# Patient Record
Sex: FEMALE | Race: BLACK OR AFRICAN AMERICAN | Employment: OTHER | URBAN - METROPOLITAN AREA
[De-identification: names, ages, dates, MRNs, and addresses within clinical notes are randomized per-mention and may not be internally consistent; named-entity substitution may affect disease eponyms.]

---

## 2018-10-11 RX ORDER — ENALAPRIL MALEATE 10 MG/1
10 TABLET ORAL 2 TIMES DAILY
Status: ON HOLD | COMMUNITY
End: 2020-08-27

## 2018-10-11 RX ORDER — BUMETANIDE 1 MG/1
1 TABLET ORAL 2 TIMES DAILY
Status: ON HOLD | COMMUNITY
End: 2020-08-27

## 2018-10-11 RX ORDER — CHOLECALCIFEROL (VITAMIN D3) 125 MCG
1000 CAPSULE ORAL DAILY
COMMUNITY
End: 2020-05-20

## 2018-10-11 RX ORDER — CARVEDILOL 25 MG/1
25 TABLET ORAL NIGHTLY
COMMUNITY
End: 2019-12-03 | Stop reason: DRUGHIGH

## 2018-10-11 RX ORDER — CARVEDILOL 25 MG/1
25 TABLET ORAL 2 TIMES DAILY WITH MEALS
Status: ON HOLD | COMMUNITY
End: 2020-01-01

## 2018-10-11 RX ORDER — RANITIDINE 150 MG/1
150 TABLET ORAL 2 TIMES DAILY
Status: ON HOLD | COMMUNITY
End: 2018-10-30

## 2018-10-17 ENCOUNTER — APPOINTMENT (OUTPATIENT)
Dept: LAB | Facility: HOSPITAL | Age: 73
End: 2018-10-17
Attending: FAMILY MEDICINE
Payer: MEDICARE

## 2018-10-17 ENCOUNTER — TELEPHONE (OUTPATIENT)
Dept: FAMILY MEDICINE CLINIC | Facility: CLINIC | Age: 73
End: 2018-10-17

## 2018-10-17 ENCOUNTER — HOSPITAL ENCOUNTER (OUTPATIENT)
Dept: GENERAL RADIOLOGY | Facility: HOSPITAL | Age: 73
Discharge: HOME | End: 2018-10-17
Attending: FAMILY MEDICINE
Payer: MEDICARE

## 2018-10-17 ENCOUNTER — OFFICE VISIT (OUTPATIENT)
Dept: FAMILY MEDICINE CLINIC | Facility: CLINIC | Age: 73
End: 2018-10-17
Payer: MEDICARE

## 2018-10-17 VITALS
BODY MASS INDEX: 40.67 KG/M2 | TEMPERATURE: 98 F | DIASTOLIC BLOOD PRESSURE: 72 MMHG | OXYGEN SATURATION: 96 % | RESPIRATION RATE: 16 BRPM | WEIGHT: 221 LBS | HEIGHT: 62 IN | HEART RATE: 61 BPM | SYSTOLIC BLOOD PRESSURE: 162 MMHG

## 2018-10-17 DIAGNOSIS — I42.9 CARDIOMYOPATHY, UNSPECIFIED TYPE (HCC): ICD-10-CM

## 2018-10-17 DIAGNOSIS — Z01.818 PREOP EXAMINATION: Primary | ICD-10-CM

## 2018-10-17 DIAGNOSIS — Z01.812 PRE-OPERATIVE LABORATORY EXAMINATION: ICD-10-CM

## 2018-10-17 DIAGNOSIS — Z01.818 PREOP EXAMINATION: ICD-10-CM

## 2018-10-17 DIAGNOSIS — E11.9 CONTROLLED TYPE 2 DIABETES MELLITUS WITHOUT COMPLICATION, WITH LONG-TERM CURRENT USE OF INSULIN (HCC): ICD-10-CM

## 2018-10-17 DIAGNOSIS — Z79.4 CONTROLLED TYPE 2 DIABETES MELLITUS WITHOUT COMPLICATION, WITH LONG-TERM CURRENT USE OF INSULIN (HCC): ICD-10-CM

## 2018-10-17 PROBLEM — E11.22 TYPE 2 DIABETES MELLITUS WITH CHRONIC KIDNEY DISEASE, WITH LONG-TERM CURRENT USE OF INSULIN (HCC): Status: ACTIVE | Noted: 2018-10-17

## 2018-10-17 PROBLEM — I10 ESSENTIAL HYPERTENSION: Status: ACTIVE | Noted: 2018-10-17

## 2018-10-17 PROBLEM — I42.8 NONISCHEMIC CARDIOMYOPATHY (HCC): Status: ACTIVE | Noted: 2018-10-17

## 2018-10-17 PROBLEM — M17.11 ARTHRITIS OF RIGHT KNEE: Status: ACTIVE | Noted: 2018-10-17

## 2018-10-17 PROBLEM — K21.00 GERD WITH ESOPHAGITIS: Status: ACTIVE | Noted: 2018-10-17

## 2018-10-17 PROBLEM — N28.9 RENAL INSUFFICIENCY: Status: ACTIVE | Noted: 2018-10-17

## 2018-10-17 PROCEDURE — 80053 COMPREHEN METABOLIC PANEL: CPT

## 2018-10-17 PROCEDURE — 86850 RBC ANTIBODY SCREEN: CPT

## 2018-10-17 PROCEDURE — 93010 ELECTROCARDIOGRAM REPORT: CPT | Performed by: FAMILY MEDICINE

## 2018-10-17 PROCEDURE — 86900 BLOOD TYPING SEROLOGIC ABO: CPT

## 2018-10-17 PROCEDURE — 81015 MICROSCOPIC EXAM OF URINE: CPT

## 2018-10-17 PROCEDURE — 36415 COLL VENOUS BLD VENIPUNCTURE: CPT

## 2018-10-17 PROCEDURE — 71046 X-RAY EXAM CHEST 2 VIEWS: CPT | Performed by: FAMILY MEDICINE

## 2018-10-17 PROCEDURE — 99205 OFFICE O/P NEW HI 60 MIN: CPT | Performed by: FAMILY MEDICINE

## 2018-10-17 PROCEDURE — 87186 SC STD MICRODIL/AGAR DIL: CPT

## 2018-10-17 PROCEDURE — 87081 CULTURE SCREEN ONLY: CPT

## 2018-10-17 PROCEDURE — 83036 HEMOGLOBIN GLYCOSYLATED A1C: CPT

## 2018-10-17 PROCEDURE — 93005 ELECTROCARDIOGRAM TRACING: CPT

## 2018-10-17 PROCEDURE — 86901 BLOOD TYPING SEROLOGIC RH(D): CPT

## 2018-10-17 PROCEDURE — 87086 URINE CULTURE/COLONY COUNT: CPT

## 2018-10-17 PROCEDURE — 85025 COMPLETE CBC W/AUTO DIFF WBC: CPT

## 2018-10-17 PROCEDURE — 87077 CULTURE AEROBIC IDENTIFY: CPT

## 2018-10-17 RX ORDER — LOPERAMIDE HYDROCHLORIDE 2 MG/1
2 CAPSULE ORAL AS NEEDED
Status: ON HOLD | COMMUNITY
End: 2020-01-01

## 2018-10-17 NOTE — TELEPHONE ENCOUNTER
Surgery on 10/29/18, RTKA with Dr. Ashley Marroquin @ [address] [place] [address]  H&P- complete  Labs- elevated HA1C (7.0), elevated BUN (28) and creatinine (1.52), low ALT (11) AST (12) and alk phos (144), low GFR (39), low HGB (10.2), low HCT (31.5), E. Coli grew out in the urine will n

## 2018-10-17 NOTE — PROGRESS NOTES
Pre-Op Evaluation     Date of surgery: 10/29/18               Procedure: R TKA  Physician: Dr. Kia Ramos    Pertinent PMH: LBP, Cataract 2017, CHF, Reflux, HBP, R thigh surgery d/t spider bite, Renal disorder, Type II DM  Living situation: Flora lives alone

## 2018-10-18 NOTE — H&P
[facility] PRE-OP CLINIC [place]    PRE-OP NOTE    HPI:   Lissa Bridges is a 75year old female with a hx of severe degenerative arthritis right knee who presents for a pre-operative physical exam. Patient is to have Right TKA by Dr. Nell Schwab on 10/29/18 at Cataract 2017    surgery   • Congestive heart disease ([facility] [address]    • Esophageal reflux    • High blood pressure    • History of blood transfusion 2003    r/t thigh surgery from spider bite-debridement   • Osteoarthritis    • Renal disorder     watching renal c lesion,   CARDIOVASCULAR:  See above Denies DVT.  Denies chest pain, palpitations, edema, dyspnea  RESPIRATORY:  Denies JOAN, Denies shortness of breath, wheezing, cough  GASTROINTESTINAL:  Denies abdominal pain, nausea, vomiting, constipation, diarrhea, or intact      Lab Results   Component Value Date    WBC 6.8 10/17/2018    HGB 10.2 (L) 10/17/2018    HCT 31.5 (L) 10/17/2018     10/17/2018    CREATSERUM 1.52 (H) 10/17/2018    BUN 28 (H) 10/17/2018     10/17/2018    K 4.4 10/17/2018     1 acceptable risk of surgery and may proceed with surgery as planned. Patient to discontinue medications and supplements with anticoagulation properties as per instruction.        Postoperative Recommendations:  Anticoagulation / DVT prophylaxis: SCDs, ASA 3

## 2018-10-18 NOTE — H&P (VIEW-ONLY)
[facility] PRE-OP CLINIC [facility]    PRE-OP NOTE    HPI:   Thea Mclean is a 75year old female with a hx of severe degenerative arthritis right knee who presents for a pre-operative physical exam. Patient is to have Right TKA by Dr. Juany Nevarez on 10/29/18 at Cataract 2017    surgery   • Congestive heart disease ([facility] [address]    • Esophageal reflux    • High blood pressure    • History of blood transfusion 2003    r/t thigh surgery from spider bite-debridement   • Osteoarthritis    • Renal disorder     watching renal c lesion,   CARDIOVASCULAR:  See above Denies DVT.  Denies chest pain, palpitations, edema, dyspnea  RESPIRATORY:  Denies JOAN, Denies shortness of breath, wheezing, cough  GASTROINTESTINAL:  Denies abdominal pain, nausea, vomiting, constipation, diarrhea, or intact      Lab Results   Component Value Date    WBC 6.8 10/17/2018    HGB 10.2 (L) 10/17/2018    HCT 31.5 (L) 10/17/2018     10/17/2018    CREATSERUM 1.52 (H) 10/17/2018    BUN 28 (H) 10/17/2018     10/17/2018    K 4.4 10/17/2018     1 acceptable risk of surgery and may proceed with surgery as planned. Patient to discontinue medications and supplements with anticoagulation properties as per instruction.        Postoperative Recommendations:  Anticoagulation / DVT prophylaxis: SCDs, ASA 3

## 2018-10-18 NOTE — ADDENDUM NOTE
Addended by: Kasandra John. on: 10/17/2018 09:41 PM     Modules accepted: Level of Service, SmartSet

## 2018-10-23 ENCOUNTER — HOSPITAL ENCOUNTER (OUTPATIENT)
Dept: CV DIAGNOSTICS | Facility: HOSPITAL | Age: 73
Discharge: HOME | End: 2018-10-23
Attending: FAMILY MEDICINE
Payer: MEDICARE

## 2018-10-23 ENCOUNTER — HOSPITAL ENCOUNTER (OUTPATIENT)
Dept: NUCLEAR MEDICINE | Facility: HOSPITAL | Age: 73
Discharge: HOME | End: 2018-10-23
Attending: FAMILY MEDICINE
Payer: MEDICARE

## 2018-10-23 DIAGNOSIS — I42.9 CARDIOMYOPATHY, UNSPECIFIED TYPE (HCC): ICD-10-CM

## 2018-10-23 PROCEDURE — 93016 CV STRESS TEST SUPVJ ONLY: CPT | Performed by: FAMILY MEDICINE

## 2018-10-23 PROCEDURE — 78452 HT MUSCLE IMAGE SPECT MULT: CPT | Performed by: FAMILY MEDICINE

## 2018-10-23 PROCEDURE — 93017 CV STRESS TEST TRACING ONLY: CPT | Performed by: FAMILY MEDICINE

## 2018-10-23 PROCEDURE — A4216 STERILE WATER/SALINE, 10 ML: HCPCS

## 2018-10-23 PROCEDURE — 93018 CV STRESS TEST I&R ONLY: CPT | Performed by: FAMILY MEDICINE

## 2018-10-23 RX ORDER — 0.9 % SODIUM CHLORIDE 0.9 %
VIAL (ML) INJECTION
Status: COMPLETED
Start: 2018-10-23 | End: 2018-10-23

## 2018-10-23 RX ADMIN — 0.9 % SODIUM CHLORIDE 10 ML: 0.9 % VIAL (ML) INJECTION at 09:29:00

## 2018-10-23 NOTE — TELEPHONE ENCOUNTER
Dr. Trae Briones please review    Labs- elevated HA1C (7.0), elevated BUN (28) and creatinine (1.52), low ALT (11) AST (12) and alk phos (144), low GFR (39), low HGB (10.2), low HCT (31.5), E. Coli grew out in the urine will need to treat.    EKG- abnormal, arabella

## 2018-10-24 NOTE — TELEPHONE ENCOUNTER
Caitie Mart MD  P [facility] Pre-Op Clinic Clinical Staff             Positive urine culture. Recommend keflex 500 mg tid for 10 days.

## 2018-10-26 RX ORDER — NITROFURANTOIN 25; 75 MG/1; MG/1
100 CAPSULE ORAL 2 TIMES DAILY
Qty: 14 CAPSULE | Refills: 0 | Status: ON HOLD | OUTPATIENT
Start: 2018-10-26 | End: 2018-11-21

## 2018-10-26 NOTE — DISCHARGE SUMMARY
Ortho Discharge Summary     Patient ID:  Andressa Fernandez  [id number]  75year old  9/8/1945      Admit Date: 10/29/2018    Discharge Date and Time: 10/30/2018    Attending Physician: Andrey Bell MD     Reason for admission: right knee DJD    Discharge D

## 2018-10-26 NOTE — TELEPHONE ENCOUNTER
Spoke to patient, Rx sent to [facility] [address] in [facility]. Her Daughter will  and she will start Rx today. Per Dr. Chen Balderrama ok if she only takes a few days prior to surgery, she will also get IV antibiotics during surgery.  Let her know she is clear for

## 2018-10-26 NOTE — TELEPHONE ENCOUNTER
Per Dr. Jeff Angel since patient has a penicillin allergy we can rodriguez antibiotic to Macrobid 100mg BID x7 days. She will take before and after surgery. She will call me back when she has the pharmacy she wants Rx to go to.    Stress test mailed to patient's a

## 2018-10-29 ENCOUNTER — HOSPITAL ENCOUNTER (INPATIENT)
Facility: HOSPITAL | Age: 73
LOS: 1 days | Discharge: HOME | DRG: 470 | End: 2018-10-30
Attending: ORTHOPAEDIC SURGERY | Admitting: ORTHOPAEDIC SURGERY
Payer: MEDICARE

## 2018-10-29 ENCOUNTER — ANESTHESIA (OUTPATIENT)
Dept: SURGERY | Facility: HOSPITAL | Age: 73
DRG: 470 | End: 2018-10-29
Payer: MEDICARE

## 2018-10-29 ENCOUNTER — ANESTHESIA EVENT (OUTPATIENT)
Dept: SURGERY | Facility: HOSPITAL | Age: 73
DRG: 470 | End: 2018-10-29
Payer: MEDICARE

## 2018-10-29 ENCOUNTER — APPOINTMENT (OUTPATIENT)
Dept: GENERAL RADIOLOGY | Facility: HOSPITAL | Age: 73
DRG: 470 | End: 2018-10-29
Attending: PHYSICIAN ASSISTANT
Payer: MEDICARE

## 2018-10-29 DIAGNOSIS — M17.11 PRIMARY OSTEOARTHRITIS OF RIGHT KNEE: Primary | ICD-10-CM

## 2018-10-29 DIAGNOSIS — D50.9 IRON DEFICIENCY ANEMIA, UNSPECIFIED IRON DEFICIENCY ANEMIA TYPE: ICD-10-CM

## 2018-10-29 PROBLEM — Z96.659 S/P KNEE REPLACEMENT: Status: ACTIVE | Noted: 2018-10-29

## 2018-10-29 PROCEDURE — 0SRC0J9 REPLACEMENT OF RIGHT KNEE JOINT WITH SYNTHETIC SUBSTITUTE, CEMENTED, OPEN APPROACH: ICD-10-PCS | Performed by: ORTHOPAEDIC SURGERY

## 2018-10-29 PROCEDURE — 73560 X-RAY EXAM OF KNEE 1 OR 2: CPT | Performed by: PHYSICIAN ASSISTANT

## 2018-10-29 PROCEDURE — 97116 GAIT TRAINING THERAPY: CPT

## 2018-10-29 PROCEDURE — 80053 COMPREHEN METABOLIC PANEL: CPT | Performed by: PHYSICIAN ASSISTANT

## 2018-10-29 PROCEDURE — 82962 GLUCOSE BLOOD TEST: CPT

## 2018-10-29 PROCEDURE — A4216 STERILE WATER/SALINE, 10 ML: HCPCS | Performed by: PHYSICIAN ASSISTANT

## 2018-10-29 PROCEDURE — 88305 TISSUE EXAM BY PATHOLOGIST: CPT | Performed by: ORTHOPAEDIC SURGERY

## 2018-10-29 PROCEDURE — 88311 DECALCIFY TISSUE: CPT | Performed by: ORTHOPAEDIC SURGERY

## 2018-10-29 PROCEDURE — 85014 HEMATOCRIT: CPT | Performed by: PHYSICIAN ASSISTANT

## 2018-10-29 PROCEDURE — 97162 PT EVAL MOD COMPLEX 30 MIN: CPT

## 2018-10-29 PROCEDURE — 85018 HEMOGLOBIN: CPT | Performed by: PHYSICIAN ASSISTANT

## 2018-10-29 RX ORDER — DIPHENHYDRAMINE HYDROCHLORIDE 50 MG/ML
25 INJECTION INTRAMUSCULAR; INTRAVENOUS ONCE AS NEEDED
Status: ACTIVE | OUTPATIENT
Start: 2018-10-29 | End: 2018-10-29

## 2018-10-29 RX ORDER — FAMOTIDINE 10 MG/ML
20 INJECTION, SOLUTION INTRAVENOUS 2 TIMES DAILY
Status: DISCONTINUED | OUTPATIENT
Start: 2018-10-29 | End: 2018-10-30

## 2018-10-29 RX ORDER — DEXAMETHASONE SODIUM PHOSPHATE 4 MG/ML
VIAL (ML) INJECTION AS NEEDED
Status: DISCONTINUED | OUTPATIENT
Start: 2018-10-29 | End: 2018-10-29 | Stop reason: SURG

## 2018-10-29 RX ORDER — DOCUSATE SODIUM 100 MG/1
100 CAPSULE, LIQUID FILLED ORAL 2 TIMES DAILY
Qty: 60 CAPSULE | Refills: 2 | Status: SHIPPED | OUTPATIENT
Start: 2018-10-29 | End: 2019-06-24

## 2018-10-29 RX ORDER — FAMOTIDINE 20 MG/1
20 TABLET ORAL ONCE
Status: DISCONTINUED | OUTPATIENT
Start: 2018-10-29 | End: 2018-10-29 | Stop reason: HOSPADM

## 2018-10-29 RX ORDER — CLINDAMYCIN PHOSPHATE 900 MG/50ML
900 INJECTION INTRAVENOUS ONCE
Status: DISCONTINUED | OUTPATIENT
Start: 2018-10-29 | End: 2018-10-29 | Stop reason: HOSPADM

## 2018-10-29 RX ORDER — DEXTROSE MONOHYDRATE 25 G/50ML
50 INJECTION, SOLUTION INTRAVENOUS AS NEEDED
Status: DISCONTINUED | OUTPATIENT
Start: 2018-10-29 | End: 2018-10-30

## 2018-10-29 RX ORDER — HYDROCODONE BITARTRATE AND ACETAMINOPHEN 5; 325 MG/1; MG/1
2 TABLET ORAL AS NEEDED
Status: DISCONTINUED | OUTPATIENT
Start: 2018-10-29 | End: 2018-10-29 | Stop reason: HOSPADM

## 2018-10-29 RX ORDER — CARVEDILOL 25 MG/1
25 TABLET ORAL NIGHTLY
Status: DISCONTINUED | OUTPATIENT
Start: 2018-10-29 | End: 2018-10-30

## 2018-10-29 RX ORDER — CLINDAMYCIN PHOSPHATE 150 MG/ML
INJECTION, SOLUTION INTRAVENOUS AS NEEDED
Status: DISCONTINUED | OUTPATIENT
Start: 2018-10-29 | End: 2018-10-29 | Stop reason: SURG

## 2018-10-29 RX ORDER — DIPHENHYDRAMINE HYDROCHLORIDE 50 MG/ML
12.5 INJECTION INTRAMUSCULAR; INTRAVENOUS EVERY 4 HOURS PRN
Status: DISCONTINUED | OUTPATIENT
Start: 2018-10-29 | End: 2018-10-30

## 2018-10-29 RX ORDER — BISACODYL 10 MG
10 SUPPOSITORY, RECTAL RECTAL
Status: DISCONTINUED | OUTPATIENT
Start: 2018-10-29 | End: 2018-10-30

## 2018-10-29 RX ORDER — NALOXONE HYDROCHLORIDE 0.4 MG/ML
80 INJECTION, SOLUTION INTRAMUSCULAR; INTRAVENOUS; SUBCUTANEOUS AS NEEDED
Status: DISCONTINUED | OUTPATIENT
Start: 2018-10-29 | End: 2018-10-29 | Stop reason: HOSPADM

## 2018-10-29 RX ORDER — ASPIRIN 325 MG
325 TABLET ORAL 2 TIMES DAILY
Status: DISCONTINUED | OUTPATIENT
Start: 2018-10-29 | End: 2018-10-30

## 2018-10-29 RX ORDER — MORPHINE SULFATE 4 MG/ML
2 INJECTION, SOLUTION INTRAMUSCULAR; INTRAVENOUS EVERY 10 MIN PRN
Status: DISCONTINUED | OUTPATIENT
Start: 2018-10-29 | End: 2018-10-29 | Stop reason: HOSPADM

## 2018-10-29 RX ORDER — METOPROLOL TARTRATE 5 MG/5ML
2.5 INJECTION INTRAVENOUS ONCE
Status: DISCONTINUED | OUTPATIENT
Start: 2018-10-29 | End: 2018-10-29 | Stop reason: HOSPADM

## 2018-10-29 RX ORDER — DIPHENHYDRAMINE HCL 25 MG
25 CAPSULE ORAL EVERY 4 HOURS PRN
Status: DISCONTINUED | OUTPATIENT
Start: 2018-10-29 | End: 2018-10-30

## 2018-10-29 RX ORDER — MORPHINE SULFATE 4 MG/ML
4 INJECTION, SOLUTION INTRAMUSCULAR; INTRAVENOUS EVERY 10 MIN PRN
Status: DISCONTINUED | OUTPATIENT
Start: 2018-10-29 | End: 2018-10-29 | Stop reason: HOSPADM

## 2018-10-29 RX ORDER — HYDROCODONE BITARTRATE AND ACETAMINOPHEN 5; 325 MG/1; MG/1
1 TABLET ORAL AS NEEDED
Status: DISCONTINUED | OUTPATIENT
Start: 2018-10-29 | End: 2018-10-29 | Stop reason: HOSPADM

## 2018-10-29 RX ORDER — METOCLOPRAMIDE 10 MG/1
10 TABLET ORAL ONCE
Status: COMPLETED | OUTPATIENT
Start: 2018-10-29 | End: 2018-10-29

## 2018-10-29 RX ORDER — DOCUSATE SODIUM 100 MG/1
100 CAPSULE, LIQUID FILLED ORAL 2 TIMES DAILY
Status: DISCONTINUED | OUTPATIENT
Start: 2018-10-29 | End: 2018-10-30

## 2018-10-29 RX ORDER — BUMETANIDE 1 MG/1
1 TABLET ORAL 2 TIMES DAILY
Status: DISCONTINUED | OUTPATIENT
Start: 2018-10-30 | End: 2018-10-30

## 2018-10-29 RX ORDER — CELECOXIB 200 MG/1
200 CAPSULE ORAL ONCE
Status: COMPLETED | OUTPATIENT
Start: 2018-10-29 | End: 2018-10-29

## 2018-10-29 RX ORDER — ONDANSETRON 4 MG/1
4 TABLET, FILM COATED ORAL EVERY 8 HOURS PRN
Qty: 30 TABLET | Refills: 0 | Status: SHIPPED | OUTPATIENT
Start: 2018-10-29 | End: 2019-06-24

## 2018-10-29 RX ORDER — ONDANSETRON 2 MG/ML
4 INJECTION INTRAMUSCULAR; INTRAVENOUS EVERY 4 HOURS PRN
Status: DISCONTINUED | OUTPATIENT
Start: 2018-10-29 | End: 2018-10-30

## 2018-10-29 RX ORDER — BUPIVACAINE HYDROCHLORIDE 7.5 MG/ML
INJECTION, SOLUTION EPIDURAL; RETROBULBAR AS NEEDED
Status: DISCONTINUED | OUTPATIENT
Start: 2018-10-29 | End: 2018-10-29 | Stop reason: SURG

## 2018-10-29 RX ORDER — ENALAPRIL MALEATE 10 MG/1
10 TABLET ORAL 2 TIMES DAILY
Status: DISCONTINUED | OUTPATIENT
Start: 2018-10-30 | End: 2018-10-30

## 2018-10-29 RX ORDER — ONDANSETRON 2 MG/ML
4 INJECTION INTRAMUSCULAR; INTRAVENOUS ONCE AS NEEDED
Status: COMPLETED | OUTPATIENT
Start: 2018-10-29 | End: 2018-10-29

## 2018-10-29 RX ORDER — HYDROMORPHONE HYDROCHLORIDE 1 MG/ML
0.8 INJECTION, SOLUTION INTRAMUSCULAR; INTRAVENOUS; SUBCUTANEOUS EVERY 2 HOUR PRN
Status: DISCONTINUED | OUTPATIENT
Start: 2018-10-29 | End: 2018-10-30

## 2018-10-29 RX ORDER — MIDAZOLAM HYDROCHLORIDE 1 MG/ML
INJECTION INTRAMUSCULAR; INTRAVENOUS AS NEEDED
Status: DISCONTINUED | OUTPATIENT
Start: 2018-10-29 | End: 2018-10-29 | Stop reason: SURG

## 2018-10-29 RX ORDER — SODIUM CHLORIDE, SODIUM LACTATE, POTASSIUM CHLORIDE, CALCIUM CHLORIDE 600; 310; 30; 20 MG/100ML; MG/100ML; MG/100ML; MG/100ML
INJECTION, SOLUTION INTRAVENOUS CONTINUOUS
Status: DISCONTINUED | OUTPATIENT
Start: 2018-10-29 | End: 2018-10-30

## 2018-10-29 RX ORDER — LABETALOL HYDROCHLORIDE 5 MG/ML
10 INJECTION, SOLUTION INTRAVENOUS ONCE
Status: COMPLETED | OUTPATIENT
Start: 2018-10-29 | End: 2018-10-29

## 2018-10-29 RX ORDER — CLINDAMYCIN PHOSPHATE 900 MG/50ML
900 INJECTION INTRAVENOUS EVERY 8 HOURS
Status: COMPLETED | OUTPATIENT
Start: 2018-10-29 | End: 2018-10-30

## 2018-10-29 RX ORDER — MORPHINE SULFATE 10 MG/ML
6 INJECTION, SOLUTION INTRAMUSCULAR; INTRAVENOUS EVERY 10 MIN PRN
Status: DISCONTINUED | OUTPATIENT
Start: 2018-10-29 | End: 2018-10-29 | Stop reason: HOSPADM

## 2018-10-29 RX ORDER — HALOPERIDOL 5 MG/ML
0.25 INJECTION INTRAMUSCULAR ONCE AS NEEDED
Status: DISCONTINUED | OUTPATIENT
Start: 2018-10-29 | End: 2018-10-29 | Stop reason: HOSPADM

## 2018-10-29 RX ORDER — SODIUM CHLORIDE, SODIUM LACTATE, POTASSIUM CHLORIDE, CALCIUM CHLORIDE 600; 310; 30; 20 MG/100ML; MG/100ML; MG/100ML; MG/100ML
INJECTION, SOLUTION INTRAVENOUS CONTINUOUS
Status: DISCONTINUED | OUTPATIENT
Start: 2018-10-29 | End: 2018-10-29 | Stop reason: HOSPADM

## 2018-10-29 RX ORDER — CARVEDILOL 25 MG/1
50 TABLET ORAL
Status: DISCONTINUED | OUTPATIENT
Start: 2018-10-30 | End: 2018-10-30

## 2018-10-29 RX ORDER — FAMOTIDINE 20 MG/1
20 TABLET ORAL 2 TIMES DAILY
Status: DISCONTINUED | OUTPATIENT
Start: 2018-10-29 | End: 2018-10-30

## 2018-10-29 RX ORDER — HYDRALAZINE HYDROCHLORIDE 20 MG/ML
10 INJECTION INTRAMUSCULAR; INTRAVENOUS ONCE
Status: COMPLETED | OUTPATIENT
Start: 2018-10-29 | End: 2018-10-29

## 2018-10-29 RX ORDER — CYCLOBENZAPRINE HCL 5 MG
5 TABLET ORAL EVERY 8 HOURS PRN
Status: DISCONTINUED | OUTPATIENT
Start: 2018-10-29 | End: 2018-10-30

## 2018-10-29 RX ORDER — SODIUM CHLORIDE 0.9 % (FLUSH) 0.9 %
10 SYRINGE (ML) INJECTION AS NEEDED
Status: DISCONTINUED | OUTPATIENT
Start: 2018-10-29 | End: 2018-10-30

## 2018-10-29 RX ORDER — ACETAMINOPHEN 500 MG
1000 TABLET ORAL ONCE
Status: DISCONTINUED | OUTPATIENT
Start: 2018-10-29 | End: 2018-10-29 | Stop reason: HOSPADM

## 2018-10-29 RX ORDER — METOCLOPRAMIDE HYDROCHLORIDE 5 MG/ML
10 INJECTION INTRAMUSCULAR; INTRAVENOUS EVERY 6 HOURS PRN
Status: DISCONTINUED | OUTPATIENT
Start: 2018-10-29 | End: 2018-10-30

## 2018-10-29 RX ORDER — SODIUM PHOSPHATE, DIBASIC AND SODIUM PHOSPHATE, MONOBASIC 7; 19 G/133ML; G/133ML
1 ENEMA RECTAL ONCE AS NEEDED
Status: DISCONTINUED | OUTPATIENT
Start: 2018-10-29 | End: 2018-10-30

## 2018-10-29 RX ORDER — DEXTROSE MONOHYDRATE 25 G/50ML
50 INJECTION, SOLUTION INTRAVENOUS
Status: DISCONTINUED | OUTPATIENT
Start: 2018-10-29 | End: 2018-10-29 | Stop reason: HOSPADM

## 2018-10-29 RX ORDER — POLYETHYLENE GLYCOL 3350 17 G/17G
17 POWDER, FOR SOLUTION ORAL DAILY PRN
Status: DISCONTINUED | OUTPATIENT
Start: 2018-10-29 | End: 2018-10-30

## 2018-10-29 RX ORDER — HYDROMORPHONE HYDROCHLORIDE 2 MG/1
TABLET ORAL
Qty: 60 TABLET | Refills: 0 | Status: ON HOLD | OUTPATIENT
Start: 2018-10-29 | End: 2018-11-19

## 2018-10-29 RX ORDER — HYDROMORPHONE HYDROCHLORIDE 2 MG/1
TABLET ORAL EVERY 4 HOURS PRN
Status: DISCONTINUED | OUTPATIENT
Start: 2018-10-29 | End: 2018-10-30

## 2018-10-29 RX ORDER — HYDROMORPHONE HYDROCHLORIDE 1 MG/ML
0.4 INJECTION, SOLUTION INTRAMUSCULAR; INTRAVENOUS; SUBCUTANEOUS EVERY 2 HOUR PRN
Status: DISCONTINUED | OUTPATIENT
Start: 2018-10-29 | End: 2018-10-30

## 2018-10-29 RX ORDER — LIDOCAINE HYDROCHLORIDE 10 MG/ML
INJECTION, SOLUTION EPIDURAL; INFILTRATION; INTRACAUDAL; PERINEURAL AS NEEDED
Status: DISCONTINUED | OUTPATIENT
Start: 2018-10-29 | End: 2018-10-29 | Stop reason: SURG

## 2018-10-29 RX ORDER — ASPIRIN 325 MG
325 TABLET, DELAYED RELEASE (ENTERIC COATED) ORAL 2 TIMES DAILY
Qty: 60 TABLET | Refills: 0 | Status: ON HOLD | OUTPATIENT
Start: 2018-10-29 | End: 2018-11-19

## 2018-10-29 RX ORDER — SCOLOPAMINE TRANSDERMAL SYSTEM 1 MG/1
1 PATCH, EXTENDED RELEASE TRANSDERMAL ONCE
Status: DISCONTINUED | OUTPATIENT
Start: 2018-10-29 | End: 2018-10-30

## 2018-10-29 RX ORDER — PANTOPRAZOLE SODIUM 40 MG/1
40 TABLET, DELAYED RELEASE ORAL
Qty: 30 TABLET | Refills: 0 | Status: SHIPPED | OUTPATIENT
Start: 2018-10-29 | End: 2018-11-16

## 2018-10-29 RX ORDER — HYDROMORPHONE HYDROCHLORIDE 1 MG/ML
1.2 INJECTION, SOLUTION INTRAMUSCULAR; INTRAVENOUS; SUBCUTANEOUS EVERY 2 HOUR PRN
Status: DISCONTINUED | OUTPATIENT
Start: 2018-10-29 | End: 2018-10-30

## 2018-10-29 RX ORDER — ONDANSETRON 2 MG/ML
INJECTION INTRAMUSCULAR; INTRAVENOUS AS NEEDED
Status: DISCONTINUED | OUTPATIENT
Start: 2018-10-29 | End: 2018-10-29 | Stop reason: SURG

## 2018-10-29 RX ORDER — ACETAMINOPHEN 325 MG/1
650 TABLET ORAL EVERY 6 HOURS SCHEDULED
Status: DISCONTINUED | OUTPATIENT
Start: 2018-10-29 | End: 2018-10-30

## 2018-10-29 RX ADMIN — LIDOCAINE HYDROCHLORIDE 15 MG: 10 INJECTION, SOLUTION EPIDURAL; INFILTRATION; INTRACAUDAL; PERINEURAL at 10:20:00

## 2018-10-29 RX ADMIN — ONDANSETRON 4 MG: 2 INJECTION INTRAMUSCULAR; INTRAVENOUS at 10:31:00

## 2018-10-29 RX ADMIN — CLINDAMYCIN PHOSPHATE 900 MG: 150 INJECTION, SOLUTION INTRAVENOUS at 10:31:00

## 2018-10-29 RX ADMIN — MIDAZOLAM HYDROCHLORIDE 2 MG: 1 INJECTION INTRAMUSCULAR; INTRAVENOUS at 10:17:00

## 2018-10-29 RX ADMIN — DEXAMETHASONE SODIUM PHOSPHATE 4 MG: 4 MG/ML VIAL (ML) INJECTION at 10:31:00

## 2018-10-29 RX ADMIN — BUPIVACAINE HYDROCHLORIDE 1.4 ML: 7.5 INJECTION, SOLUTION EPIDURAL; RETROBULBAR at 10:24:00

## 2018-10-29 RX ADMIN — LIDOCAINE HYDROCHLORIDE 25 MG: 10 INJECTION, SOLUTION EPIDURAL; INFILTRATION; INTRACAUDAL; PERINEURAL at 10:25:00

## 2018-10-29 RX ADMIN — SODIUM CHLORIDE, SODIUM LACTATE, POTASSIUM CHLORIDE, CALCIUM CHLORIDE: 600; 310; 30; 20 INJECTION, SOLUTION INTRAVENOUS at 12:15:00

## 2018-10-29 NOTE — ANESTHESIA PREPROCEDURE EVALUATION
Anesthesia PreOp Note    HPI:     Dheeraj Falcon is a 75year old female who presents for preoperative consultation requested by: Sesar Boss MD    Date of Surgery: 10/29/2018    Procedure(s):  KNEE TOTAL REPLACEMENT  Indication: Right knee degenerativ carvedilol 25 MG Oral Tab Take 50 mg by mouth daily. Disp:  Rfl:  10/29/2018 at 0700   carvedilol 25 MG Oral Tab Take 25 mg by mouth nightly. Disp:  Rfl:  10/28/2018 at 2100   RaNITidine HCl 150 MG Oral Tab Take 150 mg by mouth 2 (two) times daily.  Disp: Once Syeda Munoz MD      No current [facility]-ordered outpatient medications on file.       Flagyl [Metronidazo*    HIVES, NAUSEA AND VOMITING  Hydrocodone             HIVES, NAUSEA AND VOMITING  Penicillins             HIVES  Asa [Aspirin]           NAUSEA A Component Value Date     10/17/2018    K 4.4 10/17/2018     10/17/2018    CO2 27 10/17/2018    BUN 28 (H) 10/17/2018    CREATSERUM 1.52 (H) 10/17/2018     (H) 10/17/2018    CA 9.7 10/17/2018          Vital Signs:   Body mass index is 38 planned.   Simin Dawson  10/29/2018 9:36 AM

## 2018-10-29 NOTE — INTERVAL H&P NOTE
Pre-op Diagnosis: Right knee degenerative joint disease    The above referenced H&P was reviewed by YARI Freedman on 10/29/2018, the patient was examined and no significant changes have occurred in the patient's condition since the H&P was performed.

## 2018-10-29 NOTE — PHYSICAL THERAPY NOTE
PHYSICAL THERAPY KNEE EVALUATION - INPATIENT       Room Number: 415/415-A  Evaluation Date: 10/29/2018  Type of Evaluation: Initial  Physician Order: PT Eval and Treat    Presenting Problem: R TKA  Reason for Therapy: Mobility Dysfunction and Discharge Scott Primary osteoarthritis of right knee      Past Medical History  Past Medical History:   Diagnosis Date   • Back problem    • Cataract 2017    surgery   • Congestive heart disease ([facility] [address]    • Esophageal reflux    • High blood pressure    • History of blood t Standing: Fair  Dynamic [facility] '6-Clicks' INPATIENT SHORT FORM - BASIC MOBILITY  How much difficulty does the patient currently have. ..  -   Turning over in bed (inclu Goal #3   Current Status    Goal #4 Patient will negotiate 25 stairs/one curb w/ assistive device and SBA   Goal #4   Current Status    Goal #5  AROM 0 degrees extension to 95 degrees flexion     Goal #5   Current Status    Goal #6 Patient independently

## 2018-10-29 NOTE — CM/SW NOTE
SW met with the patient at bedside. The patient lives alone in the apartment, 1 step to enter, 18 steps with railing to get in. The patient responds being independent prior to admission with all ADL's, ambulation and driving. Patient is using a cane.  The p

## 2018-10-29 NOTE — ANESTHESIA PROCEDURE NOTES
Spinal Block  Performed by: Iraida Wilson CRNA  Authorized by: Milind Giordano MD     Start Time:  10/29/2018 10:19 AM  End Time:  10/29/2018 10:24 AM  Site identification: surface landmarks    Reason for Block: at surgeon's request, procedure

## 2018-10-30 VITALS
OXYGEN SATURATION: 96 % | BODY MASS INDEX: 39.01 KG/M2 | HEART RATE: 57 BPM | TEMPERATURE: 98 F | DIASTOLIC BLOOD PRESSURE: 46 MMHG | HEIGHT: 62 IN | RESPIRATION RATE: 18 BRPM | SYSTOLIC BLOOD PRESSURE: 129 MMHG | WEIGHT: 212 LBS

## 2018-10-30 PROCEDURE — 97116 GAIT TRAINING THERAPY: CPT

## 2018-10-30 PROCEDURE — 82962 GLUCOSE BLOOD TEST: CPT

## 2018-10-30 PROCEDURE — 97110 THERAPEUTIC EXERCISES: CPT

## 2018-10-30 PROCEDURE — 97530 THERAPEUTIC ACTIVITIES: CPT

## 2018-10-30 PROCEDURE — 85025 COMPLETE CBC W/AUTO DIFF WBC: CPT | Performed by: FAMILY MEDICINE

## 2018-10-30 PROCEDURE — 83036 HEMOGLOBIN GLYCOSYLATED A1C: CPT | Performed by: FAMILY MEDICINE

## 2018-10-30 PROCEDURE — 80053 COMPREHEN METABOLIC PANEL: CPT | Performed by: FAMILY MEDICINE

## 2018-10-30 NOTE — PROGRESS NOTES
[place] [facility]    Ortho Medical Progress Note     Eriberto Hopkins Patient Status:  Inpatient    1945 MRN [id number]   Location [facility] 4W/SW/SE Attending Neftaly Spears MD   Hosp Day # 1 PCP No primary care provider on file. CA 9.0 10/30/2018    ALB 3.0 (L) 10/30/2018    ALKPHO 112 (H) 10/30/2018    BILT 0.7 10/30/2018    TP 5.6 (L) 10/30/2018    AST 11 (L) 10/30/2018    ALT 10 (L) 10/30/2018       Xr Knee (1 Or 2 Views), Right (cpt=73560)    Result Date: 10/29/2018  CONCLUSIO

## 2018-10-30 NOTE — PROGRESS NOTES
[facility]    Progress Note    Mingo Valdivia Patient Status:  Inpatient    1945 MRN [id number]   Location [facility] 4W/SW/SE Attending Jaxon Amaro MD   Hosp Day # 1 PCP No primary care provider on file.        Subjective:

## 2018-10-30 NOTE — CM/SW NOTE
CTL rounds: met with patient and her daughter at bedside. Flora is BPCI eligible under . [employer] program reviewed with patient and daughter - 80 day phone call follow up explained. Patient verbalized understanding. [employer] provided.

## 2018-10-30 NOTE — PROGRESS NOTES
[facility]    Progress Note    Whit Morris Patient Status:  Inpatient    1945 MRN [id number]   Location [facility] 4W/SW/SE Attending Elmer Thomson MD   Hosp Day # 0 PCP No primary care provider on file.        Subjective: replacement      S/P knee replacement  Stable. Aspirin for anticoagulation      Essential hypertension        Renal insufficiency        Nonischemic cardiomyopathy (HCC)  Stable.  Negative stress test. EF normal      GERD with esophagitis        Type 2 diab

## 2018-11-01 NOTE — OPERATIVE REPORT
[address] [place] [address]\I OPERATIVE REPORT\b PATIENT NAME: Marylen Silos MR#: [id number]\G PMD: Eric Johansen DATE OF PROCEDURE: 10/29/2018\b PREOPERATIVE DIAGNOSIS: Degenerative Arthritis right kn surgery program on Monday, October 29, 2018. Following proper identification in the preoperative holding area with confirmation of the above-stated procedure, the patient was brought to the main operating room suite. Procedure was confirmed.  She received 0 was based between the medial and middle third of the tibial tubercle. This was provisionally pinned. A size 5 right EMPOWR 3D Non-Porous Femur femoral component was placed along with a 14 mm polyethylene spacer.  With this in place, the knee was able to joao

## 2018-11-08 ENCOUNTER — LAB ENCOUNTER (OUTPATIENT)
Dept: LAB | Facility: HOSPITAL | Age: 73
End: 2018-11-08
Attending: NURSE PRACTITIONER
Payer: MEDICARE

## 2018-11-08 DIAGNOSIS — D50.9 IRON DEFICIENCY ANEMIA, UNSPECIFIED IRON DEFICIENCY ANEMIA TYPE: ICD-10-CM

## 2018-11-08 PROCEDURE — 36415 COLL VENOUS BLD VENIPUNCTURE: CPT

## 2018-11-08 PROCEDURE — 85025 COMPLETE CBC W/AUTO DIFF WBC: CPT

## 2018-11-16 RX ORDER — RANITIDINE 15 MG/ML
150 SOLUTION ORAL 2 TIMES DAILY
COMMUNITY
End: 2019-12-13 | Stop reason: ALTCHOICE

## 2018-11-19 ENCOUNTER — ANESTHESIA (OUTPATIENT)
Dept: SURGERY | Facility: HOSPITAL | Age: 73
DRG: 488 | End: 2018-11-19
Payer: MEDICARE

## 2018-11-19 ENCOUNTER — HOSPITAL ENCOUNTER (INPATIENT)
Facility: HOSPITAL | Age: 73
LOS: 3 days | Discharge: NURSING, REHAB OR LONG-TERM CARE | DRG: 488 | End: 2018-11-22
Attending: ORTHOPAEDIC SURGERY | Admitting: ORTHOPAEDIC SURGERY
Payer: MEDICARE

## 2018-11-19 ENCOUNTER — APPOINTMENT (OUTPATIENT)
Dept: GENERAL RADIOLOGY | Facility: HOSPITAL | Age: 73
DRG: 488 | End: 2018-11-19
Attending: PHYSICIAN ASSISTANT
Payer: MEDICARE

## 2018-11-19 ENCOUNTER — ANESTHESIA EVENT (OUTPATIENT)
Dept: SURGERY | Facility: HOSPITAL | Age: 73
DRG: 488 | End: 2018-11-19
Payer: MEDICARE

## 2018-11-19 DIAGNOSIS — S86.811A PATELLAR TENDON RUPTURE, RIGHT, INITIAL ENCOUNTER: Primary | ICD-10-CM

## 2018-11-19 DIAGNOSIS — S83.8X1A MENISCAL INJURY, RIGHT, INITIAL ENCOUNTER: ICD-10-CM

## 2018-11-19 PROBLEM — Z96.659 S/P REVISION OF TOTAL KNEE: Status: ACTIVE | Noted: 2018-11-19

## 2018-11-19 PROBLEM — S86.819A PATELLAR TENDON RUPTURE: Status: ACTIVE | Noted: 2018-11-19

## 2018-11-19 PROCEDURE — 86850 RBC ANTIBODY SCREEN: CPT | Performed by: ORTHOPAEDIC SURGERY

## 2018-11-19 PROCEDURE — 87641 MR-STAPH DNA AMP PROBE: CPT | Performed by: ORTHOPAEDIC SURGERY

## 2018-11-19 PROCEDURE — 85025 COMPLETE CBC W/AUTO DIFF WBC: CPT | Performed by: ORTHOPAEDIC SURGERY

## 2018-11-19 PROCEDURE — 86901 BLOOD TYPING SEROLOGIC RH(D): CPT | Performed by: ORTHOPAEDIC SURGERY

## 2018-11-19 PROCEDURE — 89050 BODY FLUID CELL COUNT: CPT | Performed by: ORTHOPAEDIC SURGERY

## 2018-11-19 PROCEDURE — 73560 X-RAY EXAM OF KNEE 1 OR 2: CPT | Performed by: PHYSICIAN ASSISTANT

## 2018-11-19 PROCEDURE — 0SUV09Z SUPPLEMENT RIGHT KNEE JOINT, TIBIAL SURFACE WITH LINER, OPEN APPROACH: ICD-10-PCS | Performed by: ORTHOPAEDIC SURGERY

## 2018-11-19 PROCEDURE — 86900 BLOOD TYPING SEROLOGIC ABO: CPT | Performed by: ORTHOPAEDIC SURGERY

## 2018-11-19 PROCEDURE — 89051 BODY FLUID CELL COUNT: CPT | Performed by: ORTHOPAEDIC SURGERY

## 2018-11-19 PROCEDURE — 0LQQ0ZZ REPAIR RIGHT KNEE TENDON, OPEN APPROACH: ICD-10-PCS | Performed by: ORTHOPAEDIC SURGERY

## 2018-11-19 PROCEDURE — 86920 COMPATIBILITY TEST SPIN: CPT

## 2018-11-19 PROCEDURE — 0SPC09Z REMOVAL OF LINER FROM RIGHT KNEE JOINT, OPEN APPROACH: ICD-10-PCS | Performed by: ORTHOPAEDIC SURGERY

## 2018-11-19 PROCEDURE — 82962 GLUCOSE BLOOD TEST: CPT

## 2018-11-19 PROCEDURE — 87205 SMEAR GRAM STAIN: CPT | Performed by: ORTHOPAEDIC SURGERY

## 2018-11-19 PROCEDURE — 80053 COMPREHEN METABOLIC PANEL: CPT | Performed by: ORTHOPAEDIC SURGERY

## 2018-11-19 PROCEDURE — 0MQN0ZZ REPAIR RIGHT KNEE BURSA AND LIGAMENT, OPEN APPROACH: ICD-10-PCS | Performed by: ORTHOPAEDIC SURGERY

## 2018-11-19 PROCEDURE — 0LUQ0JZ SUPPLEMENT RIGHT KNEE TENDON WITH SYNTHETIC SUBSTITUTE, OPEN APPROACH: ICD-10-PCS | Performed by: ORTHOPAEDIC SURGERY

## 2018-11-19 PROCEDURE — 87070 CULTURE OTHR SPECIMN AEROBIC: CPT | Performed by: ORTHOPAEDIC SURGERY

## 2018-11-19 PROCEDURE — 88300 SURGICAL PATH GROSS: CPT | Performed by: ORTHOPAEDIC SURGERY

## 2018-11-19 PROCEDURE — 83036 HEMOGLOBIN GLYCOSYLATED A1C: CPT | Performed by: ORTHOPAEDIC SURGERY

## 2018-11-19 PROCEDURE — 87075 CULTR BACTERIA EXCEPT BLOOD: CPT | Performed by: ORTHOPAEDIC SURGERY

## 2018-11-19 PROCEDURE — 36415 COLL VENOUS BLD VENIPUNCTURE: CPT | Performed by: ORTHOPAEDIC SURGERY

## 2018-11-19 PROCEDURE — 30233N1 TRANSFUSION OF NONAUTOLOGOUS RED BLOOD CELLS INTO PERIPHERAL VEIN, PERCUTANEOUS APPROACH: ICD-10-PCS | Performed by: ORTHOPAEDIC SURGERY

## 2018-11-19 PROCEDURE — 36430 TRANSFUSION BLD/BLD COMPNT: CPT | Performed by: ANESTHESIOLOGY

## 2018-11-19 PROCEDURE — 88112 CYTOPATH CELL ENHANCE TECH: CPT | Performed by: ORTHOPAEDIC SURGERY

## 2018-11-19 DEVICE — IMPLANTABLE DEVICE: Type: IMPLANTABLE DEVICE | Site: KNEE | Status: FUNCTIONAL

## 2018-11-19 DEVICE — CEM BN NLTX STRL REUSE MED VSC: Type: IMPLANTABLE DEVICE | Site: KNEE | Status: FUNCTIONAL

## 2018-11-19 DEVICE — WASHER SYNT 7MM 219.98: Type: IMPLANTABLE DEVICE | Site: KNEE | Status: FUNCTIONAL

## 2018-11-19 RX ORDER — BISACODYL 10 MG
10 SUPPOSITORY, RECTAL RECTAL
Status: DISCONTINUED | OUTPATIENT
Start: 2018-11-19 | End: 2018-11-22

## 2018-11-19 RX ORDER — MIDAZOLAM HYDROCHLORIDE 1 MG/ML
INJECTION INTRAMUSCULAR; INTRAVENOUS AS NEEDED
Status: DISCONTINUED | OUTPATIENT
Start: 2018-11-19 | End: 2018-11-19 | Stop reason: SURG

## 2018-11-19 RX ORDER — POLYETHYLENE GLYCOL 3350 17 G/17G
17 POWDER, FOR SOLUTION ORAL DAILY PRN
Status: DISCONTINUED | OUTPATIENT
Start: 2018-11-19 | End: 2018-11-22

## 2018-11-19 RX ORDER — HYDRALAZINE HYDROCHLORIDE 20 MG/ML
INJECTION INTRAMUSCULAR; INTRAVENOUS AS NEEDED
Status: DISCONTINUED | OUTPATIENT
Start: 2018-11-19 | End: 2018-11-19 | Stop reason: SURG

## 2018-11-19 RX ORDER — METOCLOPRAMIDE HYDROCHLORIDE 5 MG/ML
10 INJECTION INTRAMUSCULAR; INTRAVENOUS EVERY 6 HOURS PRN
Status: ACTIVE | OUTPATIENT
Start: 2018-11-19 | End: 2018-11-21

## 2018-11-19 RX ORDER — DEXTROSE MONOHYDRATE 25 G/50ML
50 INJECTION, SOLUTION INTRAVENOUS
Status: DISCONTINUED | OUTPATIENT
Start: 2018-11-19 | End: 2018-11-19 | Stop reason: HOSPADM

## 2018-11-19 RX ORDER — MORPHINE SULFATE 4 MG/ML
4 INJECTION, SOLUTION INTRAMUSCULAR; INTRAVENOUS EVERY 10 MIN PRN
Status: DISCONTINUED | OUTPATIENT
Start: 2018-11-19 | End: 2018-11-19 | Stop reason: HOSPADM

## 2018-11-19 RX ORDER — EPHEDRINE SULFATE 50 MG/ML
INJECTION, SOLUTION INTRAVENOUS AS NEEDED
Status: DISCONTINUED | OUTPATIENT
Start: 2018-11-19 | End: 2018-11-19 | Stop reason: SURG

## 2018-11-19 RX ORDER — FAMOTIDINE 10 MG/ML
20 INJECTION, SOLUTION INTRAVENOUS 2 TIMES DAILY
Status: DISCONTINUED | OUTPATIENT
Start: 2018-11-19 | End: 2018-11-22

## 2018-11-19 RX ORDER — HYDROCODONE BITARTRATE AND ACETAMINOPHEN 5; 325 MG/1; MG/1
1 TABLET ORAL AS NEEDED
Status: DISCONTINUED | OUTPATIENT
Start: 2018-11-19 | End: 2018-11-19 | Stop reason: HOSPADM

## 2018-11-19 RX ORDER — SODIUM CHLORIDE, SODIUM LACTATE, POTASSIUM CHLORIDE, CALCIUM CHLORIDE 600; 310; 30; 20 MG/100ML; MG/100ML; MG/100ML; MG/100ML
INJECTION, SOLUTION INTRAVENOUS CONTINUOUS
Status: DISCONTINUED | OUTPATIENT
Start: 2018-11-19 | End: 2018-11-22

## 2018-11-19 RX ORDER — DOCUSATE SODIUM 100 MG/1
100 CAPSULE, LIQUID FILLED ORAL 2 TIMES DAILY
Status: DISCONTINUED | OUTPATIENT
Start: 2018-11-19 | End: 2018-11-22

## 2018-11-19 RX ORDER — SENNOSIDES 8.6 MG
17.2 TABLET ORAL NIGHTLY
Status: DISCONTINUED | OUTPATIENT
Start: 2018-11-19 | End: 2018-11-22

## 2018-11-19 RX ORDER — DIPHENHYDRAMINE HYDROCHLORIDE 50 MG/ML
12.5 INJECTION INTRAMUSCULAR; INTRAVENOUS EVERY 4 HOURS PRN
Status: DISCONTINUED | OUTPATIENT
Start: 2018-11-19 | End: 2018-11-22

## 2018-11-19 RX ORDER — HYDROMORPHONE HYDROCHLORIDE 2 MG/1
TABLET ORAL EVERY 4 HOURS PRN
Status: DISCONTINUED | OUTPATIENT
Start: 2018-11-19 | End: 2018-11-22

## 2018-11-19 RX ORDER — DEXAMETHASONE SODIUM PHOSPHATE 4 MG/ML
VIAL (ML) INJECTION AS NEEDED
Status: DISCONTINUED | OUTPATIENT
Start: 2018-11-19 | End: 2018-11-19 | Stop reason: SURG

## 2018-11-19 RX ORDER — ACETAMINOPHEN 500 MG
1000 TABLET ORAL ONCE
Status: COMPLETED | OUTPATIENT
Start: 2018-11-19 | End: 2018-11-19

## 2018-11-19 RX ORDER — CLINDAMYCIN PHOSPHATE 900 MG/50ML
900 INJECTION INTRAVENOUS EVERY 8 HOURS
Status: COMPLETED | OUTPATIENT
Start: 2018-11-19 | End: 2018-11-20

## 2018-11-19 RX ORDER — SODIUM PHOSPHATE, DIBASIC AND SODIUM PHOSPHATE, MONOBASIC 7; 19 G/133ML; G/133ML
1 ENEMA RECTAL ONCE AS NEEDED
Status: DISCONTINUED | OUTPATIENT
Start: 2018-11-19 | End: 2018-11-22

## 2018-11-19 RX ORDER — HYDROMORPHONE HYDROCHLORIDE 1 MG/ML
0.8 INJECTION, SOLUTION INTRAMUSCULAR; INTRAVENOUS; SUBCUTANEOUS EVERY 2 HOUR PRN
Status: DISCONTINUED | OUTPATIENT
Start: 2018-11-19 | End: 2018-11-22

## 2018-11-19 RX ORDER — SODIUM CHLORIDE, SODIUM LACTATE, POTASSIUM CHLORIDE, CALCIUM CHLORIDE 600; 310; 30; 20 MG/100ML; MG/100ML; MG/100ML; MG/100ML
INJECTION, SOLUTION INTRAVENOUS CONTINUOUS
Status: DISCONTINUED | OUTPATIENT
Start: 2018-11-19 | End: 2018-11-19 | Stop reason: HOSPADM

## 2018-11-19 RX ORDER — FAMOTIDINE 20 MG/1
20 TABLET ORAL 2 TIMES DAILY
Status: DISCONTINUED | OUTPATIENT
Start: 2018-11-19 | End: 2018-11-22

## 2018-11-19 RX ORDER — BUPIVACAINE HYDROCHLORIDE 7.5 MG/ML
INJECTION, SOLUTION EPIDURAL; RETROBULBAR AS NEEDED
Status: DISCONTINUED | OUTPATIENT
Start: 2018-11-19 | End: 2018-11-19 | Stop reason: SURG

## 2018-11-19 RX ORDER — ONDANSETRON 2 MG/ML
INJECTION INTRAMUSCULAR; INTRAVENOUS AS NEEDED
Status: DISCONTINUED | OUTPATIENT
Start: 2018-11-19 | End: 2018-11-19 | Stop reason: SURG

## 2018-11-19 RX ORDER — METOCLOPRAMIDE 10 MG/1
10 TABLET ORAL ONCE
Status: COMPLETED | OUTPATIENT
Start: 2018-11-19 | End: 2018-11-19

## 2018-11-19 RX ORDER — HYDROMORPHONE HYDROCHLORIDE 2 MG/1
TABLET ORAL
Qty: 60 TABLET | Refills: 0 | Status: ON HOLD | OUTPATIENT
Start: 2018-11-19 | End: 2019-06-25 | Stop reason: ALTCHOICE

## 2018-11-19 RX ORDER — HYDROCODONE BITARTRATE AND ACETAMINOPHEN 5; 325 MG/1; MG/1
2 TABLET ORAL AS NEEDED
Status: DISCONTINUED | OUTPATIENT
Start: 2018-11-19 | End: 2018-11-19 | Stop reason: HOSPADM

## 2018-11-19 RX ORDER — MORPHINE SULFATE 10 MG/ML
6 INJECTION, SOLUTION INTRAMUSCULAR; INTRAVENOUS EVERY 10 MIN PRN
Status: DISCONTINUED | OUTPATIENT
Start: 2018-11-19 | End: 2018-11-19 | Stop reason: HOSPADM

## 2018-11-19 RX ORDER — CELECOXIB 200 MG/1
200 CAPSULE ORAL ONCE
Status: COMPLETED | OUTPATIENT
Start: 2018-11-19 | End: 2018-11-19

## 2018-11-19 RX ORDER — ASPIRIN 81 MG/1
81 TABLET ORAL DAILY
Status: DISCONTINUED | OUTPATIENT
Start: 2018-11-19 | End: 2018-11-20

## 2018-11-19 RX ORDER — MORPHINE SULFATE 4 MG/ML
2 INJECTION, SOLUTION INTRAMUSCULAR; INTRAVENOUS EVERY 10 MIN PRN
Status: DISCONTINUED | OUTPATIENT
Start: 2018-11-19 | End: 2018-11-19 | Stop reason: HOSPADM

## 2018-11-19 RX ORDER — SCOLOPAMINE TRANSDERMAL SYSTEM 1 MG/1
1 PATCH, EXTENDED RELEASE TRANSDERMAL ONCE
Status: DISCONTINUED | OUTPATIENT
Start: 2018-11-19 | End: 2018-11-22

## 2018-11-19 RX ORDER — CYCLOBENZAPRINE HCL 5 MG
5 TABLET ORAL EVERY 8 HOURS PRN
Status: DISCONTINUED | OUTPATIENT
Start: 2018-11-19 | End: 2018-11-22

## 2018-11-19 RX ORDER — HYDROMORPHONE HYDROCHLORIDE 1 MG/ML
0.4 INJECTION, SOLUTION INTRAMUSCULAR; INTRAVENOUS; SUBCUTANEOUS EVERY 2 HOUR PRN
Status: DISCONTINUED | OUTPATIENT
Start: 2018-11-19 | End: 2018-11-22

## 2018-11-19 RX ORDER — LIDOCAINE HYDROCHLORIDE 10 MG/ML
INJECTION, SOLUTION EPIDURAL; INFILTRATION; INTRACAUDAL; PERINEURAL AS NEEDED
Status: DISCONTINUED | OUTPATIENT
Start: 2018-11-19 | End: 2018-11-19 | Stop reason: SURG

## 2018-11-19 RX ORDER — DIPHENHYDRAMINE HYDROCHLORIDE 50 MG/ML
25 INJECTION INTRAMUSCULAR; INTRAVENOUS ONCE AS NEEDED
Status: ACTIVE | OUTPATIENT
Start: 2018-11-19 | End: 2018-11-19

## 2018-11-19 RX ORDER — ASPIRIN 325 MG
325 TABLET ORAL 2 TIMES DAILY
Status: DISCONTINUED | OUTPATIENT
Start: 2018-11-19 | End: 2018-11-19

## 2018-11-19 RX ORDER — DIPHENHYDRAMINE HCL 25 MG
25 CAPSULE ORAL EVERY 4 HOURS PRN
Status: DISCONTINUED | OUTPATIENT
Start: 2018-11-19 | End: 2018-11-22

## 2018-11-19 RX ORDER — ACETAMINOPHEN 325 MG/1
650 TABLET ORAL EVERY 6 HOURS SCHEDULED
Status: DISCONTINUED | OUTPATIENT
Start: 2018-11-20 | End: 2018-11-22

## 2018-11-19 RX ORDER — ASPIRIN 81 MG/1
81 TABLET ORAL 2 TIMES DAILY
Qty: 60 TABLET | Refills: 0 | Status: ON HOLD | OUTPATIENT
Start: 2018-11-19 | End: 2019-06-25

## 2018-11-19 RX ORDER — FAMOTIDINE 20 MG/1
20 TABLET ORAL ONCE
Status: COMPLETED | OUTPATIENT
Start: 2018-11-19 | End: 2018-11-19

## 2018-11-19 RX ORDER — HALOPERIDOL 5 MG/ML
0.25 INJECTION INTRAMUSCULAR ONCE AS NEEDED
Status: DISCONTINUED | OUTPATIENT
Start: 2018-11-19 | End: 2018-11-19 | Stop reason: HOSPADM

## 2018-11-19 RX ORDER — SODIUM CHLORIDE 0.9 % (FLUSH) 0.9 %
10 SYRINGE (ML) INJECTION AS NEEDED
Status: DISCONTINUED | OUTPATIENT
Start: 2018-11-19 | End: 2018-11-22

## 2018-11-19 RX ORDER — HYDROMORPHONE HYDROCHLORIDE 1 MG/ML
1.2 INJECTION, SOLUTION INTRAMUSCULAR; INTRAVENOUS; SUBCUTANEOUS EVERY 2 HOUR PRN
Status: DISCONTINUED | OUTPATIENT
Start: 2018-11-19 | End: 2018-11-22

## 2018-11-19 RX ORDER — ONDANSETRON 2 MG/ML
4 INJECTION INTRAMUSCULAR; INTRAVENOUS EVERY 4 HOURS PRN
Status: ACTIVE | OUTPATIENT
Start: 2018-11-19 | End: 2018-11-21

## 2018-11-19 RX ORDER — NALOXONE HYDROCHLORIDE 0.4 MG/ML
80 INJECTION, SOLUTION INTRAMUSCULAR; INTRAVENOUS; SUBCUTANEOUS AS NEEDED
Status: DISCONTINUED | OUTPATIENT
Start: 2018-11-19 | End: 2018-11-19 | Stop reason: HOSPADM

## 2018-11-19 RX ORDER — ONDANSETRON 2 MG/ML
4 INJECTION INTRAMUSCULAR; INTRAVENOUS ONCE AS NEEDED
Status: DISCONTINUED | OUTPATIENT
Start: 2018-11-19 | End: 2018-11-19 | Stop reason: HOSPADM

## 2018-11-19 RX ADMIN — ONDANSETRON 4 MG: 2 INJECTION INTRAMUSCULAR; INTRAVENOUS at 16:12:00

## 2018-11-19 RX ADMIN — BUPIVACAINE HYDROCHLORIDE 1.3 ML: 7.5 INJECTION, SOLUTION EPIDURAL; RETROBULBAR at 15:49:00

## 2018-11-19 RX ADMIN — LIDOCAINE HYDROCHLORIDE 25 MG: 10 INJECTION, SOLUTION EPIDURAL; INFILTRATION; INTRACAUDAL; PERINEURAL at 15:50:00

## 2018-11-19 RX ADMIN — HYDRALAZINE HYDROCHLORIDE 5 MG: 20 INJECTION INTRAMUSCULAR; INTRAVENOUS at 17:36:00

## 2018-11-19 RX ADMIN — SODIUM CHLORIDE, SODIUM LACTATE, POTASSIUM CHLORIDE, CALCIUM CHLORIDE: 600; 310; 30; 20 INJECTION, SOLUTION INTRAVENOUS at 18:50:00

## 2018-11-19 RX ADMIN — EPHEDRINE SULFATE 10 MG: 50 INJECTION, SOLUTION INTRAVENOUS at 16:50:00

## 2018-11-19 RX ADMIN — LIDOCAINE HYDROCHLORIDE 10 MG: 10 INJECTION, SOLUTION EPIDURAL; INFILTRATION; INTRACAUDAL; PERINEURAL at 15:40:00

## 2018-11-19 RX ADMIN — DEXAMETHASONE SODIUM PHOSPHATE 4 MG: 4 MG/ML VIAL (ML) INJECTION at 16:12:00

## 2018-11-19 RX ADMIN — HYDRALAZINE HYDROCHLORIDE 5 MG: 20 INJECTION INTRAMUSCULAR; INTRAVENOUS at 17:29:00

## 2018-11-19 RX ADMIN — MIDAZOLAM HYDROCHLORIDE 2 MG: 1 INJECTION INTRAMUSCULAR; INTRAVENOUS at 15:39:00

## 2018-11-19 RX ADMIN — HYDRALAZINE HYDROCHLORIDE 5 MG: 20 INJECTION INTRAMUSCULAR; INTRAVENOUS at 17:34:00

## 2018-11-19 RX ADMIN — SODIUM CHLORIDE, SODIUM LACTATE, POTASSIUM CHLORIDE, CALCIUM CHLORIDE: 600; 310; 30; 20 INJECTION, SOLUTION INTRAVENOUS at 15:37:00

## 2018-11-19 NOTE — ANESTHESIA PROCEDURE NOTES
Spinal Block  Performed by: Vashti Younger CRNA  Authorized by: Seble Buenrostro MD     Patient Location:  OR  Start Time:  11/19/2018 3:45 PM  End Time:  11/19/2018 3:49 PM  Site identification: surface landmarks    Anesthesiologist:  Mango Cary

## 2018-11-19 NOTE — PAT NURSING NOTE
Per Redd Sim at Dr. Lyons Kelli office called and states per Dr. Jaxon Martin wants CBC and CMP done stat preop and the PA will see pt. And review labs . Orders entered.

## 2018-11-19 NOTE — H&P
[address]ga Patient Status:  Surgery Admit    1945 MRN [id number]   Location [facility] Attending Tryon Record, MD   Hosp Day # 0 PCP No primary care provider on file. • History of blood transfusion 11/2018    no reaction   • Osteoarthritis    • Renal disorder     watching renal counts   • Visual impairment     readers     Past Surgical History:   Procedure Laterality Date   • CATARACT  2017   • Arron Scott mouth 2 (two) times daily. carvedilol 25 MG Oral Tab Take 50 mg by mouth daily. carvedilol 25 MG Oral Tab Take 25 mg by mouth nightly. bumetanide 1 MG Oral Tab Take 1 mg by mouth 2 (two) times daily.    Insulin NPH, Human,, Isophane, 100 UNIT/ML Subcu 11/19/2018     11/19/2018    CREATSERUM 1.29 11/19/2018    BUN 12 11/19/2018     11/19/2018    K 3.5 11/19/2018     11/19/2018    CO2 29 11/19/2018    GLU 90 11/19/2018    CA 8.8 11/19/2018    ALB 2.7 (L) 11/19/2018    ALKPHO 111 (H) 11/

## 2018-11-19 NOTE — DISCHARGE SUMMARY
Ortho Discharge Summary     Patient ID:  Makayla Dash  [id number]  75year old  9/8/1945      Admit Date: 11/19/2018    Discharge Date and Time: 11/22/2018    Attending Physician: Trudi Wilcox MD     Reason for admission: right patellar tendon repair

## 2018-11-19 NOTE — ANESTHESIA PREPROCEDURE EVALUATION
Anesthesia PreOp Note    HPI:     Mal Holloway is a 75year old female who presents for preoperative consultation requested by: Silva Magaña MD    Date of Surgery: 11/19/2018    Procedure(s):  PATELLA TENDON REPAIR  Indication: Meniscal injury, right, total) by mouth 2 (two) times daily. Disp: 60 capsule Rfl: 2    Ondansetron HCl (ZOFRAN) 4 mg tablet Take 1 tablet (4 mg total) by mouth every 8 (eight) hours as needed for Nausea.  Disp: 30 tablet Rfl: 0    HYDROmorphone HCl 2 MG Oral Tab Take 1-2 tablets acetaminophen (TYLENOL EXTRA STRENGTH) tab 1,000 mg 1,000 mg Oral Once Arslan Maurer MD   metoprolol Tartrate (LOPRESSOR) tab 25 mg 25 mg Oral Once PRN Arslan Maurer MD   famoTIDine (PEPCID) tab 20 mg 20 mg Oral Once Arslan Maurer MD   Metoclopramide Component Value Date    WBC 9.6 11/08/2018    RBC 3.01 (L) 11/08/2018    HGB 7.8 (L) 11/08/2018    HCT 24.1 (L) 11/08/2018    MCV 80.1 11/08/2018    MCH 25.9 (L) 11/08/2018    MCHC 32.3 11/08/2018    RDW 16.8 (H) 11/08/2018     11/08/2018    MPV 9 patient's questions were answered to the best of my ability. The patient desires the anesthetic management as planned.   I have informed this Deandra Coup  of the risks of neuraxial anesthesia including, but not limited to: failure,headache, backache, sp

## 2018-11-20 PROBLEM — S86.811A PATELLAR TENDON RUPTURE, RIGHT, INITIAL ENCOUNTER: Status: ACTIVE | Noted: 2018-11-19

## 2018-11-20 PROCEDURE — 97110 THERAPEUTIC EXERCISES: CPT

## 2018-11-20 PROCEDURE — 97162 PT EVAL MOD COMPLEX 30 MIN: CPT

## 2018-11-20 PROCEDURE — A4216 STERILE WATER/SALINE, 10 ML: HCPCS | Performed by: PHYSICIAN ASSISTANT

## 2018-11-20 PROCEDURE — 97116 GAIT TRAINING THERAPY: CPT

## 2018-11-20 PROCEDURE — 82962 GLUCOSE BLOOD TEST: CPT

## 2018-11-20 PROCEDURE — 85014 HEMATOCRIT: CPT | Performed by: PHYSICIAN ASSISTANT

## 2018-11-20 PROCEDURE — 85018 HEMOGLOBIN: CPT | Performed by: PHYSICIAN ASSISTANT

## 2018-11-20 PROCEDURE — 80053 COMPREHEN METABOLIC PANEL: CPT | Performed by: PHYSICIAN ASSISTANT

## 2018-11-20 RX ORDER — ASPIRIN 81 MG/1
81 TABLET ORAL ONCE
Status: DISCONTINUED | OUTPATIENT
Start: 2018-11-19 | End: 2018-11-20

## 2018-11-20 RX ORDER — CARVEDILOL 25 MG/1
25 TABLET ORAL NIGHTLY
Status: DISCONTINUED | OUTPATIENT
Start: 2018-11-20 | End: 2018-11-22

## 2018-11-20 RX ORDER — BUMETANIDE 1 MG/1
1 TABLET ORAL 2 TIMES DAILY
Status: DISCONTINUED | OUTPATIENT
Start: 2018-11-20 | End: 2018-11-21

## 2018-11-20 RX ORDER — DEXTROSE MONOHYDRATE 25 G/50ML
50 INJECTION, SOLUTION INTRAVENOUS AS NEEDED
Status: DISCONTINUED | OUTPATIENT
Start: 2018-11-20 | End: 2018-11-22

## 2018-11-20 RX ORDER — ENALAPRIL MALEATE 10 MG/1
10 TABLET ORAL 2 TIMES DAILY
Status: DISCONTINUED | OUTPATIENT
Start: 2018-11-20 | End: 2018-11-21

## 2018-11-20 RX ORDER — ASPIRIN 81 MG/1
81 TABLET, CHEWABLE ORAL 2 TIMES DAILY
Status: DISCONTINUED | OUTPATIENT
Start: 2018-11-20 | End: 2018-11-22

## 2018-11-20 RX ORDER — SODIUM CHLORIDE 9 MG/ML
INJECTION, SOLUTION INTRAVENOUS CONTINUOUS
Status: DISCONTINUED | OUTPATIENT
Start: 2018-11-20 | End: 2018-11-22

## 2018-11-20 RX ORDER — CARVEDILOL 25 MG/1
50 TABLET ORAL DAILY
Status: DISCONTINUED | OUTPATIENT
Start: 2018-11-20 | End: 2018-11-22

## 2018-11-20 NOTE — PHYSICAL THERAPY NOTE
PHYSICAL THERAPY KNEE TREATMENT NOTE - INPATIENT     Room Number: 311/850-F             Presenting Problem: R patellar tendon rupture s/p reconstruction     Problem List  Principal Problem:    Patellar tendon rupture, right, initial encounter  Active Probl Sub-acute rehabilitation    PLAN  PT Treatment Plan: Endurance; Patient education; Family education;Gait training;Strengthening;Stair training;Transfer training;Balance training    SUBJECTIVE  I willing what is bets for me    OBJECTIVE  Precautions: (No R kn chair;Needs met;Call light within reach;RN aware of session/findings; All patient questions and concerns addressed    CURRENT GOALS  Goals to be met by: 11/25/18  Patient Goal Patient's self-stated goal is: to heal better   Goal #1 Patient is able to demons

## 2018-11-20 NOTE — PROGRESS NOTES
[facility]    Progress Note    Thea Mclean Patient Status:  Inpatient    1945 MRN [id number]   Location [facility] 4W/SW/SE Attending Zulema Arechiga MD   Hosp Day # 1 PCP No primary care provider on file.        Subjective: femur, tibia and patella. * There is now deformity of the inferior pole of the patella with question of a displaced fragment. * Skin staples are noted. Dictated by (CST): Kristina Radford MD on 11/19/2018 at 19:51     Approved by (CST):  Kizzy Redmond

## 2018-11-20 NOTE — PROGRESS NOTES
[facility]    Ortho Medical Progress Note     Lori Valente Patient Status:  Inpatient    1945 MRN [id number]   Location [facility] 4W/SW/SE Attending Nayana Soto MD   Hosp Day # 1 PCP No primary care provider on file. ALT 12 (L) 11/20/2018       Xr Knee (1 Or 2 Views), Right (cpt=73560)    Result Date: 11/19/2018  CONCLUSION:  * Total knee arthroplasty is identified. * The prostheses are well seated in the femur, tibia and patella.  * There is now deformity of the inf

## 2018-11-20 NOTE — ANESTHESIA PROCEDURE NOTES
Airway  Date/Time: 11/19/2018 6:25 PM  Urgency: emergent    Airway not difficult    General Information and Staff    Patient location during procedure: OR  Anesthesiologist: Tino Harding MD  Performed: anesthesiologist     Consent for Airway (if performed

## 2018-11-20 NOTE — PHYSICAL THERAPY NOTE
PHYSICAL THERAPY KNEE EVALUATION - INPATIENT       Room Number: 937/883-A  Evaluation Date: 11/20/2018  Type of Evaluation: Initial  Physician Order: PT Eval and Treat    Presenting Problem: R patellar tendon rupture s/p reconstruction   Reason for Therapy leg in full extension in splint.  NO FLEXION OF RIGHT KNEE    Procedure Performed:  11/19/18   Procedure(s):  RIGHT KNEE EXTENSOR PATELLAR TENDON RECONSTRUCTION  Primary repair of MCL    She was evaluated by Dr. Andrea Murrieta last Friday with a patella tendon rupt admission    SUBJECTIVE  I am OK fo what is best for me    PHYSICAL THERAPY EXAMINATION     OBJECTIVE  Precautions: (No R knee flexion)  Fall Risk: High fall risk    WEIGHT BEARING RESTRICTION  Weight Bearing Restriction: R lower extremity        R Lower E steps from the outside.  no steps inside    Bed Mobility: min A    Transfers: mod A    Exercise/Education Provided:  Bed mobility  Body mechanics  Functional activity tolerated  Gait training  Strengthening  Transfer training    Patient End of Session: Up i

## 2018-11-20 NOTE — ANESTHESIA POSTPROCEDURE EVALUATION
Patient: Vishal Kidd    Procedure Summary     Date:  11/19/18 Room / Location:  [address] [place] [address] MAIN OR 04 / [facility] MAIN OR    Anesthesia Start:  2260 Anesthesia Stop:      Procedure:  PATELLA TENDON REPAIR (Right Knee) Diagnosis:       Meniscal injury, right, initia

## 2018-11-20 NOTE — BRIEF OP NOTE
Pre-Operative Diagnosis: Extensor mechanism disruption of the inferior pole of the patella     Post-Operative Diagnosis:   Extensor mechanism disruption  Complete MCL rupture     Procedure Performed:   Procedure(s):  RIGHT KNEE EXTENSOR PATELLAR TENDON REC

## 2018-11-20 NOTE — PLAN OF CARE
Diabetes/Glucose Control    • Glucose maintained within prescribed range Progressing        DISCHARGE PLANNING    • Discharge to home or other facility with appropriate resources Progressing        GENITOURINARY - ADULT    • Absence of urinary retention Pr

## 2018-11-20 NOTE — CM/SW NOTE
CTL note: Lisabeth Schirmer is a BPCI readmission enrolled in [facility] program on 10/30/18 under . There are 68 days remaining in current episode.

## 2018-11-20 NOTE — PROGRESS NOTES
Amendment to Dr. Jose Harrell note:    Pt's weight bearing status is TDWB, no flexion of right knee.     Confirmed with Dr. Sabine Mccabe at [phone], 11/20/18    Ilia Martinez PA-C  Physician Assistant for Dr. Gerardo Soliman: [phone]  F: [phone]  C: [phone])

## 2018-11-21 PROCEDURE — 80048 BASIC METABOLIC PNL TOTAL CA: CPT | Performed by: NURSE PRACTITIONER

## 2018-11-21 PROCEDURE — 97530 THERAPEUTIC ACTIVITIES: CPT

## 2018-11-21 PROCEDURE — 97116 GAIT TRAINING THERAPY: CPT

## 2018-11-21 PROCEDURE — 97110 THERAPEUTIC EXERCISES: CPT

## 2018-11-21 PROCEDURE — 82962 GLUCOSE BLOOD TEST: CPT

## 2018-11-21 NOTE — PROGRESS NOTES
[facility]    Progress Note    Scarlet Heading Patient Status:  Inpatient    1945 MRN [id number]   Location [facility] 4W/SW/SE Attending Jeimy Rendon MD   Hosp Day # 2 PCP No primary care provider on file.        Subjective: Approved by (CST):  Alyssa Urbano MD on 11/19/2018 at 19:58                Assessment and Plan:     Patellar tendon rupture, right, initial encounter        S/P knee replacement        Essential hypertension        Renal insufficiency  Creatinine elevat

## 2018-11-21 NOTE — CM/SW NOTE
Patient has been accepted to [facility] in [place]. PT/OT notes sent.       JOSS Payan., [phone]

## 2018-11-21 NOTE — CM/SW NOTE
[facility] can [facility] the patient for rehab, per Yohana Daniel [phone].     Wilder Beard, MSW., [id number]

## 2018-11-21 NOTE — PHYSICAL THERAPY NOTE
PHYSICAL THERAPY TREATMENT NOTE - INPATIENT     Room Number: 011/556-C       Presenting Problem: R patellar tendon rupture s/p reconstruction     Problem List  Principal Problem:    Patellar tendon rupture, right, initial encounter  Active Problems:    S/P Static Sitting: Good  Dynamic Sitting: Fair +           Static Standing: Poor  Dynamic Standing: Dependent    ACTIVITY TOLERANCE                         O2 WALK                  AM-PAC '6-Clicks' INPATIENT Current Status  Max A x 2   Goal #4 amb 5 steps with RW TDWB on the R LE   Goal #4   Current Status  NT   Goal #5      Goal #5   Current Status     Goal #6 Patient independently performs home exercise program for ROM/strengthening per the instructions pr

## 2018-11-21 NOTE — PLAN OF CARE
GENITOURINARY - ADULT    • Absence of urinary retention Not Progressing          Diabetes/Glucose Control    • Glucose maintained within prescribed range Progressing        HEMATOLOGIC - ADULT    • Maintains hematologic stability Progressing    • Free from

## 2018-11-22 VITALS
BODY MASS INDEX: 39.75 KG/M2 | SYSTOLIC BLOOD PRESSURE: 150 MMHG | DIASTOLIC BLOOD PRESSURE: 51 MMHG | HEIGHT: 62 IN | OXYGEN SATURATION: 95 % | HEART RATE: 63 BPM | RESPIRATION RATE: 18 BRPM | TEMPERATURE: 98 F | WEIGHT: 216 LBS

## 2018-11-22 PROCEDURE — 85025 COMPLETE CBC W/AUTO DIFF WBC: CPT | Performed by: FAMILY MEDICINE

## 2018-11-22 PROCEDURE — A4216 STERILE WATER/SALINE, 10 ML: HCPCS | Performed by: PHYSICIAN ASSISTANT

## 2018-11-22 PROCEDURE — 80048 BASIC METABOLIC PNL TOTAL CA: CPT | Performed by: FAMILY MEDICINE

## 2018-11-22 PROCEDURE — 82962 GLUCOSE BLOOD TEST: CPT

## 2018-11-22 PROCEDURE — 97530 THERAPEUTIC ACTIVITIES: CPT

## 2018-11-22 PROCEDURE — 85018 HEMOGLOBIN: CPT | Performed by: FAMILY MEDICINE

## 2018-11-22 RX ORDER — SODIUM CHLORIDE 9 MG/ML
INJECTION, SOLUTION INTRAVENOUS ONCE
Status: COMPLETED | OUTPATIENT
Start: 2018-11-22 | End: 2018-11-22

## 2018-11-22 RX ORDER — SODIUM CHLORIDE 0.9 % (FLUSH) 0.9 %
10 SYRINGE (ML) INJECTION AS NEEDED
Status: DISCONTINUED | OUTPATIENT
Start: 2018-11-22 | End: 2018-11-22

## 2018-11-22 RX ORDER — SODIUM CHLORIDE 9 MG/ML
INJECTION, SOLUTION INTRAVENOUS
Status: COMPLETED
Start: 2018-11-22 | End: 2018-11-22

## 2018-11-22 NOTE — PLAN OF CARE
Diabetes/Glucose Control    • Glucose maintained within prescribed range Adequate for Discharge        DISCHARGE PLANNING    • Discharge to home or other facility with appropriate resources Adequate for Discharge        GENITOURINARY - ADULT    • Absence o

## 2018-11-22 NOTE — PROGRESS NOTES
[facility]    Progress Note    Zula Abo Patient Status:  Inpatient    1945 MRN [id number]   Location [facility] 4W/SW/SE Attending Stephane Restrepo MD   Hosp Day # 3 PCP No primary care provider on file.        Subjective: ALB 2.4 (L) 11/20/2018    ALKPHO 97 11/20/2018    BILT 0.7 11/20/2018    TP 4.9 (L) 11/20/2018    AST 16 11/20/2018    ALT 12 (L) 11/20/2018                         Caryn Peacock, DO  11/22/2018

## 2018-11-22 NOTE — DISCHARGE PLANNING
This RN spoke with Mandi Lopez at [facility] regarding transfer of patient. Reviewed medical history, diet, urinary issues and to continue the voiding trial and bladder scan at rehab. Medications, ambulatory and dressing status reviewed.  Patient

## 2018-11-22 NOTE — CM/SW NOTE
11/22CM- [address] of the above, they are able to accept the Patient today (11/22) at 5:30 p.m. This Writer informed the Patient's RN of the above, she will informed the Patient's family.   The Patient is a max assist x3  and will require

## 2018-11-22 NOTE — PHYSICAL THERAPY NOTE
PHYSICAL THERAPY TREATMENT NOTE - INPATIENT     Room Number: 647/841-K       Presenting Problem: R patellar tendon rupture s/p reconstruction     Problem List  Principal Problem:    Patellar tendon rupture, right, initial encounter  Active Problems:    S/P +  Dynamic Standing: Poor +    ACTIVITY TOLERANCE                         O2 WALK                  AM-PAC '6-Clicks' INPATIENT SHORT FORM - BASIC MOBILITY  How much difficulty does the patient currently have. ..  -   Turning over in bed (including adjusting the instructions provided in preparation for discharge.    Goal #6  Current Status IN PROGRESS

## 2018-11-23 NOTE — OPERATIVE REPORT
[facility]    PATIENT'S NAME: Urban Early   ATTENDING PHYSICIAN: Iraida Meng MD   OPERATING PHYSICIAN: Iraida Meng MD   PATIENT ACCOUNT#:   [id number]    LOCATION:  [address] [place] [address] #:   [id number]       DATE OF BIRTH: augmented with a Marlex mesh repair. This was performed, again, to supplement the primary patellar tendon repair. OPERATIVE TECHNIQUE:  The patient was admitted to the Same-Day Surgery program on 11/19/2018.   Following proper identification in the pr excellent tension on the patellar tendon with restoration of the continuity of the patellar ligament. It was felt she would be best served, as well, with a supplemental fixation using the Marlex mesh technique.   A trough in the tibia was subsequently deve splint. Dictated By Herb Meng MD  d: 11/23/2018 10:11:42  t: 11/23/2018 12:37:13  Job [id number]  [facility]/

## 2018-11-26 ENCOUNTER — SNF VISIT (OUTPATIENT)
Dept: INTERNAL MEDICINE CLINIC | Facility: SKILLED NURSING FACILITY | Age: 73
End: 2018-11-26

## 2018-11-26 DIAGNOSIS — Z79.4 TYPE 2 DIABETES MELLITUS WITH CHRONIC KIDNEY DISEASE, WITH LONG-TERM CURRENT USE OF INSULIN, UNSPECIFIED CKD STAGE (HCC): ICD-10-CM

## 2018-11-26 DIAGNOSIS — R53.1 WEAKNESS: ICD-10-CM

## 2018-11-26 DIAGNOSIS — E11.22 TYPE 2 DIABETES MELLITUS WITH CHRONIC KIDNEY DISEASE, WITH LONG-TERM CURRENT USE OF INSULIN, UNSPECIFIED CKD STAGE (HCC): ICD-10-CM

## 2018-11-26 DIAGNOSIS — Z96.651 STATUS POST RIGHT KNEE REPLACEMENT: ICD-10-CM

## 2018-11-26 DIAGNOSIS — S86.811D PATELLAR TENDON RUPTURE, RIGHT, SUBSEQUENT ENCOUNTER: ICD-10-CM

## 2018-11-26 PROCEDURE — 99310 SBSQ NF CARE HIGH MDM 45: CPT | Performed by: NURSE PRACTITIONER

## 2018-11-26 NOTE — PROGRESS NOTES
HPI: Vishal Kidd  Is a 76 yo female who underwent a right knee arthroplasty by Dr. Nam Burch 10/29/18 at [address] [place] [address] with anemia following the surgery. She was discharged and admitted to a different hospital for treatment of kidney stones, s/p stent placement.  Homero Cota VOMITING  Hydrocodone             HIVES, NAUSEA AND VOMITING  Penicillins             HIVES  Asa [Aspirin]           NAUSEA AND VOMITING    Comment:Can tolerate low dose but not regular strength d/t             GI problems  Codeine                 NAUSEA A Nonischemic cardiomyopathy  Cont present management      GERD with esophagitis  Cont bid zantac       Type 2 diabetes mellitus with chronic kidney disease, with long-term current use of insulin/controlled  Cont novolog sliding scale, levemir 15u qd      Re

## 2018-11-28 ENCOUNTER — SNF VISIT (OUTPATIENT)
Dept: INTERNAL MEDICINE CLINIC | Facility: SKILLED NURSING FACILITY | Age: 73
End: 2018-11-28

## 2018-11-28 DIAGNOSIS — S86.811D RUPTURE OF RIGHT PATELLAR TENDON, SUBSEQUENT ENCOUNTER: ICD-10-CM

## 2018-11-28 DIAGNOSIS — K59.00 CONSTIPATION, UNSPECIFIED CONSTIPATION TYPE: ICD-10-CM

## 2018-11-28 DIAGNOSIS — R53.1 WEAKNESS: ICD-10-CM

## 2018-11-28 PROCEDURE — 99308 SBSQ NF CARE LOW MDM 20: CPT | Performed by: NURSE PRACTITIONER

## 2018-11-28 NOTE — PROGRESS NOTES
HPI: Sushant Mckeon  Is a 74 yo female who underwent a right knee arthroplasty by Dr. Tito Collins 10/29/18 at [address] [place] [address] with anemia following the surgery. She was discharged and admitted to a different hospital for treatment of kidney stones, s/p stent placement.  Axel Rivera LABS/Imaging: Reviewed                 REVIEW OF SYSTEMS: Pain controlled. No sob/cough/CP/palpitations. No BM yet.   Denies hematuria/dysuria/frequency.      PHYSICAL EXAM:  GENERAL HEALTH: NAD  HEENT: atraumatic/normocephalic, mucous membranes pink and stent  Cont present management      Hx CHF  Cont bumex 1mg bid, daily weights  10/23/18 nuclear stress test EF 54%  Cardiology consult in rehab

## 2018-11-30 ENCOUNTER — EXTERNAL FACILITY (OUTPATIENT)
Dept: FAMILY MEDICINE CLINIC | Facility: CLINIC | Age: 73
End: 2018-11-30

## 2018-11-30 DIAGNOSIS — I50.9 CHRONIC CONGESTIVE HEART FAILURE, UNSPECIFIED HEART FAILURE TYPE (HCC): ICD-10-CM

## 2018-11-30 DIAGNOSIS — N30.00 ACUTE CYSTITIS WITHOUT HEMATURIA: ICD-10-CM

## 2018-11-30 DIAGNOSIS — D64.9 ANEMIA, UNSPECIFIED TYPE: Primary | ICD-10-CM

## 2018-11-30 DIAGNOSIS — M17.11 ARTHRITIS OF RIGHT KNEE: ICD-10-CM

## 2018-11-30 PROCEDURE — 99305 1ST NF CARE MODERATE MDM 35: CPT | Performed by: FAMILY MEDICINE

## 2018-12-03 ENCOUNTER — SNF VISIT (OUTPATIENT)
Dept: INTERNAL MEDICINE CLINIC | Facility: SKILLED NURSING FACILITY | Age: 73
End: 2018-12-03

## 2018-12-03 DIAGNOSIS — Z96.651 STATUS POST RIGHT KNEE REPLACEMENT: ICD-10-CM

## 2018-12-03 DIAGNOSIS — R53.1 WEAKNESS: ICD-10-CM

## 2018-12-03 DIAGNOSIS — S86.811D PATELLAR TENDON RUPTURE, RIGHT, SUBSEQUENT ENCOUNTER: ICD-10-CM

## 2018-12-03 PROCEDURE — 99308 SBSQ NF CARE LOW MDM 20: CPT | Performed by: NURSE PRACTITIONER

## 2018-12-03 NOTE — PROGRESS NOTES
HPI: Flora Jones  Is a 74 yo female who underwent a right knee arthroplasty by Dr. Albertina Miller 10/29/18 at [address] [place] [address] with anemia following the surgery. She was discharged and admitted to a different hospital for treatment of kidney stones, s/p stent placement.  Lara Araiza LABS/Imaging: Reviewed     REVIEW OF SYSTEMS: Looks well. Pain controlled. Declined her bumex yesterday, but will take it today, advised not to skip doses. No sob/cough/CP/palpitations. Reports she had a BM yesterday. Denies hematuria/dysuria/frequency. bid, daily weights  10/23/18 nuclear stress test EF 54%  Cardiology consult in rehab following

## 2018-12-05 ENCOUNTER — SNF VISIT (OUTPATIENT)
Dept: INTERNAL MEDICINE CLINIC | Facility: SKILLED NURSING FACILITY | Age: 73
End: 2018-12-05

## 2018-12-05 DIAGNOSIS — D64.9 ANEMIA, UNSPECIFIED TYPE: ICD-10-CM

## 2018-12-05 DIAGNOSIS — R53.1 WEAKNESS: ICD-10-CM

## 2018-12-05 DIAGNOSIS — E11.22 TYPE 2 DIABETES MELLITUS WITH CHRONIC KIDNEY DISEASE, WITH LONG-TERM CURRENT USE OF INSULIN, UNSPECIFIED CKD STAGE (HCC): ICD-10-CM

## 2018-12-05 DIAGNOSIS — Z79.4 TYPE 2 DIABETES MELLITUS WITH CHRONIC KIDNEY DISEASE, WITH LONG-TERM CURRENT USE OF INSULIN, UNSPECIFIED CKD STAGE (HCC): ICD-10-CM

## 2018-12-05 PROCEDURE — 99308 SBSQ NF CARE LOW MDM 20: CPT | Performed by: NURSE PRACTITIONER

## 2018-12-05 NOTE — PROGRESS NOTES
HPI: Flora Jones  Is a 74 yo female who underwent a right knee arthroplasty by Dr. Bianca Temple 10/29/18 at [address] [place] [address] with anemia following the surgery. She was discharged and admitted to a different hospital for treatment of kidney stones, s/p stent placement.  Leah Sharpe LABS/Imaging: Reviewed     REVIEW OF SYSTEMS:Seeing her ortho today. Pain controlled. Afebrile.  No sob/cough/CP/palpitations.  Denies hematuria/dysuria/frequency.     PHYSICAL EXAM:  GENERAL HEALTH: NAD  HEENT: atraumatic/normocephalic, mucous membranes p daily weights  10/23/18 nuclear stress test EF 54%  Cardiology consult in rehab following

## 2018-12-08 ENCOUNTER — TELEPHONE (OUTPATIENT)
Dept: FAMILY MEDICINE CLINIC | Facility: CLINIC | Age: 73
End: 2018-12-08

## 2018-12-10 ENCOUNTER — SNF VISIT (OUTPATIENT)
Dept: INTERNAL MEDICINE CLINIC | Facility: SKILLED NURSING FACILITY | Age: 73
End: 2018-12-10

## 2018-12-10 DIAGNOSIS — S86.811D PATELLAR TENDON RUPTURE, RIGHT, SUBSEQUENT ENCOUNTER: ICD-10-CM

## 2018-12-10 DIAGNOSIS — R53.1 WEAKNESS: ICD-10-CM

## 2018-12-10 PROCEDURE — 99308 SBSQ NF CARE LOW MDM 20: CPT | Performed by: NURSE PRACTITIONER

## 2018-12-10 NOTE — PROGRESS NOTES
HPI: Flora Jones  Is a 74 yo female who underwent a right knee arthroplasty by Dr. Emre Pulliam 10/29/18 at [address] [place] [address] with anemia following the surgery. She was discharged and admitted to a different hospital for treatment of kidney stones, s/p stent placement.  Yaw Alva LABS/Imaging: Reviewed     REVIEW OF SYSTEMS: Saw her surgeon last week, cast changed, next f/u 1/2/19. She will be discharged home soon. Pain controlled. Afebrile. No sob/cough/CP/palpitations.  Denies hematuria/dysuria/frequency.  Reports no BM since Th bid zantac        Type 2 diabetes mellitus with chronic kidney disease, with long-term current use of insulin/controlled  Cont novolog sliding scale, levemir 15u qd      Renal stones s/p stent  Cont present management      Hx CHF  Cont bumex 1mg bid, daily

## 2018-12-11 PROCEDURE — 99315 NF DSCHRG MGMT 30 MIN/LESS: CPT | Performed by: FAMILY MEDICINE

## 2018-12-12 ENCOUNTER — SNF VISIT (OUTPATIENT)
Dept: INTERNAL MEDICINE CLINIC | Facility: SKILLED NURSING FACILITY | Age: 73
End: 2018-12-12

## 2018-12-12 DIAGNOSIS — S86.811D PATELLAR TENDON RUPTURE, RIGHT, SUBSEQUENT ENCOUNTER: ICD-10-CM

## 2018-12-12 DIAGNOSIS — E11.22 TYPE 2 DIABETES MELLITUS WITH CHRONIC KIDNEY DISEASE, WITH LONG-TERM CURRENT USE OF INSULIN, UNSPECIFIED CKD STAGE (HCC): ICD-10-CM

## 2018-12-12 DIAGNOSIS — Z79.4 TYPE 2 DIABETES MELLITUS WITH CHRONIC KIDNEY DISEASE, WITH LONG-TERM CURRENT USE OF INSULIN, UNSPECIFIED CKD STAGE (HCC): ICD-10-CM

## 2018-12-12 PROCEDURE — 99308 SBSQ NF CARE LOW MDM 20: CPT | Performed by: NURSE PRACTITIONER

## 2018-12-12 NOTE — PROGRESS NOTES
HPI: Flora Jones  Is a 76 yo female who underwent a right knee arthroplasty by Dr. Nando Cuevas 10/29/18 at [address] [place] [address] with anemia following the surgery. She was discharged and admitted to a different hospital for treatment of kidney stones, s/p stent placement.  Janki Douglas Reviewed     REVIEW OF SYSTEMS: Going home today. Pain controlled. Afebrile. No sob/cough/CP/palpitations.  Denies hematuria/dysuria/frequency.  Last BM was yesterday.      PHYSICAL EXAM:  GENERAL HEALTH: NAD  HEENT: atraumatic/normocephalic, mucous membran bid, hydralazine 25mg 2 tabs bid      Renal insufficiency  Monitor bmp in rehab   Bun 14, creatinine 1. 16        Nonischemic cardiomyopathy  Cont present management       GERD with esophagitis  Cont bid zantac        Type 2 diabetes mellitus with chronic k

## 2019-01-28 ENCOUNTER — MED REC SCAN ONLY (OUTPATIENT)
Dept: FAMILY MEDICINE CLINIC | Facility: CLINIC | Age: 74
End: 2019-01-28

## 2019-01-28 ENCOUNTER — DIAGNOSTIC TRANS (OUTPATIENT)
Dept: OTHER | Age: 74
End: 2019-01-28

## 2019-01-28 LAB
ALBUMIN SERPL-MCNC: 3 GM/DL (ref 3.6–5.1)
ALBUMIN/GLOB SERPL: 0.6 {RATIO} (ref 1–2.4)
ALP SERPL-CCNC: 122 UNIT/L (ref 45–117)
ALT SERPL-CCNC: 9 UNIT/L
AMORPH SED URNS QL MICRO: PRESENT
ANALYZER ANC (IANC): ABNORMAL
ANION GAP SERPL CALC-SCNC: 17 MMOL/L (ref 10–20)
APPEARANCE UR: ABNORMAL
AST SERPL-CCNC: 17 UNIT/L
BACTERIA #/AREA URNS HPF: ABNORMAL /HPF
BASOPHILS # BLD: 0 THOUSAND/MCL (ref 0–0.3)
BASOPHILS NFR BLD: 0 %
BILIRUB SERPL-MCNC: 0.8 MG/DL (ref 0.2–1)
BILIRUB UR QL: NEGATIVE
BUN SERPL-MCNC: 16 MG/DL (ref 6–20)
BUN/CREAT SERPL: 14 (ref 7–25)
CALCIUM SERPL-MCNC: 9.8 MG/DL (ref 8.4–10.2)
CAOX CRY URNS QL MICRO: ABNORMAL
CHLORIDE: 102 MMOL/L (ref 98–107)
CO2 SERPL-SCNC: 27 MMOL/L (ref 21–32)
COLOR UR: ABNORMAL
CREAT SERPL-MCNC: 1.14 MG/DL (ref 0.51–0.95)
DIFFERENTIAL METHOD BLD: ABNORMAL
EOSINOPHIL # BLD: 0 THOUSAND/MCL (ref 0.1–0.5)
EOSINOPHIL NFR BLD: 0 %
EPITH CASTS #/AREA URNS LPF: ABNORMAL /[LPF]
ERYTHROCYTE [DISTWIDTH] IN BLOOD: 16.1 % (ref 11–15)
FATTY CASTS #/AREA URNS LPF: ABNORMAL /[LPF]
GLOBULIN SER-MCNC: 4.9 GM/DL (ref 2–4)
GLUCOSE BLDC GLUCOMTR-MCNC: 168 MG/DL (ref 65–99)
GLUCOSE SERPL-MCNC: 185 MG/DL (ref 65–99)
GLUCOSE UR-MCNC: NEGATIVE MG/DL
GRAN CASTS #/AREA URNS LPF: ABNORMAL /[LPF]
HEMATOCRIT: 34.3 % (ref 36–46.5)
HGB BLD-MCNC: 10.6 GM/DL (ref 12–15.5)
HGB UR QL: ABNORMAL
HYALINE CASTS #/AREA URNS LPF: ABNORMAL /LPF (ref 0–5)
IMM GRANULOCYTES # BLD AUTO: 0.1 THOUSAND/MCL (ref 0–0.2)
IMM GRANULOCYTES NFR BLD: 1 %
KETONES UR-MCNC: ABNORMAL MG/DL
LEUKOCYTE ESTERASE UR QL STRIP: ABNORMAL
LIPASE SERPL-CCNC: <50 UNIT/L (ref 73–393)
LYMPHOCYTES # BLD: 1.2 THOUSAND/MCL (ref 1–4)
LYMPHOCYTES NFR BLD: 12 %
MCH RBC QN AUTO: 25.5 PG (ref 26–34)
MCHC RBC AUTO-ENTMCNC: 30.9 GM/DL (ref 32–36.5)
MCV RBC AUTO: 82.5 FL (ref 78–100)
MIXED CELL CASTS #/AREA URNS LPF: ABNORMAL /[LPF]
MONOCYTES # BLD: 0.7 THOUSAND/MCL (ref 0.3–0.9)
MONOCYTES NFR BLD: 7 %
MUCOUS THREADS URNS QL MICRO: ABNORMAL
NEUTROPHILS # BLD: 7.8 THOUSAND/MCL (ref 1.8–7.7)
NEUTROPHILS NFR BLD: 80 %
NEUTS SEG NFR BLD: ABNORMAL %
NITRITE UR QL: NEGATIVE
NRBC (NRBCRE): 0 /100 WBC
PH UR: 6 UNIT (ref 5–7)
PLATELET # BLD: 252 THOUSAND/MCL (ref 140–450)
POTASSIUM SERPL-SCNC: 4.3 MMOL/L (ref 3.4–5.1)
PROT SERPL-MCNC: 7.9 GM/DL (ref 6.4–8.2)
PROT UR QL: >500 MG/DL
RBC # BLD: 4.16 MILLION/MCL (ref 4–5.2)
RBC #/AREA URNS HPF: >100 /HPF (ref 0–3)
RBC CASTS #/AREA URNS LPF: ABNORMAL /[LPF]
RENAL EPI CELLS #/AREA URNS HPF: ABNORMAL /[HPF]
SODIUM SERPL-SCNC: 142 MMOL/L (ref 135–145)
SP GR UR: 1.02 (ref 1–1.03)
SPECIMEN SOURCE: ABNORMAL
SPERM URNS QL MICRO: ABNORMAL
SQUAMOUS #/AREA URNS HPF: ABNORMAL /HPF (ref 0–5)
T VAGINALIS URNS QL MICRO: ABNORMAL
TRI-PHOS CRY URNS QL MICRO: ABNORMAL
TROPONIN I SERPL HS-MCNC: 0.16 NG/ML
URATE CRY URNS QL MICRO: ABNORMAL
URINE REFLEX: ABNORMAL
URNS CMNT MICRO: ABNORMAL
UROBILINOGEN UR QL: 0.2 MG/DL (ref 0–1)
WAXY CASTS #/AREA URNS LPF: ABNORMAL /[LPF]
WBC # BLD: 9.8 THOUSAND/MCL (ref 4.2–11)
WBC #/AREA URNS HPF: >100 /HPF (ref 0–5)
WBC CASTS #/AREA URNS LPF: ABNORMAL /[LPF]
YEAST HYPHAE URNS QL MICRO: ABNORMAL
YEAST URNS QL MICRO: ABNORMAL

## 2019-01-29 ENCOUNTER — HOSPITAL (OUTPATIENT)
Dept: OTHER | Age: 74
End: 2019-01-29
Attending: FAMILY MEDICINE

## 2019-01-29 ENCOUNTER — HOSPITAL (OUTPATIENT)
Dept: OTHER | Age: 74
End: 2019-01-29

## 2019-01-29 LAB
ALBUMIN SERPL-MCNC: 2.6 GM/DL (ref 3.6–5.1)
ALBUMIN/GLOB SERPL: 0.6 {RATIO} (ref 1–2.4)
ALP SERPL-CCNC: 108 UNIT/L (ref 45–117)
ALT SERPL-CCNC: 6 UNIT/L
ANALYZER ANC (IANC): ABNORMAL
ANION GAP SERPL CALC-SCNC: 15 MMOL/L (ref 10–20)
AST SERPL-CCNC: 8 UNIT/L
BASOPHILS # BLD: 0 THOUSAND/MCL (ref 0–0.3)
BASOPHILS NFR BLD: 0 %
BILIRUB SERPL-MCNC: 0.5 MG/DL (ref 0.2–1)
BUN SERPL-MCNC: 19 MG/DL (ref 6–20)
BUN/CREAT SERPL: 14 (ref 7–25)
CALCIUM SERPL-MCNC: 9.4 MG/DL (ref 8.4–10.2)
CHLORIDE: 106 MMOL/L (ref 98–107)
CO2 SERPL-SCNC: 29 MMOL/L (ref 21–32)
CREAT SERPL-MCNC: 1.34 MG/DL (ref 0.51–0.95)
DIFFERENTIAL METHOD BLD: ABNORMAL
EOSINOPHIL # BLD: 0 THOUSAND/MCL (ref 0.1–0.5)
EOSINOPHIL NFR BLD: 0 %
ERYTHROCYTE [DISTWIDTH] IN BLOOD: 16.2 % (ref 11–15)
GLOBULIN SER-MCNC: 4.3 GM/DL (ref 2–4)
GLUCOSE BLDC GLUCOMTR-MCNC: 162 MG/DL (ref 65–99)
GLUCOSE BLDC GLUCOMTR-MCNC: 164 MG/DL (ref 65–99)
GLUCOSE BLDC GLUCOMTR-MCNC: 179 MG/DL (ref 65–99)
GLUCOSE BLDC GLUCOMTR-MCNC: 196 MG/DL (ref 65–99)
GLUCOSE SERPL-MCNC: 190 MG/DL (ref 65–99)
HEMATOCRIT: 31.9 % (ref 36–46.5)
HGB BLD-MCNC: 9.8 GM/DL (ref 12–15.5)
IMM GRANULOCYTES # BLD AUTO: 0.1 THOUSAND/MCL (ref 0–0.2)
IMM GRANULOCYTES NFR BLD: 1 %
LYMPHOCYTES # BLD: 1.1 THOUSAND/MCL (ref 1–4)
LYMPHOCYTES NFR BLD: 11 %
MCH RBC QN AUTO: 25.2 PG (ref 26–34)
MCHC RBC AUTO-ENTMCNC: 30.7 GM/DL (ref 32–36.5)
MCV RBC AUTO: 82 FL (ref 78–100)
MONOCYTES # BLD: 0.9 THOUSAND/MCL (ref 0.3–0.9)
MONOCYTES NFR BLD: 8 %
NEUTROPHILS # BLD: 8.3 THOUSAND/MCL (ref 1.8–7.7)
NEUTROPHILS NFR BLD: 80 %
NEUTS SEG NFR BLD: ABNORMAL %
NRBC (NRBCRE): 0 /100 WBC
PLATELET # BLD: 245 THOUSAND/MCL (ref 140–450)
POTASSIUM SERPL-SCNC: 3.6 MMOL/L (ref 3.4–5.1)
PROT SERPL-MCNC: 6.9 GM/DL (ref 6.4–8.2)
RBC # BLD: 3.89 MILLION/MCL (ref 4–5.2)
SODIUM SERPL-SCNC: 146 MMOL/L (ref 135–145)
WBC # BLD: 10.3 THOUSAND/MCL (ref 4.2–11)

## 2019-01-30 ENCOUNTER — HOSPITAL (OUTPATIENT)
Dept: OTHER | Age: 74
End: 2019-01-30

## 2019-01-30 LAB
GLUCOSE BLDC GLUCOMTR-MCNC: 184 MG/DL (ref 65–99)
GLUCOSE BLDC GLUCOMTR-MCNC: 186 MG/DL (ref 65–99)
GLUCOSE BLDC GLUCOMTR-MCNC: 217 MG/DL (ref 65–99)

## 2019-01-31 LAB
ANALYZER ANC (IANC): ABNORMAL
ANION GAP SERPL CALC-SCNC: 11 MMOL/L (ref 10–20)
BASOPHILS # BLD: 0 THOUSAND/MCL (ref 0–0.3)
BASOPHILS NFR BLD: 0 %
BUN SERPL-MCNC: 28 MG/DL (ref 6–20)
BUN/CREAT SERPL: 14 (ref 7–25)
CALCIUM SERPL-MCNC: 9 MG/DL (ref 8.4–10.2)
CHLORIDE: 106 MMOL/L (ref 98–107)
CO2 SERPL-SCNC: 28 MMOL/L (ref 21–32)
CREAT SERPL-MCNC: 2.01 MG/DL (ref 0.51–0.95)
DIFFERENTIAL METHOD BLD: ABNORMAL
EOSINOPHIL # BLD: 0 THOUSAND/MCL (ref 0.1–0.5)
EOSINOPHIL NFR BLD: 0 %
ERYTHROCYTE [DISTWIDTH] IN BLOOD: 16.2 % (ref 11–15)
GLUCOSE BLDC GLUCOMTR-MCNC: 125 MG/DL (ref 65–99)
GLUCOSE BLDC GLUCOMTR-MCNC: 128 MG/DL (ref 65–99)
GLUCOSE BLDC GLUCOMTR-MCNC: 205 MG/DL (ref 65–99)
GLUCOSE SERPL-MCNC: 104 MG/DL (ref 65–99)
HEMATOCRIT: 27.7 % (ref 36–46.5)
HGB BLD-MCNC: 8.4 GM/DL (ref 12–15.5)
IMM GRANULOCYTES # BLD AUTO: 0.1 THOUSAND/MCL (ref 0–0.2)
IMM GRANULOCYTES NFR BLD: 1 %
LYMPHOCYTES # BLD: 1.5 THOUSAND/MCL (ref 1–4)
LYMPHOCYTES NFR BLD: 12 %
MAGNESIUM SERPL-MCNC: 1.5 MG/DL (ref 1.7–2.4)
MCH RBC QN AUTO: 25.1 PG (ref 26–34)
MCHC RBC AUTO-ENTMCNC: 30.3 GM/DL (ref 32–36.5)
MCV RBC AUTO: 82.9 FL (ref 78–100)
MONOCYTES # BLD: 1.2 THOUSAND/MCL (ref 0.3–0.9)
MONOCYTES NFR BLD: 10 %
NEUTROPHILS # BLD: 9.4 THOUSAND/MCL (ref 1.8–7.7)
NEUTROPHILS NFR BLD: 77 %
NEUTS SEG NFR BLD: ABNORMAL %
NRBC (NRBCRE): 0 /100 WBC
PLATELET # BLD: 210 THOUSAND/MCL (ref 140–450)
POTASSIUM SERPL-SCNC: 2.9 MMOL/L (ref 3.4–5.1)
POTASSIUM SERPL-SCNC: 3.6 MMOL/L (ref 3.4–5.1)
RBC # BLD: 3.34 MILLION/MCL (ref 4–5.2)
SODIUM SERPL-SCNC: 142 MMOL/L (ref 135–145)
WBC # BLD: 12.2 THOUSAND/MCL (ref 4.2–11)

## 2019-02-01 ENCOUNTER — HOSPITAL (OUTPATIENT)
Dept: OTHER | Age: 74
End: 2019-02-01

## 2019-02-01 LAB
CREAT SERPL-MCNC: 1.69 MG/DL (ref 0.51–0.95)
GLUCOSE BLDC GLUCOMTR-MCNC: 111 MG/DL (ref 65–99)
GLUCOSE BLDC GLUCOMTR-MCNC: 114 MG/DL (ref 65–99)
GLUCOSE BLDC GLUCOMTR-MCNC: 117 MG/DL (ref 65–99)
GLUCOSE BLDC GLUCOMTR-MCNC: 180 MG/DL (ref 65–99)
GLUCOSE BLDC GLUCOMTR-MCNC: 43 MG/DL (ref 65–99)
GLUCOSE BLDC GLUCOMTR-MCNC: 80 MG/DL (ref 65–99)
GLUCOSE BLDC GLUCOMTR-MCNC: 94 MG/DL (ref 65–99)
GLUCOSE BLDC GLUCOMTR-MCNC: 99 MG/DL (ref 65–99)
MAGNESIUM SERPL-MCNC: 1.4 MG/DL (ref 1.7–2.4)
POTASSIUM SERPL-SCNC: 3.1 MMOL/L (ref 3.4–5.1)

## 2019-02-02 LAB
ANION GAP SERPL CALC-SCNC: 10 MMOL/L (ref 10–20)
BUN SERPL-MCNC: 21 MG/DL (ref 6–20)
BUN/CREAT SERPL: 13 (ref 7–25)
CALCIUM SERPL-MCNC: 8.3 MG/DL (ref 8.4–10.2)
CHLORIDE: 107 MMOL/L (ref 98–107)
CO2 SERPL-SCNC: 27 MMOL/L (ref 21–32)
CREAT SERPL-MCNC: 1.61 MG/DL (ref 0.51–0.95)
GLUCOSE BLDC GLUCOMTR-MCNC: 187 MG/DL (ref 65–99)
GLUCOSE BLDC GLUCOMTR-MCNC: 211 MG/DL (ref 65–99)
GLUCOSE BLDC GLUCOMTR-MCNC: 59 MG/DL (ref 65–99)
GLUCOSE BLDC GLUCOMTR-MCNC: 90 MG/DL (ref 65–99)
GLUCOSE SERPL-MCNC: 71 MG/DL (ref 65–99)
MAGNESIUM SERPL-MCNC: 1.6 MG/DL (ref 1.7–2.4)
POTASSIUM SERPL-SCNC: 3.6 MMOL/L (ref 3.4–5.1)
SODIUM SERPL-SCNC: 140 MMOL/L (ref 135–145)

## 2019-02-04 LAB — PATHOLOGIST NAME: NORMAL

## 2019-02-05 PROCEDURE — 99305 1ST NF CARE MODERATE MDM 35: CPT | Performed by: FAMILY MEDICINE

## 2019-02-06 ENCOUNTER — TELEPHONE (OUTPATIENT)
Dept: FAMILY MEDICINE CLINIC | Facility: CLINIC | Age: 74
End: 2019-02-06

## 2019-02-06 NOTE — TELEPHONE ENCOUNTER
Message #          2019 05:36p   [JAY]  To:  From:  NARESH Khan MD:  Phone#:  ----------------------------------------------------------------------  [facility] [phone]. EXT [id number] PT ASHLEIGH MCLEAN VERIFY   ORDERS .  44

## 2019-02-11 ENCOUNTER — MED REC SCAN ONLY (OUTPATIENT)
Dept: FAMILY MEDICINE CLINIC | Facility: CLINIC | Age: 74
End: 2019-02-11

## 2019-02-11 NOTE — PROGRESS NOTES
[facility] laboratories sent lab results done on 2/6/19, forms put in [facility] folder for dr to review, will be sent to scan

## 2019-02-12 PROCEDURE — 99315 NF DSCHRG MGMT 30 MIN/LESS: CPT | Performed by: FAMILY MEDICINE

## 2019-02-23 ENCOUNTER — HOSPITAL (OUTPATIENT)
Dept: OTHER | Age: 74
End: 2019-02-23
Attending: INTERNAL MEDICINE

## 2019-02-23 ENCOUNTER — HOSPITAL (OUTPATIENT)
Dept: OTHER | Age: 74
End: 2019-02-23

## 2019-02-23 LAB
AMORPH SED URNS QL MICRO: ABNORMAL
ANALYZER ANC (IANC): ABNORMAL
ANION GAP SERPL CALC-SCNC: 12 MMOL/L (ref 10–20)
APPEARANCE UR: ABNORMAL
BACTERIA #/AREA URNS HPF: ABNORMAL /HPF
BASOPHILS # BLD: 0 THOUSAND/MCL (ref 0–0.3)
BASOPHILS NFR BLD: 0 %
BILIRUB UR QL: NEGATIVE
BUN SERPL-MCNC: 14 MG/DL (ref 6–20)
BUN/CREAT SERPL: 11 (ref 7–25)
CALCIUM SERPL-MCNC: 9.2 MG/DL (ref 8.4–10.2)
CAOX CRY URNS QL MICRO: ABNORMAL
CHLORIDE: 105 MMOL/L (ref 98–107)
CO2 SERPL-SCNC: 29 MMOL/L (ref 21–32)
COLOR UR: YELLOW
CREAT SERPL-MCNC: 1.23 MG/DL (ref 0.51–0.95)
DIFFERENTIAL METHOD BLD: ABNORMAL
EOSINOPHIL # BLD: 0.1 THOUSAND/MCL (ref 0.1–0.5)
EOSINOPHIL NFR BLD: 2 %
EPITH CASTS #/AREA URNS LPF: ABNORMAL /[LPF]
ERYTHROCYTE [DISTWIDTH] IN BLOOD: 16.6 % (ref 11–15)
FATTY CASTS #/AREA URNS LPF: ABNORMAL /[LPF]
GLUCOSE SERPL-MCNC: 153 MG/DL (ref 65–99)
GLUCOSE UR-MCNC: NEGATIVE MG/DL
GRAN CASTS #/AREA URNS LPF: ABNORMAL /[LPF]
HEMATOCRIT: 30.1 % (ref 36–46.5)
HGB BLD-MCNC: 9.2 GM/DL (ref 12–15.5)
HGB UR QL: ABNORMAL
HYALINE CASTS #/AREA URNS LPF: ABNORMAL /LPF (ref 0–5)
IMM GRANULOCYTES # BLD AUTO: 0 THOUSAND/MCL (ref 0–0.2)
IMM GRANULOCYTES NFR BLD: 1 %
KETONES UR-MCNC: NEGATIVE MG/DL
LACTATE BLDV-MCNC: 1.1 MMOL/L
LEUKOCYTE ESTERASE UR QL STRIP: ABNORMAL
LYMPHOCYTES # BLD: 0.5 THOUSAND/MCL (ref 1–4)
LYMPHOCYTES NFR BLD: 11 %
MAGNESIUM SERPL-MCNC: 1.3 MG/DL (ref 1.7–2.4)
MCH RBC QN AUTO: 25.2 PG (ref 26–34)
MCHC RBC AUTO-ENTMCNC: 30.6 GM/DL (ref 32–36.5)
MCV RBC AUTO: 82.5 FL (ref 78–100)
MIXED CELL CASTS #/AREA URNS LPF: ABNORMAL /[LPF]
MONOCYTES # BLD: 0.8 THOUSAND/MCL (ref 0.3–0.9)
MONOCYTES NFR BLD: 18 %
MUCOUS THREADS URNS QL MICRO: ABNORMAL
NEUTROPHILS # BLD: 3 THOUSAND/MCL (ref 1.8–7.7)
NEUTROPHILS NFR BLD: 68 %
NEUTS SEG NFR BLD: ABNORMAL %
NITRITE UR QL: NEGATIVE
NRBC (NRBCRE): 0 /100 WBC
PH UR: 8 UNIT (ref 5–7)
PLATELET # BLD: 228 THOUSAND/MCL (ref 140–450)
POTASSIUM SERPL-SCNC: 3.7 MMOL/L (ref 3.4–5.1)
PROT UR QL: >500 MG/DL
RBC # BLD: 3.65 MILLION/MCL (ref 4–5.2)
RBC #/AREA URNS HPF: >100 /HPF (ref 0–3)
RBC CASTS #/AREA URNS LPF: ABNORMAL /[LPF]
RENAL EPI CELLS #/AREA URNS HPF: ABNORMAL /[HPF]
SODIUM SERPL-SCNC: 142 MMOL/L (ref 135–145)
SP GR UR: 1.01 (ref 1–1.03)
SPECIMEN SOURCE: ABNORMAL
SPERM URNS QL MICRO: ABNORMAL
SQUAMOUS #/AREA URNS HPF: ABNORMAL /HPF (ref 0–5)
T VAGINALIS URNS QL MICRO: ABNORMAL
TRI-PHOS CRY URNS QL MICRO: ABNORMAL
URATE CRY URNS QL MICRO: ABNORMAL
URINE REFLEX: ABNORMAL
URNS CMNT MICRO: ABNORMAL
UROBILINOGEN UR QL: 0.2 MG/DL (ref 0–1)
WAXY CASTS #/AREA URNS LPF: ABNORMAL /[LPF]
WBC # BLD: 4.3 THOUSAND/MCL (ref 4.2–11)
WBC #/AREA URNS HPF: ABNORMAL /HPF (ref 0–5)
WBC CASTS #/AREA URNS LPF: ABNORMAL /[LPF]
YEAST HYPHAE URNS QL MICRO: ABNORMAL
YEAST URNS QL MICRO: ABNORMAL

## 2019-02-24 ENCOUNTER — HOSPITAL (OUTPATIENT)
Dept: OTHER | Age: 74
End: 2019-02-24

## 2019-02-24 ENCOUNTER — DIAGNOSTIC TRANS (OUTPATIENT)
Dept: OTHER | Age: 74
End: 2019-02-24

## 2019-02-24 LAB
ALBUMIN SERPL-MCNC: 2.3 GM/DL (ref 3.6–5.1)
ALBUMIN/GLOB SERPL: 0.6 {RATIO} (ref 1–2.4)
ALP SERPL-CCNC: 104 UNIT/L (ref 45–117)
ALT SERPL-CCNC: 8 UNIT/L
ANALYZER ANC (IANC): ABNORMAL
ANION GAP SERPL CALC-SCNC: 13 MMOL/L (ref 10–20)
AST SERPL-CCNC: 10 UNIT/L
BASOPHILS # BLD: 0 THOUSAND/MCL (ref 0–0.3)
BASOPHILS NFR BLD: 0 %
BILIRUB SERPL-MCNC: 0.3 MG/DL (ref 0.2–1)
BUN SERPL-MCNC: 13 MG/DL (ref 6–20)
BUN/CREAT SERPL: 10 (ref 7–25)
CALCIUM SERPL-MCNC: 8.7 MG/DL (ref 8.4–10.2)
CHLORIDE: 107 MMOL/L (ref 98–107)
CO2 SERPL-SCNC: 28 MMOL/L (ref 21–32)
CREAT SERPL-MCNC: 1.35 MG/DL (ref 0.51–0.95)
DIFFERENTIAL METHOD BLD: ABNORMAL
EOSINOPHIL # BLD: 0.1 THOUSAND/MCL (ref 0.1–0.5)
EOSINOPHIL NFR BLD: 2 %
ERYTHROCYTE [DISTWIDTH] IN BLOOD: 16.8 % (ref 11–15)
GLOBULIN SER-MCNC: 3.9 GM/DL (ref 2–4)
GLUCOSE BLDC GLUCOMTR-MCNC: 108 MG/DL (ref 65–99)
GLUCOSE BLDC GLUCOMTR-MCNC: 111 MG/DL (ref 65–99)
GLUCOSE BLDC GLUCOMTR-MCNC: 142 MG/DL (ref 65–99)
GLUCOSE BLDC GLUCOMTR-MCNC: 174 MG/DL (ref 65–99)
GLUCOSE BLDC GLUCOMTR-MCNC: 51 MG/DL (ref 65–99)
GLUCOSE BLDC GLUCOMTR-MCNC: 63 MG/DL (ref 65–99)
GLUCOSE BLDC GLUCOMTR-MCNC: 64 MG/DL (ref 65–99)
GLUCOSE BLDC GLUCOMTR-MCNC: 97 MG/DL (ref 65–99)
GLUCOSE SERPL-MCNC: 121 MG/DL (ref 65–99)
HEMATOCRIT: 29.3 % (ref 36–46.5)
HGB BLD-MCNC: 8.9 GM/DL (ref 12–15.5)
IMM GRANULOCYTES # BLD AUTO: 0 THOUSAND/MCL (ref 0–0.2)
IMM GRANULOCYTES NFR BLD: 0 %
LYMPHOCYTES # BLD: 0.7 THOUSAND/MCL (ref 1–4)
LYMPHOCYTES NFR BLD: 11 %
MAGNESIUM SERPL-MCNC: 1.8 MG/DL (ref 1.7–2.4)
MCH RBC QN AUTO: 25.4 PG (ref 26–34)
MCHC RBC AUTO-ENTMCNC: 30.4 GM/DL (ref 32–36.5)
MCV RBC AUTO: 83.7 FL (ref 78–100)
MONOCYTES # BLD: 0.8 THOUSAND/MCL (ref 0.3–0.9)
MONOCYTES NFR BLD: 13 %
NEUTROPHILS # BLD: 4.5 THOUSAND/MCL (ref 1.8–7.7)
NEUTROPHILS NFR BLD: 74 %
NEUTS SEG NFR BLD: ABNORMAL %
NRBC (NRBCRE): 0 /100 WBC
PLATELET # BLD: 202 THOUSAND/MCL (ref 140–450)
POTASSIUM SERPL-SCNC: 4 MMOL/L (ref 3.4–5.1)
PROT SERPL-MCNC: 6.2 GM/DL (ref 6.4–8.2)
RBC # BLD: 3.5 MILLION/MCL (ref 4–5.2)
SODIUM SERPL-SCNC: 144 MMOL/L (ref 135–145)
WBC # BLD: 6.2 THOUSAND/MCL (ref 4.2–11)

## 2019-02-25 ENCOUNTER — HOSPITAL (OUTPATIENT)
Dept: OTHER | Age: 74
End: 2019-02-25

## 2019-02-25 LAB
GLUCOSE BLDC GLUCOMTR-MCNC: 101 MG/DL (ref 65–99)
GLUCOSE BLDC GLUCOMTR-MCNC: 102 MG/DL (ref 65–99)
GLUCOSE BLDC GLUCOMTR-MCNC: 108 MG/DL (ref 65–99)
GLUCOSE BLDC GLUCOMTR-MCNC: 163 MG/DL (ref 65–99)
GLUCOSE BLDC GLUCOMTR-MCNC: 88 MG/DL (ref 65–99)
GLUCOSE BLDC GLUCOMTR-MCNC: 99 MG/DL (ref 65–99)
MAGNESIUM SERPL-MCNC: 2 MG/DL (ref 1.7–2.4)

## 2019-02-26 ENCOUNTER — HOSPITAL (OUTPATIENT)
Dept: OTHER | Age: 74
End: 2019-02-26

## 2019-02-26 LAB
25(OH)D3+25(OH)D2 SERPL-MCNC: 25.7 NG/ML (ref 30–100)
ANALYZER ANC (IANC): ABNORMAL
ANION GAP SERPL CALC-SCNC: 13 MMOL/L (ref 10–20)
BASOPHILS # BLD: 0 THOUSAND/MCL (ref 0–0.3)
BASOPHILS NFR BLD: 0 %
BUN SERPL-MCNC: 20 MG/DL (ref 6–20)
BUN/CREAT SERPL: 13 (ref 7–25)
CALCIUM SERPL-MCNC: 8.2 MG/DL (ref 8.4–10.2)
CHLORIDE: 102 MMOL/L (ref 98–107)
CHOLEST SERPL-MCNC: 189 MG/DL
CHOLEST/HDLC SERPL: 4.1 {RATIO}
CO2 SERPL-SCNC: 27 MMOL/L (ref 21–32)
CREAT SERPL-MCNC: 1.57 MG/DL (ref 0.51–0.95)
CRP SERPL HS-MCNC: 24 MG/L
DIFFERENTIAL METHOD BLD: ABNORMAL
EOSINOPHIL # BLD: 0 THOUSAND/MCL (ref 0.1–0.5)
EOSINOPHIL NFR BLD: 0 %
ERYTHROCYTE [DISTWIDTH] IN BLOOD: 16.4 % (ref 11–15)
ERYTHROCYTE [SEDIMENTATION RATE] IN BLOOD BY WESTERGREN METHOD: 51 MM/HR (ref 0–20)
GLUCOSE BLDC GLUCOMTR-MCNC: 100 MG/DL (ref 65–99)
GLUCOSE BLDC GLUCOMTR-MCNC: 102 MG/DL (ref 65–99)
GLUCOSE BLDC GLUCOMTR-MCNC: 106 MG/DL (ref 65–99)
GLUCOSE BLDC GLUCOMTR-MCNC: 112 MG/DL (ref 65–99)
GLUCOSE BLDC GLUCOMTR-MCNC: 128 MG/DL (ref 65–99)
GLUCOSE BLDC GLUCOMTR-MCNC: 60 MG/DL (ref 65–99)
GLUCOSE BLDC GLUCOMTR-MCNC: 66 MG/DL (ref 65–99)
GLUCOSE SERPL-MCNC: 71 MG/DL (ref 65–99)
HDLC SERPL-MCNC: 46 MG/DL
HEMATOCRIT: 25.2 % (ref 36–46.5)
HGB BLD-MCNC: 7.9 GM/DL (ref 12–15.5)
IMM GRANULOCYTES # BLD AUTO: 0 THOUSAND/MCL (ref 0–0.2)
IMM GRANULOCYTES NFR BLD: 1 %
LDLC SERPL CALC-MCNC: 127 MG/DL
LIPOPROTEIN (A): <2
LYMPHOCYTES # BLD: 1.3 THOUSAND/MCL (ref 1–4)
LYMPHOCYTES NFR BLD: 32 %
MAGNESIUM SERPL-MCNC: 1.8 MG/DL (ref 1.7–2.4)
MCH RBC QN AUTO: 25.6 PG (ref 26–34)
MCHC RBC AUTO-ENTMCNC: 31.3 GM/DL (ref 32–36.5)
MCV RBC AUTO: 81.6 FL (ref 78–100)
MONOCYTES # BLD: 0.9 THOUSAND/MCL (ref 0.3–0.9)
MONOCYTES NFR BLD: 22 %
NEUTROPHILS # BLD: 1.9 THOUSAND/MCL (ref 1.8–7.7)
NEUTROPHILS NFR BLD: 45 %
NEUTS SEG NFR BLD: ABNORMAL %
NONHDLC SERPL-MCNC: 143 MG/DL
NRBC (NRBCRE): 0 /100 WBC
NT-PROBNP SERPL-MCNC: ABNORMAL PG/ML
PLATELET # BLD: 199 THOUSAND/MCL (ref 140–450)
POTASSIUM SERPL-SCNC: 3.3 MMOL/L (ref 3.4–5.1)
RBC # BLD: 3.09 MILLION/MCL (ref 4–5.2)
SODIUM SERPL-SCNC: 139 MMOL/L (ref 135–145)
TRIGLYCERIDE (TRIGP): 82 MG/DL
TSH SERPL-ACNC: 1.18 MCUNIT/ML (ref 0.35–5)
WBC # BLD: 4.2 THOUSAND/MCL (ref 4.2–11)

## 2019-02-27 LAB
ALBUMIN SERPL-MCNC: 1.9 GM/DL (ref 3.6–5.1)
ALBUMIN/GLOB SERPL: 0.5 {RATIO} (ref 1–2.4)
ALP SERPL-CCNC: 75 UNIT/L (ref 45–117)
ALT SERPL-CCNC: 8 UNIT/L
ANALYZER ANC (IANC): ABNORMAL
ANION GAP SERPL CALC-SCNC: 10 MMOL/L (ref 10–20)
AST SERPL-CCNC: 10 UNIT/L
BASOPHILS # BLD: 0 THOUSAND/MCL (ref 0–0.3)
BASOPHILS NFR BLD: 0 %
BILIRUB SERPL-MCNC: 0.3 MG/DL (ref 0.2–1)
BUN SERPL-MCNC: 20 MG/DL (ref 6–20)
BUN/CREAT SERPL: 15 (ref 7–25)
CALCIUM SERPL-MCNC: 8.3 MG/DL (ref 8.4–10.2)
CHLORIDE: 104 MMOL/L (ref 98–107)
CO2 SERPL-SCNC: 29 MMOL/L (ref 21–32)
CREAT SERPL-MCNC: 1.37 MG/DL (ref 0.51–0.95)
DIFFERENTIAL METHOD BLD: ABNORMAL
DIGOXIN SERPL-MCNC: 0.8 NG/ML (ref 0.8–2.1)
EOSINOPHIL # BLD: 0.1 THOUSAND/MCL (ref 0.1–0.5)
EOSINOPHIL NFR BLD: 3 %
ERYTHROCYTE [DISTWIDTH] IN BLOOD: 16.2 % (ref 11–15)
GLOBULIN SER-MCNC: 3.5 GM/DL (ref 2–4)
GLUCOSE BLDC GLUCOMTR-MCNC: 111 MG/DL (ref 65–99)
GLUCOSE BLDC GLUCOMTR-MCNC: 119 MG/DL (ref 65–99)
GLUCOSE BLDC GLUCOMTR-MCNC: 133 MG/DL (ref 65–99)
GLUCOSE BLDC GLUCOMTR-MCNC: 172 MG/DL (ref 65–99)
GLUCOSE BLDC GLUCOMTR-MCNC: 93 MG/DL (ref 65–99)
GLUCOSE SERPL-MCNC: 115 MG/DL (ref 65–99)
HEMATOCRIT: 26.2 % (ref 36–46.5)
HGB BLD-MCNC: 8.1 GM/DL (ref 12–15.5)
IMM GRANULOCYTES # BLD AUTO: 0 THOUSAND/MCL (ref 0–0.2)
IMM GRANULOCYTES NFR BLD: 0 %
LYMPHOCYTES # BLD: 1.4 THOUSAND/MCL (ref 1–4)
LYMPHOCYTES NFR BLD: 45 %
MCH RBC QN AUTO: 25.2 PG (ref 26–34)
MCHC RBC AUTO-ENTMCNC: 30.9 GM/DL (ref 32–36.5)
MCV RBC AUTO: 81.4 FL (ref 78–100)
MONOCYTES # BLD: 0.6 THOUSAND/MCL (ref 0.3–0.9)
MONOCYTES NFR BLD: 20 %
NEUTROPHILS # BLD: 1 THOUSAND/MCL (ref 1.8–7.7)
NEUTROPHILS NFR BLD: 32 %
NEUTS SEG NFR BLD: ABNORMAL %
NRBC (NRBCRE): 0 /100 WBC
PLATELET # BLD: 183 THOUSAND/MCL (ref 140–450)
POTASSIUM SERPL-SCNC: 4 MMOL/L (ref 3.4–5.1)
PROT SERPL-MCNC: 5.4 GM/DL (ref 6.4–8.2)
RBC # BLD: 3.22 MILLION/MCL (ref 4–5.2)
SODIUM SERPL-SCNC: 139 MMOL/L (ref 135–145)
WBC # BLD: 3.2 THOUSAND/MCL (ref 4.2–11)

## 2019-02-28 LAB
ALBUMIN SERPL-MCNC: 2.3 GM/DL (ref 3.6–5.1)
ALBUMIN/GLOB SERPL: 0.6 {RATIO} (ref 1–2.4)
ALP SERPL-CCNC: 84 UNIT/L (ref 45–117)
ALT SERPL-CCNC: 7 UNIT/L
ANALYZER ANC (IANC): ABNORMAL
ANION GAP SERPL CALC-SCNC: 13 MMOL/L (ref 10–20)
AST SERPL-CCNC: 16 UNIT/L
BASOPHILS # BLD: 0 THOUSAND/MCL (ref 0–0.3)
BASOPHILS NFR BLD: 0 %
BILIRUB SERPL-MCNC: 0.2 MG/DL (ref 0.2–1)
BUN SERPL-MCNC: 23 MG/DL (ref 6–20)
BUN/CREAT SERPL: 16 (ref 7–25)
CALCIUM SERPL-MCNC: 9 MG/DL (ref 8.4–10.2)
CHLORIDE: 102 MMOL/L (ref 98–107)
CO2 SERPL-SCNC: 28 MMOL/L (ref 21–32)
CREAT SERPL-MCNC: 1.46 MG/DL (ref 0.51–0.95)
DIFFERENTIAL METHOD BLD: ABNORMAL
EOSINOPHIL # BLD: 0.2 THOUSAND/MCL (ref 0.1–0.5)
EOSINOPHIL NFR BLD: 4 %
ERYTHROCYTE [DISTWIDTH] IN BLOOD: 16.1 % (ref 11–15)
GLOBULIN SER-MCNC: 3.8 GM/DL (ref 2–4)
GLUCOSE BLDC GLUCOMTR-MCNC: 104 MG/DL (ref 65–99)
GLUCOSE BLDC GLUCOMTR-MCNC: 105 MG/DL (ref 65–99)
GLUCOSE BLDC GLUCOMTR-MCNC: 112 MG/DL (ref 65–99)
GLUCOSE BLDC GLUCOMTR-MCNC: 130 MG/DL (ref 65–99)
GLUCOSE BLDC GLUCOMTR-MCNC: 135 MG/DL (ref 65–99)
GLUCOSE BLDC GLUCOMTR-MCNC: 139 MG/DL (ref 65–99)
GLUCOSE BLDC GLUCOMTR-MCNC: 144 MG/DL (ref 65–99)
GLUCOSE BLDC GLUCOMTR-MCNC: 158 MG/DL (ref 65–99)
GLUCOSE BLDC GLUCOMTR-MCNC: 54 MG/DL (ref 65–99)
GLUCOSE BLDC GLUCOMTR-MCNC: 78 MG/DL (ref 65–99)
GLUCOSE BLDC GLUCOMTR-MCNC: 87 MG/DL (ref 65–99)
GLUCOSE SERPL-MCNC: 104 MG/DL (ref 65–99)
HEMATOCRIT: 29.4 % (ref 36–46.5)
HGB BLD-MCNC: 8.9 GM/DL (ref 12–15.5)
IMM GRANULOCYTES # BLD AUTO: 0 THOUSAND/MCL (ref 0–0.2)
IMM GRANULOCYTES NFR BLD: 1 %
LYMPHOCYTES # BLD: 1.6 THOUSAND/MCL (ref 1–4)
LYMPHOCYTES NFR BLD: 36 %
MCH RBC QN AUTO: 25.1 PG (ref 26–34)
MCHC RBC AUTO-ENTMCNC: 30.3 GM/DL (ref 32–36.5)
MCV RBC AUTO: 83.1 FL (ref 78–100)
MONOCYTES # BLD: 0.5 THOUSAND/MCL (ref 0.3–0.9)
MONOCYTES NFR BLD: 12 %
NEUTROPHILS # BLD: 2 THOUSAND/MCL (ref 1.8–7.7)
NEUTROPHILS NFR BLD: 47 %
NEUTS SEG NFR BLD: ABNORMAL %
NRBC (NRBCRE): 0 /100 WBC
PLATELET # BLD: 247 THOUSAND/MCL (ref 140–450)
POTASSIUM SERPL-SCNC: 4.2 MMOL/L (ref 3.4–5.1)
PROT SERPL-MCNC: 6.1 GM/DL (ref 6.4–8.2)
RBC # BLD: 3.54 MILLION/MCL (ref 4–5.2)
SODIUM SERPL-SCNC: 139 MMOL/L (ref 135–145)
WBC # BLD: 4.3 THOUSAND/MCL (ref 4.2–11)

## 2019-03-01 LAB
CREAT SERPL-MCNC: 1.32 MG/DL (ref 0.51–0.95)
GLUCOSE BLDC GLUCOMTR-MCNC: 105 MG/DL (ref 65–99)
GLUCOSE BLDC GLUCOMTR-MCNC: 106 MG/DL (ref 65–99)
GLUCOSE BLDC GLUCOMTR-MCNC: 155 MG/DL (ref 65–99)
GLUCOSE BLDC GLUCOMTR-MCNC: 175 MG/DL (ref 65–99)
INR PPP: 1
POTASSIUM SERPL-SCNC: 4.1 MMOL/L (ref 3.4–5.1)
PROTHROMBIN TIME: 10.5 SECONDS (ref 9.7–11.8)
PROTHROMBIN TIME: NORMAL

## 2019-03-02 LAB
GLUCOSE BLDC GLUCOMTR-MCNC: 108 MG/DL (ref 65–99)
GLUCOSE BLDC GLUCOMTR-MCNC: 142 MG/DL (ref 65–99)
GLUCOSE BLDC GLUCOMTR-MCNC: 145 MG/DL (ref 65–99)
GLUCOSE BLDC GLUCOMTR-MCNC: 169 MG/DL (ref 65–99)
INR PPP: 1.1
NT-PROBNP SERPL-MCNC: 3376 PG/ML
PROTHROMBIN TIME: 10.9 SECONDS (ref 9.7–11.8)
PROTHROMBIN TIME: NORMAL

## 2019-03-18 ENCOUNTER — TELEPHONE (OUTPATIENT)
Dept: SCHEDULING | Age: 74
End: 2019-03-18

## 2019-03-18 ENCOUNTER — TELEPHONE (OUTPATIENT)
Dept: CARDIOLOGY | Age: 74
End: 2019-03-18

## 2019-03-19 ENCOUNTER — TELEPHONE (OUTPATIENT)
Dept: CARDIOLOGY | Age: 74
End: 2019-03-19

## 2019-03-25 ENCOUNTER — HOSPITAL (OUTPATIENT)
Dept: OTHER | Age: 74
End: 2019-03-25

## 2019-03-25 ENCOUNTER — HOSPITAL (OUTPATIENT)
Dept: OTHER | Age: 74
End: 2019-03-25
Attending: UROLOGY

## 2019-03-25 LAB
COLOR STONE: NORMAL
COMPN STONE: NORMAL
GLUCOSE BLDC GLUCOMTR-MCNC: 141 MG/DL (ref 65–99)
GLUCOSE BLDC GLUCOMTR-MCNC: 141 MG/DL (ref 65–99)
GLUCOSE BLDC GLUCOMTR-MCNC: 170 MG/DL (ref 65–99)
GLUCOSE BLDC GLUCOMTR-MCNC: 170 MG/DL (ref 65–99)
INR PPP: 2.8
INR PPP: 2.8
INR PPP: ABNORMAL
PROTHROMBIN TIME (PRT2): ABNORMAL
PROTHROMBIN TIME: 26.9 SEC (ref 9.7–11.8)
PROTHROMBIN TIME: 26.9 SECONDS (ref 9.7–11.8)
PROTHROMBIN TIME: ABNORMAL
SIZE STONE: NORMAL MM3
STONE ANALYSIS-IMP: NORMAL
STONE ANALYSIS-IMP: NORMAL

## 2019-03-26 LAB — PATHOLOGIST NAME: NORMAL

## 2019-03-27 LAB
COLOR STONE: NORMAL
COMPN STONE: NORMAL
SIZE STONE: NORMAL MM3
STONE ANALYSIS-IMP: NORMAL

## 2019-04-15 ENCOUNTER — HOSPITAL ENCOUNTER (OUTPATIENT)
Dept: ULTRASOUND IMAGING | Facility: HOSPITAL | Age: 74
Discharge: HOME | End: 2019-04-15
Attending: UROLOGY
Payer: MEDICARE

## 2019-04-15 DIAGNOSIS — N20.1 CALCULUS OF URETER: ICD-10-CM

## 2019-04-15 PROCEDURE — 76775 US EXAM ABDO BACK WALL LIM: CPT | Performed by: UROLOGY

## 2019-04-26 ENCOUNTER — NURSE ONLY (OUTPATIENT)
Dept: WOUND CARE | Facility: HOSPITAL | Age: 74
End: 2019-04-26
Attending: CLINICAL NURSE SPECIALIST
Payer: MEDICARE

## 2019-04-26 DIAGNOSIS — L89.610 PRESSURE INJURY OF RIGHT HEEL, UNSTAGEABLE (HCC): Primary | ICD-10-CM

## 2019-04-26 PROCEDURE — 99214 OFFICE O/P EST MOD 30 MIN: CPT

## 2019-04-26 NOTE — PROGRESS NOTES
Subjective    Chief Complaint  This information was obtained from the patient  The patient is new to the [address] [facility],[address] here for an initial visit for the evaluation and management of non-healing wound(s).  Patient was discharged from [facility] fo denies, Illicit Drug Use - No, Lives in - apartment, Lives with - friend, Needs assistance with weight-shifting - wheelchair bound due to knee surgery uses awalker occasionally, No Signs/Symptoms of Abuse, (Ask Privately) Are you being hurt or frightened b oral 2 mg tablet twice daily  Imodium A-D - oral 2 mg tablet other  ondansetron HCl - oral 4 mg tablet every 8 hours as needed  enalapril maleate - oral 10 mg tablet twice daily  ranitidine HCl - oral 15 mg/mL 150 mg syrup twice daily  insulin aspart U-100 present  Compression Device In Use: No  Vascular Assessment:  Right Extremity Pulses:  Popliteal: Palpable  Posterior Tibial: Palpable  Dorsalis Pedis: Palpable  Right Extremity colors, hair growth, and conditions:  Extremity Color: WNL  Hair Growth on Ext absolute 1.4 high. BMP abnormal lab results: Sodium 130, BUN 22 high, creatinine 1.90 high, total calcium 7.9 low, calculated osmolality 273 low, GFR 30 low. 11/22/2018 hemoglobin 8.5 low POCT glucose 161 high.      Anemia and elevated glucose levels can im

## 2019-05-09 ENCOUNTER — APPOINTMENT (OUTPATIENT)
Dept: WOUND CARE | Facility: HOSPITAL | Age: 74
End: 2019-05-09
Attending: CLINICAL NURSE SPECIALIST
Payer: MEDICARE

## 2019-05-16 ENCOUNTER — OFFICE VISIT (OUTPATIENT)
Dept: WOUND CARE | Facility: HOSPITAL | Age: 74
End: 2019-05-16
Attending: CLINICAL NURSE SPECIALIST
Payer: MEDICARE

## 2019-05-16 PROBLEM — L89.610 UNSTAGEABLE PRESSURE ULCER OF RIGHT HEEL (HCC): Status: ACTIVE | Noted: 2019-05-16

## 2019-05-16 PROCEDURE — 97597 DBRDMT OPN WND 1ST 20 CM/<: CPT

## 2019-05-16 NOTE — PROGRESS NOTES
Subjective    Chief Complaint  This information was obtained from the patient  The patient is new to the [address] [facility],[address] here for an initial visit for the evaluation and management of non-healing wound(s).    5/16/19 no additional concerns for todays visit /foot)  Endocrine:  Other (HX; Type 2 DM)    Patient denies complaints or symptoms related to:   Constitutional Symptoms (General Health): Fever  Respiratory: Cough, Shortness of Breath, Wheezing  Cardiovascular (Central/Peripheral): Chest Pain, Edema  Neur presents to the outpatient wound clinic with daughter. Right heel eschar stable. No drainage noted. Selective debridement performed to trim eschar edges that are loose. Continue Betadine to decrease bioburden and maintain dryness to stable eschar.   Inst Appointments:  A follow-up appointment should be scheduled. Increase wound clinic visit to weekly.        Entered By: Sanjay Chappell on 05/16/2019 2:29:51 PM   Signature(s): Date(s):         Header Image    Debridement Details  Patient Name: Cherry Yanez,

## 2019-06-06 ENCOUNTER — OFFICE VISIT (OUTPATIENT)
Dept: WOUND CARE | Facility: HOSPITAL | Age: 74
End: 2019-06-06
Attending: CLINICAL NURSE SPECIALIST
Payer: MEDICARE

## 2019-06-06 DIAGNOSIS — L89.610 UNSTAGEABLE PRESSURE ULCER OF RIGHT HEEL (HCC): Primary | ICD-10-CM

## 2019-06-06 PROCEDURE — 97597 DBRDMT OPN WND 1ST 20 CM/<: CPT

## 2019-06-06 NOTE — PROGRESS NOTES
Subjective    Chief Complaint  This information was obtained from the patient  The patient is new to the [address] [facility],[address] here for an initial visit for the evaluation and management of non-healing wound(s).    6/6/19 no additional concerns for todays visit right leg /foot)  Neurological: Abnormal Gait (uses walker to ambulate)  Endocrine:  Other (HX; Type 2 DM)    Patient denies complaints or symptoms related to:   Constitutional Symptoms (General Health): Fever  Respiratory: Cough, Shortness of Breath, Rojean Kohut neurological complication  (Encounter Diagnosis) I10 - Essential (primary) hypertension    Diagnoses    ICD-10  L89.610: Pressure ulcer of right heel, unstageable        Patient has right heel eschar that is soft in center with slough tissue noted under es of 2.4 cubic cm;        Plan    Wound Orders:  Wound #1 Right, Posterior Heel     Follow-Up Appointments  Return Appointment in 2 weeks.   - APN visit 30 minutes weekly x 4 weeks    Wound Cleansing & Dressings  Clean wound with Normal Saline or Wound Cleans on Area*D : 1.2    Tunneling: No    Undermining: No    Sinus Tract: No    Wound Encounter: Subsequent    Can Scale: Grade 1    Exudate Amount: None    Odor: None    Pain: 0    Wound Margin:  Thickened    Eschar: %    Debridement: Selective    Devit tolerated well      Entered By: Suri Young on 06/06/2019 1:56:30 PM   Signature(s): Date(s):

## 2019-06-12 ENCOUNTER — HOSPITAL ENCOUNTER (OUTPATIENT)
Dept: GENERAL RADIOLOGY | Facility: HOSPITAL | Age: 74
Discharge: HOME | End: 2019-06-12
Attending: CLINICAL NURSE SPECIALIST
Payer: MEDICARE

## 2019-06-12 ENCOUNTER — OFFICE VISIT (OUTPATIENT)
Dept: WOUND CARE | Facility: HOSPITAL | Age: 74
End: 2019-06-12
Attending: CLINICAL NURSE SPECIALIST
Payer: MEDICARE

## 2019-06-12 ENCOUNTER — LAB ENCOUNTER (OUTPATIENT)
Dept: LAB | Facility: HOSPITAL | Age: 74
End: 2019-06-12
Attending: CLINICAL NURSE SPECIALIST
Payer: MEDICARE

## 2019-06-12 DIAGNOSIS — L89.610 UNSTAGEABLE PRESSURE ULCER OF RIGHT HEEL (HCC): ICD-10-CM

## 2019-06-12 DIAGNOSIS — L97.412 DIABETIC ULCER OF RIGHT HEEL ASSOCIATED WITH TYPE 2 DIABETES MELLITUS, WITH FAT LAYER EXPOSED (HCC): Primary | ICD-10-CM

## 2019-06-12 DIAGNOSIS — L97.412 DIABETIC ULCER OF RIGHT HEEL ASSOCIATED WITH TYPE 2 DIABETES MELLITUS, WITH FAT LAYER EXPOSED (HCC): ICD-10-CM

## 2019-06-12 DIAGNOSIS — E11.621 DIABETIC ULCER OF RIGHT HEEL ASSOCIATED WITH TYPE 2 DIABETES MELLITUS, WITH FAT LAYER EXPOSED (HCC): ICD-10-CM

## 2019-06-12 DIAGNOSIS — E11.621 DIABETIC ULCER OF RIGHT HEEL ASSOCIATED WITH TYPE 2 DIABETES MELLITUS, WITH FAT LAYER EXPOSED (HCC): Primary | ICD-10-CM

## 2019-06-12 PROCEDURE — 80048 BASIC METABOLIC PNL TOTAL CA: CPT

## 2019-06-12 PROCEDURE — 36415 COLL VENOUS BLD VENIPUNCTURE: CPT

## 2019-06-12 PROCEDURE — 73630 X-RAY EXAM OF FOOT: CPT | Performed by: CLINICAL NURSE SPECIALIST

## 2019-06-12 PROCEDURE — 11042 DBRDMT SUBQ TIS 1ST 20SQCM/<: CPT

## 2019-06-12 PROCEDURE — 85025 COMPLETE CBC W/AUTO DIFF WBC: CPT

## 2019-06-12 NOTE — PROGRESS NOTES
Subjective    Chief Complaint  This information was obtained from the patient  The patient is new to the [address] [facility],[address] here for an initial visit for the evaluation and management of non-healing wound(s).    6/12/19 no additional concerns for todays visit Activity  Integumentary (Hair/Skin/Nails): Other (pressure ulcer to right heel unstageable), Dryness (dry skin to right leg /foot)  Neurological: Abnormal Gait (uses walker to ambulate)  Endocrine:  Other (HX; Type 2 DM)    Patient denies complaints or symp with other diabetic neurological complication  (Encounter Diagnosis) I10 - Essential (primary) hypertension    Diagnoses    ICD-10  L89.610: Pressure ulcer of right heel, unstageable        Patient's right heel wound with soft necrosis/slough tissue noted of 0 throughout and a pain level of 0 following the procedure.  Post Debridement Measurements: 3cm length x 3.5cm width x 0.3cm depth; with an area of 10.5 sq cm and a volume of 3.15 cubic cm;  General Notes:  Excisional Debridement        [address] other material debrided:  Subcutaneous  Devitalized Tissue Debrided:  Necrotic/Eschar  Instrument: Blade, Forceps  Bleeding: None  Procedural Pain: 0  Post Procedural Pain: 0  Response to Treatment: Procedure was tolerated well    Notes  Excisional Debride

## 2019-06-14 ENCOUNTER — APPOINTMENT (OUTPATIENT)
Dept: LAB | Facility: HOSPITAL | Age: 74
End: 2019-06-14
Attending: CLINICAL NURSE SPECIALIST
Payer: MEDICARE

## 2019-06-14 DIAGNOSIS — L97.412 DIABETIC ULCER OF RIGHT HEEL ASSOCIATED WITH TYPE 2 DIABETES MELLITUS, WITH FAT LAYER EXPOSED (HCC): ICD-10-CM

## 2019-06-14 DIAGNOSIS — L89.610 UNSTAGEABLE PRESSURE ULCER OF RIGHT HEEL (HCC): ICD-10-CM

## 2019-06-14 DIAGNOSIS — E11.621 DIABETIC ULCER OF RIGHT HEEL ASSOCIATED WITH TYPE 2 DIABETES MELLITUS, WITH FAT LAYER EXPOSED (HCC): ICD-10-CM

## 2019-06-14 PROCEDURE — 85652 RBC SED RATE AUTOMATED: CPT

## 2019-06-14 PROCEDURE — 36415 COLL VENOUS BLD VENIPUNCTURE: CPT

## 2019-06-19 ENCOUNTER — APPOINTMENT (OUTPATIENT)
Dept: WOUND CARE | Facility: HOSPITAL | Age: 74
End: 2019-06-19
Attending: CLINICAL NURSE SPECIALIST
Payer: MEDICARE

## 2019-06-20 ENCOUNTER — LAB ENCOUNTER (OUTPATIENT)
Dept: LAB | Age: 74
End: 2019-06-20
Attending: INTERNAL MEDICINE
Payer: MEDICARE

## 2019-06-20 DIAGNOSIS — L97.429 ULCER OF LEFT HEEL AND MIDFOOT, UNSPECIFIED ULCER STAGE (HCC): Primary | ICD-10-CM

## 2019-06-20 DIAGNOSIS — Z96.651 HISTORY OF ARTHROPLASTY OF RIGHT KNEE: ICD-10-CM

## 2019-06-20 DIAGNOSIS — Z00.00 ROUTINE GENERAL MEDICAL EXAMINATION AT A HEALTH CARE FACILITY: ICD-10-CM

## 2019-06-20 PROCEDURE — 85610 PROTHROMBIN TIME: CPT

## 2019-06-20 PROCEDURE — 80048 BASIC METABOLIC PNL TOTAL CA: CPT

## 2019-06-20 PROCEDURE — 36415 COLL VENOUS BLD VENIPUNCTURE: CPT

## 2019-06-20 PROCEDURE — 85025 COMPLETE CBC W/AUTO DIFF WBC: CPT

## 2019-06-24 RX ORDER — WARFARIN SODIUM 5 MG/1
5 TABLET ORAL EVERY EVENING
COMMUNITY
End: 2019-12-03

## 2019-06-24 RX ORDER — METOCLOPRAMIDE 10 MG/1
10 TABLET ORAL ONCE
Status: CANCELLED | OUTPATIENT
Start: 2019-06-24 | End: 2019-06-24

## 2019-06-24 RX ORDER — SODIUM CHLORIDE 9 MG/ML
INJECTION, SOLUTION INTRAVENOUS CONTINUOUS
Status: DISCONTINUED | OUTPATIENT
Start: 2019-06-24 | End: 2019-06-24

## 2019-06-24 RX ORDER — ACETAMINOPHEN 500 MG
1000 TABLET ORAL ONCE
Status: CANCELLED | OUTPATIENT
Start: 2019-06-24 | End: 2019-06-24

## 2019-06-24 RX ORDER — METOCLOPRAMIDE 10 MG/1
10 TABLET ORAL ONCE
Status: DISCONTINUED | OUTPATIENT
Start: 2019-06-24 | End: 2019-06-24

## 2019-06-24 RX ORDER — FAMOTIDINE 20 MG/1
20 TABLET ORAL ONCE
Status: CANCELLED | OUTPATIENT
Start: 2019-06-24 | End: 2019-06-24

## 2019-06-24 RX ORDER — ACETAMINOPHEN 500 MG
1000 TABLET ORAL ONCE
Status: DISCONTINUED | OUTPATIENT
Start: 2019-06-24 | End: 2019-06-24

## 2019-06-24 RX ORDER — FAMOTIDINE 20 MG/1
20 TABLET ORAL ONCE
Status: DISCONTINUED | OUTPATIENT
Start: 2019-06-24 | End: 2019-06-24

## 2019-06-24 RX ORDER — SODIUM CHLORIDE 9 MG/ML
INJECTION, SOLUTION INTRAVENOUS CONTINUOUS
Status: CANCELLED | OUTPATIENT
Start: 2019-06-24

## 2019-06-24 NOTE — PAT NURSING NOTE
Called  spoke to him notified about pt's still taking Coumadin,said still will do the surgery,said to call the pt. and inform her and was done. Also spoke to Dr. Syeda Chavira anesthesiologist about pt's still taking Coumadin, no orders.

## 2019-06-25 ENCOUNTER — ANESTHESIA EVENT (OUTPATIENT)
Dept: SURGERY | Facility: HOSPITAL | Age: 74
End: 2019-06-25
Payer: MEDICARE

## 2019-06-25 ENCOUNTER — ANESTHESIA (OUTPATIENT)
Dept: SURGERY | Facility: HOSPITAL | Age: 74
End: 2019-06-25
Payer: MEDICARE

## 2019-06-25 ENCOUNTER — HOSPITAL ENCOUNTER (OUTPATIENT)
Facility: HOSPITAL | Age: 74
Setting detail: HOSPITAL OUTPATIENT SURGERY
Discharge: HOME | End: 2019-06-25
Attending: INTERNAL MEDICINE | Admitting: PODIATRIST
Payer: MEDICARE

## 2019-06-25 VITALS
HEIGHT: 62 IN | HEART RATE: 58 BPM | TEMPERATURE: 97 F | OXYGEN SATURATION: 100 % | SYSTOLIC BLOOD PRESSURE: 147 MMHG | WEIGHT: 202.38 LBS | BODY MASS INDEX: 37.24 KG/M2 | RESPIRATION RATE: 16 BRPM | DIASTOLIC BLOOD PRESSURE: 63 MMHG

## 2019-06-25 DIAGNOSIS — L97.416 DIABETIC ULCER OF RIGHT HEEL ASSOCIATED WITH TYPE 2 DIABETES MELLITUS, WITH BONE INVOLVEMENT WITHOUT EVIDENCE OF NECROSIS (HCC): Primary | ICD-10-CM

## 2019-06-25 DIAGNOSIS — E11.621 DIABETIC ULCER OF RIGHT HEEL ASSOCIATED WITH TYPE 2 DIABETES MELLITUS, WITH BONE INVOLVEMENT WITHOUT EVIDENCE OF NECROSIS (HCC): Primary | ICD-10-CM

## 2019-06-25 PROCEDURE — 82962 GLUCOSE BLOOD TEST: CPT

## 2019-06-25 PROCEDURE — 85610 PROTHROMBIN TIME: CPT | Performed by: INTERNAL MEDICINE

## 2019-06-25 PROCEDURE — 0HRMXK3 REPLACEMENT OF RIGHT FOOT SKIN WITH NONAUTOLOGOUS TISSUE SUBSTITUTE, FULL THICKNESS, EXTERNAL APPROACH: ICD-10-PCS | Performed by: PODIATRIST

## 2019-06-25 RX ORDER — HYDROMORPHONE HYDROCHLORIDE 1 MG/ML
0.6 INJECTION, SOLUTION INTRAMUSCULAR; INTRAVENOUS; SUBCUTANEOUS EVERY 5 MIN PRN
Status: DISCONTINUED | OUTPATIENT
Start: 2019-06-25 | End: 2019-06-25

## 2019-06-25 RX ORDER — ACETAMINOPHEN 500 MG
1000 TABLET ORAL ONCE
Status: COMPLETED | OUTPATIENT
Start: 2019-06-25 | End: 2019-06-25

## 2019-06-25 RX ORDER — DEXTROSE MONOHYDRATE 25 G/50ML
50 INJECTION, SOLUTION INTRAVENOUS
Status: DISCONTINUED | OUTPATIENT
Start: 2019-06-25 | End: 2019-06-25

## 2019-06-25 RX ORDER — HYDROMORPHONE HYDROCHLORIDE 1 MG/ML
0.2 INJECTION, SOLUTION INTRAMUSCULAR; INTRAVENOUS; SUBCUTANEOUS EVERY 5 MIN PRN
Status: DISCONTINUED | OUTPATIENT
Start: 2019-06-25 | End: 2019-06-25

## 2019-06-25 RX ORDER — ONDANSETRON 2 MG/ML
4 INJECTION INTRAMUSCULAR; INTRAVENOUS ONCE AS NEEDED
Status: DISCONTINUED | OUTPATIENT
Start: 2019-06-25 | End: 2019-06-25

## 2019-06-25 RX ORDER — METOPROLOL TARTRATE 5 MG/5ML
2.5 INJECTION INTRAVENOUS ONCE
Status: DISCONTINUED | OUTPATIENT
Start: 2019-06-25 | End: 2019-06-25

## 2019-06-25 RX ORDER — SODIUM CHLORIDE, SODIUM LACTATE, POTASSIUM CHLORIDE, CALCIUM CHLORIDE 600; 310; 30; 20 MG/100ML; MG/100ML; MG/100ML; MG/100ML
INJECTION, SOLUTION INTRAVENOUS CONTINUOUS
Status: DISCONTINUED | OUTPATIENT
Start: 2019-06-25 | End: 2019-06-25

## 2019-06-25 RX ORDER — NALOXONE HYDROCHLORIDE 0.4 MG/ML
80 INJECTION, SOLUTION INTRAMUSCULAR; INTRAVENOUS; SUBCUTANEOUS AS NEEDED
Status: DISCONTINUED | OUTPATIENT
Start: 2019-06-25 | End: 2019-06-25

## 2019-06-25 RX ORDER — HYDROCODONE BITARTRATE AND ACETAMINOPHEN 5; 325 MG/1; MG/1
2 TABLET ORAL AS NEEDED
Status: DISCONTINUED | OUTPATIENT
Start: 2019-06-25 | End: 2019-06-25

## 2019-06-25 RX ORDER — MORPHINE SULFATE 10 MG/ML
6 INJECTION, SOLUTION INTRAMUSCULAR; INTRAVENOUS EVERY 10 MIN PRN
Status: DISCONTINUED | OUTPATIENT
Start: 2019-06-25 | End: 2019-06-25

## 2019-06-25 RX ORDER — HYDROMORPHONE HYDROCHLORIDE 1 MG/ML
0.4 INJECTION, SOLUTION INTRAMUSCULAR; INTRAVENOUS; SUBCUTANEOUS EVERY 5 MIN PRN
Status: DISCONTINUED | OUTPATIENT
Start: 2019-06-25 | End: 2019-06-25

## 2019-06-25 RX ORDER — LIDOCAINE HYDROCHLORIDE 10 MG/ML
INJECTION, SOLUTION EPIDURAL; INFILTRATION; INTRACAUDAL; PERINEURAL AS NEEDED
Status: DISCONTINUED | OUTPATIENT
Start: 2019-06-25 | End: 2019-06-25 | Stop reason: SURG

## 2019-06-25 RX ORDER — PROCHLORPERAZINE EDISYLATE 5 MG/ML
5 INJECTION INTRAMUSCULAR; INTRAVENOUS ONCE AS NEEDED
Status: DISCONTINUED | OUTPATIENT
Start: 2019-06-25 | End: 2019-06-25

## 2019-06-25 RX ORDER — METOCLOPRAMIDE 10 MG/1
10 TABLET ORAL ONCE
Status: COMPLETED | OUTPATIENT
Start: 2019-06-25 | End: 2019-06-25

## 2019-06-25 RX ORDER — HALOPERIDOL 5 MG/ML
0.25 INJECTION INTRAMUSCULAR ONCE AS NEEDED
Status: DISCONTINUED | OUTPATIENT
Start: 2019-06-25 | End: 2019-06-25

## 2019-06-25 RX ORDER — SODIUM CHLORIDE 9 MG/ML
INJECTION, SOLUTION INTRAVENOUS CONTINUOUS
Status: DISCONTINUED | OUTPATIENT
Start: 2019-06-25 | End: 2019-06-25

## 2019-06-25 RX ORDER — MORPHINE SULFATE 2 MG/ML
2 INJECTION, SOLUTION INTRAMUSCULAR; INTRAVENOUS EVERY 10 MIN PRN
Status: DISCONTINUED | OUTPATIENT
Start: 2019-06-25 | End: 2019-06-25

## 2019-06-25 RX ORDER — FAMOTIDINE 20 MG/1
20 TABLET ORAL ONCE
Status: DISCONTINUED | OUTPATIENT
Start: 2019-06-25 | End: 2019-06-25 | Stop reason: HOSPADM

## 2019-06-25 RX ORDER — BUPIVACAINE HYDROCHLORIDE 5 MG/ML
INJECTION, SOLUTION EPIDURAL; INTRACAUDAL AS NEEDED
Status: DISCONTINUED | OUTPATIENT
Start: 2019-06-25 | End: 2019-06-25 | Stop reason: HOSPADM

## 2019-06-25 RX ORDER — INSULIN ASPART 100 [IU]/ML
INJECTION, SOLUTION INTRAVENOUS; SUBCUTANEOUS ONCE
Status: DISCONTINUED | OUTPATIENT
Start: 2019-06-25 | End: 2019-06-25

## 2019-06-25 RX ORDER — CEFAZOLIN SODIUM/WATER 2 G/20 ML
SYRINGE (ML) INTRAVENOUS AS NEEDED
Status: DISCONTINUED | OUTPATIENT
Start: 2019-06-25 | End: 2019-06-25 | Stop reason: SURG

## 2019-06-25 RX ORDER — MORPHINE SULFATE 4 MG/ML
4 INJECTION, SOLUTION INTRAMUSCULAR; INTRAVENOUS EVERY 10 MIN PRN
Status: DISCONTINUED | OUTPATIENT
Start: 2019-06-25 | End: 2019-06-25

## 2019-06-25 RX ORDER — HYDROCODONE BITARTRATE AND ACETAMINOPHEN 5; 325 MG/1; MG/1
1 TABLET ORAL AS NEEDED
Status: DISCONTINUED | OUTPATIENT
Start: 2019-06-25 | End: 2019-06-25

## 2019-06-25 RX ADMIN — LIDOCAINE HYDROCHLORIDE 50 MG: 10 INJECTION, SOLUTION EPIDURAL; INFILTRATION; INTRACAUDAL; PERINEURAL at 15:16:00

## 2019-06-25 RX ADMIN — SODIUM CHLORIDE: 9 INJECTION, SOLUTION INTRAVENOUS at 15:58:00

## 2019-06-25 RX ADMIN — CEFAZOLIN SODIUM/WATER 2 G: 2 G/20 ML SYRINGE (ML) INTRAVENOUS at 15:20:00

## 2019-06-25 NOTE — ANESTHESIA PREPROCEDURE EVALUATION
Anesthesia PreOp Note    HPI:     Mingo Valdivia is a 75year old female who presents for preoperative consultation requested by:  Vincent Kelley DPM    Date of Surgery: 6/25/2019    Procedure(s):  EXTREMITY LOWER IRRIGATION & DEBRIDEMENT  Indication reclusive, right thigh   • PATELLA TENDON REPAIR Right 11/19/2018    Performed by Stephane Restrepo MD at [facility] OR   • TOTAL KNEE REPLACEMENT Right 10/29/2018         Medications Prior to Admission:  Warfarin Sodium 5 MG Oral Tab Take 5 mg by mouth every e Oral Once PRN [employer], DPM    famoTIDine (PEPCID) tab 20 mg 20 mg Oral Once [employer], DPM      No current Epic-ordered outpatient medications on file.       Flagyl [Metronidazo*    HIVES, NAUSEA AND VOMITING  Hydrocodone             H clubs or organizations: Not on file        Relationship status: Not on file      Intimate partner violence:        Fear of current or ex partner: Not on file        Emotionally abused: Not on file        Physically abused: Not on file        Forced sexual controlled using insulin,   Abdominal  - normal exam     Other findings: Missing teeth          Anesthesia Plan:   ASA:  3  Plan:   MAC  Post-op Pain Management: Local and IV analgesics  Informed Consent Plan and Risks Discussed With:  Patient  Discussed p

## 2019-06-25 NOTE — OPERATIVE REPORT
[facility]    PATIENT'S NAME: Marc Burrell   ATTENDING PHYSICIAN: Naveed Shepherd. Kavon Juarez MD   OPERATING PHYSICIAN: Winter Subramanian   PATIENT ACCOUNT#:   [de-identified]    LOCATION:  [facility] PACU 2 [facility] 10  MEDICAL RECORD #:   [id number]

## 2019-06-25 NOTE — ANESTHESIA POSTPROCEDURE EVALUATION
Patient: Eriberto Hopkins    Procedure Summary     Date:  06/25/19 Room / Location:  [address] [place] [address] MAIN OR 08 / [address] [place] [address] MAIN OR    Anesthesia Start:  3176 Anesthesia Stop:  0952    Procedure:  EXTREMITY LOWER IRRIGATION & DEBRIDEMENT (Right ) Diagnosis:  (diabetic ulcera

## 2019-06-25 NOTE — BRIEF OP NOTE
Pre-Operative Diagnosis: diabetic ulceration right heel     Post-Operative Diagnosis: diabetic ulceration right heel      Procedure Performed:   Procedure(s):  debridement of right foot wound with application of apligraf    Surgeon(s) and Role:     Alpesh Worley

## 2019-06-26 ENCOUNTER — APPOINTMENT (OUTPATIENT)
Dept: WOUND CARE | Facility: HOSPITAL | Age: 74
End: 2019-06-26
Attending: CLINICAL NURSE SPECIALIST
Payer: MEDICARE

## 2019-06-26 DIAGNOSIS — L97.412 DIABETIC ULCER OF RIGHT HEEL ASSOCIATED WITH TYPE 2 DIABETES MELLITUS, WITH FAT LAYER EXPOSED (HCC): Primary | ICD-10-CM

## 2019-06-26 DIAGNOSIS — E11.621 DIABETIC ULCER OF RIGHT HEEL ASSOCIATED WITH TYPE 2 DIABETES MELLITUS, WITH FAT LAYER EXPOSED (HCC): Primary | ICD-10-CM

## 2019-06-26 PROCEDURE — 97605 NEG PRS WND THER DME<=50SQCM: CPT

## 2019-06-26 NOTE — PROGRESS NOTES
Subjective    Chief Complaint  This information was obtained from the patient  The patient is new to the [address] [facility],[address] here for an initial visit for the evaluation and management of non-healing wound(s).   [birth year] patient has application of apligraft on the Musculoskeletal: Other (wears right immobilizer knee/leg brace), Assistive Devices (walker), Joint Pain (left knee arthritis), Decreased Activity  Integumentary (Hair/Skin/Nails):  Other (pressure ulcer to right heel more granular), Dryness (dry skin to rig patient has mepitel one in place the dressing has slid some --- see picture    Psychiatric:  Appropriate judgement and insight. Oriented to time, place and person. Appropriate mood and affect.         Assessment    Active Problems    ICD-10  (Encounter Diag Patient presents to the outpatient wound clinic with friend. Patient had debridement and Apligraf application performed by Dr. Teresa Marshall yesterday. Patient's in the outpatient wound clinic today to have wound VAC applied to right heel wound.   Mepitel dres Increase dietary protein intake. - Drink Glucerna or Premier protein drink between meals  Other: - waffle boot to elevate heel off of bed at night-patient has one  Patient to purchase a Darco heel offloading shoe to right heel (Prescription for ICD-10 L89.

## 2019-07-03 ENCOUNTER — APPOINTMENT (OUTPATIENT)
Dept: WOUND CARE | Facility: HOSPITAL | Age: 74
End: 2019-07-03
Attending: CLINICAL NURSE SPECIALIST
Payer: MEDICARE

## 2019-07-10 ENCOUNTER — OFFICE VISIT (OUTPATIENT)
Dept: WOUND CARE | Facility: HOSPITAL | Age: 74
End: 2019-07-10
Attending: CLINICAL NURSE SPECIALIST
Payer: MEDICARE

## 2019-07-10 DIAGNOSIS — L97.412 DIABETIC ULCER OF RIGHT HEEL ASSOCIATED WITH TYPE 2 DIABETES MELLITUS, WITH FAT LAYER EXPOSED (HCC): ICD-10-CM

## 2019-07-10 DIAGNOSIS — L89.613 PRESSURE ULCER OF RIGHT HEEL, STAGE 3 (HCC): Primary | ICD-10-CM

## 2019-07-10 DIAGNOSIS — E11.621 DIABETIC ULCER OF RIGHT HEEL ASSOCIATED WITH TYPE 2 DIABETES MELLITUS, WITH FAT LAYER EXPOSED (HCC): ICD-10-CM

## 2019-07-10 PROCEDURE — 97605 NEG PRS WND THER DME<=50SQCM: CPT

## 2019-07-10 NOTE — PROGRESS NOTES
Subjective    Chief Complaint  This information was obtained from the patient  The patient is new to the [address] [facility],[address] here for an initial visit for the evaluation and management of non-healing wound(s).    7/10/19 no additional concerns    Allergies  Flagyl patient    Complaints and Symptoms  Patient complains of:   Constitutional Symptoms (General Health): Weakness (walker)  Eyes: Other (cataract right eye)  Ear/Nose/Mouth/Throat: Other ([place] to right ear)  Gastrointestinal (GI): Diarrhea  Genitourinary (): periwound skin did not exhibit: Brawny Induration, Edema, Excoriation, Induration, Callus, Crepitus, Fluctuance, Friable, Rash, Moist, Maceration. The temperature of the periwound skin is Warm.  Periwound skin does not exhibit signs or symptoms of infection Black granular foam applied to wound and continued wound VAC negative pressure therapy to assist in healing of wound. Instructed patient to continue protein in diet to promote wound healing. Maintain glucose within normal range.   Continue current pressur Name: Mary Ann Harrison   Patient Number: [id number]  Patient YOB: 1945  Date: 7/10/2019  RN: Eli Pabon  Physician / Extender: Kayla Horton [address]: Outpatient  NPWT Maintenance Performed for: Wound #1 Right, Posterior Heel  Performed By:

## 2019-07-17 ENCOUNTER — APPOINTMENT (OUTPATIENT)
Dept: WOUND CARE | Facility: HOSPITAL | Age: 74
End: 2019-07-17
Attending: CLINICAL NURSE SPECIALIST
Payer: MEDICARE

## 2019-07-19 ENCOUNTER — APPOINTMENT (OUTPATIENT)
Dept: WOUND CARE | Facility: HOSPITAL | Age: 74
End: 2019-07-19
Attending: CLINICAL NURSE SPECIALIST
Payer: MEDICARE

## 2019-07-22 ENCOUNTER — APPOINTMENT (OUTPATIENT)
Dept: WOUND CARE | Facility: HOSPITAL | Age: 74
End: 2019-07-22
Attending: CLINICAL NURSE SPECIALIST
Payer: MEDICARE

## 2019-07-24 ENCOUNTER — OFFICE VISIT (OUTPATIENT)
Dept: WOUND CARE | Facility: HOSPITAL | Age: 74
End: 2019-07-24
Attending: CLINICAL NURSE SPECIALIST
Payer: MEDICARE

## 2019-07-24 DIAGNOSIS — S91.301D OPEN WOUND OF HEEL, RIGHT, SUBSEQUENT ENCOUNTER: Primary | ICD-10-CM

## 2019-07-24 DIAGNOSIS — L89.613 PRESSURE INJURY OF RIGHT HEEL, STAGE 3 (HCC): ICD-10-CM

## 2019-07-24 DIAGNOSIS — Z86.31 PERSONAL HISTORY OF DIABETIC FOOT ULCER: ICD-10-CM

## 2019-07-24 PROCEDURE — 99214 OFFICE O/P EST MOD 30 MIN: CPT | Performed by: CLINICAL NURSE SPECIALIST

## 2019-07-24 PROCEDURE — 99213 OFFICE O/P EST LOW 20 MIN: CPT | Performed by: CLINICAL NURSE SPECIALIST

## 2019-07-24 PROCEDURE — 97605 NEG PRS WND THER DME<=50SQCM: CPT

## 2019-07-24 NOTE — PROGRESS NOTES
Patient presents to the outpatient wound clinic with sister. Right heel wound granular. Wound measures 2.0X2.8X0.6 cm Odor present to wound. Dakin's solution soak x10 minutes to decrease bioburden to wound. Epithelialization noted to wound edges.   Prism

## 2019-07-25 NOTE — PROGRESS NOTES
Objective:    T: 97.9  HR: 64  RR: 17  BP: 126/54  O2: 99  B/S 102      Right dorsal 0.3x0.3x0.1  Right heel 2.0x2.8x0.6

## 2019-07-31 ENCOUNTER — APPOINTMENT (OUTPATIENT)
Dept: WOUND CARE | Facility: HOSPITAL | Age: 74
End: 2019-07-31
Attending: CLINICAL NURSE SPECIALIST
Payer: MEDICARE

## 2019-08-07 ENCOUNTER — OFFICE VISIT (OUTPATIENT)
Dept: WOUND CARE | Facility: HOSPITAL | Age: 74
End: 2019-08-07
Attending: CLINICAL NURSE SPECIALIST
Payer: MEDICARE

## 2019-08-07 DIAGNOSIS — L89.623 PRESSURE ULCER OF LEFT HEEL, STAGE 3 (HCC): Primary | ICD-10-CM

## 2019-08-07 DIAGNOSIS — Z86.31 PERSONAL HISTORY OF DIABETIC FOOT ULCER: ICD-10-CM

## 2019-08-07 PROCEDURE — 11042 DBRDMT SUBQ TIS 1ST 20SQCM/<: CPT

## 2019-08-07 NOTE — PROGRESS NOTES
Subjective    Chief Complaint  This information was obtained from the patient  The patient was seen today for follow up and management of difficult to heal wound on the R heel.  8/7/19: no additional new concern    Allergies  Flagyl (Reaction: Hives, Nausea complains of:   Constitutional Symptoms (General Health): Weakness (walker)  Eyes: Other (cataract right eye)  Ear/Nose/Mouth/Throat: Other ([place] to right ear)  Gastrointestinal (GI): Diarrhea  Genitourinary (): Other (HX; Kidney stones)  Musculoskeletal: The periwound skin did not exhibit: Brawny Induration, Edema, Excoriation, Induration, Callus, Crepitus, Fluctuance, Friable, Rash, Moist. The temperature of the periwound skin is Warm. Periwound skin does not exhibit signs or symptoms of infection.     Wou used to offload right  heel. Endoform to enhance collagen production to granular tissue. Continue wound VAC negative pressure therapy to assist in healing of wound. Maintain glucose levels within normal range.   Instructed to continue protein in diet to - HHC to change wound vac on Mondays and fridays. Outpatient wound clinic to change wound vac on wednesdays    Misc / Additional orders  Increase dietary protein intake. - Drink Glucerna or Premier protein drink between meals  Decrease salt intake.    Other

## 2019-08-14 ENCOUNTER — OFFICE VISIT (OUTPATIENT)
Dept: WOUND CARE | Facility: HOSPITAL | Age: 74
End: 2019-08-14
Attending: CLINICAL NURSE SPECIALIST
Payer: MEDICARE

## 2019-08-14 DIAGNOSIS — L89.613 PRESSURE INJURY OF RIGHT HEEL, STAGE 3 (HCC): Primary | ICD-10-CM

## 2019-08-14 DIAGNOSIS — L89.610 UNSTAGEABLE PRESSURE ULCER OF RIGHT HEEL (HCC): ICD-10-CM

## 2019-08-14 PROCEDURE — 97605 NEG PRS WND THER DME<=50SQCM: CPT

## 2019-08-14 NOTE — PROGRESS NOTES
Subjective    Chief Complaint  This information was obtained from the patient  The patient was seen today for follow up and management of difficult to heal wound on the R heel.  8/14/19: no additional new concern    Allergies  Flagyl (Reaction: Hives, Nause health care agency due to [employer].     Review of Systems (ROS)  This information was obtained from the patient    Complaints and Symptoms  Patient complains of:   Constitutional Symptoms (General Health): Weakness (walker)  Eyes: Other (catarac The periwound skin color is normal. The periwound skin exhibited: Dry/Scaly, Maceration.  The periwound skin did not exhibit: Brawny Induration, Edema, Excoriation, Induration, Callus, Crepitus, Fluctuance, Friable, Rash, Moist. The temperature of the per off of bony prominence area. Patient wearing Darco offloading heel shoe during the day and patient to wear heel boot to elevate heel off of bed  at bedtime. Patient to continue protein in diet to promote wound healing.   Instructed patient to maintain gly changes, monitoring for signs and symptoms of infection, monitoring for wound healing.       Entered By: Oksana Patrick on 08/14/2019 2:50:12 PM   Signature(s): Date(s):         Header Image    Negative Pressure Wound Therapy Maintenance (NPWT) Details  Shira

## 2019-08-21 ENCOUNTER — OFFICE VISIT (OUTPATIENT)
Dept: WOUND CARE | Facility: HOSPITAL | Age: 74
End: 2019-08-21
Attending: CLINICAL NURSE SPECIALIST
Payer: MEDICARE

## 2019-08-21 DIAGNOSIS — L89.613 PRESSURE INJURY OF RIGHT HEEL, STAGE 3 (HCC): Primary | ICD-10-CM

## 2019-08-21 DIAGNOSIS — Z86.31 PERSONAL HISTORY OF DIABETIC FOOT ULCER: ICD-10-CM

## 2019-08-21 PROCEDURE — 97605 NEG PRS WND THER DME<=50SQCM: CPT

## 2019-08-21 NOTE — PROGRESS NOTES
Subjective    Chief Complaint  This information was obtained from the patient  The patient was seen today for follow up and management of difficult to heal wound on the R heel.  8/21/19: no additional new concern    Allergies  Flagyl (Reaction: Hives, Nause health care agency due to [employer].     Review of Systems (ROS)  This information was obtained from the patient    Complaints and Symptoms  Patient complains of:   Constitutional Symptoms (General Health): Weakness (walker)  Eyes: Other (catarac periwound skin exhibited: Dry/Scaly, Maceration. The periwound skin did not exhibit: Brawny Induration, Edema, Excoriation, Induration, Callus, Crepitus, Fluctuance, Friable, Rash, Moist. The temperature of the periwound skin is Warm.  Periwound skin does n understanding of instructions. Patient verbalized she will not be able to make her appointment next week as she has an appointment with her orthopedic surgeon for her right knee on the same day of her visit on Wednesday in wound clinic.   Patient to res appointment should be scheduled. Entered By: Gerhardt Ghent on 08/21/2019 2:46:30 PM   Signature(s): Date(s):         Header Image    Negative Pressure Wound Therapy Maintenance (NPWT) Details  Patient Name: Pan Eddy   Patient Number: [id number]

## 2019-08-28 ENCOUNTER — APPOINTMENT (OUTPATIENT)
Dept: WOUND CARE | Facility: HOSPITAL | Age: 74
End: 2019-08-28
Attending: CLINICAL NURSE SPECIALIST
Payer: MEDICARE

## 2019-09-04 ENCOUNTER — APPOINTMENT (OUTPATIENT)
Dept: WOUND CARE | Facility: HOSPITAL | Age: 74
End: 2019-09-04
Attending: CLINICAL NURSE SPECIALIST
Payer: MEDICARE

## 2019-09-11 ENCOUNTER — OFFICE VISIT (OUTPATIENT)
Dept: WOUND CARE | Facility: HOSPITAL | Age: 74
End: 2019-09-11
Attending: CLINICAL NURSE SPECIALIST
Payer: MEDICARE

## 2019-09-11 DIAGNOSIS — L89.623 PRESSURE ULCER OF LEFT HEEL, STAGE 3 (HCC): Primary | ICD-10-CM

## 2019-09-11 DIAGNOSIS — E08.49 DIABETES DUE TO UNDRL CONDITION W OTH DIABETIC NEURO COMP (HCC): ICD-10-CM

## 2019-09-11 PROCEDURE — 97597 DBRDMT OPN WND 1ST 20 CM/<: CPT

## 2019-09-11 NOTE — PROGRESS NOTES
Subjective    Chief Complaint  This information was obtained from the patient  The patient was seen today for follow up and management of difficult to heal wound(s).  9/11/19: Patient verbalized she was hospitalized    Allergies  Flagyl (Reaction: Hives, Na health care agency due to [employer].  9/11/19: Patient verbalized she was hospitalized at [facility] and remained in intensive care unit x3 days. Hospitalization was due to patient vomiting blood and Coumadin issues.     Review of Sy volume of 0.28 cubic cm. No tunneling has been noted. No sinus tract has been noted. No undermining has been noted. There is a small amount of sanguineous drainage noted which has no odor. The patient reports a wound pain of level 0/10.  The wound margin is infection noted. 91% wound healing present. Wound care changed as follows: Fibracol to enhance collagen production and Hydrofera Ready to decrease bioburden to wound and manage drainage. Secured with foam boarder dressing.  Offloading using thick adhesive Orders  Discontinue NPWT - Patient to send wound vac back to [facility] / Additional orders  Increase dietary protein intake. - Drink Glucerna or Premier protein drink between meals  Decrease salt intake.   Other: - waffle boot to elevate heel off of bed a Type: Sanguineous    Odor: None    Pain: 0    Wound Margin:  Thickened    Granulation: %, Red    Debridement: Selective    Devitalized Tissue Debrided: Necrotic/Eschar    Instrument: Curette    Bleeding: Minimal    Hemostasis Achieved: Pressure    Pro Debridement Measurements  Length: (cm) 1  Width: (cm) 1.4  Depth: (cm) 0.3      Area: (cm²) 1.4  Percent Debrided: 20  Total Area Debrided: (sq cm) 0.28  Volume: (cm³) 0.42  Devitalized Tissue Debrided:  Necrotic/Eschar  Instrument: Curette  Bleeding: Mini

## 2019-09-18 ENCOUNTER — OFFICE VISIT (OUTPATIENT)
Dept: WOUND CARE | Facility: HOSPITAL | Age: 74
End: 2019-09-18
Attending: CLINICAL NURSE SPECIALIST
Payer: MEDICARE

## 2019-09-18 DIAGNOSIS — Z86.31 PERSONAL HISTORY OF DIABETIC FOOT ULCER: ICD-10-CM

## 2019-09-18 DIAGNOSIS — L89.613 PRESSURE ULCER OF RIGHT HEEL, STAGE 3 (HCC): Primary | ICD-10-CM

## 2019-09-18 PROCEDURE — 97597 DBRDMT OPN WND 1ST 20 CM/<: CPT

## 2019-09-18 NOTE — PROGRESS NOTES
Subjective    Chief Complaint  This information was obtained from the patient  The patient was seen today for follow up and management of difficult to heal wound(s).  9/18/19:no new concerns    Allergies  Flagyl (Reaction: Hives, Nausea, Vomiting), hydrocod to [employer].  9/11/19: Patient verbalized she was hospitalized at [facility] and remained in intensive care unit x3 days. Hospitalization was due to patient vomiting blood and Coumadin issues.   9/18/19:  Patient to follow up with o are 1.4cm length x 2cm width x 0.2cm depth, with an area of 2.8 sq cm and a volume of 0.56 cubic cm. No tunneling has been noted. No sinus tract has been noted. No undermining has been noted.  There is a small amount of sanguineous drainage noted which has not wear Darco offloading heel shoe today. Instructed patient the importance to continue to wear offloading shoe. Instructed patient to continue to include protein in diet to enhance wound healing. Patient verbalized understanding of instructions.       Pa heel off of bed at night-patient has this  Patient purchased Darco heel offloading shoe to be worn right heel during the day. Sloan Rendon Nurse to call APN in outpatient wound clinic if has any questions regarding Plan of Treatment.     Additional Orders:    Offlo

## 2019-09-25 ENCOUNTER — APPOINTMENT (OUTPATIENT)
Dept: WOUND CARE | Facility: HOSPITAL | Age: 74
End: 2019-09-25
Attending: CLINICAL NURSE SPECIALIST
Payer: MEDICARE

## 2019-10-02 ENCOUNTER — OFFICE VISIT (OUTPATIENT)
Dept: WOUND CARE | Facility: HOSPITAL | Age: 74
End: 2019-10-02
Attending: CLINICAL NURSE SPECIALIST
Payer: MEDICARE

## 2019-10-02 DIAGNOSIS — Z86.31 PERSONAL HISTORY OF DIABETIC FOOT ULCER: Primary | ICD-10-CM

## 2019-10-02 DIAGNOSIS — L89.623 PRESSURE ULCER OF LEFT HEEL, STAGE 3 (HCC): ICD-10-CM

## 2019-10-02 PROCEDURE — 17250 CHEM CAUT OF GRANLTJ TISSUE: CPT

## 2019-10-02 NOTE — PROGRESS NOTES
Subjective    Chief Complaint  This information was obtained from the patient  The patient was seen today for follow up and management of difficult to heal wound(s).   10/2/19 - No additional concerns for todays visit    Allergies  Flagyl (Reaction: Hives, home health care agency due to Consolidated Billing.  9/11/19: Patient verbalized she was hospitalized at [facility] and remained in intensive care unit x3 days. Hospitalization was due to patient vomiting blood and Coumadin issues.   9/18/19: induration. Wound #1 Right, Posterior Heel is a chronic Can Grade 1 Diabetic Ulcer and has received a status of Not Healed.  Subsequent wound encounter measurements are 1cm length x 1.3cm width x 0.2cm depth, with an area of 1.3 sq cm and a volume of offloading using thick adhesive felt to relieve pressure off of bony prominence area. Patient to follow up in outpatient wound clinic every other week for wound care/dressing changes, monitoring for wound healing and monitoring for wound infection.  Continu Multi Wound Chart Details  Patient Name: Domonique Cantor   Patient Number: [id number]  Patient YOB: 1945  Date: 10/2/2019  RN: Robina Kayser  Physician / Extender: Kayla Horton [address]: Outpatient  Vital Signs  Height/Length(in): 62  Heig Signature(s)  Signed By: Date:  Maite Kent 10/02/2019 3:27:19 PM  Maite Kent 10/02/2019 3:28:53 PM       Entered By: Maite Kent on 10/02/2019 3:26:07 PM         Header Image    Chemical Cautery Details  Patient Name: Annika Josue

## 2019-10-09 ENCOUNTER — APPOINTMENT (OUTPATIENT)
Dept: WOUND CARE | Facility: HOSPITAL | Age: 74
End: 2019-10-09
Attending: CLINICAL NURSE SPECIALIST
Payer: MEDICARE

## 2019-10-16 ENCOUNTER — OFFICE VISIT (OUTPATIENT)
Dept: WOUND CARE | Facility: HOSPITAL | Age: 74
End: 2019-10-16
Attending: CLINICAL NURSE SPECIALIST
Payer: MEDICARE

## 2019-10-16 DIAGNOSIS — L89.613 PRESSURE ULCER OF RIGHT HEEL, STAGE 3 (HCC): Primary | ICD-10-CM

## 2019-10-16 DIAGNOSIS — Z86.31 PERSONAL HISTORY OF DIABETIC FOOT ULCER: ICD-10-CM

## 2019-10-16 PROCEDURE — 99213 OFFICE O/P EST LOW 20 MIN: CPT

## 2019-10-16 NOTE — PROGRESS NOTES
Subjective    Chief Complaint  This information was obtained from the patient  The patient was seen today for follow up and management of difficult to heal wound(s).   10/16/19 - No additional concerns for todays visit    Allergies  Flagyl (Reaction: Hives, home health care agency due to Consolidated Billing.  9/11/19: Patient verbalized she was hospitalized at [facility] and remained in intensive care unit x3 days. Hospitalization was due to patient vomiting blood and Coumadin issues.   9/18/19: which has no odor. The patient reports a wound pain of level 0/10. The wound margin is thickened. Wound bed has 1-25% slough, 51-75% bright red granulation. The periwound skin color is normal. The periwound skin exhibited: Moist, Maceration.  The periwound heel shoe during the day. Continue wearing heel elevator boots at nighttime to right foot. Patient continues to wear right leg brace. Continue protein in diet to promote wound healing. Maintain glycemic control to assist in wound healing.   Patient to f Birth: 9/8/1945  Date: 10/16/2019  RN: Alexa Cade CNA/JUDY/CMA:  Court Leiva  Physician / Extender: Kayla Horton [address]: Outpatient  Vital Signs  Height/Length(in): 62  Height/Length(cm): 157.48  Weight(lbs): 178  Weight(kgs): 80.91  Temperature on 10/16/2019 1:44:56 PM

## 2019-10-23 ENCOUNTER — APPOINTMENT (OUTPATIENT)
Dept: WOUND CARE | Facility: HOSPITAL | Age: 74
End: 2019-10-23
Attending: CLINICAL NURSE SPECIALIST
Payer: MEDICARE

## 2019-10-23 ENCOUNTER — HOSPITAL ENCOUNTER (OUTPATIENT)
Dept: ULTRASOUND IMAGING | Facility: HOSPITAL | Age: 74
Discharge: HOME | End: 2019-10-23
Attending: UROLOGY
Payer: MEDICARE

## 2019-10-23 DIAGNOSIS — N20.0 KIDNEY STONE: ICD-10-CM

## 2019-10-23 PROCEDURE — 76775 US EXAM ABDO BACK WALL LIM: CPT | Performed by: UROLOGY

## 2019-10-30 ENCOUNTER — OFFICE VISIT (OUTPATIENT)
Dept: WOUND CARE | Facility: HOSPITAL | Age: 74
End: 2019-10-30
Attending: CLINICAL NURSE SPECIALIST
Payer: MEDICARE

## 2019-10-30 DIAGNOSIS — L89.613 PRESSURE ULCER OF RIGHT HEEL, STAGE 3 (HCC): Primary | ICD-10-CM

## 2019-10-30 PROCEDURE — 97597 DBRDMT OPN WND 1ST 20 CM/<: CPT

## 2019-10-30 NOTE — PROGRESS NOTES
Subjective    Chief Complaint  This information was obtained from the patient  The patient was seen today for follow up and management of difficult to heal wound(s).   10/30/19 - No additional concerns for todays visit    Allergies  Flagyl (Reaction: Hives, home health care agency due to Consolidated Billing.  9/11/19: Patient verbalized she was hospitalized at [facility] and remained in intensive care unit x3 days. Hospitalization was due to patient vomiting blood and Coumadin issues.   9/18/19: drainage noted which has no odor. The patient reports a wound pain of level 0/10. The wound margin is thickened. Wound bed has 1-25% slough, 51-75% bright red granulation.   The periwound skin color is normal. The periwound skin exhibited: Moist, Maceration decrease bioburden to wound and secured with a foam border dressing. Patient did have home health care continue previous wound care orders. Instructed patient to include protein in diet to promote wound healing.   Maintain glycemic control within normal r x 2 secured with medipore tape. Patient wears Darco offloading heel shoe    Additional Orders:    Misc / Additional orders  Home health care nurse for wound care.  - HHC to change dressing 3 x per week and outpatient wound clinic to change dressing every ot Red    Debridement: Selective    Devitalized Tissue Debrided: Fibrin    Instrument: Curette    Bleeding: Minimal    Hemostasis Achieved: Pressure    Procedural Pain: 0    Post Procedural Pain: 0    Debridement Treatment Response: Procedure was tolerated we (cm³) 0.24  Devitalized Tissue Debrided:  Fibrin  Instrument: Curette  Bleeding: Minimal  Hemostasis Achieved: Pressure  Procedural Pain: 0  Post Procedural Pain: 0  Response to Treatment: Procedure was tolerated well    Notes  Dermoplast applied prior to

## 2019-11-06 ENCOUNTER — APPOINTMENT (OUTPATIENT)
Dept: WOUND CARE | Facility: HOSPITAL | Age: 74
End: 2019-11-06
Attending: CLINICAL NURSE SPECIALIST
Payer: MEDICARE

## 2019-11-13 ENCOUNTER — OFFICE VISIT (OUTPATIENT)
Dept: WOUND CARE | Facility: HOSPITAL | Age: 74
End: 2019-11-13
Attending: CLINICAL NURSE SPECIALIST
Payer: MEDICARE

## 2019-11-13 DIAGNOSIS — L89.610 UNSTAGEABLE PRESSURE ULCER OF RIGHT HEEL (HCC): Primary | ICD-10-CM

## 2019-11-13 PROCEDURE — 99213 OFFICE O/P EST LOW 20 MIN: CPT

## 2019-11-13 NOTE — PROGRESS NOTES
Chief Complaint  This information was obtained from the patient  The patient was seen today for follow up and management of difficult to heal wound(s).   11/13/19 - No additional concerns for todays visit    Allergies  Flagyl (Reaction: Hives, Nausea, Vomit care agency due to Consolidated Billing.  9/11/19: Patient verbalized she was hospitalized at [facility] and remained in intensive care unit x3 days. Hospitalization was due to patient vomiting blood and Coumadin issues.   9/18/19:  Patient to patient  326/19: 50-year-old female with a history of hypertension, type 2 diabetes, GERD, arthritis of the right knee, renal insufficiency. Patient verbalized she had a right knee surgery (arthroplasty) performed by Dr. Maru Brennan on October 29, 2018 . Shira RLE.    Review of Systems (ROS)  This information was obtained from the patient    Complaints and Symptoms  Patient complains of:  Constitutional Symptoms (General Health): Weakness (walker)  Eyes: Other (cataract right eye)  Ear/Nose/Mouth/Throat: Other ( Oriented to time, place and person. Appropriate mood and affect.         Assessment    Active Problems    ICD-10  (Encounter Diagnosis) E08.49 - Diabetes mellitus due to underlying condition with other diabetic neurological complication  (Encounter Diagnosi on body weight. Status: Continued Date: 9/11/2019  - Distribute protein equally into 3 meals.   Status: Continued Date: 9/18/2019  Pressure Ulcer Prevention:  - Conduct skin risk assessment utilizing a structured approach that will identify patients at ris Resolved. The patient reports a wound pain of level 0/10. The wound margin is thickened. The periwound skin color is normal. The periwound skin exhibited: Moist, Maceration.  The periwound skin did not exhibit: Brawny Induration, Edema, Excoriation, Geovanna Brunette injury. Plan    Additional Orders: Follow-Up Appointments  Discharge from the wound care clinic. Offloading  Pressure relief shoe / inserts / foams  Heel offloading boot    Misc / Additional orders  Exercise as tolerated. Decrease salt intake.   Jg if at risk for malnutrition. Status: Continued Date: 9/18/2019  - Reposition the patient to relieve or redistribute the pressure while avoiding pressure and shear forces.  Frequency of repositioning will be influenced by variables such as tissue tolerance,

## 2019-11-20 ENCOUNTER — APPOINTMENT (OUTPATIENT)
Dept: WOUND CARE | Facility: HOSPITAL | Age: 74
End: 2019-11-20
Attending: CLINICAL NURSE SPECIALIST
Payer: MEDICARE

## 2019-11-27 ENCOUNTER — APPOINTMENT (OUTPATIENT)
Dept: WOUND CARE | Facility: HOSPITAL | Age: 74
End: 2019-11-27
Attending: CLINICAL NURSE SPECIALIST
Payer: MEDICARE

## 2019-12-02 ENCOUNTER — TELEPHONE (OUTPATIENT)
Dept: FAMILY MEDICINE CLINIC | Facility: CLINIC | Age: 74
End: 2019-12-02

## 2019-12-03 NOTE — TELEPHONE ENCOUNTER
Patient having surgery on DOS 12/23 Right Knee Revision with [email or web address]      H&P-  LABS-RBC(3.02),HGB(7.3),HCT(24.6),MCH(24.2),MCHC(29.7)RDW-SD(52.7)RDW(18.3),Lymphocyte Absol(0.87)Sed(104)  WBC Urine(32)Urine,CRP(0.87) C/S-pending,HA1C(5.8)Creatinine(1

## 2019-12-04 ENCOUNTER — LAB ENCOUNTER (OUTPATIENT)
Dept: LAB | Facility: HOSPITAL | Age: 74
End: 2019-12-04
Attending: FAMILY MEDICINE
Payer: MEDICARE

## 2019-12-04 ENCOUNTER — HOSPITAL ENCOUNTER (OUTPATIENT)
Dept: GENERAL RADIOLOGY | Facility: HOSPITAL | Age: 74
Discharge: HOME | End: 2019-12-04
Attending: FAMILY MEDICINE
Payer: MEDICARE

## 2019-12-04 ENCOUNTER — APPOINTMENT (OUTPATIENT)
Dept: WOUND CARE | Facility: HOSPITAL | Age: 74
End: 2019-12-04
Attending: CLINICAL NURSE SPECIALIST
Payer: MEDICARE

## 2019-12-04 ENCOUNTER — OFFICE VISIT (OUTPATIENT)
Dept: FAMILY MEDICINE CLINIC | Facility: CLINIC | Age: 74
End: 2019-12-04
Payer: MEDICARE

## 2019-12-04 VITALS
RESPIRATION RATE: 16 BRPM | BODY MASS INDEX: 31.28 KG/M2 | DIASTOLIC BLOOD PRESSURE: 60 MMHG | WEIGHT: 170 LBS | SYSTOLIC BLOOD PRESSURE: 100 MMHG | HEIGHT: 62 IN | TEMPERATURE: 98 F | HEART RATE: 56 BPM | OXYGEN SATURATION: 96 %

## 2019-12-04 DIAGNOSIS — R73.01 ELEVATED FASTING BLOOD SUGAR: ICD-10-CM

## 2019-12-04 DIAGNOSIS — Z01.812 PRE-OPERATIVE LABORATORY EXAMINATION: ICD-10-CM

## 2019-12-04 DIAGNOSIS — Z79.4 TYPE 2 DIABETES MELLITUS WITH CHRONIC KIDNEY DISEASE, WITH LONG-TERM CURRENT USE OF INSULIN, UNSPECIFIED CKD STAGE (HCC): ICD-10-CM

## 2019-12-04 DIAGNOSIS — Z01.818 OTHER SPECIFIED PRE-OPERATIVE EXAMINATION: ICD-10-CM

## 2019-12-04 DIAGNOSIS — I10 ESSENTIAL HYPERTENSION: ICD-10-CM

## 2019-12-04 DIAGNOSIS — Z01.818 OTHER SPECIFIED PRE-OPERATIVE EXAMINATION: Primary | ICD-10-CM

## 2019-12-04 DIAGNOSIS — I42.8 NONISCHEMIC CARDIOMYOPATHY (HCC): ICD-10-CM

## 2019-12-04 DIAGNOSIS — K21.00 GERD WITH ESOPHAGITIS: ICD-10-CM

## 2019-12-04 DIAGNOSIS — E11.22 TYPE 2 DIABETES MELLITUS WITH CHRONIC KIDNEY DISEASE, WITH LONG-TERM CURRENT USE OF INSULIN, UNSPECIFIED CKD STAGE (HCC): ICD-10-CM

## 2019-12-04 DIAGNOSIS — N28.9 RENAL INSUFFICIENCY: ICD-10-CM

## 2019-12-04 DIAGNOSIS — S86.811A PATELLAR TENDON RUPTURE, RIGHT, INITIAL ENCOUNTER: ICD-10-CM

## 2019-12-04 DIAGNOSIS — T84.012S FAILED TOTAL RIGHT KNEE REPLACEMENT, SEQUELA: ICD-10-CM

## 2019-12-04 PROBLEM — T84.012A FAILED TOTAL KNEE, RIGHT (HCC): Status: ACTIVE | Noted: 2019-12-04

## 2019-12-04 PROBLEM — I48.91 ATRIAL FIBRILLATION (HCC): Status: ACTIVE | Noted: 2019-12-04

## 2019-12-04 PROCEDURE — 83036 HEMOGLOBIN GLYCOSYLATED A1C: CPT

## 2019-12-04 PROCEDURE — 80053 COMPREHEN METABOLIC PANEL: CPT

## 2019-12-04 PROCEDURE — 87077 CULTURE AEROBIC IDENTIFY: CPT

## 2019-12-04 PROCEDURE — 36415 COLL VENOUS BLD VENIPUNCTURE: CPT

## 2019-12-04 PROCEDURE — 86140 C-REACTIVE PROTEIN: CPT

## 2019-12-04 PROCEDURE — 86850 RBC ANTIBODY SCREEN: CPT

## 2019-12-04 PROCEDURE — 81015 MICROSCOPIC EXAM OF URINE: CPT

## 2019-12-04 PROCEDURE — 93005 ELECTROCARDIOGRAM TRACING: CPT

## 2019-12-04 PROCEDURE — 85652 RBC SED RATE AUTOMATED: CPT

## 2019-12-04 PROCEDURE — 86900 BLOOD TYPING SEROLOGIC ABO: CPT

## 2019-12-04 PROCEDURE — 85025 COMPLETE CBC W/AUTO DIFF WBC: CPT

## 2019-12-04 PROCEDURE — 87086 URINE CULTURE/COLONY COUNT: CPT

## 2019-12-04 PROCEDURE — 99214 OFFICE O/P EST MOD 30 MIN: CPT | Performed by: FAMILY MEDICINE

## 2019-12-04 PROCEDURE — 87186 SC STD MICRODIL/AGAR DIL: CPT

## 2019-12-04 PROCEDURE — 71046 X-RAY EXAM CHEST 2 VIEWS: CPT | Performed by: FAMILY MEDICINE

## 2019-12-04 PROCEDURE — 93010 ELECTROCARDIOGRAM REPORT: CPT | Performed by: FAMILY MEDICINE

## 2019-12-04 PROCEDURE — 87081 CULTURE SCREEN ONLY: CPT

## 2019-12-04 PROCEDURE — 86901 BLOOD TYPING SEROLOGIC RH(D): CPT

## 2019-12-04 NOTE — TELEPHONE ENCOUNTER
Spoke with Dr Clyde Cummings in regards to patient's HGB and Abnormal EKG . Will notify patient she needs to go to the ER for blood transfusion and further workup.       Spoke with Girma GREENBERG with Dr Vincenzo Mares office, informed of blood values and patient will need t

## 2019-12-05 ENCOUNTER — HOSPITAL ENCOUNTER (EMERGENCY)
Facility: HOSPITAL | Age: 74
Discharge: HOME | End: 2019-12-05
Attending: EMERGENCY MEDICINE
Payer: MEDICARE

## 2019-12-05 VITALS
HEIGHT: 62 IN | SYSTOLIC BLOOD PRESSURE: 143 MMHG | WEIGHT: 170 LBS | HEART RATE: 73 BPM | BODY MASS INDEX: 31.28 KG/M2 | DIASTOLIC BLOOD PRESSURE: 70 MMHG | TEMPERATURE: 98 F | OXYGEN SATURATION: 97 % | RESPIRATION RATE: 23 BRPM

## 2019-12-05 DIAGNOSIS — D64.9 ANEMIA, UNSPECIFIED TYPE: Primary | ICD-10-CM

## 2019-12-05 PROCEDURE — 85025 COMPLETE CBC W/AUTO DIFF WBC: CPT

## 2019-12-05 PROCEDURE — 80048 BASIC METABOLIC PNL TOTAL CA: CPT

## 2019-12-05 PROCEDURE — 99283 EMERGENCY DEPT VISIT LOW MDM: CPT

## 2019-12-05 PROCEDURE — 80048 BASIC METABOLIC PNL TOTAL CA: CPT | Performed by: EMERGENCY MEDICINE

## 2019-12-05 PROCEDURE — 36415 COLL VENOUS BLD VENIPUNCTURE: CPT

## 2019-12-05 PROCEDURE — 85025 COMPLETE CBC W/AUTO DIFF WBC: CPT | Performed by: EMERGENCY MEDICINE

## 2019-12-05 NOTE — TELEPHONE ENCOUNTER
Spoke with Calixto Loomis at [facility] . They informed patient to go to the ER due to recent HGB.     Phone number [phone]

## 2019-12-05 NOTE — ED PROVIDER NOTES
Patient Seen in: [facility] Emergency Department      History   Patient presents with:  Abnormal Labs    Stated Complaint:     HPI    41-year-old female with past medical history significant for diabetes, high blood pressure, high cholesterol, HENRY Cigarettes        Quit date: 10/11/1980        Years since quittin.1      Smokeless tobacco: Never Used    Alcohol use: No      Frequency: Never    Drug use:  No             Review of Systems    Positive for stated complaint:   Other systems are as not 24.8 (*)     MCHC 30.6 (*)     RDW-SD 51.8 (*)     RDW 18.1 (*)     Lymphocyte Absolute 0.75 (*)     All other components within normal limits   CBC WITH DIFFERENTIAL WITH PLATELET    Narrative:      The following orders were created for panel order CBC WIT

## 2019-12-05 NOTE — TELEPHONE ENCOUNTER
Spoke with patient and son informed of recent blood work and CXR showing Congestive Heart Failure. Patient needs to go to ER. Spoke with Dr Jacques Villafuerte informed of recent lab work and CXR ,he will review with patient at office visit .

## 2019-12-05 NOTE — H&P
[address] [facility] PRE-OP CLINIC English    PRE-OP NOTE    HPI:   I have been consulted by Dr. Albin Rasmussen to see Meli Tubbs 75year old female for a preoperative evaluation and medical clearance.  Flora is scheduled to have a revision of her right knee scheduled 12 14 yrs   • Esophageal reflux    • Essential hypertension    • High blood pressure    • High cholesterol    • History of blood transfusion 11/2018    no reaction   • Hyperlipidemia    • Osteoarthritis    • Renal disorder     watching renal counts   • Visual Gets together: Not on file        Attends Quaker service: Not on file        Active member of club or organization: Not on file        Attends meetings of clubs or organizations: Not on file        Relationship status: Not on file      Intimate partner developed, well nourished, no apparent distress. HEENT:  Head:  Normocephalic, atraumatic Eyes: EOMI, PERRLA, no scleral icterus, conjunctivae clear bilaterally, no eye discharge Nose: patent, no nasal discharge Throat:  No tonsillar erythema or exudate. fibrillation -Old anterior infarct.  - Nonspecific T-abnormality.  ABNORMAL When compared with ECG of 10/17/2018 11:57:11 No significant changes have occurred Electronically signed on 12/04/2019 at 17:16 by Cinthia Saucedo MD      ASSESSMENT AND PLAN:   E

## 2019-12-05 NOTE — TELEPHONE ENCOUNTER
Spoke with patient informed surgery will be postponed until she can have further workup due to her low hemoglobin. Per Dr Solange Cox she needs to go to the ER for further workup . Patient does not have primary Doctor at this time.  She has Nephrologist at [facility]

## 2019-12-06 ENCOUNTER — TELEPHONE (OUTPATIENT)
Dept: HEMATOLOGY/ONCOLOGY | Facility: HOSPITAL | Age: 74
End: 2019-12-06

## 2019-12-06 NOTE — TELEPHONE ENCOUNTER
LM explaining I was the clinic nurse for Dr Rachele Chao and that the ED physician from [place] had referred her to Dr Valerio, a hematologist (blood doctor) regarding her anemia.   If she prefers to discuss with her PCP Dr Sara Redding first that would be understandab

## 2019-12-06 NOTE — TELEPHONE ENCOUNTER
As noted, pt needs to follow up with her pcp and have numerous medical concerns addressed before she can be cleared for surgery.

## 2019-12-06 NOTE — TELEPHONE ENCOUNTER
Reached out to Flora to see if she would like to come in. She is unable to come in today. She asked that she get a call back from a nurse as she did not know who Dr. Edmond Melendez was and is looking to get information as to why she is coming in.  Please give the p

## 2019-12-06 NOTE — TELEPHONE ENCOUNTER
Spoke with Mora Dewey patient has appt with Alesia Castellon on  12/18 for follow up with Anemia . She will be her PCP.

## 2019-12-06 NOTE — TELEPHONE ENCOUNTER
Faxed note in regards to patient's recent history to Dr Ольга Russell office  Fax number [phone]

## 2019-12-06 NOTE — TELEPHONE ENCOUNTER
Dr Pat Orat please review    Patient had urine C/S positive for >100,000 CFU/ML Klebsiella pneumoniae . Patient not having burning or frequency with urination. Per Dr Mihaela Gambino will not put patient on antibiotic at this time.  She will need to f/u  h

## 2019-12-11 ENCOUNTER — OFFICE VISIT (OUTPATIENT)
Dept: INTERNAL MEDICINE CLINIC | Facility: CLINIC | Age: 74
End: 2019-12-11
Payer: MEDICARE

## 2019-12-11 ENCOUNTER — APPOINTMENT (OUTPATIENT)
Dept: WOUND CARE | Facility: HOSPITAL | Age: 74
End: 2019-12-11
Attending: CLINICAL NURSE SPECIALIST
Payer: MEDICARE

## 2019-12-11 VITALS
HEIGHT: 62 IN | OXYGEN SATURATION: 98 % | BODY MASS INDEX: 31 KG/M2 | RESPIRATION RATE: 22 BRPM | DIASTOLIC BLOOD PRESSURE: 75 MMHG | HEART RATE: 68 BPM | SYSTOLIC BLOOD PRESSURE: 120 MMHG

## 2019-12-11 DIAGNOSIS — D50.9 MICROCYTIC ANEMIA: Primary | ICD-10-CM

## 2019-12-11 DIAGNOSIS — I48.21 PERMANENT ATRIAL FIBRILLATION (HCC): ICD-10-CM

## 2019-12-11 DIAGNOSIS — B96.89 UTI DUE TO KLEBSIELLA SPECIES: ICD-10-CM

## 2019-12-11 DIAGNOSIS — S86.811S RUPTURE OF RIGHT PATELLAR TENDON, SEQUELA: ICD-10-CM

## 2019-12-11 DIAGNOSIS — K20.90 ESOPHAGITIS: ICD-10-CM

## 2019-12-11 DIAGNOSIS — N39.0 UTI DUE TO KLEBSIELLA SPECIES: ICD-10-CM

## 2019-12-11 DIAGNOSIS — I10 ESSENTIAL HYPERTENSION: ICD-10-CM

## 2019-12-11 DIAGNOSIS — L97.419 HEEL ULCERATION, RIGHT, WITH UNSPECIFIED SEVERITY (HCC): ICD-10-CM

## 2019-12-11 DIAGNOSIS — E11.22 TYPE 2 DIABETES MELLITUS WITH CHRONIC KIDNEY DISEASE, WITH LONG-TERM CURRENT USE OF INSULIN, UNSPECIFIED CKD STAGE (HCC): ICD-10-CM

## 2019-12-11 DIAGNOSIS — I50.23 ACUTE ON CHRONIC SYSTOLIC (CONGESTIVE) HEART FAILURE (HCC): ICD-10-CM

## 2019-12-11 DIAGNOSIS — Z79.4 TYPE 2 DIABETES MELLITUS WITH CHRONIC KIDNEY DISEASE, WITH LONG-TERM CURRENT USE OF INSULIN, UNSPECIFIED CKD STAGE (HCC): ICD-10-CM

## 2019-12-11 PROBLEM — R13.10 DYSPHAGIA: Status: ACTIVE | Noted: 2019-12-11

## 2019-12-11 PROBLEM — N28.9 KIDNEY DISORDER: Status: ACTIVE | Noted: 2018-10-17

## 2019-12-11 PROBLEM — L60.2 HYPERTROPHY OF NAIL: Status: ACTIVE | Noted: 2019-12-11

## 2019-12-11 PROBLEM — L97.409 ULCER OF HEEL (HCC): Status: ACTIVE | Noted: 2019-05-16

## 2019-12-11 PROBLEM — M25.50 PAIN IN JOINT: Status: ACTIVE | Noted: 2019-10-20

## 2019-12-11 PROBLEM — K30 INDIGESTION: Status: ACTIVE | Noted: 2019-12-11

## 2019-12-11 PROBLEM — Z96.659 ARTIFICIAL KNEE JOINT PRESENT: Status: ACTIVE | Noted: 2019-03-07

## 2019-12-11 PROBLEM — D64.9 ANEMIA: Status: ACTIVE | Noted: 2018-02-22

## 2019-12-11 PROBLEM — M62.81 MUSCLE WEAKNESS: Status: ACTIVE | Noted: 2019-03-07

## 2019-12-11 PROBLEM — Z87.442 HISTORY OF URINARY STONE: Status: ACTIVE | Noted: 2019-03-07

## 2019-12-11 PROBLEM — I48.91 ATRIAL FIBRILLATION (HCC): Status: ACTIVE | Noted: 2019-03-07

## 2019-12-11 PROBLEM — I50.9 CONGESTIVE HEART FAILURE (HCC): Status: ACTIVE | Noted: 2019-03-07

## 2019-12-11 PROBLEM — K21.9 GASTROESOPHAGEAL REFLUX DISEASE: Status: ACTIVE | Noted: 2019-12-11

## 2019-12-11 PROBLEM — N64.4 BREAST PAIN: Status: ACTIVE | Noted: 2017-11-09

## 2019-12-11 PROBLEM — H26.9 CATARACT: Status: ACTIVE | Noted: 2019-12-11

## 2019-12-11 PROCEDURE — 99205 OFFICE O/P NEW HI 60 MIN: CPT | Performed by: INTERNAL MEDICINE

## 2019-12-11 RX ORDER — HYDROMORPHONE HYDROCHLORIDE 2 MG/1
TABLET ORAL
Refills: 0 | COMMUNITY
Start: 2019-10-24 | End: 2019-12-13 | Stop reason: ALTCHOICE

## 2019-12-11 RX ORDER — TORSEMIDE 20 MG/1
TABLET ORAL
COMMUNITY
End: 2019-12-13

## 2019-12-11 RX ORDER — ENOXAPARIN SODIUM 100 MG/ML
INJECTION SUBCUTANEOUS
Refills: 0 | COMMUNITY
Start: 2019-06-24 | End: 2019-12-13 | Stop reason: ALTCHOICE

## 2019-12-11 RX ORDER — PEN NEEDLE, DIABETIC 29 G X1/2"
NEEDLE, DISPOSABLE MISCELLANEOUS
Refills: 5 | COMMUNITY
Start: 2019-11-04 | End: 2021-01-01 | Stop reason: CLARIF

## 2019-12-11 RX ORDER — WARFARIN SODIUM 2.5 MG/1
TABLET ORAL
COMMUNITY
End: 2019-12-11 | Stop reason: ALTCHOICE

## 2019-12-11 RX ORDER — WARFARIN SODIUM 3 MG/1
TABLET ORAL
Refills: 3 | COMMUNITY
Start: 2019-07-15 | End: 2019-12-11 | Stop reason: ALTCHOICE

## 2019-12-11 RX ORDER — BLOOD SUGAR DIAGNOSTIC
STRIP MISCELLANEOUS
Refills: 5 | COMMUNITY
Start: 2019-09-23 | End: 2020-07-28

## 2019-12-11 RX ORDER — PANTOPRAZOLE SODIUM 40 MG/1
TABLET, DELAYED RELEASE ORAL
COMMUNITY
End: 2019-12-13 | Stop reason: ALTCHOICE

## 2019-12-11 RX ORDER — OMEPRAZOLE 40 MG/1
CAPSULE, DELAYED RELEASE ORAL
Refills: 3 | COMMUNITY
Start: 2019-10-21 | End: 2020-01-01 | Stop reason: ALTCHOICE

## 2019-12-11 RX ORDER — SPIRONOLACTONE 25 MG/1
TABLET ORAL
COMMUNITY
End: 2019-12-13 | Stop reason: ALTCHOICE

## 2019-12-11 RX ORDER — SYRING-NEEDL,DISP,INSUL,0.3 ML 31 GX5/16"
SYRINGE, EMPTY DISPOSABLE MISCELLANEOUS
Refills: 1 | COMMUNITY
Start: 2019-10-04 | End: 2020-07-28

## 2019-12-11 RX ORDER — PEN NEEDLE, DIABETIC 32GX 5/32"
NEEDLE, DISPOSABLE MISCELLANEOUS
Refills: 5 | COMMUNITY
Start: 2019-10-02 | End: 2021-01-01 | Stop reason: CLARIF

## 2019-12-11 RX ORDER — PNV NO.95/FERROUS FUM/FOLIC AC 28MG-0.8MG
TABLET ORAL
Refills: 2 | COMMUNITY
Start: 2019-06-24 | End: 2019-12-31 | Stop reason: ALTCHOICE

## 2019-12-11 RX ORDER — FLUTICASONE PROPIONATE 50 MCG
SPRAY, SUSPENSION (ML) NASAL
Refills: 4 | COMMUNITY
Start: 2019-11-01

## 2019-12-11 NOTE — PROGRESS NOTES
HPI:    Patient ID: Julissa Lomeli is a 75year old female. HPI     Patient is here to establish care.      H/o type 2 dm, on long term use of insulin, Non-ischemic cardiomyopathy, HTN , HLD, chronic microcytic anemia, requiring multiple blood transfusi She had right knee arthroplasty performed by Dr. Gloria Reynolds on 10/29/18. She later heard a pop in her right knee and was diagnosed to have patella tendon rupture. This was repaired by Dr. Gloria Reynolds at [facility].. Later transferred to  [place] [address] for rehab.  She Smoking status: Former Smoker        Packs/day: 0.50        Years: 8.00        Pack years: 4        Types: Cigarettes        Quit date: 10/11/1980        Years since quittin.1      Smokeless tobacco: Never Used    Alcohol use: No      Frequency: N Psychiatric/Behavioral: Negative for behavioral problems.             Current Outpatient Medications   Medication Sig Dispense Refill   • Insulin NPH, Human,, Isophane, 100 UNIT/ML Subcutaneous Suspension Inject 12 Units into the skin daily with breakfast. Hydrocodone-Acetami*    HIVES  Metronidazole           HIVES, NAUSEA AND VOMITING  Penicillins             HIVES    Comment:Other reaction(s): Hives/Urticaria  Tramadol                NAUSEA AND VOMITING, HIVES    Comment:Other reaction(s): emesis  Aspirin Approved by (CST): Olivia Wilson MD on 10/23/2018 at 11:04          Component      Latest Ref Rng & Units 12/5/2019 12/4/2019   WBC      4.0 - 11.0 x10(3) uL 5.5 5.9   RBC      3.80 - 5.30 x10(6)uL 3.14 (L) 3.02 (L)   Hemoglobin      12.0 - 16.0 g/dL 7.8 0.1 - 2.0 mg/dL  0.4   TOTAL PROTEIN      6.4 - 8.2 g/dL  7.4   Albumin      3.4 - 5.0 g/dL  2.6 (L)   Globulin      2.8 - 4.4 g/dL  4.8 (H)   A/G Ratio      1.0 - 2.0  0.5 (L)   Patient Fasting?         Yes   SQUAM EPI CELLS UR      /HPF  Few   HYALINE 4. Type 2 diabetes mellitus with chronic kidney disease, with long-term current use of insulin, unspecified CKD stage (HCC)  A1c 5.8. ON NPH  12 u daily  Hypoglycemic precaution     5.  Acute on chronic systolic (congestive) heart failure (HCC)  ON carvedil

## 2019-12-12 ENCOUNTER — TELEPHONE (OUTPATIENT)
Dept: INTERNAL MEDICINE CLINIC | Facility: CLINIC | Age: 74
End: 2019-12-12

## 2019-12-13 PROBLEM — T78.3XXA ANGIOEDEMA: Status: ACTIVE | Noted: 2019-12-13

## 2019-12-13 RX ORDER — CIPROFLOXACIN 500 MG/1
500 TABLET, FILM COATED ORAL 2 TIMES DAILY
Qty: 14 TABLET | Refills: 0 | Status: SHIPPED | OUTPATIENT
Start: 2019-12-13 | End: 2019-12-16

## 2019-12-16 ENCOUNTER — TELEPHONE (OUTPATIENT)
Dept: INTERNAL MEDICINE CLINIC | Facility: CLINIC | Age: 74
End: 2019-12-16

## 2019-12-16 RX ORDER — SULFAMETHOXAZOLE AND TRIMETHOPRIM 800; 160 MG/1; MG/1
1 TABLET ORAL 2 TIMES DAILY
Qty: 14 TABLET | Refills: 0 | Status: SHIPPED | OUTPATIENT
Start: 2019-12-16 | End: 2020-01-03 | Stop reason: ALTCHOICE

## 2019-12-16 NOTE — TELEPHONE ENCOUNTER
Ciprofloxacin HCl (CIPRO) 500 MG Oral Tab / pt called to let the Dr know that she can't take this RX because of the side effects.  / Please give her a call.

## 2019-12-18 ENCOUNTER — APPOINTMENT (OUTPATIENT)
Dept: WOUND CARE | Facility: HOSPITAL | Age: 74
End: 2019-12-18
Attending: CLINICAL NURSE SPECIALIST
Payer: MEDICARE

## 2019-12-20 ENCOUNTER — APPOINTMENT (OUTPATIENT)
Dept: LAB | Facility: HOSPITAL | Age: 74
End: 2019-12-20
Attending: INTERNAL MEDICINE
Payer: MEDICARE

## 2019-12-20 ENCOUNTER — OFFICE VISIT (OUTPATIENT)
Dept: HEMATOLOGY/ONCOLOGY | Facility: HOSPITAL | Age: 74
End: 2019-12-20
Attending: INTERNAL MEDICINE
Payer: MEDICARE

## 2019-12-20 VITALS
SYSTOLIC BLOOD PRESSURE: 123 MMHG | TEMPERATURE: 99 F | HEART RATE: 98 BPM | DIASTOLIC BLOOD PRESSURE: 51 MMHG | RESPIRATION RATE: 18 BRPM | OXYGEN SATURATION: 99 %

## 2019-12-20 DIAGNOSIS — D50.9 MICROCYTIC ANEMIA: ICD-10-CM

## 2019-12-20 DIAGNOSIS — D64.9 NORMOCYTIC ANEMIA: Primary | ICD-10-CM

## 2019-12-20 DIAGNOSIS — R11.2 NON-INTRACTABLE VOMITING WITH NAUSEA, UNSPECIFIED VOMITING TYPE: ICD-10-CM

## 2019-12-20 DIAGNOSIS — R19.7 DIARRHEA, UNSPECIFIED TYPE: ICD-10-CM

## 2019-12-20 DIAGNOSIS — R10.9 ABDOMINAL PAIN WITH RADIATION TO BACK: ICD-10-CM

## 2019-12-20 DIAGNOSIS — Z87.19 HISTORY OF GI BLEED: ICD-10-CM

## 2019-12-20 LAB
DEPRECATED HBV CORE AB SER IA-ACNC: 108.3 NG/ML (ref 18–340)
HGB RETIC QN AUTO: 27.5 PG (ref 28.2–36.6)
IMM RETICS NFR: 0.12 RATIO (ref 0.1–0.3)
IRON SATURATION: 12 % (ref 15–50)
IRON SERPL-MCNC: 24 UG/DL (ref 50–170)
RETICS # AUTO: 98.5 X10(3) UL (ref 22.5–147.5)
RETICS/RBC NFR AUTO: 3.1 % (ref 0.5–2.5)
TOTAL IRON BINDING CAPACITY: 194 UG/DL (ref 240–450)
TRANSFERRIN SERPL-MCNC: 130 MG/DL (ref 200–360)
VIT B12 SERPL-MCNC: >2000 PG/ML (ref 193–986)

## 2019-12-20 PROCEDURE — 85045 AUTOMATED RETICULOCYTE COUNT: CPT

## 2019-12-20 PROCEDURE — 82607 VITAMIN B-12: CPT

## 2019-12-20 PROCEDURE — 99204 OFFICE O/P NEW MOD 45 MIN: CPT | Performed by: INTERNAL MEDICINE

## 2019-12-20 PROCEDURE — 83540 ASSAY OF IRON: CPT

## 2019-12-20 PROCEDURE — 84466 ASSAY OF TRANSFERRIN: CPT

## 2019-12-20 PROCEDURE — 82728 ASSAY OF FERRITIN: CPT
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
## 2019-12-23 ENCOUNTER — TELEPHONE (OUTPATIENT)
Dept: INTERNAL MEDICINE CLINIC | Facility: CLINIC | Age: 74
End: 2019-12-23

## 2019-12-26 ENCOUNTER — APPOINTMENT (OUTPATIENT)
Dept: WOUND CARE | Facility: HOSPITAL | Age: 74
End: 2019-12-26
Attending: CLINICAL NURSE SPECIALIST
Payer: MEDICARE

## 2019-12-28 ENCOUNTER — HOSPITAL ENCOUNTER (OUTPATIENT)
Dept: CT IMAGING | Facility: HOSPITAL | Age: 74
Discharge: HOME | End: 2019-12-28
Attending: INTERNAL MEDICINE
Payer: MEDICARE

## 2019-12-28 DIAGNOSIS — D64.9 NORMOCYTIC ANEMIA: ICD-10-CM

## 2019-12-28 DIAGNOSIS — Z87.19 HISTORY OF GI BLEED: ICD-10-CM

## 2019-12-28 DIAGNOSIS — R10.9 ABDOMINAL PAIN WITH RADIATION TO BACK: ICD-10-CM

## 2019-12-28 PROCEDURE — 74176 CT ABD & PELVIS W/O CONTRAST: CPT | Performed by: INTERNAL MEDICINE

## 2019-12-31 ENCOUNTER — OFFICE VISIT (OUTPATIENT)
Dept: HEMATOLOGY/ONCOLOGY | Facility: HOSPITAL | Age: 74
End: 2019-12-31
Attending: INTERNAL MEDICINE
Payer: MEDICARE

## 2019-12-31 ENCOUNTER — LAB ENCOUNTER (OUTPATIENT)
Dept: LAB | Facility: HOSPITAL | Age: 74
End: 2019-12-31
Attending: INTERNAL MEDICINE
Payer: MEDICARE

## 2019-12-31 VITALS
SYSTOLIC BLOOD PRESSURE: 100 MMHG | OXYGEN SATURATION: 99 % | TEMPERATURE: 98 F | RESPIRATION RATE: 18 BRPM | HEART RATE: 85 BPM | DIASTOLIC BLOOD PRESSURE: 59 MMHG

## 2019-12-31 DIAGNOSIS — D64.9 NORMOCYTIC ANEMIA: Primary | ICD-10-CM

## 2019-12-31 DIAGNOSIS — E53.8 FOLIC ACID DEFICIENCY: ICD-10-CM

## 2019-12-31 DIAGNOSIS — Z87.19 HISTORY OF GI BLEED: ICD-10-CM

## 2019-12-31 DIAGNOSIS — D50.0 IRON DEFICIENCY ANEMIA DUE TO CHRONIC BLOOD LOSS: ICD-10-CM

## 2019-12-31 DIAGNOSIS — Z92.89 HISTORY OF BLOOD TRANSFUSION: ICD-10-CM

## 2019-12-31 DIAGNOSIS — M35.00 SJOGREN'S SYNDROME, WITH UNSPECIFIED ORGAN INVOLVEMENT (HCC): Primary | ICD-10-CM

## 2019-12-31 LAB
ANTIBODY SCREEN: NEGATIVE
DIRECT COOMBS POLY: NEGATIVE
RH BLOOD TYPE: POSITIVE

## 2019-12-31 PROCEDURE — 86901 BLOOD TYPING SEROLOGIC RH(D): CPT

## 2019-12-31 PROCEDURE — 99214 OFFICE O/P EST MOD 30 MIN: CPT | Performed by: INTERNAL MEDICINE

## 2019-12-31 PROCEDURE — 36415 COLL VENOUS BLD VENIPUNCTURE: CPT

## 2019-12-31 PROCEDURE — 86880 COOMBS TEST DIRECT: CPT

## 2019-12-31 PROCEDURE — 86900 BLOOD TYPING SEROLOGIC ABO: CPT

## 2019-12-31 PROCEDURE — 86850 RBC ANTIBODY SCREEN: CPT

## 2019-12-31 RX ORDER — FERROUS SULFATE 325(65) MG
325 TABLET ORAL
Qty: 30 TABLET | Refills: 3 | Status: SHIPPED | OUTPATIENT
Start: 2019-12-31 | End: 2020-05-20

## 2019-12-31 RX ORDER — FOLIC ACID 1 MG/1
1 TABLET ORAL DAILY
Qty: 30 TABLET | Refills: 3 | Status: SHIPPED | OUTPATIENT
Start: 2019-12-31 | End: 2020-05-20

## 2019-12-31 NOTE — PROGRESS NOTES
Hematology Progress Note    Patient Name: Makayla Dash   YOB: 1945   Medical Record Number: [id number]   CSN: [id number]   Consulting Physician: Meenakshi Marquez MD  Referring Physician(s):  Beatris Villatoro  Date of Visit: 12/31/2019     Chief co Renal disorder     watching renal counts   • Visual impairment     readers       Past Surgical History:  Past Surgical History:   Procedure Laterality Date   • CATARACT  2017   • CYSTOSCOPY,INSERT URETERAL STENT     • EXTREMITY LOWER IRRIGATION & Dominic Logan Activity      Alcohol use: No        Frequency: Never      Drug use: No      Sexual activity: Not on file    Lifestyle      Physical activity:        Days per week: Not on file        Minutes per session: Not on file      Stress: Not on file    Relationshi 0  Insulin NPH, Human,, Isophane, 100 UNIT/ML Subcutaneous Suspension, Inject 12 Units into the skin daily with breakfast., Disp: , Rfl:   Fluticasone Propionate 50 MCG/ACT Nasal Suspension, USE 1 SPRAY(S) IN EACH NOSTRIL ONCE DAILY, Disp: , Rfl: 4  Glucos easy bruising, bleeding, and lymphadenopathy. Musculoskeletal: Negative for myalgias, arthralgias, muscle weakness. Neurological: Negative for headaches, dizziness, speech problems, gait problems and weakness.     A comprehensive 14 point review of system 12/20/2019 11:57 AM    CO2 26.0 12/20/2019 11:57 AM    ALKPHO 122 12/20/2019 11:57 AM    AST 12 (L) 12/20/2019 11:57 AM    ALT 6 (L) 12/20/2019 11:57 AM       CT abdomen+pelvis 12/28/19    CONCLUSION:   1. No acute intra-abdominal process.     2. Small bila value is stabe ~8 g/dL. Hgb value that would be optimal for surgical procedures is to the discretion of her surgeon.  --She was ordered for type and screen and RICARDO testing today to ensure she has compatible blood available if she needs a blood transfusion w

## 2020-01-01 ENCOUNTER — ANESTHESIA (OUTPATIENT)
Dept: ENDOSCOPY | Facility: HOSPITAL | Age: 75
DRG: 378 | End: 2020-01-01
Payer: MEDICARE

## 2020-01-01 ENCOUNTER — EXTERNAL FACILITY (OUTPATIENT)
Dept: INTERNAL MEDICINE CLINIC | Facility: CLINIC | Age: 75
End: 2020-01-01

## 2020-01-01 ENCOUNTER — TELEPHONE (OUTPATIENT)
Dept: GASTROENTEROLOGY | Facility: CLINIC | Age: 75
End: 2020-01-01

## 2020-01-01 ENCOUNTER — ANESTHESIA EVENT (OUTPATIENT)
Dept: ENDOSCOPY | Facility: HOSPITAL | Age: 75
DRG: 378 | End: 2020-01-01
Payer: MEDICARE

## 2020-01-01 ENCOUNTER — MED REC SCAN ONLY (OUTPATIENT)
Dept: INTERNAL MEDICINE CLINIC | Facility: CLINIC | Age: 75
End: 2020-01-01

## 2020-01-01 ENCOUNTER — APPOINTMENT (OUTPATIENT)
Dept: GENERAL RADIOLOGY | Facility: HOSPITAL | Age: 75
DRG: 378 | End: 2020-01-01
Attending: EMERGENCY MEDICINE
Payer: MEDICARE

## 2020-01-01 ENCOUNTER — OFFICE VISIT (OUTPATIENT)
Dept: HEMATOLOGY/ONCOLOGY | Facility: HOSPITAL | Age: 75
End: 2020-01-01
Attending: INTERNAL MEDICINE
Payer: MEDICARE

## 2020-01-01 ENCOUNTER — HOSPITAL ENCOUNTER (INPATIENT)
Facility: HOSPITAL | Age: 75
LOS: 4 days | Discharge: NURSING, REHAB OR LONG-TERM CARE | DRG: 378 | End: 2020-01-01
Attending: EMERGENCY MEDICINE | Admitting: HOSPITALIST
Payer: MEDICARE

## 2020-01-01 ENCOUNTER — EXTERNAL RECORD (OUTPATIENT)
Dept: OTHER | Age: 75
End: 2020-01-01

## 2020-01-01 ENCOUNTER — HOSPITAL ENCOUNTER (INPATIENT)
Facility: HOSPITAL | Age: 75
LOS: 11 days | Discharge: NURSING, REHAB OR LONG-TERM CARE | DRG: 286 | End: 2020-01-01
Attending: HOSPITALIST | Admitting: HOSPITALIST
Payer: MEDICARE

## 2020-01-01 ENCOUNTER — TELEPHONE (OUTPATIENT)
Dept: FAMILY MEDICINE CLINIC | Facility: CLINIC | Age: 75
End: 2020-01-01

## 2020-01-01 ENCOUNTER — APPOINTMENT (OUTPATIENT)
Dept: ULTRASOUND IMAGING | Facility: HOSPITAL | Age: 75
DRG: 286 | End: 2020-01-01
Attending: INTERNAL MEDICINE
Payer: MEDICARE

## 2020-01-01 ENCOUNTER — HOSPITAL ENCOUNTER (OUTPATIENT)
Dept: INTERVENTIONAL RADIOLOGY/VASCULAR | Facility: HOSPITAL | Age: 75
Discharge: HOME | DRG: 286 | End: 2020-01-01
Attending: INTERNAL MEDICINE
Payer: MEDICARE

## 2020-01-01 ENCOUNTER — HOSPITAL ENCOUNTER (OUTPATIENT)
Dept: INTERVENTIONAL RADIOLOGY/VASCULAR | Facility: HOSPITAL | Age: 75
Discharge: NURSING, REHAB OR LONG-TERM CARE | End: 2020-01-01
Attending: INTERNAL MEDICINE | Admitting: RADIOLOGY
Payer: MEDICARE

## 2020-01-01 ENCOUNTER — TELEPHONE (OUTPATIENT)
Dept: HEMATOLOGY/ONCOLOGY | Facility: HOSPITAL | Age: 75
End: 2020-01-01

## 2020-01-01 ENCOUNTER — APPOINTMENT (OUTPATIENT)
Dept: GENERAL RADIOLOGY | Facility: HOSPITAL | Age: 75
DRG: 378 | End: 2020-01-01
Attending: HOSPITALIST
Payer: MEDICARE

## 2020-01-01 ENCOUNTER — OFFICE VISIT (OUTPATIENT)
Dept: FAMILY MEDICINE CLINIC | Facility: CLINIC | Age: 75
End: 2020-01-01
Payer: MEDICARE

## 2020-01-01 ENCOUNTER — PATIENT OUTREACH (OUTPATIENT)
Dept: CASE MANAGEMENT | Age: 75
End: 2020-01-01

## 2020-01-01 ENCOUNTER — TELEPHONE (OUTPATIENT)
Dept: INTERNAL MEDICINE CLINIC | Facility: CLINIC | Age: 75
End: 2020-01-01

## 2020-01-01 ENCOUNTER — APPOINTMENT (OUTPATIENT)
Dept: CT IMAGING | Facility: HOSPITAL | Age: 75
End: 2020-01-01
Attending: EMERGENCY MEDICINE
Payer: MEDICARE

## 2020-01-01 ENCOUNTER — HOSPITAL ENCOUNTER (EMERGENCY)
Facility: HOSPITAL | Age: 75
Discharge: HOME | End: 2020-01-01
Attending: EMERGENCY MEDICINE
Payer: MEDICARE

## 2020-01-01 ENCOUNTER — HOSPITAL ENCOUNTER (OUTPATIENT)
Dept: CV DIAGNOSTICS | Facility: HOSPITAL | Age: 75
Discharge: HOME | End: 2020-01-01
Attending: INTERNAL MEDICINE
Payer: MEDICARE

## 2020-01-01 ENCOUNTER — EXTERNAL RECORD (OUTPATIENT)
Dept: HEALTH INFORMATION MANAGEMENT | Facility: OTHER | Age: 75
End: 2020-01-01

## 2020-01-01 ENCOUNTER — LAB ENCOUNTER (OUTPATIENT)
Dept: LAB | Facility: HOSPITAL | Age: 75
End: 2020-01-01
Attending: FAMILY MEDICINE
Payer: MEDICARE

## 2020-01-01 ENCOUNTER — TELEPHONE (OUTPATIENT)
Dept: RHEUMATOLOGY | Facility: CLINIC | Age: 75
End: 2020-01-01

## 2020-01-01 VITALS
SYSTOLIC BLOOD PRESSURE: 113 MMHG | TEMPERATURE: 98 F | HEART RATE: 61 BPM | RESPIRATION RATE: 20 BRPM | BODY MASS INDEX: 34 KG/M2 | OXYGEN SATURATION: 96 % | DIASTOLIC BLOOD PRESSURE: 60 MMHG | WEIGHT: 186.31 LBS

## 2020-01-01 VITALS
RESPIRATION RATE: 16 BRPM | WEIGHT: 216 LBS | HEIGHT: 62 IN | SYSTOLIC BLOOD PRESSURE: 122 MMHG | DIASTOLIC BLOOD PRESSURE: 78 MMHG | TEMPERATURE: 98 F | HEART RATE: 99 BPM | OXYGEN SATURATION: 99 % | BODY MASS INDEX: 39.75 KG/M2

## 2020-01-01 VITALS
DIASTOLIC BLOOD PRESSURE: 63 MMHG | WEIGHT: 214 LBS | RESPIRATION RATE: 16 BRPM | OXYGEN SATURATION: 100 % | BODY MASS INDEX: 39 KG/M2 | SYSTOLIC BLOOD PRESSURE: 127 MMHG | TEMPERATURE: 97 F | HEART RATE: 63 BPM

## 2020-01-01 VITALS
OXYGEN SATURATION: 100 % | WEIGHT: 166.25 LBS | DIASTOLIC BLOOD PRESSURE: 62 MMHG | HEIGHT: 62 IN | RESPIRATION RATE: 16 BRPM | TEMPERATURE: 98 F | SYSTOLIC BLOOD PRESSURE: 98 MMHG | BODY MASS INDEX: 30.59 KG/M2 | HEART RATE: 77 BPM

## 2020-01-01 VITALS
HEART RATE: 67 BPM | SYSTOLIC BLOOD PRESSURE: 143 MMHG | TEMPERATURE: 90 F | OXYGEN SATURATION: 100 % | RESPIRATION RATE: 9 BRPM | DIASTOLIC BLOOD PRESSURE: 73 MMHG

## 2020-01-01 VITALS
DIASTOLIC BLOOD PRESSURE: 75 MMHG | RESPIRATION RATE: 17 BRPM | SYSTOLIC BLOOD PRESSURE: 126 MMHG | OXYGEN SATURATION: 98 % | HEART RATE: 60 BPM

## 2020-01-01 VITALS
OXYGEN SATURATION: 95 % | TEMPERATURE: 97 F | BODY MASS INDEX: 31.28 KG/M2 | HEIGHT: 62 IN | SYSTOLIC BLOOD PRESSURE: 150 MMHG | WEIGHT: 170 LBS | DIASTOLIC BLOOD PRESSURE: 96 MMHG | RESPIRATION RATE: 18 BRPM | HEART RATE: 87 BPM

## 2020-01-01 DIAGNOSIS — I42.0 DILATED CARDIOMYOPATHY (HCC): ICD-10-CM

## 2020-01-01 DIAGNOSIS — N18.4 CKD (CHRONIC KIDNEY DISEASE), STAGE IV (HCC): ICD-10-CM

## 2020-01-01 DIAGNOSIS — K92.2 GASTROINTESTINAL HEMORRHAGE, UNSPECIFIED GASTROINTESTINAL HEMORRHAGE TYPE: ICD-10-CM

## 2020-01-01 DIAGNOSIS — T84.50XS INFECTION OF PROSTHETIC JOINT, SEQUELA: ICD-10-CM

## 2020-01-01 DIAGNOSIS — Z96.659 INFECTION OF PROSTHETIC KNEE JOINT, SUBSEQUENT ENCOUNTER: ICD-10-CM

## 2020-01-01 DIAGNOSIS — N17.9 AKI (ACUTE KIDNEY INJURY) (HCC): ICD-10-CM

## 2020-01-01 DIAGNOSIS — R22.42 LOCALIZED SWELLING OF LEFT LOWER EXTREMITY: ICD-10-CM

## 2020-01-01 DIAGNOSIS — Z01.818 PREOP EXAMINATION: Primary | ICD-10-CM

## 2020-01-01 DIAGNOSIS — T84.59XD INFECTION OF PROSTHETIC KNEE JOINT, SUBSEQUENT ENCOUNTER: ICD-10-CM

## 2020-01-01 DIAGNOSIS — E11.65 TYPE 2 DIABETES MELLITUS WITH HYPERGLYCEMIA, WITHOUT LONG-TERM CURRENT USE OF INSULIN (HCC): ICD-10-CM

## 2020-01-01 DIAGNOSIS — N28.9 KIDNEY DISORDER: ICD-10-CM

## 2020-01-01 DIAGNOSIS — N18.9 CHRONIC KIDNEY DISEASE, UNSPECIFIED CKD STAGE: Primary | ICD-10-CM

## 2020-01-01 DIAGNOSIS — D63.1 ANEMIA DUE TO CHRONIC KIDNEY DISEASE, UNSPECIFIED CKD STAGE: ICD-10-CM

## 2020-01-01 DIAGNOSIS — N18.9 ANEMIA DUE TO CHRONIC KIDNEY DISEASE, UNSPECIFIED CKD STAGE: ICD-10-CM

## 2020-01-01 DIAGNOSIS — Z01.812 PRE-OPERATIVE LABORATORY EXAMINATION: ICD-10-CM

## 2020-01-01 DIAGNOSIS — D50.9 IRON DEFICIENCY ANEMIA, UNSPECIFIED IRON DEFICIENCY ANEMIA TYPE: ICD-10-CM

## 2020-01-01 DIAGNOSIS — I87.2 VENOUS INSUFFICIENCY (CHRONIC) (PERIPHERAL): ICD-10-CM

## 2020-01-01 DIAGNOSIS — M79.89 SWELLING OF LOWER EXTREMITY: ICD-10-CM

## 2020-01-01 DIAGNOSIS — R11.2 NAUSEA VOMITING AND DIARRHEA: Primary | ICD-10-CM

## 2020-01-01 DIAGNOSIS — E53.8 FOLIC ACID DEFICIENCY: ICD-10-CM

## 2020-01-01 DIAGNOSIS — I42.8 OTHER CARDIOMYOPATHY (HCC): ICD-10-CM

## 2020-01-01 DIAGNOSIS — N17.9 ACUTE KIDNEY FAILURE (HCC): ICD-10-CM

## 2020-01-01 DIAGNOSIS — I87.2 VENOUS INSUFFICIENCY: ICD-10-CM

## 2020-01-01 DIAGNOSIS — K92.2 GASTROINTESTINAL HEMORRHAGE, UNSPECIFIED GASTROINTESTINAL HEMORRHAGE TYPE: Primary | ICD-10-CM

## 2020-01-01 DIAGNOSIS — I50.22 CHRONIC SYSTOLIC CONGESTIVE HEART FAILURE (HCC): ICD-10-CM

## 2020-01-01 DIAGNOSIS — Z01.818 PREOP EXAMINATION: ICD-10-CM

## 2020-01-01 DIAGNOSIS — K59.00 CONSTIPATION, UNSPECIFIED CONSTIPATION TYPE: ICD-10-CM

## 2020-01-01 DIAGNOSIS — R19.7 NAUSEA VOMITING AND DIARRHEA: Primary | ICD-10-CM

## 2020-01-01 DIAGNOSIS — I25.5 ISCHEMIC CARDIOMYOPATHY: ICD-10-CM

## 2020-01-01 DIAGNOSIS — R11.2 NON-INTRACTABLE VOMITING WITH NAUSEA, UNSPECIFIED VOMITING TYPE: ICD-10-CM

## 2020-01-01 DIAGNOSIS — I25.10 CAD (CORONARY ARTERY DISEASE): ICD-10-CM

## 2020-01-01 DIAGNOSIS — R11.2 NAUSEA AND VOMITING, INTRACTABILITY OF VOMITING NOT SPECIFIED, UNSPECIFIED VOMITING TYPE: ICD-10-CM

## 2020-01-01 DIAGNOSIS — Z01.818 PREOPERATIVE CLEARANCE: ICD-10-CM

## 2020-01-01 DIAGNOSIS — I10 ESSENTIAL HYPERTENSION: ICD-10-CM

## 2020-01-01 DIAGNOSIS — I25.84 CORONARY ARTERY DISEASE DUE TO CALCIFIED CORONARY LESION: ICD-10-CM

## 2020-01-01 DIAGNOSIS — I25.10 CORONARY ARTERY DISEASE DUE TO CALCIFIED CORONARY LESION: ICD-10-CM

## 2020-01-01 DIAGNOSIS — R60.1 ANASARCA: ICD-10-CM

## 2020-01-01 DIAGNOSIS — D64.9 SEVERE ANEMIA: ICD-10-CM

## 2020-01-01 DIAGNOSIS — R76.8 POSITIVE ANA (ANTINUCLEAR ANTIBODY): ICD-10-CM

## 2020-01-01 DIAGNOSIS — D64.9 NORMOCYTIC ANEMIA: Primary | ICD-10-CM

## 2020-01-01 LAB
25(OH)D3 SERPL-MCNC: 29.8 NG/ML (ref 30–100)
ALBUMIN SERPL-MCNC: 2.2 G/DL (ref 3.4–5)
ALBUMIN SERPL-MCNC: 2.4 G/DL (ref 3.4–5)
ALBUMIN SERPL-MCNC: 2.4 G/DL (ref 3.4–5)
ALBUMIN SERPL-MCNC: 2.6 G/DL (ref 3.4–5)
ALBUMIN SERPL-MCNC: 2.9 G/DL (ref 3.4–5)
ALBUMIN SERPL-MCNC: 3.1 G/DL (ref 3.4–5)
ALP LIVER SERPL-CCNC: 149 U/L (ref 55–142)
ALT SERPL-CCNC: 10 U/L (ref 13–56)
ANION GAP SERPL CALC-SCNC: 10 MMOL/L (ref 0–18)
ANION GAP SERPL CALC-SCNC: 5 MMOL/L (ref 0–18)
ANION GAP SERPL CALC-SCNC: 5 MMOL/L (ref 0–18)
ANION GAP SERPL CALC-SCNC: 6 MMOL/L (ref 0–18)
ANION GAP SERPL CALC-SCNC: 7 MMOL/L (ref 0–18)
AST SERPL-CCNC: 11 U/L (ref 15–37)
BASOPHILS # BLD AUTO: 0 X10(3) UL (ref 0–0.2)
BASOPHILS # BLD AUTO: 0.01 X10(3) UL (ref 0–0.2)
BASOPHILS NFR BLD AUTO: 0 %
BASOPHILS NFR BLD AUTO: 0.2 %
BASOPHILS NFR BLD AUTO: 0.3 %
BILIRUB DIRECT SERPL-MCNC: 0.2 MG/DL (ref 0–0.2)
BILIRUB SERPL-MCNC: 0.5 MG/DL (ref 0.1–2)
BILIRUB UR QL: NEGATIVE
BUN BLD-MCNC: 26 MG/DL (ref 7–18)
BUN BLD-MCNC: 63 MG/DL (ref 7–18)
BUN BLD-MCNC: 65 MG/DL (ref 7–18)
BUN BLD-MCNC: 66 MG/DL (ref 7–18)
BUN BLD-MCNC: 67 MG/DL (ref 7–18)
BUN BLD-MCNC: 67 MG/DL (ref 7–18)
BUN BLD-MCNC: 69 MG/DL (ref 7–18)
BUN BLD-MCNC: 72 MG/DL (ref 7–18)
BUN BLD-MCNC: 73 MG/DL (ref 7–18)
BUN BLD-MCNC: 73 MG/DL (ref 7–18)
BUN BLD-MCNC: 77 MG/DL (ref 7–18)
BUN BLD-MCNC: 78 MG/DL (ref 7–18)
BUN/CREAT SERPL: 18.3 (ref 10–20)
BUN/CREAT SERPL: 18.4 (ref 10–20)
BUN/CREAT SERPL: 18.8 (ref 10–20)
BUN/CREAT SERPL: 19 (ref 10–20)
BUN/CREAT SERPL: 19.1 (ref 10–20)
BUN/CREAT SERPL: 19.9 (ref 10–20)
BUN/CREAT SERPL: 21.3 (ref 10–20)
BUN/CREAT SERPL: 23.5 (ref 10–20)
BUN/CREAT SERPL: 24.9 (ref 10–20)
BUN/CREAT SERPL: 25.5 (ref 10–20)
BUN/CREAT SERPL: 25.8 (ref 10–20)
BUN/CREAT SERPL: 9.5 (ref 10–20)
CALCIUM BLD-MCNC: 10.1 MG/DL (ref 8.5–10.1)
CALCIUM BLD-MCNC: 10.2 MG/DL (ref 8.5–10.1)
CALCIUM BLD-MCNC: 9 MG/DL (ref 8.5–10.1)
CALCIUM BLD-MCNC: 9.3 MG/DL (ref 8.5–10.1)
CALCIUM BLD-MCNC: 9.4 MG/DL (ref 8.5–10.1)
CALCIUM BLD-MCNC: 9.4 MG/DL (ref 8.5–10.1)
CALCIUM BLD-MCNC: 9.6 MG/DL (ref 8.5–10.1)
CALCIUM BLD-MCNC: 9.7 MG/DL (ref 8.5–10.1)
CALCIUM BLD-MCNC: 9.8 MG/DL (ref 8.5–10.1)
CALCIUM BLD-MCNC: 9.8 MG/DL (ref 8.5–10.1)
CHLORIDE SERPL-SCNC: 100 MMOL/L (ref 98–112)
CHLORIDE SERPL-SCNC: 107 MMOL/L (ref 98–112)
CHLORIDE SERPL-SCNC: 93 MMOL/L (ref 98–112)
CHLORIDE SERPL-SCNC: 95 MMOL/L (ref 98–112)
CHLORIDE SERPL-SCNC: 96 MMOL/L (ref 98–112)
CHLORIDE SERPL-SCNC: 97 MMOL/L (ref 98–112)
CHLORIDE SERPL-SCNC: 98 MMOL/L (ref 98–112)
CO2 SERPL-SCNC: 26 MMOL/L (ref 21–32)
CO2 SERPL-SCNC: 29 MMOL/L (ref 21–32)
CO2 SERPL-SCNC: 31 MMOL/L (ref 21–32)
CO2 SERPL-SCNC: 32 MMOL/L (ref 21–32)
CO2 SERPL-SCNC: 33 MMOL/L (ref 21–32)
CO2 SERPL-SCNC: 33 MMOL/L (ref 21–32)
CO2 SERPL-SCNC: 34 MMOL/L (ref 21–32)
CO2 SERPL-SCNC: 34 MMOL/L (ref 21–32)
CO2 SERPL-SCNC: 35 MMOL/L (ref 21–32)
CO2 SERPL-SCNC: 37 MMOL/L (ref 21–32)
CREAT BLD-MCNC: 2.73 MG/DL (ref 0.55–1.02)
CREAT BLD-MCNC: 2.93 MG/DL
CREAT BLD-MCNC: 2.99 MG/DL
CREAT BLD-MCNC: 3.06 MG/DL
CREAT BLD-MCNC: 3.1 MG/DL
CREAT BLD-MCNC: 3.37 MG/DL
CREAT BLD-MCNC: 3.38 MG/DL
CREAT BLD-MCNC: 3.4 MG/DL
CREAT BLD-MCNC: 3.42 MG/DL
CREAT BLD-MCNC: 3.47 MG/DL
CREAT BLD-MCNC: 3.56 MG/DL
CREAT BLD-MCNC: 3.77 MG/DL
DEPRECATED RDW RBC AUTO: 50.2 FL (ref 35.1–46.3)
DEPRECATED RDW RBC AUTO: 50.3 FL (ref 35.1–46.3)
DEPRECATED RDW RBC AUTO: 50.4 FL (ref 35.1–46.3)
DEPRECATED RDW RBC AUTO: 50.6 FL (ref 35.1–46.3)
DEPRECATED RDW RBC AUTO: 51 FL (ref 35.1–46.3)
DEPRECATED RDW RBC AUTO: 51 FL (ref 35.1–46.3)
DEPRECATED RDW RBC AUTO: 51.3 FL (ref 35.1–46.3)
DEPRECATED RDW RBC AUTO: 52.1 FL (ref 35.1–46.3)
DEPRECATED RDW RBC AUTO: 52.8 FL (ref 35.1–46.3)
DEPRECATED RDW RBC AUTO: 54.3 FL (ref 35.1–46.3)
EOSINOPHIL # BLD AUTO: 0.02 X10(3) UL (ref 0–0.7)
EOSINOPHIL # BLD AUTO: 0.1 X10(3) UL (ref 0–0.7)
EOSINOPHIL # BLD AUTO: 0.13 X10(3) UL (ref 0–0.7)
EOSINOPHIL # BLD AUTO: 0.19 X10(3) UL (ref 0–0.7)
EOSINOPHIL # BLD AUTO: 0.2 X10(3) UL (ref 0–0.7)
EOSINOPHIL # BLD AUTO: 0.24 X10(3) UL (ref 0–0.7)
EOSINOPHIL # BLD AUTO: 0.26 X10(3) UL (ref 0–0.7)
EOSINOPHIL # BLD AUTO: 0.29 X10(3) UL (ref 0–0.7)
EOSINOPHIL # BLD AUTO: 0.29 X10(3) UL (ref 0–0.7)
EOSINOPHIL # BLD AUTO: 0.31 X10(3) UL (ref 0–0.7)
EOSINOPHIL NFR BLD AUTO: 0.3 %
EOSINOPHIL NFR BLD AUTO: 2.2 %
EOSINOPHIL NFR BLD AUTO: 3.1 %
EOSINOPHIL NFR BLD AUTO: 4.6 %
EOSINOPHIL NFR BLD AUTO: 5.2 %
EOSINOPHIL NFR BLD AUTO: 5.3 %
EOSINOPHIL NFR BLD AUTO: 5.6 %
EOSINOPHIL NFR BLD AUTO: 5.7 %
EOSINOPHIL NFR BLD AUTO: 6 %
EOSINOPHIL NFR BLD AUTO: 6 %
ERYTHROCYTE [DISTWIDTH] IN BLOOD BY AUTOMATED COUNT: 15.9 % (ref 11–15)
ERYTHROCYTE [DISTWIDTH] IN BLOOD BY AUTOMATED COUNT: 17 % (ref 11–15)
ERYTHROCYTE [DISTWIDTH] IN BLOOD BY AUTOMATED COUNT: 17.1 % (ref 11–15)
ERYTHROCYTE [DISTWIDTH] IN BLOOD BY AUTOMATED COUNT: 17.1 % (ref 11–15)
ERYTHROCYTE [DISTWIDTH] IN BLOOD BY AUTOMATED COUNT: 17.2 % (ref 11–15)
ERYTHROCYTE [DISTWIDTH] IN BLOOD BY AUTOMATED COUNT: 17.2 % (ref 11–15)
ERYTHROCYTE [DISTWIDTH] IN BLOOD BY AUTOMATED COUNT: 17.3 % (ref 11–15)
ERYTHROCYTE [DISTWIDTH] IN BLOOD BY AUTOMATED COUNT: 17.5 % (ref 11–15)
ERYTHROCYTE [DISTWIDTH] IN BLOOD BY AUTOMATED COUNT: 17.5 % (ref 11–15)
ERYTHROCYTE [DISTWIDTH] IN BLOOD BY AUTOMATED COUNT: 18 % (ref 11–15)
GLUCOSE BLD-MCNC: 102 MG/DL (ref 70–99)
GLUCOSE BLD-MCNC: 107 MG/DL (ref 70–99)
GLUCOSE BLD-MCNC: 108 MG/DL (ref 70–99)
GLUCOSE BLD-MCNC: 113 MG/DL (ref 70–99)
GLUCOSE BLD-MCNC: 121 MG/DL (ref 70–99)
GLUCOSE BLD-MCNC: 126 MG/DL (ref 70–99)
GLUCOSE BLD-MCNC: 126 MG/DL (ref 70–99)
GLUCOSE BLD-MCNC: 132 MG/DL (ref 70–99)
GLUCOSE BLD-MCNC: 133 MG/DL (ref 70–99)
GLUCOSE BLD-MCNC: 147 MG/DL (ref 70–99)
GLUCOSE BLD-MCNC: 152 MG/DL (ref 70–99)
GLUCOSE BLD-MCNC: 169 MG/DL (ref 70–99)
GLUCOSE BLDC GLUCOMTR-MCNC: 106 MG/DL (ref 70–99)
GLUCOSE BLDC GLUCOMTR-MCNC: 115 MG/DL (ref 70–99)
GLUCOSE BLDC GLUCOMTR-MCNC: 117 MG/DL (ref 70–99)
GLUCOSE BLDC GLUCOMTR-MCNC: 121 MG/DL (ref 70–99)
GLUCOSE BLDC GLUCOMTR-MCNC: 124 MG/DL (ref 70–99)
GLUCOSE BLDC GLUCOMTR-MCNC: 128 MG/DL (ref 70–99)
GLUCOSE BLDC GLUCOMTR-MCNC: 129 MG/DL (ref 70–99)
GLUCOSE BLDC GLUCOMTR-MCNC: 129 MG/DL (ref 70–99)
GLUCOSE BLDC GLUCOMTR-MCNC: 132 MG/DL (ref 70–99)
GLUCOSE BLDC GLUCOMTR-MCNC: 134 MG/DL (ref 70–99)
GLUCOSE BLDC GLUCOMTR-MCNC: 136 MG/DL (ref 70–99)
GLUCOSE BLDC GLUCOMTR-MCNC: 138 MG/DL (ref 70–99)
GLUCOSE BLDC GLUCOMTR-MCNC: 141 MG/DL (ref 70–99)
GLUCOSE BLDC GLUCOMTR-MCNC: 142 MG/DL (ref 70–99)
GLUCOSE BLDC GLUCOMTR-MCNC: 145 MG/DL (ref 70–99)
GLUCOSE BLDC GLUCOMTR-MCNC: 146 MG/DL (ref 70–99)
GLUCOSE BLDC GLUCOMTR-MCNC: 146 MG/DL (ref 70–99)
GLUCOSE BLDC GLUCOMTR-MCNC: 147 MG/DL (ref 70–99)
GLUCOSE BLDC GLUCOMTR-MCNC: 148 MG/DL (ref 70–99)
GLUCOSE BLDC GLUCOMTR-MCNC: 156 MG/DL (ref 70–99)
GLUCOSE BLDC GLUCOMTR-MCNC: 158 MG/DL (ref 70–99)
GLUCOSE BLDC GLUCOMTR-MCNC: 159 MG/DL (ref 70–99)
GLUCOSE BLDC GLUCOMTR-MCNC: 160 MG/DL (ref 70–99)
GLUCOSE BLDC GLUCOMTR-MCNC: 161 MG/DL (ref 70–99)
GLUCOSE BLDC GLUCOMTR-MCNC: 162 MG/DL (ref 70–99)
GLUCOSE BLDC GLUCOMTR-MCNC: 164 MG/DL (ref 70–99)
GLUCOSE BLDC GLUCOMTR-MCNC: 166 MG/DL (ref 70–99)
GLUCOSE BLDC GLUCOMTR-MCNC: 168 MG/DL (ref 70–99)
GLUCOSE BLDC GLUCOMTR-MCNC: 169 MG/DL (ref 70–99)
GLUCOSE BLDC GLUCOMTR-MCNC: 171 MG/DL (ref 70–99)
GLUCOSE BLDC GLUCOMTR-MCNC: 172 MG/DL (ref 70–99)
GLUCOSE BLDC GLUCOMTR-MCNC: 174 MG/DL (ref 70–99)
GLUCOSE BLDC GLUCOMTR-MCNC: 180 MG/DL (ref 70–99)
GLUCOSE BLDC GLUCOMTR-MCNC: 185 MG/DL (ref 70–99)
GLUCOSE BLDC GLUCOMTR-MCNC: 187 MG/DL (ref 70–99)
GLUCOSE BLDC GLUCOMTR-MCNC: 192 MG/DL (ref 70–99)
GLUCOSE BLDC GLUCOMTR-MCNC: 194 MG/DL (ref 70–99)
GLUCOSE BLDC GLUCOMTR-MCNC: 201 MG/DL (ref 70–99)
GLUCOSE BLDC GLUCOMTR-MCNC: 203 MG/DL (ref 70–99)
GLUCOSE BLDC GLUCOMTR-MCNC: 218 MG/DL (ref 70–99)
GLUCOSE BLDC GLUCOMTR-MCNC: 244 MG/DL (ref 70–99)
GLUCOSE UR-MCNC: NEGATIVE MG/DL
HAV IGM SER QL: 1.7 MG/DL (ref 1.6–2.6)
HAV IGM SER QL: 1.8 MG/DL (ref 1.6–2.6)
HAV IGM SER QL: 1.9 MG/DL (ref 1.6–2.6)
HAV IGM SER QL: 1.9 MG/DL (ref 1.6–2.6)
HCT VFR BLD AUTO: 24.9 %
HCT VFR BLD AUTO: 25.6 %
HCT VFR BLD AUTO: 25.7 %
HCT VFR BLD AUTO: 26.4 %
HCT VFR BLD AUTO: 27.6 %
HCT VFR BLD AUTO: 27.7 %
HCT VFR BLD AUTO: 27.8 %
HCT VFR BLD AUTO: 30 %
HCT VFR BLD AUTO: 30.1 %
HCT VFR BLD AUTO: 37.1 % (ref 35–48)
HGB BLD-MCNC: 11.6 G/DL (ref 12–16)
HGB BLD-MCNC: 7.9 G/DL
HGB BLD-MCNC: 8 G/DL
HGB BLD-MCNC: 8.3 G/DL
HGB BLD-MCNC: 8.4 G/DL
HGB BLD-MCNC: 8.8 G/DL
HGB BLD-MCNC: 9.4 G/DL
HGB BLD-MCNC: 9.6 G/DL
HYALINE CASTS #/AREA URNS AUTO: 2 /LPF
IMM GRANULOCYTES # BLD AUTO: 0.01 X10(3) UL (ref 0–1)
IMM GRANULOCYTES # BLD AUTO: 0.02 X10(3) UL (ref 0–1)
IMM GRANULOCYTES # BLD AUTO: 0.03 X10(3) UL (ref 0–1)
IMM GRANULOCYTES NFR BLD: 0.2 %
IMM GRANULOCYTES NFR BLD: 0.3 %
IMM GRANULOCYTES NFR BLD: 0.4 %
IMM GRANULOCYTES NFR BLD: 0.4 %
IMM GRANULOCYTES NFR BLD: 0.5 %
IRON SATURATION: 19 %
IRON SERPL-MCNC: 44 UG/DL
KETONES UR-MCNC: NEGATIVE MG/DL
LIPASE SERPL-CCNC: 12 U/L (ref 73–393)
LYMPHOCYTES # BLD AUTO: 0.54 X10(3) UL (ref 1–4)
LYMPHOCYTES # BLD AUTO: 0.83 X10(3) UL (ref 1–4)
LYMPHOCYTES # BLD AUTO: 0.83 X10(3) UL (ref 1–4)
LYMPHOCYTES # BLD AUTO: 0.91 X10(3) UL (ref 1–4)
LYMPHOCYTES # BLD AUTO: 0.99 X10(3) UL (ref 1–4)
LYMPHOCYTES # BLD AUTO: 1.05 X10(3) UL (ref 1–4)
LYMPHOCYTES NFR BLD AUTO: 18 %
LYMPHOCYTES NFR BLD AUTO: 18.5 %
LYMPHOCYTES NFR BLD AUTO: 18.5 %
LYMPHOCYTES NFR BLD AUTO: 18.7 %
LYMPHOCYTES NFR BLD AUTO: 20.3 %
LYMPHOCYTES NFR BLD AUTO: 20.7 %
LYMPHOCYTES NFR BLD AUTO: 25.2 %
LYMPHOCYTES NFR BLD AUTO: 27.3 %
LYMPHOCYTES NFR BLD AUTO: 29.4 %
LYMPHOCYTES NFR BLD AUTO: 9.1 %
M PROTEIN MFR SERPL ELPH: 6.6 G/DL (ref 6.4–8.2)
MCH RBC QN AUTO: 25.8 PG (ref 26–34)
MCH RBC QN AUTO: 25.9 PG (ref 26–34)
MCH RBC QN AUTO: 26.1 PG (ref 26–34)
MCH RBC QN AUTO: 26.2 PG (ref 26–34)
MCH RBC QN AUTO: 26.3 PG (ref 26–34)
MCH RBC QN AUTO: 26.4 PG (ref 26–34)
MCH RBC QN AUTO: 26.4 PG (ref 26–34)
MCH RBC QN AUTO: 26.5 PG (ref 26–34)
MCH RBC QN AUTO: 27 PG (ref 26–34)
MCH RBC QN AUTO: 27.6 PG (ref 26–34)
MCHC RBC AUTO-ENTMCNC: 31.1 G/DL (ref 31–37)
MCHC RBC AUTO-ENTMCNC: 31.3 G/DL (ref 31–37)
MCHC RBC AUTO-ENTMCNC: 31.3 G/DL (ref 31–37)
MCHC RBC AUTO-ENTMCNC: 31.7 G/DL (ref 31–37)
MCHC RBC AUTO-ENTMCNC: 31.7 G/DL (ref 31–37)
MCHC RBC AUTO-ENTMCNC: 31.8 G/DL (ref 31–37)
MCHC RBC AUTO-ENTMCNC: 31.8 G/DL (ref 31–37)
MCHC RBC AUTO-ENTMCNC: 31.9 G/DL (ref 31–37)
MCHC RBC AUTO-ENTMCNC: 31.9 G/DL (ref 31–37)
MCHC RBC AUTO-ENTMCNC: 32.4 G/DL (ref 31–37)
MCV RBC AUTO: 81.6 FL
MCV RBC AUTO: 81.8 FL
MCV RBC AUTO: 82.2 FL
MCV RBC AUTO: 82.4 FL
MCV RBC AUTO: 82.9 FL
MCV RBC AUTO: 82.9 FL
MCV RBC AUTO: 83.3 FL
MCV RBC AUTO: 83.5 FL
MCV RBC AUTO: 85 FL
MCV RBC AUTO: 88.1 FL (ref 80–100)
MONOCYTES # BLD AUTO: 0.16 X10(3) UL (ref 0.1–1)
MONOCYTES # BLD AUTO: 0.45 X10(3) UL (ref 0.1–1)
MONOCYTES # BLD AUTO: 0.45 X10(3) UL (ref 0.1–1)
MONOCYTES # BLD AUTO: 0.53 X10(3) UL (ref 0.1–1)
MONOCYTES # BLD AUTO: 0.6 X10(3) UL (ref 0.1–1)
MONOCYTES # BLD AUTO: 0.61 X10(3) UL (ref 0.1–1)
MONOCYTES # BLD AUTO: 0.66 X10(3) UL (ref 0.1–1)
MONOCYTES # BLD AUTO: 0.81 X10(3) UL (ref 0.1–1)
MONOCYTES # BLD AUTO: 0.85 X10(3) UL (ref 0.1–1)
MONOCYTES # BLD AUTO: 0.88 X10(3) UL (ref 0.1–1)
MONOCYTES NFR BLD AUTO: 11.8 %
MONOCYTES NFR BLD AUTO: 12.6 %
MONOCYTES NFR BLD AUTO: 13.5 %
MONOCYTES NFR BLD AUTO: 13.6 %
MONOCYTES NFR BLD AUTO: 13.9 %
MONOCYTES NFR BLD AUTO: 14.7 %
MONOCYTES NFR BLD AUTO: 15 %
MONOCYTES NFR BLD AUTO: 15.1 %
MONOCYTES NFR BLD AUTO: 15.5 %
MONOCYTES NFR BLD AUTO: 2.7 %
NEUTROPHILS # BLD AUTO: 1.77 X10 (3) UL (ref 1.5–7.7)
NEUTROPHILS # BLD AUTO: 1.77 X10(3) UL (ref 1.5–7.7)
NEUTROPHILS # BLD AUTO: 1.85 X10 (3) UL (ref 1.5–7.7)
NEUTROPHILS # BLD AUTO: 1.85 X10(3) UL (ref 1.5–7.7)
NEUTROPHILS # BLD AUTO: 2.33 X10 (3) UL (ref 1.5–7.7)
NEUTROPHILS # BLD AUTO: 2.33 X10(3) UL (ref 1.5–7.7)
NEUTROPHILS # BLD AUTO: 2.36 X10 (3) UL (ref 1.5–7.7)
NEUTROPHILS # BLD AUTO: 2.36 X10(3) UL (ref 1.5–7.7)
NEUTROPHILS # BLD AUTO: 2.91 X10 (3) UL (ref 1.5–7.7)
NEUTROPHILS # BLD AUTO: 2.91 X10(3) UL (ref 1.5–7.7)
NEUTROPHILS # BLD AUTO: 3.02 X10 (3) UL (ref 1.5–7.7)
NEUTROPHILS # BLD AUTO: 3.02 X10(3) UL (ref 1.5–7.7)
NEUTROPHILS # BLD AUTO: 3.39 X10 (3) UL (ref 1.5–7.7)
NEUTROPHILS # BLD AUTO: 3.39 X10(3) UL (ref 1.5–7.7)
NEUTROPHILS # BLD AUTO: 3.45 X10 (3) UL (ref 1.5–7.7)
NEUTROPHILS # BLD AUTO: 3.45 X10(3) UL (ref 1.5–7.7)
NEUTROPHILS # BLD AUTO: 3.64 X10 (3) UL (ref 1.5–7.7)
NEUTROPHILS # BLD AUTO: 3.64 X10(3) UL (ref 1.5–7.7)
NEUTROPHILS # BLD AUTO: 5.22 X10 (3) UL (ref 1.5–7.7)
NEUTROPHILS # BLD AUTO: 5.22 X10(3) UL (ref 1.5–7.7)
NEUTROPHILS NFR BLD AUTO: 51.8 %
NEUTROPHILS NFR BLD AUTO: 53.2 %
NEUTROPHILS NFR BLD AUTO: 56.8 %
NEUTROPHILS NFR BLD AUTO: 58.1 %
NEUTROPHILS NFR BLD AUTO: 59.7 %
NEUTROPHILS NFR BLD AUTO: 60.3 %
NEUTROPHILS NFR BLD AUTO: 60.8 %
NEUTROPHILS NFR BLD AUTO: 62.3 %
NEUTROPHILS NFR BLD AUTO: 67.3 %
NEUTROPHILS NFR BLD AUTO: 87.7 %
NITRITE UR QL STRIP.AUTO: NEGATIVE
NT-PROBNP SERPL-MCNC: ABNORMAL PG/ML (ref ?–450)
OSMOLALITY SERPL CALC.SUM OF ELEC: 297 MOSM/KG (ref 275–295)
OSMOLALITY SERPL CALC.SUM OF ELEC: 298 MOSM/KG (ref 275–295)
OSMOLALITY SERPL CALC.SUM OF ELEC: 300 MOSM/KG (ref 275–295)
OSMOLALITY SERPL CALC.SUM OF ELEC: 300 MOSM/KG (ref 275–295)
OSMOLALITY SERPL CALC.SUM OF ELEC: 304 MOSM/KG (ref 275–295)
OSMOLALITY SERPL CALC.SUM OF ELEC: 305 MOSM/KG (ref 275–295)
OSMOLALITY SERPL CALC.SUM OF ELEC: 306 MOSM/KG (ref 275–295)
OSMOLALITY SERPL CALC.SUM OF ELEC: 307 MOSM/KG (ref 275–295)
OSMOLALITY SERPL CALC.SUM OF ELEC: 308 MOSM/KG (ref 275–295)
OSMOLALITY SERPL CALC.SUM OF ELEC: 309 MOSM/KG (ref 275–295)
PH UR: 5 [PH] (ref 5–8)
PHOSPHATE SERPL-MCNC: 1.9 MG/DL (ref 2.5–4.9)
PHOSPHATE SERPL-MCNC: 2.2 MG/DL (ref 2.5–4.9)
PHOSPHATE SERPL-MCNC: 2.2 MG/DL (ref 2.5–4.9)
PHOSPHATE SERPL-MCNC: 4.1 MG/DL (ref 2.5–4.9)
PHOSPHATE SERPL-MCNC: 4.2 MG/DL (ref 2.5–4.9)
PLATELET # BLD AUTO: 104 10(3)UL (ref 150–450)
PLATELET # BLD AUTO: 105 10(3)UL (ref 150–450)
PLATELET # BLD AUTO: 107 10(3)UL (ref 150–450)
PLATELET # BLD AUTO: 113 10(3)UL (ref 150–450)
PLATELET # BLD AUTO: 122 10(3)UL (ref 150–450)
PLATELET # BLD AUTO: 123 10(3)UL (ref 150–450)
PLATELET # BLD AUTO: 124 10(3)UL (ref 150–450)
PLATELET # BLD AUTO: 165 10(3)UL (ref 150–450)
PLATELET # BLD AUTO: 97 10(3)UL (ref 150–450)
PLATELET # BLD AUTO: 99 10(3)UL (ref 150–450)
POTASSIUM SERPL-SCNC: 3.4 MMOL/L (ref 3.5–5.1)
POTASSIUM SERPL-SCNC: 3.5 MMOL/L (ref 3.5–5.1)
POTASSIUM SERPL-SCNC: 3.5 MMOL/L (ref 3.5–5.1)
POTASSIUM SERPL-SCNC: 3.6 MMOL/L (ref 3.5–5.1)
POTASSIUM SERPL-SCNC: 3.7 MMOL/L (ref 3.5–5.1)
POTASSIUM SERPL-SCNC: 3.8 MMOL/L (ref 3.5–5.1)
POTASSIUM SERPL-SCNC: 3.8 MMOL/L (ref 3.5–5.1)
POTASSIUM SERPL-SCNC: 3.9 MMOL/L (ref 3.5–5.1)
POTASSIUM SERPL-SCNC: 4 MMOL/L (ref 3.5–5.1)
POTASSIUM SERPL-SCNC: 4.4 MMOL/L (ref 3.5–5.1)
PROT UR-MCNC: 100 MG/DL
PTH-INTACT SERPL-MCNC: 504.1 PG/ML (ref 18.5–88)
RBC # BLD AUTO: 3.05 X10(6)UL
RBC # BLD AUTO: 3.1 X10(6)UL
RBC # BLD AUTO: 3.13 X10(6)UL
RBC # BLD AUTO: 3.21 X10(6)UL
RBC # BLD AUTO: 3.26 X10(6)UL
RBC # BLD AUTO: 3.33 X10(6)UL
RBC # BLD AUTO: 3.35 X10(6)UL
RBC # BLD AUTO: 3.6 X10(6)UL
RBC # BLD AUTO: 3.63 X10(6)UL
RBC # BLD AUTO: 4.21 X10(6)UL (ref 3.8–5.3)
RBC #/AREA URNS AUTO: 146 /HPF
SARS-COV-2 RNA RESP QL NAA+PROBE: NOT DETECTED
SODIUM SERPL-SCNC: 134 MMOL/L (ref 136–145)
SODIUM SERPL-SCNC: 134 MMOL/L (ref 136–145)
SODIUM SERPL-SCNC: 135 MMOL/L (ref 136–145)
SODIUM SERPL-SCNC: 136 MMOL/L (ref 136–145)
SODIUM SERPL-SCNC: 137 MMOL/L (ref 136–145)
SODIUM SERPL-SCNC: 138 MMOL/L (ref 136–145)
SODIUM SERPL-SCNC: 140 MMOL/L (ref 136–145)
SP GR UR STRIP: 1.01 (ref 1–1.03)
TOTAL IRON BINDING CAPACITY: 232 UG/DL (ref 240–450)
TRANSFERRIN SERPL-MCNC: 156 MG/DL (ref 200–360)
UROBILINOGEN UR STRIP-ACNC: <2
WBC # BLD AUTO: 3.3 X10(3) UL (ref 4–11)
WBC # BLD AUTO: 3.6 X10(3) UL (ref 4–11)
WBC # BLD AUTO: 4 X10(3) UL (ref 4–11)
WBC # BLD AUTO: 4.2 X10(3) UL (ref 4–11)
WBC # BLD AUTO: 4.5 X10(3) UL (ref 4–11)
WBC # BLD AUTO: 4.9 X10(3) UL (ref 4–11)
WBC # BLD AUTO: 5.6 X10(3) UL (ref 4–11)
WBC # BLD AUTO: 5.7 X10(3) UL (ref 4–11)
WBC # BLD AUTO: 5.8 X10(3) UL (ref 4–11)
WBC # BLD AUTO: 6 X10(3) UL (ref 4–11)
WBC #/AREA URNS AUTO: 211 /HPF

## 2020-01-01 PROCEDURE — 99233 SBSQ HOSP IP/OBS HIGH 50: CPT | Performed by: INTERNAL MEDICINE

## 2020-01-01 PROCEDURE — 99232 SBSQ HOSP IP/OBS MODERATE 35: CPT | Performed by: INTERNAL MEDICINE

## 2020-01-01 PROCEDURE — 99233 SBSQ HOSP IP/OBS HIGH 50: CPT | Performed by: HOSPITALIST

## 2020-01-01 PROCEDURE — 1111F DSCHRG MED/CURRENT MED MERGE: CPT | Performed by: INTERNAL MEDICINE

## 2020-01-01 PROCEDURE — 99307 SBSQ NF CARE SF MDM 10: CPT | Performed by: INTERNAL MEDICINE

## 2020-01-01 PROCEDURE — 96361 HYDRATE IV INFUSION ADD-ON: CPT

## 2020-01-01 PROCEDURE — 99308 SBSQ NF CARE LOW MDM 20: CPT | Performed by: INTERNAL MEDICINE

## 2020-01-01 PROCEDURE — 99223 1ST HOSP IP/OBS HIGH 75: CPT | Performed by: HOSPITALIST

## 2020-01-01 PROCEDURE — 99223 1ST HOSP IP/OBS HIGH 75: CPT | Performed by: INTERNAL MEDICINE

## 2020-01-01 PROCEDURE — 80053 COMPREHEN METABOLIC PANEL: CPT

## 2020-01-01 PROCEDURE — 99233 SBSQ HOSP IP/OBS HIGH 50: CPT | Performed by: PHYSICIAN ASSISTANT

## 2020-01-01 PROCEDURE — 86850 RBC ANTIBODY SCREEN: CPT

## 2020-01-01 PROCEDURE — 43255 EGD CONTROL BLEEDING ANY: CPT | Performed by: INTERNAL MEDICINE

## 2020-01-01 PROCEDURE — 93306 TTE W/DOPPLER COMPLETE: CPT | Performed by: INTERNAL MEDICINE

## 2020-01-01 PROCEDURE — 96375 TX/PRO/DX INJ NEW DRUG ADDON: CPT

## 2020-01-01 PROCEDURE — 87081 CULTURE SCREEN ONLY: CPT

## 2020-01-01 PROCEDURE — 86901 BLOOD TYPING SEROLOGIC RH(D): CPT

## 2020-01-01 PROCEDURE — 82962 GLUCOSE BLOOD TEST: CPT

## 2020-01-01 PROCEDURE — 02PY33Z REMOVAL OF INFUSION DEVICE FROM GREAT VESSEL, PERCUTANEOUS APPROACH: ICD-10-PCS | Performed by: RADIOLOGY

## 2020-01-01 PROCEDURE — 99153 MOD SED SAME PHYS/QHP EA: CPT

## 2020-01-01 PROCEDURE — 80076 HEPATIC FUNCTION PANEL: CPT | Performed by: EMERGENCY MEDICINE

## 2020-01-01 PROCEDURE — 74018 RADEX ABDOMEN 1 VIEW: CPT | Performed by: EMERGENCY MEDICINE

## 2020-01-01 PROCEDURE — 30233N1 TRANSFUSION OF NONAUTOLOGOUS RED BLOOD CELLS INTO PERIPHERAL VEIN, PERCUTANEOUS APPROACH: ICD-10-PCS | Performed by: HOSPITALIST

## 2020-01-01 PROCEDURE — 99309 SBSQ NF CARE MODERATE MDM 30: CPT | Performed by: INTERNAL MEDICINE

## 2020-01-01 PROCEDURE — 99152 MOD SED SAME PHYS/QHP 5/>YRS: CPT

## 2020-01-01 PROCEDURE — 99232 SBSQ HOSP IP/OBS MODERATE 35: CPT | Performed by: HOSPITALIST

## 2020-01-01 PROCEDURE — B2111ZZ FLUOROSCOPY OF MULTIPLE CORONARY ARTERIES USING LOW OSMOLAR CONTRAST: ICD-10-PCS | Performed by: INTERNAL MEDICINE

## 2020-01-01 PROCEDURE — 80048 BASIC METABOLIC PNL TOTAL CA: CPT | Performed by: EMERGENCY MEDICINE

## 2020-01-01 PROCEDURE — 0W3P8ZZ CONTROL BLEEDING IN GASTROINTESTINAL TRACT, VIA NATURAL OR ARTIFICIAL OPENING ENDOSCOPIC: ICD-10-PCS | Performed by: INTERNAL MEDICINE

## 2020-01-01 PROCEDURE — 36415 COLL VENOUS BLD VENIPUNCTURE: CPT

## 2020-01-01 PROCEDURE — 93460 R&L HRT ART/VENTRICLE ANGIO: CPT

## 2020-01-01 PROCEDURE — 86900 BLOOD TYPING SEROLOGIC ABO: CPT

## 2020-01-01 PROCEDURE — S0028 INJECTION, FAMOTIDINE, 20 MG: HCPCS | Performed by: EMERGENCY MEDICINE

## 2020-01-01 PROCEDURE — 85652 RBC SED RATE AUTOMATED: CPT

## 2020-01-01 PROCEDURE — 74176 CT ABD & PELVIS W/O CONTRAST: CPT | Performed by: EMERGENCY MEDICINE

## 2020-01-01 PROCEDURE — 99285 EMERGENCY DEPT VISIT HI MDM: CPT

## 2020-01-01 PROCEDURE — 96374 THER/PROPH/DIAG INJ IV PUSH: CPT

## 2020-01-01 PROCEDURE — 99239 HOSP IP/OBS DSCHRG MGMT >30: CPT | Performed by: HOSPITALIST

## 2020-01-01 PROCEDURE — 71045 X-RAY EXAM CHEST 1 VIEW: CPT | Performed by: EMERGENCY MEDICINE

## 2020-01-01 PROCEDURE — 99215 OFFICE O/P EST HI 40 MIN: CPT | Performed by: FAMILY MEDICINE

## 2020-01-01 PROCEDURE — 85025 COMPLETE CBC W/AUTO DIFF WBC: CPT

## 2020-01-01 PROCEDURE — 36589 REMOVAL TUNNELED CV CATH: CPT

## 2020-01-01 PROCEDURE — 3E0G8GC INTRODUCTION OF OTHER THERAPEUTIC SUBSTANCE INTO UPPER GI, VIA NATURAL OR ARTIFICIAL OPENING ENDOSCOPIC: ICD-10-PCS | Performed by: INTERNAL MEDICINE

## 2020-01-01 PROCEDURE — 86140 C-REACTIVE PROTEIN: CPT

## 2020-01-01 PROCEDURE — 86039 ANTINUCLEAR ANTIBODIES (ANA): CPT

## 2020-01-01 PROCEDURE — 76775 US EXAM ABDO BACK WALL LIM: CPT | Performed by: INTERNAL MEDICINE

## 2020-01-01 PROCEDURE — 99305 1ST NF CARE MODERATE MDM 35: CPT | Performed by: INTERNAL MEDICINE

## 2020-01-01 PROCEDURE — 86038 ANTINUCLEAR ANTIBODIES: CPT

## 2020-01-01 PROCEDURE — 99214 OFFICE O/P EST MOD 30 MIN: CPT | Performed by: INTERNAL MEDICINE

## 2020-01-01 PROCEDURE — 71045 X-RAY EXAM CHEST 1 VIEW: CPT | Performed by: HOSPITALIST

## 2020-01-01 PROCEDURE — 4A023N8 MEASUREMENT OF CARDIAC SAMPLING AND PRESSURE, BILATERAL, PERCUTANEOUS APPROACH: ICD-10-PCS | Performed by: INTERNAL MEDICINE

## 2020-01-01 PROCEDURE — 99306 1ST NF CARE HIGH MDM 50: CPT | Performed by: INTERNAL MEDICINE

## 2020-01-01 PROCEDURE — 83690 ASSAY OF LIPASE: CPT | Performed by: EMERGENCY MEDICINE

## 2020-01-01 PROCEDURE — 83036 HEMOGLOBIN GLYCOSYLATED A1C: CPT

## 2020-01-01 PROCEDURE — 85025 COMPLETE CBC W/AUTO DIFF WBC: CPT | Performed by: EMERGENCY MEDICINE

## 2020-01-01 RX ORDER — METOCLOPRAMIDE HYDROCHLORIDE 5 MG/ML
10 INJECTION INTRAMUSCULAR; INTRAVENOUS ONCE
Status: COMPLETED | OUTPATIENT
Start: 2020-01-01 | End: 2020-01-01

## 2020-01-01 RX ORDER — SODIUM CHLORIDE 0.9 % (FLUSH) 0.9 %
3 SYRINGE (ML) INJECTION AS NEEDED
Status: DISCONTINUED | OUTPATIENT
Start: 2020-01-01 | End: 2020-01-01

## 2020-01-01 RX ORDER — MAGNESIUM OXIDE 400 MG (241.3 MG MAGNESIUM) TABLET
400 TABLET ONCE
Status: COMPLETED | OUTPATIENT
Start: 2020-01-01 | End: 2020-01-01

## 2020-01-01 RX ORDER — DIPHENHYDRAMINE HCL 25 MG
25 CAPSULE ORAL EVERY 6 HOURS PRN
Status: DISCONTINUED | OUTPATIENT
Start: 2020-01-01 | End: 2020-01-01

## 2020-01-01 RX ORDER — MORPHINE SULFATE 2 MG/ML
1 INJECTION, SOLUTION INTRAMUSCULAR; INTRAVENOUS EVERY 2 HOUR PRN
Status: DISCONTINUED | OUTPATIENT
Start: 2020-01-01 | End: 2020-01-01

## 2020-01-01 RX ORDER — METOLAZONE 2.5 MG/1
2.5 TABLET ORAL DAILY
Status: ON HOLD | COMMUNITY
End: 2020-01-01

## 2020-01-01 RX ORDER — BUMETANIDE 0.25 MG/ML
0.5 INJECTION, SOLUTION INTRAMUSCULAR; INTRAVENOUS 2 TIMES DAILY
Status: DISCONTINUED | OUTPATIENT
Start: 2020-01-01 | End: 2020-01-01

## 2020-01-01 RX ORDER — METOCLOPRAMIDE HYDROCHLORIDE 5 MG/ML
5 INJECTION INTRAMUSCULAR; INTRAVENOUS EVERY 8 HOURS PRN
Status: DISCONTINUED | OUTPATIENT
Start: 2020-01-01 | End: 2020-01-01

## 2020-01-01 RX ORDER — POTASSIUM CHLORIDE 20 MEQ/1
40 TABLET, EXTENDED RELEASE ORAL ONCE
Status: COMPLETED | OUTPATIENT
Start: 2020-01-01 | End: 2020-01-01

## 2020-01-01 RX ORDER — HYDRALAZINE HYDROCHLORIDE 25 MG/1
25 TABLET, FILM COATED ORAL EVERY 8 HOURS SCHEDULED
Status: DISCONTINUED | OUTPATIENT
Start: 2020-01-01 | End: 2020-01-01

## 2020-01-01 RX ORDER — HYDROMORPHONE HYDROCHLORIDE 2 MG/1
4 TABLET ORAL EVERY 4 HOURS PRN
Status: DISCONTINUED | OUTPATIENT
Start: 2020-01-01 | End: 2020-01-01

## 2020-01-01 RX ORDER — NALOXONE HYDROCHLORIDE 4 MG/.1ML
4 SPRAY, METERED NASAL AS NEEDED
Qty: 1 KIT | Refills: 0 | Status: SHIPPED | OUTPATIENT
Start: 2020-01-01

## 2020-01-01 RX ORDER — ONDANSETRON 4 MG/1
4 TABLET, ORALLY DISINTEGRATING ORAL EVERY 8 HOURS PRN
Status: DISCONTINUED | OUTPATIENT
Start: 2020-01-01 | End: 2020-01-01

## 2020-01-01 RX ORDER — CLOPIDOGREL BISULFATE 75 MG/1
TABLET ORAL
Status: DISCONTINUED
Start: 2020-01-01 | End: 2020-01-01 | Stop reason: WASHOUT

## 2020-01-01 RX ORDER — ISOSORBIDE DINITRATE 5 MG/1
5 TABLET ORAL
Status: DISCONTINUED | OUTPATIENT
Start: 2020-01-01 | End: 2020-01-01

## 2020-01-01 RX ORDER — POTASSIUM CHLORIDE 20 MEQ/1
20 TABLET, EXTENDED RELEASE ORAL ONCE
Status: COMPLETED | OUTPATIENT
Start: 2020-01-01 | End: 2020-01-01

## 2020-01-01 RX ORDER — SODIUM CHLORIDE 9 MG/ML
INJECTION, SOLUTION INTRAVENOUS CONTINUOUS
Status: DISCONTINUED | OUTPATIENT
Start: 2020-01-01 | End: 2020-01-01

## 2020-01-01 RX ORDER — ONDANSETRON 2 MG/ML
4 INJECTION INTRAMUSCULAR; INTRAVENOUS EVERY 6 HOURS PRN
Status: DISCONTINUED | OUTPATIENT
Start: 2020-01-01 | End: 2020-01-01

## 2020-01-01 RX ORDER — MORPHINE SULFATE 2 MG/ML
2 INJECTION, SOLUTION INTRAMUSCULAR; INTRAVENOUS EVERY 2 HOUR PRN
Status: DISCONTINUED | OUTPATIENT
Start: 2020-01-01 | End: 2020-01-01

## 2020-01-01 RX ORDER — BUMETANIDE 1 MG/1
1 TABLET ORAL
Status: DISCONTINUED | OUTPATIENT
Start: 2020-01-01 | End: 2020-01-01

## 2020-01-01 RX ORDER — POLYETHYLENE GLYCOL 3350 17 G/17G
17 POWDER, FOR SOLUTION ORAL DAILY PRN
Status: DISCONTINUED | OUTPATIENT
Start: 2020-01-01 | End: 2020-01-01

## 2020-01-01 RX ORDER — CLOPIDOGREL BISULFATE 75 MG/1
75 TABLET ORAL DAILY
Status: DISCONTINUED | OUTPATIENT
Start: 2020-01-01 | End: 2020-01-01

## 2020-01-01 RX ORDER — ATORVASTATIN CALCIUM 40 MG/1
40 TABLET, FILM COATED ORAL NIGHTLY
Status: DISCONTINUED | OUTPATIENT
Start: 2020-01-01 | End: 2020-01-01

## 2020-01-01 RX ORDER — SODIUM CHLORIDE 9 MG/ML
INJECTION, SOLUTION INTRAVENOUS ONCE
Status: COMPLETED | OUTPATIENT
Start: 2020-01-01 | End: 2020-01-01

## 2020-01-01 RX ORDER — HYDROMORPHONE HYDROCHLORIDE 2 MG/1
TABLET ORAL EVERY 4 HOURS PRN
Qty: 60 TABLET | Refills: 0 | Status: ON HOLD | OUTPATIENT
Start: 2020-01-01 | End: 2020-01-01

## 2020-01-01 RX ORDER — SUCRALFATE 1 G/1
1 TABLET ORAL
Status: DISCONTINUED | OUTPATIENT
Start: 2020-01-01 | End: 2020-01-01

## 2020-01-01 RX ORDER — HYDROMORPHONE HYDROCHLORIDE 2 MG/1
TABLET ORAL EVERY 4 HOURS PRN
Qty: 30 TABLET | Refills: 0 | Status: ON HOLD | OUTPATIENT
Start: 2020-01-01 | End: 2021-01-01

## 2020-01-01 RX ORDER — HEPARIN SODIUM 5000 [USP'U]/ML
5000 INJECTION, SOLUTION INTRAVENOUS; SUBCUTANEOUS EVERY 12 HOURS SCHEDULED
Status: DISCONTINUED | OUTPATIENT
Start: 2020-01-01 | End: 2020-01-01

## 2020-01-01 RX ORDER — VANCOMYCIN HYDROCHLORIDE 125 MG/1
125 CAPSULE ORAL DAILY
Qty: 7 CAPSULE | Refills: 0 | Status: SHIPPED | OUTPATIENT
Start: 2020-01-01 | End: 2021-01-01

## 2020-01-01 RX ORDER — ASPIRIN 81 MG/1
81 TABLET ORAL DAILY
Status: DISCONTINUED | OUTPATIENT
Start: 2020-01-01 | End: 2020-01-01

## 2020-01-01 RX ORDER — SODIUM CHLORIDE 9 MG/ML
INJECTION, SOLUTION INTRAVENOUS ONCE
Status: DISCONTINUED | OUTPATIENT
Start: 2020-01-01 | End: 2020-01-01

## 2020-01-01 RX ORDER — METOPROLOL SUCCINATE 25 MG/1
12.5 TABLET, EXTENDED RELEASE ORAL
Qty: 15 TABLET | Refills: 1 | Status: ON HOLD | OUTPATIENT
Start: 2020-01-01 | End: 2021-01-01

## 2020-01-01 RX ORDER — VANCOMYCIN HYDROCHLORIDE 125 MG/1
125 CAPSULE ORAL DAILY
Status: DISCONTINUED | OUTPATIENT
Start: 2020-01-01 | End: 2020-01-01

## 2020-01-01 RX ORDER — CIPROFLOXACIN 2 MG/ML
400 INJECTION, SOLUTION INTRAVENOUS EVERY 24 HOURS
Status: DISCONTINUED | OUTPATIENT
Start: 2020-01-01 | End: 2020-01-01

## 2020-01-01 RX ORDER — ASPIRIN 81 MG/1
TABLET, CHEWABLE ORAL
Status: DISCONTINUED
Start: 2020-01-01 | End: 2020-01-01 | Stop reason: WASHOUT

## 2020-01-01 RX ORDER — MELATONIN
325 3 TIMES DAILY
Status: DISCONTINUED | OUTPATIENT
Start: 2020-01-01 | End: 2020-01-01

## 2020-01-01 RX ORDER — DEXTROSE MONOHYDRATE 25 G/50ML
50 INJECTION, SOLUTION INTRAVENOUS
Status: DISCONTINUED | OUTPATIENT
Start: 2020-01-01 | End: 2020-01-01

## 2020-01-01 RX ORDER — CALCITRIOL 0.25 UG/1
0.25 CAPSULE, LIQUID FILLED ORAL EVERY OTHER DAY
Refills: 0 | Status: SHIPPED | COMMUNITY
Start: 2020-01-01

## 2020-01-01 RX ORDER — DOCUSATE SODIUM 100 MG/1
100 CAPSULE, LIQUID FILLED ORAL 2 TIMES DAILY PRN
Status: DISCONTINUED | OUTPATIENT
Start: 2020-01-01 | End: 2020-01-01

## 2020-01-01 RX ORDER — METOPROLOL SUCCINATE 25 MG/1
12.5 TABLET, EXTENDED RELEASE ORAL
Qty: 15 TABLET | Refills: 1 | Status: SHIPPED | OUTPATIENT
Start: 2020-01-01 | End: 2020-01-01

## 2020-01-01 RX ORDER — HYDRALAZINE HYDROCHLORIDE 50 MG/1
50 TABLET, FILM COATED ORAL EVERY 8 HOURS SCHEDULED
Refills: 0 | Status: ON HOLD | COMMUNITY
Start: 2020-01-01 | End: 2020-01-01

## 2020-01-01 RX ORDER — ISOSORBIDE DINITRATE 10 MG/1
10 TABLET ORAL
Refills: 0 | Status: ON HOLD | COMMUNITY
Start: 2020-01-01 | End: 2020-01-01

## 2020-01-01 RX ORDER — MORPHINE SULFATE 2 MG/ML
2 INJECTION, SOLUTION INTRAMUSCULAR; INTRAVENOUS ONCE
Status: COMPLETED | OUTPATIENT
Start: 2020-01-01 | End: 2020-01-01

## 2020-01-01 RX ORDER — ACETAMINOPHEN 500 MG
500 TABLET ORAL EVERY 6 HOURS PRN
Status: DISCONTINUED | OUTPATIENT
Start: 2020-01-01 | End: 2020-01-01

## 2020-01-01 RX ORDER — NITROGLYCERIN 0.4 MG/1
0.4 TABLET SUBLINGUAL EVERY 5 MIN PRN
Status: DISCONTINUED | OUTPATIENT
Start: 2020-01-01 | End: 2020-01-01

## 2020-01-01 RX ORDER — ONDANSETRON 2 MG/ML
INJECTION INTRAMUSCULAR; INTRAVENOUS
Status: COMPLETED
Start: 2020-01-01 | End: 2020-01-01

## 2020-01-01 RX ORDER — HYDROMORPHONE HYDROCHLORIDE 2 MG/1
2 TABLET ORAL EVERY 4 HOURS PRN
Status: DISCONTINUED | OUTPATIENT
Start: 2020-01-01 | End: 2020-01-01

## 2020-01-01 RX ORDER — METOLAZONE 2.5 MG/1
2.5 TABLET ORAL
Refills: 0 | Status: ON HOLD | COMMUNITY
Start: 2020-01-01 | End: 2020-01-01

## 2020-01-01 RX ORDER — BUMETANIDE 1 MG/1
1 TABLET ORAL EVERY OTHER DAY
Qty: 15 TABLET | Refills: 0 | Status: SHIPPED | OUTPATIENT
Start: 2020-01-01 | End: 2021-01-01 | Stop reason: DRUGHIGH

## 2020-01-01 RX ORDER — METOPROLOL SUCCINATE 25 MG/1
25 TABLET, EXTENDED RELEASE ORAL NIGHTLY
Status: DISCONTINUED | OUTPATIENT
Start: 2020-01-01 | End: 2020-01-01

## 2020-01-01 RX ORDER — ACETAMINOPHEN 325 MG/1
650 TABLET ORAL EVERY 6 HOURS PRN
Status: DISCONTINUED | OUTPATIENT
Start: 2020-01-01 | End: 2020-01-01

## 2020-01-01 RX ORDER — SODIUM CHLORIDE 9 MG/ML
INJECTION, SOLUTION INTRAVENOUS CONTINUOUS
Status: CANCELLED | OUTPATIENT
Start: 2020-01-01

## 2020-01-01 RX ORDER — SODIUM CHLORIDE, SODIUM LACTATE, POTASSIUM CHLORIDE, CALCIUM CHLORIDE 600; 310; 30; 20 MG/100ML; MG/100ML; MG/100ML; MG/100ML
INJECTION, SOLUTION INTRAVENOUS CONTINUOUS
Status: DISCONTINUED | OUTPATIENT
Start: 2020-01-01 | End: 2020-01-01

## 2020-01-01 RX ORDER — TRAZODONE HYDROCHLORIDE 50 MG/1
50 TABLET ORAL NIGHTLY PRN
Status: DISCONTINUED | OUTPATIENT
Start: 2020-01-01 | End: 2020-01-01

## 2020-01-01 RX ORDER — ONDANSETRON 2 MG/ML
4 INJECTION INTRAMUSCULAR; INTRAVENOUS ONCE
Status: COMPLETED | OUTPATIENT
Start: 2020-01-01 | End: 2020-01-01

## 2020-01-01 RX ORDER — POTASSIUM CHLORIDE 750 MG/1
10 TABLET, EXTENDED RELEASE ORAL ONCE
Status: COMPLETED | OUTPATIENT
Start: 2020-01-01 | End: 2020-01-01

## 2020-01-01 RX ORDER — CALCITRIOL 0.25 UG/1
0.25 CAPSULE, LIQUID FILLED ORAL EVERY OTHER DAY
Status: DISCONTINUED | OUTPATIENT
Start: 2020-01-01 | End: 2020-01-01

## 2020-01-01 RX ORDER — PSEUDOEPHEDRINE HCL 30 MG
100 TABLET ORAL 2 TIMES DAILY PRN
Refills: 0 | Status: ON HOLD | COMMUNITY
Start: 2020-01-01 | End: 2021-01-01

## 2020-01-01 RX ORDER — POTASSIUM CHLORIDE 1.5 G/1.77G
40 POWDER, FOR SOLUTION ORAL ONCE
Status: DISCONTINUED | OUTPATIENT
Start: 2020-01-01 | End: 2020-01-01

## 2020-01-01 RX ORDER — LOPERAMIDE HYDROCHLORIDE 2 MG/1
2 CAPSULE ORAL AS NEEDED
Status: DISCONTINUED | OUTPATIENT
Start: 2020-01-01 | End: 2020-01-01

## 2020-01-01 RX ORDER — FAMOTIDINE 20 MG/1
20 TABLET ORAL ONCE
Status: DISCONTINUED | OUTPATIENT
Start: 2020-01-01 | End: 2020-01-01 | Stop reason: ALTCHOICE

## 2020-01-01 RX ORDER — SODIUM CHLORIDE, SODIUM LACTATE, POTASSIUM CHLORIDE, CALCIUM CHLORIDE 600; 310; 30; 20 MG/100ML; MG/100ML; MG/100ML; MG/100ML
INJECTION, SOLUTION INTRAVENOUS CONTINUOUS PRN
Status: DISCONTINUED | OUTPATIENT
Start: 2020-01-01 | End: 2020-01-01 | Stop reason: SURG

## 2020-01-01 RX ORDER — LIDOCAINE HYDROCHLORIDE 20 MG/ML
INJECTION, SOLUTION EPIDURAL; INFILTRATION; INTRACAUDAL; PERINEURAL
Status: COMPLETED
Start: 2020-01-01 | End: 2020-01-01

## 2020-01-01 RX ORDER — ACETAMINOPHEN 500 MG
1000 TABLET ORAL ONCE
Status: COMPLETED | OUTPATIENT
Start: 2020-01-01 | End: 2020-01-01

## 2020-01-01 RX ORDER — VANCOMYCIN HYDROCHLORIDE 125 MG/1
125 CAPSULE ORAL DAILY
Qty: 7 CAPSULE | Refills: 0 | Status: SHIPPED | OUTPATIENT
Start: 2020-01-01 | End: 2020-01-01

## 2020-01-01 RX ORDER — LEVOFLOXACIN 500 MG/1
250 TABLET, FILM COATED ORAL DAILY
Status: DISCONTINUED | OUTPATIENT
Start: 2020-01-01 | End: 2020-01-01

## 2020-01-01 RX ORDER — METOCLOPRAMIDE 10 MG/1
10 TABLET ORAL ONCE
Status: DISCONTINUED | OUTPATIENT
Start: 2020-01-01 | End: 2020-01-01 | Stop reason: ALTCHOICE

## 2020-01-01 RX ORDER — MIDAZOLAM HYDROCHLORIDE 1 MG/ML
INJECTION INTRAMUSCULAR; INTRAVENOUS
Status: COMPLETED
Start: 2020-01-01 | End: 2020-01-01

## 2020-01-01 RX ORDER — ACETAMINOPHEN 500 MG
1000 TABLET ORAL EVERY 8 HOURS PRN
Status: DISCONTINUED | OUTPATIENT
Start: 2020-01-01 | End: 2020-01-01

## 2020-01-01 RX ORDER — SUCRALFATE 1 G/1
1 TABLET ORAL
COMMUNITY
End: 2021-01-01

## 2020-01-01 RX ORDER — PANTOPRAZOLE SODIUM 40 MG/1
40 TABLET, DELAYED RELEASE ORAL DAILY
Refills: 0 | Status: ON HOLD | COMMUNITY
Start: 2020-01-01 | End: 2021-01-01

## 2020-01-01 RX ORDER — HYDRALAZINE HYDROCHLORIDE 10 MG/1
10 TABLET, FILM COATED ORAL EVERY 8 HOURS SCHEDULED
Status: DISCONTINUED | OUTPATIENT
Start: 2020-01-01 | End: 2020-01-01

## 2020-01-01 RX ORDER — METOLAZONE 2.5 MG/1
2.5 TABLET ORAL
Status: DISCONTINUED | OUTPATIENT
Start: 2020-01-01 | End: 2020-01-01

## 2020-01-01 RX ORDER — BUMETANIDE 1 MG/1
1 TABLET ORAL 2 TIMES DAILY
Status: ON HOLD | COMMUNITY
End: 2020-01-01

## 2020-01-01 RX ORDER — CARVEDILOL 25 MG/1
25 TABLET ORAL 2 TIMES DAILY WITH MEALS
Status: DISCONTINUED | OUTPATIENT
Start: 2020-01-01 | End: 2020-01-01

## 2020-01-01 RX ORDER — ISOSORBIDE DINITRATE 10 MG/1
10 TABLET ORAL
Status: DISCONTINUED | OUTPATIENT
Start: 2020-01-01 | End: 2020-01-01

## 2020-01-01 RX ORDER — METOPROLOL SUCCINATE 25 MG/1
25 TABLET, EXTENDED RELEASE ORAL NIGHTLY
Refills: 0 | Status: ON HOLD | COMMUNITY
Start: 2020-01-01 | End: 2020-01-01

## 2020-01-01 RX ORDER — FAMOTIDINE 10 MG/ML
20 INJECTION, SOLUTION INTRAVENOUS ONCE
Status: COMPLETED | OUTPATIENT
Start: 2020-01-01 | End: 2020-01-01

## 2020-01-01 RX ORDER — ONDANSETRON 2 MG/ML
8 INJECTION INTRAMUSCULAR; INTRAVENOUS EVERY 4 HOURS PRN
Status: DISCONTINUED | OUTPATIENT
Start: 2020-01-01 | End: 2020-01-01

## 2020-01-01 RX ORDER — DICYCLOMINE HYDROCHLORIDE 10 MG/1
10 CAPSULE ORAL 3 TIMES DAILY PRN
Status: DISCONTINUED | OUTPATIENT
Start: 2020-01-01 | End: 2020-01-01

## 2020-01-01 RX ORDER — HYDRALAZINE HYDROCHLORIDE 50 MG/1
50 TABLET, FILM COATED ORAL EVERY 8 HOURS SCHEDULED
Status: DISCONTINUED | OUTPATIENT
Start: 2020-01-01 | End: 2020-01-01

## 2020-01-01 RX ORDER — FOLIC ACID 1 MG/1
1 TABLET ORAL DAILY
Status: DISCONTINUED | OUTPATIENT
Start: 2020-01-01 | End: 2020-01-01

## 2020-01-01 RX ORDER — MORPHINE SULFATE 4 MG/ML
4 INJECTION, SOLUTION INTRAMUSCULAR; INTRAVENOUS EVERY 2 HOUR PRN
Status: DISCONTINUED | OUTPATIENT
Start: 2020-01-01 | End: 2020-01-01

## 2020-01-01 RX ORDER — SODIUM CHLORIDE 9 MG/ML
INJECTION, SOLUTION INTRAVENOUS CONTINUOUS
Status: CANCELLED | OUTPATIENT
Start: 2020-01-01 | End: 2020-01-01

## 2020-01-01 RX ORDER — ONDANSETRON 2 MG/ML
4 INJECTION INTRAMUSCULAR; INTRAVENOUS EVERY 4 HOURS PRN
Status: CANCELLED | OUTPATIENT
Start: 2020-01-01

## 2020-01-01 RX ORDER — HYDROMORPHONE HYDROCHLORIDE 1 MG/ML
0.5 INJECTION, SOLUTION INTRAMUSCULAR; INTRAVENOUS; SUBCUTANEOUS ONCE
Status: COMPLETED | OUTPATIENT
Start: 2020-01-01 | End: 2020-01-01

## 2020-01-01 RX ORDER — LEVOFLOXACIN 5 MG/ML
250 INJECTION, SOLUTION INTRAVENOUS EVERY 24 HOURS
Status: DISCONTINUED | OUTPATIENT
Start: 2020-01-01 | End: 2020-01-01

## 2020-01-01 RX ORDER — EPHEDRINE SULFATE 50 MG/ML
INJECTION INTRAVENOUS AS NEEDED
Status: DISCONTINUED | OUTPATIENT
Start: 2020-01-01 | End: 2020-01-01 | Stop reason: SURG

## 2020-01-01 RX ORDER — VANCOMYCIN HYDROCHLORIDE 125 MG/1
125 CAPSULE ORAL DAILY
Qty: 7 CAPSULE | Refills: 0 | Status: ON HOLD | OUTPATIENT
Start: 2020-01-01 | End: 2020-01-01

## 2020-01-01 RX ADMIN — SODIUM CHLORIDE, SODIUM LACTATE, POTASSIUM CHLORIDE, CALCIUM CHLORIDE: 600; 310; 30; 20 INJECTION, SOLUTION INTRAVENOUS at 12:30:00

## 2020-01-01 RX ADMIN — EPHEDRINE SULFATE 5 MG: 50 INJECTION INTRAVENOUS at 12:35:00

## 2020-01-01 RX ADMIN — SODIUM CHLORIDE, SODIUM LACTATE, POTASSIUM CHLORIDE, CALCIUM CHLORIDE: 600; 310; 30; 20 INJECTION, SOLUTION INTRAVENOUS at 12:58:00

## 2020-01-03 ENCOUNTER — OFFICE VISIT (OUTPATIENT)
Dept: INTERNAL MEDICINE CLINIC | Facility: CLINIC | Age: 75
End: 2020-01-03
Payer: MEDICARE

## 2020-01-03 VITALS
HEART RATE: 104 BPM | BODY MASS INDEX: 31 KG/M2 | OXYGEN SATURATION: 98 % | TEMPERATURE: 98 F | DIASTOLIC BLOOD PRESSURE: 58 MMHG | SYSTOLIC BLOOD PRESSURE: 92 MMHG | HEIGHT: 62 IN | RESPIRATION RATE: 18 BRPM

## 2020-01-03 DIAGNOSIS — R76.8 ANA POSITIVE: ICD-10-CM

## 2020-01-03 DIAGNOSIS — I48.0 PAROXYSMAL ATRIAL FIBRILLATION (HCC): ICD-10-CM

## 2020-01-03 DIAGNOSIS — I10 ESSENTIAL HYPERTENSION: ICD-10-CM

## 2020-01-03 DIAGNOSIS — S86.811D RUPTURE OF RIGHT PATELLAR TENDON, SUBSEQUENT ENCOUNTER: ICD-10-CM

## 2020-01-03 DIAGNOSIS — D64.9 NORMOCYTIC ANEMIA: Primary | ICD-10-CM

## 2020-01-03 DIAGNOSIS — R10.32 LLQ PAIN: ICD-10-CM

## 2020-01-03 DIAGNOSIS — M17.11 ARTHRITIS OF RIGHT KNEE: ICD-10-CM

## 2020-01-03 DIAGNOSIS — I50.22 CHRONIC SYSTOLIC CONGESTIVE HEART FAILURE (HCC): ICD-10-CM

## 2020-01-03 DIAGNOSIS — E53.8 FOLIC ACID DEFICIENCY: ICD-10-CM

## 2020-01-03 PROCEDURE — 99214 OFFICE O/P EST MOD 30 MIN: CPT | Performed by: INTERNAL MEDICINE

## 2020-01-03 NOTE — PROGRESS NOTES
HPI:    Patient ID: Taina Joshi is a 75year old female. HPI    Patient is here for follow-up. /110   Here for f/u.     She has chronic normocytic  anemia, requiring multiple blood transfusion, nonischemic cardiomyopathy, type 2 diabetes mellmaribel - 7.70 x10 (3) uL     Neutrophils Absolute      1.50 - 7.70 x10(3) uL     Lymphocytes Absolute      1.00 - 4.00 x10(3) uL     Monocytes Absolute      0.10 - 1.00 x10(3) uL     Eosinophils Absolute      0.00 - 0.70 x10(3) uL     Basophils Absolute      0.00 - 27 mU/mL     FERRITIN      18.0 - 340.0 ng/mL 108.3    Vitamin B12      193 - 986 pg/mL     FOLATE (FOLIC ACID), SERUM      >=8.7 ng/mL     LDH      84 - 246 U/L     Haptoglobin      30.0 - 200.0 mg/dL     NICHOL SCREEN WITH REFLEX (S)      Negative       C TOTAL PROTEIN      6.4 - 8.2 g/dL 7.2   Albumin      3.4 - 5.0 g/dL 2.5 (L)   Globulin      2.8 - 4.4 g/dL 4.7 (H)   A/G Ratio      1.0 - 2.0 0.5 (L)   Patient Fasting?        Yes   Iron, Serum      50 - 170 ug/dL 24 (L)   Transferrin      200 - 360 mg/dL joint pain and gait problem. Neurological: Negative for dizziness and speech difficulty.             Current Outpatient Medications   Medication Sig Dispense Refill   • Ferrous Sulfate 325 (65 Fe) MG Oral Tab Take 1 tablet (325 mg total) by mouth daily wi NAUSEA AND VOMITING, RASH    Comment:Can tolerate low dose but not regular strength d/t             GI problemsStomach ulcers  Codeine                 NAUSEA AND VOMITING   PHYSICAL EXAM:   Physical Exam   Nursing note and vitals reviewed.    C EXTERNAL     5. Chronic systolic congestive heart failure (HCC)  Continue bumetanide, , carvedilol, enalapril , lowsalt diet. Track  daily weights. Call with weight gain of 3 lbs overnight or concerning symptoms.    32-64 oz fluid restriction   Less than 2

## 2020-02-14 ENCOUNTER — TELEPHONE (OUTPATIENT)
Dept: GASTROENTEROLOGY | Facility: CLINIC | Age: 75
End: 2020-02-14

## 2020-02-14 ENCOUNTER — OFFICE VISIT (OUTPATIENT)
Dept: GASTROENTEROLOGY | Facility: CLINIC | Age: 75
End: 2020-02-14
Payer: MEDICARE

## 2020-02-14 VITALS
SYSTOLIC BLOOD PRESSURE: 120 MMHG | DIASTOLIC BLOOD PRESSURE: 74 MMHG | BODY MASS INDEX: 31 KG/M2 | HEIGHT: 62 IN | HEART RATE: 62 BPM

## 2020-02-14 DIAGNOSIS — K21.00 GERD WITH ESOPHAGITIS: ICD-10-CM

## 2020-02-14 DIAGNOSIS — I50.22 CHRONIC SYSTOLIC CONGESTIVE HEART FAILURE (HCC): ICD-10-CM

## 2020-02-14 DIAGNOSIS — D50.9 IRON DEFICIENCY ANEMIA, UNSPECIFIED IRON DEFICIENCY ANEMIA TYPE: Primary | ICD-10-CM

## 2020-02-14 PROCEDURE — G0463 HOSPITAL OUTPT CLINIC VISIT: HCPCS | Performed by: INTERNAL MEDICINE

## 2020-02-14 PROCEDURE — 99204 OFFICE O/P NEW MOD 45 MIN: CPT | Performed by: INTERNAL MEDICINE

## 2020-02-14 RX ORDER — ATORVASTATIN CALCIUM 10 MG/1
10 TABLET, FILM COATED ORAL
Status: ON HOLD | COMMUNITY
Start: 2020-01-06 | End: 2020-06-23

## 2020-02-14 RX ORDER — ENALAPRIL MALEATE 2.5 MG/1
TABLET ORAL
COMMUNITY
Start: 2020-01-06 | End: 2020-05-20

## 2020-02-14 RX ORDER — AMIODARONE HYDROCHLORIDE 200 MG/1
TABLET ORAL
COMMUNITY
Start: 2020-01-09 | End: 2020-05-20

## 2020-02-14 RX ORDER — AMLODIPINE BESYLATE 5 MG/1
TABLET ORAL
Status: ON HOLD | COMMUNITY
Start: 2020-01-09 | End: 2020-06-23

## 2020-02-14 RX ORDER — POLYETHYLENE GLYCOL 3350, SODIUM CHLORIDE, SODIUM BICARBONATE, POTASSIUM CHLORIDE 420; 11.2; 5.72; 1.48 G/4L; G/4L; G/4L; G/4L
POWDER, FOR SOLUTION ORAL
Qty: 1 BOTTLE | Refills: 0 | Status: SHIPPED | OUTPATIENT
Start: 2020-02-14 | End: 2020-06-02

## 2020-02-14 NOTE — PATIENT INSTRUCTIONS
1. Schedule colonoscopy with MAC [Diagnosis: iron deficiency anemia] -- Sat March 14th AM.     2.  bowel prep from pharmacy (single dose Trilyte)    3.  Medication adjustment:     -HOLD iron for 7 days before colonoscopy     -Thursday night: take GOMEZ

## 2020-02-14 NOTE — TELEPHONE ENCOUNTER
Scheduled for:  Colonoscopy [address]  Provider Name: Dr. Mary Swanson  Date:  3/14/2020  Location:  [facility]  Sedation:  MAC  Time:  9:30 am, arrival 8:30 am  Prep: Single dose Trilyte  Meds/Allergies Reconciled?:  Physician reviewed  Diagnosis with codes:  Iron deficien

## 2020-02-16 ENCOUNTER — TELEPHONE (OUTPATIENT)
Dept: GASTROENTEROLOGY | Facility: CLINIC | Age: 75
End: 2020-02-16

## 2020-02-16 PROBLEM — K21.9 GASTROESOPHAGEAL REFLUX DISEASE: Status: RESOLVED | Noted: 2019-12-11 | Resolved: 2020-02-16

## 2020-02-16 NOTE — H&P
[address] [facility] [address] Gastroenterology                                                                                                               Reason for consult: L Diagnosis Date   • Back problem    • Calculus of kidney    • Cataract 2017    surgery   • Diabetes ([facility] [address]     14 yrs   • Esophageal reflux    • Essential hypertension    • History of blood transfusion 11/2018    no reaction   • History of GI bleed 08/2019 MOUTH EVERY 12 HOURS FOR 30 DAYS     • PEG 3350-KCl-Na Bicarb-NaCl (TRILYTE) 420 g Oral Recon Soln Take prep as directed by gastro office. May substitute with Trilyte/generic equivalent if needed.  1 Bottle 0   • Ferrous Sulfate 325 (65 Fe) MG Oral Tab Take Comment:Tolerates morphine, hydromorphone and fentanyl  Hydrocodone-Acetami*    HIVES  Metronidazole           HIVES, NAUSEA AND VOMITING  Penicillins             HIVES    Comment:Other reaction(s): Hives/Urticaria  Tramadol                NAUSEA AND VOMIT back problems, DM, renal disorder who presents for evaluation of LLQ pain and anemia. #Chronic Iron def anemia - I had a long discussion with pt and family. She presented to OSH in Aug 2019 with a Hgb of 4g/dl.  She had EGD at that time (reviewed records alternatives to colonoscopy with the patient/primary decision maker [who demonstrated understanding], including but not limited to the risks of bleeding, infection, pain, death, as well as the risks of anesthesia and perforation all leading to prolonged ho

## 2020-02-17 NOTE — TELEPHONE ENCOUNTER
Request for cardiac clearance from Dr. Thi Lubin via fax at [phone], confirmation received 2/17/2020 @ [phone]. DOS 3/14/2020.    -Awaiting cardiac clearance at this time.

## 2020-02-19 ENCOUNTER — OFFICE VISIT (OUTPATIENT)
Dept: RHEUMATOLOGY | Facility: CLINIC | Age: 75
End: 2020-02-19
Payer: MEDICARE

## 2020-02-19 ENCOUNTER — LAB ENCOUNTER (OUTPATIENT)
Dept: LAB | Facility: HOSPITAL | Age: 75
End: 2020-02-19
Attending: INTERNAL MEDICINE
Payer: MEDICARE

## 2020-02-19 VITALS — SYSTOLIC BLOOD PRESSURE: 129 MMHG | DIASTOLIC BLOOD PRESSURE: 79 MMHG | HEART RATE: 85 BPM

## 2020-02-19 DIAGNOSIS — D64.9 ANEMIA, UNSPECIFIED TYPE: ICD-10-CM

## 2020-02-19 DIAGNOSIS — D50.9 IRON DEFICIENCY ANEMIA, UNSPECIFIED IRON DEFICIENCY ANEMIA TYPE: ICD-10-CM

## 2020-02-19 DIAGNOSIS — R76.8 POSITIVE ANA (ANTINUCLEAR ANTIBODY): ICD-10-CM

## 2020-02-19 DIAGNOSIS — R76.8 POSITIVE ANA (ANTINUCLEAR ANTIBODY): Primary | ICD-10-CM

## 2020-02-19 LAB
BASOPHILS # BLD AUTO: 0 X10(3) UL (ref 0–0.2)
BASOPHILS NFR BLD AUTO: 0 %
C3 SERPL-MCNC: 90.4 MG/DL (ref 90–180)
C4 SERPL-MCNC: 30.5 MG/DL (ref 10–40)
CRP SERPL-MCNC: <0.29 MG/DL (ref ?–0.3)
DEPRECATED HBV CORE AB SER IA-ACNC: 83.4 NG/ML (ref 18–340)
DEPRECATED RDW RBC AUTO: 60 FL (ref 35.1–46.3)
EOSINOPHIL # BLD AUTO: 0.05 X10(3) UL (ref 0–0.7)
EOSINOPHIL NFR BLD AUTO: 0.9 %
ERYTHROCYTE [DISTWIDTH] IN BLOOD BY AUTOMATED COUNT: 21.2 % (ref 11–15)
ERYTHROCYTE [SEDIMENTATION RATE] IN BLOOD: 80 MM/HR (ref 0–30)
HCT VFR BLD AUTO: 28.4 % (ref 35–48)
HGB BLD-MCNC: 8.8 G/DL (ref 12–16)
IMM GRANULOCYTES # BLD AUTO: 0.02 X10(3) UL (ref 0–1)
IMM GRANULOCYTES NFR BLD: 0.4 %
INR BLD: 1.18 (ref 0.9–1.2)
IRON SATURATION: 18 % (ref 15–50)
IRON SERPL-MCNC: 42 UG/DL (ref 50–170)
LYMPHOCYTES # BLD AUTO: 0.65 X10(3) UL (ref 1–4)
LYMPHOCYTES NFR BLD AUTO: 11.9 %
MCH RBC QN AUTO: 24.9 PG (ref 26–34)
MCHC RBC AUTO-ENTMCNC: 31 G/DL (ref 31–37)
MCV RBC AUTO: 80.2 FL (ref 80–100)
MONOCYTES # BLD AUTO: 0.41 X10(3) UL (ref 0.1–1)
MONOCYTES NFR BLD AUTO: 7.5 %
NEUTROPHILS # BLD AUTO: 4.35 X10 (3) UL (ref 1.5–7.7)
NEUTROPHILS # BLD AUTO: 4.35 X10(3) UL (ref 1.5–7.7)
NEUTROPHILS NFR BLD AUTO: 79.3 %
PLATELET # BLD AUTO: 185 10(3)UL (ref 150–450)
PROTHROMBIN TIME: 14.9 SECONDS (ref 11.8–14.5)
RBC # BLD AUTO: 3.54 X10(6)UL (ref 3.8–5.3)
TOTAL IRON BINDING CAPACITY: 240 UG/DL (ref 240–450)
TRANSFERRIN SERPL-MCNC: 161 MG/DL (ref 200–360)
WBC # BLD AUTO: 5.5 X10(3) UL (ref 4–11)

## 2020-02-19 PROCEDURE — G0463 HOSPITAL OUTPT CLINIC VISIT: HCPCS | Performed by: INTERNAL MEDICINE

## 2020-02-19 PROCEDURE — 86147 CARDIOLIPIN ANTIBODY EA IG: CPT

## 2020-02-19 PROCEDURE — 86160 COMPLEMENT ANTIGEN: CPT | Performed by: INTERNAL MEDICINE

## 2020-02-19 PROCEDURE — 85652 RBC SED RATE AUTOMATED: CPT | Performed by: INTERNAL MEDICINE

## 2020-02-19 PROCEDURE — 85730 THROMBOPLASTIN TIME PARTIAL: CPT

## 2020-02-19 PROCEDURE — 85390 FIBRINOLYSINS SCREEN I&R: CPT

## 2020-02-19 PROCEDURE — 86146 BETA-2 GLYCOPROTEIN ANTIBODY: CPT | Performed by: INTERNAL MEDICINE

## 2020-02-19 PROCEDURE — 99204 OFFICE O/P NEW MOD 45 MIN: CPT | Performed by: INTERNAL MEDICINE

## 2020-02-19 PROCEDURE — 86140 C-REACTIVE PROTEIN: CPT | Performed by: INTERNAL MEDICINE

## 2020-02-19 PROCEDURE — 86235 NUCLEAR ANTIGEN ANTIBODY: CPT

## 2020-02-19 PROCEDURE — 85610 PROTHROMBIN TIME: CPT

## 2020-02-19 PROCEDURE — 85598 HEXAGNAL PHOSPH PLTLT NEUTRL: CPT

## 2020-02-19 PROCEDURE — 84165 PROTEIN E-PHORESIS SERUM: CPT

## 2020-02-19 PROCEDURE — 82728 ASSAY OF FERRITIN: CPT

## 2020-02-19 PROCEDURE — 86225 DNA ANTIBODY NATIVE: CPT

## 2020-02-19 PROCEDURE — 36415 COLL VENOUS BLD VENIPUNCTURE: CPT

## 2020-02-19 PROCEDURE — 85025 COMPLETE CBC W/AUTO DIFF WBC: CPT

## 2020-02-19 PROCEDURE — 83540 ASSAY OF IRON: CPT

## 2020-02-19 PROCEDURE — 84466 ASSAY OF TRANSFERRIN: CPT

## 2020-02-19 PROCEDURE — 85613 RUSSELL VIPER VENOM DILUTED: CPT

## 2020-02-19 PROCEDURE — 83516 IMMUNOASSAY NONANTIBODY: CPT | Performed by: INTERNAL MEDICINE

## 2020-02-19 NOTE — PATIENT INSTRUCTIONS
You are seen today for a + NICHOL and also anemia  Some of your blood work is consistent with an autoimmune disease but your symptoms do not really correlate  Need to obtain more blood work  Need to obtain information from your kidney specialist and also

## 2020-02-19 NOTE — PROGRESS NOTES
Mal Holloway is a 75year old female who presents for Patient presents with:  Consult  Sjogren's Syndrome  Knee Pain: intermittent  .    HPI:   CC: evaluate for Sjogren  Consult: referred by PCP Dr. Chadd Monet     This is a 74 yo F with HTN, HLD, DM-2, CHF, GERD Medications   Medication Sig Dispense Refill   • Enalapril Maleate 2.5 MG Oral Tab TAKE 1 TABLET BY MOUTH EVERY 12 HOURS FOR 30 DAYS     • atorvastatin 10 MG Oral Tab Take 10 mg by mouth once daily.      • amLODIPine Besylate 5 MG Oral Tab TAKE 1 TABLET BY mouth daily.         Past Medical History:   Diagnosis Date   • Back problem    • Calculus of kidney    • Cataract 2017    surgery   • [facility]     [facility] y[facility]   • Esophageal reflux    • Essential hypertension    • History of blood transfusion 11/2018    n alopecia, no photosensitivity, no psoriasis  HEENT: No dry eyes, no dry mouth, no Raynaud's, no nasal ulcers, no parotid swelling, no neck pain, no jaw pain, no temple pain  Eyes: No visual changes,   CVS: No chest pain, no heart disease  RS: No SOB, no Co Moderate Positive (A)   Anti-Sjogren's B      Negative Negative   Anti-Smith Antibody      Negative Negative   Anti-Magana/RNP Antibody      Negative Negative     Component      Latest Ref Rng & Units 12/4/2019   SED RATE      0 - 30 mm/Hr 104 (H)   C-REACT 0.1 - 2.0 mg/dL 0.4   TOTAL PROTEIN      6.4 - 8.2 g/dL 7.2   Albumin      3.4 - 5.0 g/dL 2.5 (L)   Globulin      2.8 - 4.4 g/dL 4.7 (H)   A/G Ratio      1.0 - 2.0 0.5 (L)   Patient Fasting?        Yes     Component      Latest Ref Rng & Units 12/20/2019 1 disease.     +NICHOL  Anemia  Lymphopenia  CKD Stage 3  R knee pain hx of TKR now with ligament tears   Comorbidity of HTN, HLD, CHF, DM-2    - Plan to obtain more blood work consisting of C3 and C4, APL panel, urinalysis, urine protein creatinine ratio and re

## 2020-02-20 ENCOUNTER — APPOINTMENT (OUTPATIENT)
Dept: LAB | Facility: HOSPITAL | Age: 75
End: 2020-02-20
Attending: INTERNAL MEDICINE
Payer: MEDICARE

## 2020-02-20 DIAGNOSIS — R76.8 POSITIVE ANA (ANTINUCLEAR ANTIBODY): ICD-10-CM

## 2020-02-20 DIAGNOSIS — D64.9 ANEMIA, UNSPECIFIED TYPE: ICD-10-CM

## 2020-02-20 LAB
ALBUMIN SERPL ELPH-MCNC: 2.82 G/DL (ref 3.75–5.21)
ALBUMIN/GLOB SERPL: 0.73 {RATIO} (ref 1–2)
ALPHA1 GLOB SERPL ELPH-MCNC: 0.34 G/DL (ref 0.19–0.46)
ALPHA2 GLOB SERPL ELPH-MCNC: 0.67 G/DL (ref 0.48–1.05)
B-GLOBULIN SERPL ELPH-MCNC: 0.98 G/DL (ref 0.68–1.23)
BACTERIA UR QL AUTO: NEGATIVE /HPF
BILIRUB UR QL: NEGATIVE
CARDIOLIPIN IGG SERPL-ACNC: 7.4 GPL (ref 0–14.9)
CARDIOLIPIN IGM SERPL-ACNC: 7.6 MPL (ref 0–12.4)
COLOR UR: YELLOW
CREAT UR-SCNC: 140 MG/DL
GAMMA GLOB SERPL ELPH-MCNC: 1.89 G/DL (ref 0.62–1.7)
GLUCOSE UR-MCNC: NEGATIVE MG/DL
HYALINE CASTS #/AREA URNS AUTO: 15 /LPF
KETONES UR-MCNC: NEGATIVE MG/DL
M PROTEIN MFR SERPL ELPH: 6.7 G/DL (ref 6.4–8.2)
NITRITE UR QL STRIP.AUTO: NEGATIVE
PH UR: 5 [PH] (ref 5–8)
PROT UR-MCNC: 41.2 MG/DL
PROT UR-MCNC: NEGATIVE MG/DL
PROT/CREAT UR-RTO: 0.29
RBC #/AREA URNS AUTO: 3 /HPF
SP GR UR STRIP: 1.01 (ref 1–1.03)
UROBILINOGEN UR STRIP-ACNC: <2
WBC #/AREA URNS AUTO: 27 /HPF

## 2020-02-20 PROCEDURE — 81001 URINALYSIS AUTO W/SCOPE: CPT | Performed by: INTERNAL MEDICINE

## 2020-02-20 PROCEDURE — 82570 ASSAY OF URINE CREATININE: CPT

## 2020-02-20 PROCEDURE — 84156 ASSAY OF PROTEIN URINE: CPT

## 2020-02-21 ENCOUNTER — TELEPHONE (OUTPATIENT)
Dept: GASTROENTEROLOGY | Facility: CLINIC | Age: 75
End: 2020-02-21

## 2020-02-21 LAB
APTT PPP: 28.1 SECONDS (ref 23.2–35.3)
BETA 2 GLYCOPROTEIN 1 AB, IGG: 22.9 U/ML (ref ?–15)
BETA 2 GLYCOPROTEIN 1 AB, IGM: <3.7 U/ML (ref ?–15)
CONFIRM APTT STACLOT: NEGATIVE
CONFIRM DRVVT: 1.1 S (ref 0–1.1)
DSDNA AB TITR SER: 40 {TITER}
PROTHROMBIN TIME: 15.3 SECONDS (ref 11.8–14.5)

## 2020-02-22 LAB
CENTROMERE AB, IGG: 0 AU/ML
SCLERODERMA (SCL-70) (ENA) AB, IGG: 1 AU/ML

## 2020-02-24 ENCOUNTER — TELEPHONE (OUTPATIENT)
Dept: FAMILY MEDICINE CLINIC | Facility: CLINIC | Age: 75
End: 2020-02-24

## 2020-02-25 ENCOUNTER — TELEPHONE (OUTPATIENT)
Dept: RHEUMATOLOGY | Facility: CLINIC | Age: 75
End: 2020-02-25

## 2020-02-25 LAB
ENA SM IGG SER QL: NEGATIVE
ENA SM+RNP AB SER QL: NEGATIVE
ENA SS-B AB SER QL IA: NEGATIVE

## 2020-02-25 NOTE — TELEPHONE ENCOUNTER
Reviewed cardiology note. She follows with cardiology due to cardiomyopathy, EF is 30 to 35%, nonischemic. She was hospitalized early 2019 for GI bleed, hemoglobin at that time was 4 and she was on warfarin.   EGD at that time showed no other findings per

## 2020-03-02 NOTE — TELEPHONE ENCOUNTER
SECOND Request for cardiac clearance from Dr. Pancho Kelley via fax at [phone], confirmation received 3/2/2020 @ [phone]. DOS 3/14/2020.     -Awaiting cardiac clearance at this time.

## 2020-03-05 NOTE — TELEPHONE ENCOUNTER
Left detailed VM at Dr. Yvonne Wallace office at [phone] requesting cardiac clearance for CLN on 3/14/2020 and/or request for alternate fax number.    -Awaiting c/b at this time.  DOS 3/14/2020

## 2020-03-05 NOTE — TELEPHONE ENCOUNTER
Spoke to María at Dr. Beatris Pham office ([phone]) and states their correct/alternate fax for the [address] [place] [address] location where the patient is seen is [phone]. Request for clearance has been faxed and confirmation received.     -Awaiting cardiac clearance at

## 2020-03-06 ENCOUNTER — TELEPHONE (OUTPATIENT)
Dept: GASTROENTEROLOGY | Facility: CLINIC | Age: 75
End: 2020-03-06

## 2020-03-06 NOTE — TELEPHONE ENCOUNTER
Spoke to [address] [place] [address] ([phone]) from Cardiology and reviewed need for clearance for CLN on 3/14/2020. She states she will discuss w/ Dr. Shawanda Parson on Monday (her main cardiologist) and get back to us at direct RN line.     -Awaiting c/b at this time.

## 2020-03-09 NOTE — TELEPHONE ENCOUNTER
See TE from 2/16/2020 as I have already spoke to [place] regarding this patient and this matter. Duplicate TE closed.

## 2020-03-10 ENCOUNTER — TELEPHONE (OUTPATIENT)
Dept: GASTROENTEROLOGY | Facility: CLINIC | Age: 75
End: 2020-03-10

## 2020-03-10 DIAGNOSIS — D50.9 IRON DEFICIENCY ANEMIA, UNSPECIFIED IRON DEFICIENCY ANEMIA TYPE: Primary | ICD-10-CM

## 2020-03-10 NOTE — TELEPHONE ENCOUNTER
Dr. Bruno John    Cardiac clearance note received from Dr. Artur Lauren in cardiology. Sent to scanning. Patient may proceed as scheduled on 3/14/2020.

## 2020-03-10 NOTE — TELEPHONE ENCOUNTER
Patient cancelling colonoscopy scheduled 3/14/2020 due to her being in the hospital at [facility] room 559. Patient will call when ready to reschedule, thanks.

## 2020-03-10 NOTE — TELEPHONE ENCOUNTER
Spoke to JASPAL at Dr. Keaton Reed office ([phone]) and she confirmed that the patient has cardiac clearance to proceed as scheduled. She states she faxed the clearance this morning.  I reviewed we have not received it and provided her alternate fax cong

## 2020-03-11 NOTE — TELEPHONE ENCOUNTER
Cancelled for:  Colonoscopy 80574  Provider Name: Dr. Manju Fuentes  Date:  3/14/20  Location:  [facility]  Sedation:  MAC  Time:   0930   Prep:  Nas [place]  Meds/Allergies Reconciled?:  Physician reviewed   Diagnosis with codes:  Iron deficiency anemia D50.9  Was patient i

## 2020-03-31 ENCOUNTER — APPOINTMENT (OUTPATIENT)
Dept: HEMATOLOGY/ONCOLOGY | Facility: HOSPITAL | Age: 75
End: 2020-03-31
Attending: INTERNAL MEDICINE
Payer: MEDICARE

## 2020-05-14 ENCOUNTER — TELEPHONE (OUTPATIENT)
Dept: GASTROENTEROLOGY | Facility: CLINIC | Age: 75
End: 2020-05-14

## 2020-05-14 NOTE — TELEPHONE ENCOUNTER
Dr Obi Rios, I received a call from [address] [place] [address] at Dr Vincenzo Mares office (ortho surgeon). States patient having revision of her right knee replacement June 1, and is getting surgery clearance letter from all of her doctors.  Please note 3/10 encounter, patient had cance

## 2020-05-14 NOTE — TELEPHONE ENCOUNTER
Pt contacted and states her [facility] hospitalization back in 3/2020 was for \"a heart issue, I was vomiting and then bleeding. \" I reviewed that was not a heart issue, but a GI issue.  She states she doesn't understand why she \"even needed to have a c

## 2020-05-15 NOTE — TELEPHONE ENCOUNTER
Pt contacted and reviewed Dr. Vahe Sebastian message below. She confirmed she is NOT having any active bleeding at this time. Reviewed Dr. Vahe Sebastian did generated a GI medical clearance letter to send to her orthopedic surgeon, Dr. Go Alvarez.  Reviewed she will have to schedul

## 2020-05-15 NOTE — TELEPHONE ENCOUNTER
Left a message for [place] saying I can write a letter saying okay to move forward with knee surgery from GI standpoint, knowing that there is an underlying anemia and that we still need a c-scope.  As long as there are no active GI bleeding issues, okay to p

## 2020-05-15 NOTE — TELEPHONE ENCOUNTER
Radha Lerma MD  [facility] Clinical Staff             Please fax to Dr. Dixon Bottom, info on telephone encounter

## 2020-05-19 ENCOUNTER — TELEPHONE (OUTPATIENT)
Dept: FAMILY MEDICINE CLINIC | Facility: CLINIC | Age: 75
End: 2020-05-19

## 2020-05-19 DIAGNOSIS — A49.01 MSSA (METHICILLIN SUSCEPTIBLE STAPHYLOCOCCUS AUREUS): ICD-10-CM

## 2020-05-19 DIAGNOSIS — B95.61 MSSA BACTEREMIA: Primary | ICD-10-CM

## 2020-05-19 DIAGNOSIS — R78.81 MSSA BACTEREMIA: Primary | ICD-10-CM

## 2020-05-19 NOTE — TELEPHONE ENCOUNTER
Surgery on 06/01/20, Right knee revision with Dr. Carmita Modi @ [address] [place] [address]    H&P- complete  Labs- MSSA+, FBS (112), HGA1C (6.6), Sed rate (109), CRP (2.16), RBC (3.03, HBG (7.5), HCT (24.7), MCH (24.8), MCHC (30.4), RDW-SD (48.0), RDW (16.3), BUN (30), creatinine (1.

## 2020-05-20 ENCOUNTER — OFFICE VISIT (OUTPATIENT)
Dept: FAMILY MEDICINE CLINIC | Facility: CLINIC | Age: 75
End: 2020-05-20
Payer: MEDICARE

## 2020-05-20 ENCOUNTER — HOSPITAL ENCOUNTER (OUTPATIENT)
Dept: GENERAL RADIOLOGY | Facility: HOSPITAL | Age: 75
Discharge: HOME | End: 2020-05-20
Attending: FAMILY MEDICINE
Payer: MEDICARE

## 2020-05-20 ENCOUNTER — LAB ENCOUNTER (OUTPATIENT)
Dept: LAB | Facility: HOSPITAL | Age: 75
End: 2020-05-20
Attending: FAMILY MEDICINE
Payer: MEDICARE

## 2020-05-20 VITALS
RESPIRATION RATE: 16 BRPM | HEIGHT: 62 IN | WEIGHT: 176 LBS | HEART RATE: 58 BPM | OXYGEN SATURATION: 98 % | DIASTOLIC BLOOD PRESSURE: 70 MMHG | SYSTOLIC BLOOD PRESSURE: 120 MMHG | BODY MASS INDEX: 32.39 KG/M2 | TEMPERATURE: 98 F

## 2020-05-20 DIAGNOSIS — N18.30 TYPE 1 DIABETES MELLITUS WITH STAGE 3 CHRONIC KIDNEY DISEASE (HCC): ICD-10-CM

## 2020-05-20 DIAGNOSIS — I42.8 OTHER CARDIOMYOPATHY (HCC): ICD-10-CM

## 2020-05-20 DIAGNOSIS — Z01.818 OTHER SPECIFIED PRE-OPERATIVE EXAMINATION: ICD-10-CM

## 2020-05-20 DIAGNOSIS — Z01.812 PRE-OPERATIVE LABORATORY EXAMINATION: ICD-10-CM

## 2020-05-20 DIAGNOSIS — I10 ESSENTIAL HYPERTENSION: ICD-10-CM

## 2020-05-20 DIAGNOSIS — R73.01 ELEVATED FASTING BLOOD SUGAR: ICD-10-CM

## 2020-05-20 DIAGNOSIS — D50.0 IRON DEFICIENCY ANEMIA DUE TO CHRONIC BLOOD LOSS: ICD-10-CM

## 2020-05-20 DIAGNOSIS — N28.9 RENAL INSUFFICIENCY: ICD-10-CM

## 2020-05-20 DIAGNOSIS — E10.22 TYPE 1 DIABETES MELLITUS WITH STAGE 3 CHRONIC KIDNEY DISEASE (HCC): ICD-10-CM

## 2020-05-20 DIAGNOSIS — I48.0 PAF (PAROXYSMAL ATRIAL FIBRILLATION) (HCC): ICD-10-CM

## 2020-05-20 DIAGNOSIS — Z01.818 OTHER SPECIFIED PRE-OPERATIVE EXAMINATION: Primary | ICD-10-CM

## 2020-05-20 DIAGNOSIS — T84.012D FAILED TOTAL KNEE, RIGHT, SUBSEQUENT ENCOUNTER: ICD-10-CM

## 2020-05-20 DIAGNOSIS — I50.22 CHRONIC SYSTOLIC CONGESTIVE HEART FAILURE (HCC): ICD-10-CM

## 2020-05-20 DIAGNOSIS — E78.2 MIXED HYPERLIPIDEMIA: ICD-10-CM

## 2020-05-20 PROBLEM — I48.91 ATRIAL FIBRILLATION (HCC): Status: RESOLVED | Noted: 2019-03-07 | Resolved: 2020-05-20

## 2020-05-20 PROBLEM — M17.11 ARTHRITIS OF RIGHT KNEE: Status: RESOLVED | Noted: 2018-10-17 | Resolved: 2020-05-20

## 2020-05-20 PROCEDURE — 86850 RBC ANTIBODY SCREEN: CPT

## 2020-05-20 PROCEDURE — 85652 RBC SED RATE AUTOMATED: CPT

## 2020-05-20 PROCEDURE — 80053 COMPREHEN METABOLIC PANEL: CPT

## 2020-05-20 PROCEDURE — 81015 MICROSCOPIC EXAM OF URINE: CPT

## 2020-05-20 PROCEDURE — 83036 HEMOGLOBIN GLYCOSYLATED A1C: CPT

## 2020-05-20 PROCEDURE — 99215 OFFICE O/P EST HI 40 MIN: CPT | Performed by: FAMILY MEDICINE

## 2020-05-20 PROCEDURE — 71046 X-RAY EXAM CHEST 2 VIEWS: CPT | Performed by: FAMILY MEDICINE

## 2020-05-20 PROCEDURE — 93010 ELECTROCARDIOGRAM REPORT: CPT | Performed by: FAMILY MEDICINE

## 2020-05-20 PROCEDURE — 93005 ELECTROCARDIOGRAM TRACING: CPT

## 2020-05-20 PROCEDURE — 86140 C-REACTIVE PROTEIN: CPT

## 2020-05-20 PROCEDURE — 36415 COLL VENOUS BLD VENIPUNCTURE: CPT

## 2020-05-20 PROCEDURE — 87081 CULTURE SCREEN ONLY: CPT

## 2020-05-20 PROCEDURE — 86901 BLOOD TYPING SEROLOGIC RH(D): CPT

## 2020-05-20 PROCEDURE — 86900 BLOOD TYPING SEROLOGIC ABO: CPT

## 2020-05-20 PROCEDURE — 85025 COMPLETE CBC W/AUTO DIFF WBC: CPT

## 2020-05-20 RX ORDER — OMEGA-3S/DHA/EPA/FISH OIL/D3 300MG-1000
400 CAPSULE ORAL DAILY
Status: ON HOLD | COMMUNITY
End: 2021-01-01

## 2020-05-20 NOTE — H&P
[facility] PRE-OP CLINIC [place]    PRE-OP NOTE    HPI:   I have been consulted by Dr. Mattie Ricketts to see Maria Isabel Gardner 75year old female for a preoperative evaluation and medical clearance.  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   INTEGUMENTARY:  Denies rashes, itching, skin lesion,   CARDIOVASCULAR: history of chf  RESPIRATORY:  Denies JOAN, Denies shortness of breath, wheezing, cough  GASTROINTESTINAL:  Denies abdominal pain, NEURO:  No focal deficit, speech fluent, normal gait, strength and tone, sensory intact      Lab Results   Component Value Date    WBC 7.1 05/20/2020    HGB 7.5 (L) 05/20/2020    HCT 24.7 (L) 05/20/2020    .0 05/20/2020    CREATSERUM 1.36 (H) 05/2 encounter     S/P revision of total knee     Ulcer of heel (HCC)     Failed total knee, right, subsequent encounter     Allergic rhinitis     Anemia     Artificial knee joint present     Breast pain     Carpal tunnel syndrome     Cataract     Congestive he protection: (minimize narcotics, benzodiazepines, scopolamine)     Pain management and Physical therapy as per Orthopedic service.    Home Health as needed    Thank you for the opportunity to care for your patient and to assist in managing the postoperative

## 2020-05-21 NOTE — TELEPHONE ENCOUNTER
Patient having cardiac angiogram on 05/22/20 at [facility] by Dr Sandro Neville #[phone] ,fax #[phone]    Faxed recent lab values to  [facility] office

## 2020-05-22 NOTE — TELEPHONE ENCOUNTER
Jakob Brand MD  [facility] Pre-Op Clinic Clinical Staff;  Dawson Chappell RN             Recommend MRSA / MSSA protocol

## 2020-05-26 ENCOUNTER — OFFICE VISIT (OUTPATIENT)
Dept: HEMATOLOGY/ONCOLOGY | Facility: HOSPITAL | Age: 75
End: 2020-05-26
Attending: INTERNAL MEDICINE
Payer: MEDICARE

## 2020-05-26 ENCOUNTER — APPOINTMENT (OUTPATIENT)
Dept: LAB | Facility: HOSPITAL | Age: 75
End: 2020-05-26
Attending: INTERNAL MEDICINE
Payer: MEDICARE

## 2020-05-26 VITALS
SYSTOLIC BLOOD PRESSURE: 133 MMHG | OXYGEN SATURATION: 98 % | HEART RATE: 56 BPM | TEMPERATURE: 99 F | RESPIRATION RATE: 16 BRPM | DIASTOLIC BLOOD PRESSURE: 48 MMHG

## 2020-05-26 DIAGNOSIS — D64.9 NORMOCYTIC ANEMIA: ICD-10-CM

## 2020-05-26 DIAGNOSIS — E53.8 FOLIC ACID DEFICIENCY: ICD-10-CM

## 2020-05-26 DIAGNOSIS — D89.2 SERUM GAMMA GLOBULIN INCREASED: ICD-10-CM

## 2020-05-26 DIAGNOSIS — D89.2 SERUM GAMMA GLOBULIN INCREASED: Primary | ICD-10-CM

## 2020-05-26 PROCEDURE — 99214 OFFICE O/P EST MOD 30 MIN: CPT | Performed by: INTERNAL MEDICINE

## 2020-05-26 PROCEDURE — 86334 IMMUNOFIX E-PHORESIS SERUM: CPT

## 2020-05-26 PROCEDURE — 83883 ASSAY NEPHELOMETRY NOT SPEC: CPT

## 2020-05-26 PROCEDURE — 82746 ASSAY OF FOLIC ACID SERUM: CPT

## 2020-05-26 PROCEDURE — 84466 ASSAY OF TRANSFERRIN: CPT

## 2020-05-26 PROCEDURE — 84165 PROTEIN E-PHORESIS SERUM: CPT

## 2020-05-26 PROCEDURE — 82784 ASSAY IGA/IGD/IGG/IGM EACH: CPT

## 2020-05-26 PROCEDURE — 82728 ASSAY OF FERRITIN: CPT

## 2020-05-26 PROCEDURE — 36415 COLL VENOUS BLD VENIPUNCTURE: CPT

## 2020-05-26 PROCEDURE — 83540 ASSAY OF IRON: CPT

## 2020-05-26 NOTE — TELEPHONE ENCOUNTER
Spoke with Elizabeth Hein RN at [employer]  Dr Vanessa Muhammad . Angiogram canceled due to low HGB of 7.5 .  She needs to be cleared by PCP and Hematology

## 2020-05-26 NOTE — PROGRESS NOTES
Hematology Progress Note    Patient Name: Andre Cowart   YOB: 1945   Medical Record Number: [id number]   CSN: [id number]   Consulting Physician: Kim Rodriguez MD  Referring Physician(s): Kim Rodriguez  Date of Visit: 05/26/2020     Chief c previously been on folic acid and oral iron replacement which she mentions were recently discontinued. Most recent CBC shows hemoglobin 7.5 g/dL/hematocrit 24.7% in the setting of normal WBC with differential as well as platelets.  ROS is negative for any children: Not on file      Years of education: Not on file      Highest education level: Not on file    Occupational History      Not on file    Social Needs      Financial resource strain: Not on file      Food insecurity:        Worry: Not on file VOMITING, HIVES    Comment:Other reaction(s): emesis  Aspirin                 NAUSEA AND VOMITING, RASH    Comment:Can tolerate low dose but not regular strength d/t             GI problemsStomach ulcers  Codeine                 NAUSEA AND VOMITING    Curr ARTHRITIS PAIN OR), Take 1,300 mg by mouth as needed. , Disp: , Rfl:         Review of Systems:    Constitutional: Negative for chills, fatigue, fevers, night sweats and no reports of anorexia, or weight loss.   Eyes: Negative for visual disturbance, irritat 05/20/2020 11:05 AM    RBC 3.03 (L) 05/20/2020 11:05 AM    HGB 7.5 (L) 05/20/2020 11:05 AM    HCT 24.7 (L) 05/20/2020 11:05 AM    MCV 81.5 05/20/2020 11:05 AM    MCH 24.8 (L) 05/20/2020 11:05 AM    MCHC 30.4 (L) 05/20/2020 11:05 AM    RDW 16.3 (H) 05/20/20 her family my concern for this being multifactorial anemia likely secondary to chronic disease in the setting of her hypertension, diabetes, CKD &CHF  --Patient has a history of GI bleed, will recheck iron studies, P28, folic acid levels, rule out hemolysi intra-abdominal process. Lab testing and imaging shows no evidence of acute pancreatitis. --strongly recommended for GI evaluation based on her hx of GI blood loss and signs of iron deficiency by labs.  She will be planned for colonoscopy with Dr. Napoleon Jose s

## 2020-05-26 NOTE — TELEPHONE ENCOUNTER
Spoke with Dr Barbara Ortiz informed of canceled Angiogram . He will discuss with Rochelle Fan at Dr Albertina Thomson office

## 2020-05-27 ENCOUNTER — TELEPHONE (OUTPATIENT)
Dept: HEMATOLOGY/ONCOLOGY | Facility: HOSPITAL | Age: 75
End: 2020-05-27

## 2020-05-27 RX ORDER — FOLIC ACID 1 MG/1
1 TABLET ORAL DAILY
Qty: 30 TABLET | Refills: 1 | Status: SHIPPED | OUTPATIENT
Start: 2020-05-27

## 2020-05-27 RX ORDER — FERROUS SULFATE 325(65) MG
325 TABLET ORAL 3 TIMES DAILY
Qty: 90 TABLET | Refills: 1 | Status: SHIPPED | OUTPATIENT
Start: 2020-05-27

## 2020-05-27 NOTE — TELEPHONE ENCOUNTER
Per Dr Elias Brothers instruction: Jones Morse contacted and advised that Dr Valerio would like her to resume her folic acid daily and oral iron three times per day to improve her anemia, based on labs from yesterday. scrips sent to pharmacy.   [place] confirms jaredin

## 2020-05-28 ENCOUNTER — TELEPHONE (OUTPATIENT)
Dept: INTERNAL MEDICINE CLINIC | Facility: CLINIC | Age: 75
End: 2020-05-28

## 2020-05-28 ENCOUNTER — VIRTUAL PHONE E/M (OUTPATIENT)
Dept: INTERNAL MEDICINE CLINIC | Facility: CLINIC | Age: 75
End: 2020-05-28
Payer: MEDICARE

## 2020-05-28 DIAGNOSIS — E11.22 TYPE 2 DIABETES MELLITUS WITH CHRONIC KIDNEY DISEASE, WITH LONG-TERM CURRENT USE OF INSULIN, UNSPECIFIED CKD STAGE (HCC): ICD-10-CM

## 2020-05-28 DIAGNOSIS — N18.30 CKD (CHRONIC KIDNEY DISEASE) STAGE 3, GFR 30-59 ML/MIN (HCC): ICD-10-CM

## 2020-05-28 DIAGNOSIS — Z79.4 TYPE 2 DIABETES MELLITUS WITH CHRONIC KIDNEY DISEASE, WITH LONG-TERM CURRENT USE OF INSULIN, UNSPECIFIED CKD STAGE (HCC): ICD-10-CM

## 2020-05-28 DIAGNOSIS — I51.9 CHRONIC SYSTOLIC DYSFUNCTION OF LEFT VENTRICLE: ICD-10-CM

## 2020-05-28 DIAGNOSIS — T84.012D FAILED TOTAL KNEE, RIGHT, SUBSEQUENT ENCOUNTER: ICD-10-CM

## 2020-05-28 DIAGNOSIS — D50.9 MICROCYTIC ANEMIA: Primary | ICD-10-CM

## 2020-05-28 DIAGNOSIS — E53.8 FOLIC ACID DEFICIENCY: ICD-10-CM

## 2020-05-28 PROCEDURE — 99443 PHONE E/M BY PHYS 21-30 MIN: CPT | Performed by: INTERNAL MEDICINE

## 2020-05-28 NOTE — PROGRESS NOTES
Virtual/Telephone Check-In    Mal Holloway verbally consents to a Virtual/Telephone Check-In service on 05/28/20. Patient has been referred to the [facility] website at [email or web address] to review the yearly Consent to Treat document.   Patient underst 450.0 10(3)uL 302.0   Prelim Neutrophil Abs      1.50 - 7.70 x10 (3) uL 4.98   Neutrophils Absolute      1.50 - 7.70 x10(3) uL 4.98   Lymphocytes Absolute      1.00 - 4.00 x10(3) uL 1.02   Monocytes Absolute      0.10 - 1.00 x10(3) uL 0.70   Eosinophils Ab ESTIMATED AVERAGE GLUCOSE      68 - 126 mg/dL 143 (H)   ABO Blood Type       O   RH Factor       Positive   SED RATE      0 - 30 mm/Hr 109 (H)   C-REACTIVE PROTEIN      <0.30 mg/dL 2.16 (H)   ANTIBODY SCREEN       Negative   FERRITIN      18.0 - 340.0 ng DAILY  5   • BD PEN NEEDLE GABRIELA U/F 32G X 4 MM Does not apply Misc USE 1 PEN NEEDLE 4 TIMES DAILY  5   • BD INSULIN SYRINGE U/F 31G X 5/16\" 0.5 ML Does not apply Misc USE 1 SYRINGE ONCE DAILY AS DIRECTED  5   • RELION INSULIN SYRINGE 31G X 5/16\" 0.3 ML D Family History   Problem Relation Age of Onset   • Cancer Father         prostate   • Diabetes Mother    • Anemia Mother    • Diabetes Sister    • Anemia Sister    • Other (Other) Sister         cva   • Diabetes Brother    • DVT/VTE Paternal Cousin Fe insertion of antibiotic spacer after cleared by cardiology. Bing Nguyen

## 2020-05-28 NOTE — TELEPHONE ENCOUNTER
----- Message from Gail Oneill MD sent at 5/22/2020  6:19 PM CDT -----  Regarding: needs f/u  Please schedule follow up if I am still her PCP.

## 2020-05-29 NOTE — TELEPHONE ENCOUNTER
Spoke with daughter informed mother's nasal culture was positive ,antibiotic ointment ordered .  Daughter states mother is making an appointment to Dr Iris Powell the Gastroenterologist

## 2020-06-01 ENCOUNTER — TELEPHONE (OUTPATIENT)
Dept: GASTROENTEROLOGY | Facility: CLINIC | Age: 75
End: 2020-06-01

## 2020-06-01 NOTE — TELEPHONE ENCOUNTER
[facility], when you get a chance, can you please call [place] and talk to her about going for a CLN. She was supposed to go in March but [employer] hit. Dr. Prem Valderrama (her hematologist) asked her to go ahead and schedule.  She apparently had an EGD in 2019 (please con

## 2020-06-02 RX ORDER — POLYETHYLENE GLYCOL 3350, SODIUM CHLORIDE, SODIUM BICARBONATE, POTASSIUM CHLORIDE 420; 11.2; 5.72; 1.48 G/4L; G/4L; G/4L; G/4L
POWDER, FOR SOLUTION ORAL
Qty: 1 BOTTLE | Refills: 0 | Status: ON HOLD | OUTPATIENT
Start: 2020-06-02 | End: 2020-06-26

## 2020-06-02 NOTE — TELEPHONE ENCOUNTER
She reports last EGD was at [address] [place] [address] 1 year ago and she will try and get records.      Plan for colonoscopy w/ MAC dx: Fe def anemia    She will hold iron 7 days prior to procedure    On enalapril and understands to hold med if procedure scheduled in t

## 2020-06-03 NOTE — TELEPHONE ENCOUNTER
Tried to call pt to schedule colon.  Got a message that said pt not able to be reached was unable to leave vm

## 2020-06-09 ENCOUNTER — TELEPHONE (OUTPATIENT)
Dept: GASTROENTEROLOGY | Facility: CLINIC | Age: 75
End: 2020-06-09

## 2020-06-09 NOTE — TELEPHONE ENCOUNTER
I called [place] today to discuss her colonoscopy with me, which we are doing for iron deficiency anemia. Unfortunately she says she cannot schedule because she has an upcoming angiogram test which is still to be scheduled.  I do see the patient saw Dr. Myriam Harrington

## 2020-06-10 ENCOUNTER — TELEPHONE (OUTPATIENT)
Dept: GASTROENTEROLOGY | Facility: CLINIC | Age: 75
End: 2020-06-10

## 2020-06-10 ENCOUNTER — APPOINTMENT (OUTPATIENT)
Dept: HEMATOLOGY/ONCOLOGY | Facility: HOSPITAL | Age: 75
End: 2020-06-10
Attending: INTERNAL MEDICINE
Payer: MEDICARE

## 2020-06-10 DIAGNOSIS — D50.9 IRON DEFICIENCY ANEMIA, UNSPECIFIED IRON DEFICIENCY ANEMIA TYPE: Primary | ICD-10-CM

## 2020-06-10 NOTE — TELEPHONE ENCOUNTER
Contacted pt to confirm appt for colon/egd w/ mac dx: fe def anemia w/ Dr. Bennett Room. She is agreeable to procedure and prep reviewed verbally as well as medication adjustments.    Confirmed with Chaparrita in [place]    Scheduled for:  Colon/egd w/ mac  Provider Name:

## 2020-06-10 NOTE — TELEPHONE ENCOUNTER
I talked to Dr. Pardo Speak cardiology ([phone]) and he explained that he would like me to perform c-scope first before angiogram because he is concerned if anti-plt or anti-coagulation may be needed.  He has CLEARED the patient from his stand point to pro

## 2020-06-10 NOTE — TELEPHONE ENCOUNTER
Per Dr. Iris Powell will plan for colon/EGD w/ MAC dx: fe def anemia (last egd reported to be 1 year ago at [facility] and no records found)     2. She has bowel prep from pharmacy (single dose Trilyte)     3.  Medication adjustment:     -HOLD iron for 7 days before col

## 2020-06-12 ENCOUNTER — TELEPHONE (OUTPATIENT)
Dept: GASTROENTEROLOGY | Facility: CLINIC | Age: 75
End: 2020-06-12

## 2020-06-12 ENCOUNTER — ANESTHESIA (OUTPATIENT)
Dept: ENDOSCOPY | Facility: HOSPITAL | Age: 75
End: 2020-06-12
Payer: MEDICARE

## 2020-06-12 ENCOUNTER — ANESTHESIA EVENT (OUTPATIENT)
Dept: ENDOSCOPY | Facility: HOSPITAL | Age: 75
End: 2020-06-12
Payer: MEDICARE

## 2020-06-12 ENCOUNTER — HOSPITAL ENCOUNTER (OUTPATIENT)
Facility: HOSPITAL | Age: 75
Setting detail: HOSPITAL OUTPATIENT SURGERY
Discharge: HOME | End: 2020-06-12
Attending: INTERNAL MEDICINE | Admitting: INTERNAL MEDICINE
Payer: MEDICARE

## 2020-06-12 VITALS
TEMPERATURE: 97 F | DIASTOLIC BLOOD PRESSURE: 65 MMHG | WEIGHT: 176 LBS | BODY MASS INDEX: 31.58 KG/M2 | HEART RATE: 64 BPM | HEIGHT: 62.5 IN | RESPIRATION RATE: 16 BRPM | SYSTOLIC BLOOD PRESSURE: 138 MMHG

## 2020-06-12 DIAGNOSIS — D50.9 IRON DEFICIENCY ANEMIA, UNSPECIFIED IRON DEFICIENCY ANEMIA TYPE: ICD-10-CM

## 2020-06-12 PROCEDURE — 0DBP8ZX EXCISION OF RECTUM, VIA NATURAL OR ARTIFICIAL OPENING ENDOSCOPIC, DIAGNOSTIC: ICD-10-PCS | Performed by: INTERNAL MEDICINE

## 2020-06-12 PROCEDURE — 0DBK8ZX EXCISION OF ASCENDING COLON, VIA NATURAL OR ARTIFICIAL OPENING ENDOSCOPIC, DIAGNOSTIC: ICD-10-PCS | Performed by: INTERNAL MEDICINE

## 2020-06-12 PROCEDURE — 45385 COLONOSCOPY W/LESION REMOVAL: CPT | Performed by: INTERNAL MEDICINE

## 2020-06-12 PROCEDURE — 0DB78ZX EXCISION OF STOMACH, PYLORUS, VIA NATURAL OR ARTIFICIAL OPENING ENDOSCOPIC, DIAGNOSTIC: ICD-10-PCS | Performed by: INTERNAL MEDICINE

## 2020-06-12 PROCEDURE — 0DBN8ZX EXCISION OF SIGMOID COLON, VIA NATURAL OR ARTIFICIAL OPENING ENDOSCOPIC, DIAGNOSTIC: ICD-10-PCS | Performed by: INTERNAL MEDICINE

## 2020-06-12 PROCEDURE — 0DB98ZX EXCISION OF DUODENUM, VIA NATURAL OR ARTIFICIAL OPENING ENDOSCOPIC, DIAGNOSTIC: ICD-10-PCS | Performed by: INTERNAL MEDICINE

## 2020-06-12 PROCEDURE — 43239 EGD BIOPSY SINGLE/MULTIPLE: CPT | Performed by: INTERNAL MEDICINE

## 2020-06-12 RX ORDER — SODIUM CHLORIDE, SODIUM LACTATE, POTASSIUM CHLORIDE, CALCIUM CHLORIDE 600; 310; 30; 20 MG/100ML; MG/100ML; MG/100ML; MG/100ML
INJECTION, SOLUTION INTRAVENOUS CONTINUOUS
Status: DISCONTINUED | OUTPATIENT
Start: 2020-06-12 | End: 2020-06-12

## 2020-06-12 RX ORDER — NALOXONE HYDROCHLORIDE 0.4 MG/ML
80 INJECTION, SOLUTION INTRAMUSCULAR; INTRAVENOUS; SUBCUTANEOUS AS NEEDED
Status: DISCONTINUED | OUTPATIENT
Start: 2020-06-12 | End: 2020-06-12

## 2020-06-12 RX ORDER — LIDOCAINE HYDROCHLORIDE 10 MG/ML
INJECTION, SOLUTION EPIDURAL; INFILTRATION; INTRACAUDAL; PERINEURAL AS NEEDED
Status: DISCONTINUED | OUTPATIENT
Start: 2020-06-12 | End: 2020-06-12 | Stop reason: SURG

## 2020-06-12 RX ORDER — DEXTROSE MONOHYDRATE 25 G/50ML
50 INJECTION, SOLUTION INTRAVENOUS
Status: DISCONTINUED | OUTPATIENT
Start: 2020-06-12 | End: 2020-06-12

## 2020-06-12 RX ADMIN — SODIUM CHLORIDE, SODIUM LACTATE, POTASSIUM CHLORIDE, CALCIUM CHLORIDE: 600; 310; 30; 20 INJECTION, SOLUTION INTRAVENOUS at 12:00:00

## 2020-06-12 RX ADMIN — LIDOCAINE HYDROCHLORIDE 50 MG: 10 INJECTION, SOLUTION EPIDURAL; INFILTRATION; INTRACAUDAL; PERINEURAL at 11:17:00

## 2020-06-12 NOTE — ANESTHESIA PREPROCEDURE EVALUATION
Anesthesia PreOp Note    HPI:     Scarlet Heading is a 75year old female who presents for preoperative consultation requested by: Radha Lerma MD    Date of Surgery: 6/12/2020    Procedure(s):  COLONOSCOPY  ESOPHAGOGASTRODUODENOSCOPY (EGD)  Indication Osteopenia         Date Noted: 12/20/2010      Rotator cuff syndrome         Date Noted: 12/20/2010      Allergic rhinitis         Date Noted: 04/10/2008      Hypertension         Date Noted: 05/22/2007      Carpal tunnel syndrome         Date Noted: 05/22 by mouth 3 (three) times daily. , Disp: 90 tablet, Rfl: 1  folic acid 1 MG Oral Tab, Take 1 tablet (1 mg total) by mouth daily. , Disp: 30 tablet, Rfl: 1  Vitamin D, Cholecalciferol, (VITAMIN D3) 10 MCG (400 UNIT) Oral Tab, Take 400 Units by mouth daily. Sunitha Gan not apply Misc, USE 1 4 TIMES DAILY, Disp: , Rfl: 1, Taking      lactated ringers infusion, , Intravenous, Continuous, Mary Gonzalez MD    No current [facility]-ordered outpatient medications on file.         Hydrocodone             HIVES, NAUSEA AND VOMITING activity:        Days per week: Not on file        Minutes per session: Not on file      Stress: Not on file    Relationships      Social connections:        Talks on phone: Not on file        Gets together: Not on file        Attends [employer] service: No 5/20 SB    Neuro/Psych      GI/Hepatic/Renal      Endo/Other    (+) diabetes mellitus well controlled,     Comments: DM Type 1 controlled with insulin,   Abdominal   (+) obese,              Anesthesia Plan:   ASA:  3  Plan:   MAC  Informed Consent Pl

## 2020-06-12 NOTE — ANESTHESIA POSTPROCEDURE EVALUATION
Patient: Paul Solo    Procedure Summary     Date:  06/12/20 Room / Location:  [facility] ENDOSCOPY 01 / [facility] ENDOSCOPY    Anesthesia Start:  0569 Anesthesia Stop:  7086    Procedures:       COLONOSCOPY (N/A )      ESOPHAGOGASTRODUODENOSCOPY (EGD) (N/A ) Diag

## 2020-06-12 NOTE — OPERATIVE REPORT
ESOPHAGOGASTRODUODENOSCOPY (EGD) & COLONOSCOPY REPORT    [facility]     1945 Age 75year old   PCP No primary care provider on file.  Endoscopist Gill Chavis MD     Date of procedure: 20    Procedure: EGD w/cold biopsy & Colonosc colon were fair to adequate with washing. I then carefully withdrew the instrument from the patient who tolerated the procedure well. Complications: None    EGD findings:      1. Esophagus:  The squamocolumnar junction was noted at 41 cm and appeared re Impression:  · No findings to explain iron deficiency anemia. No mass or ulcer or AVMs noted. Unexplained iron deficiency anemia. No obvious GI blood loss noted. · EGD shows small hiatal hernia.   · CLN shows six small polyps noted, prep was fair with

## 2020-06-12 NOTE — H&P
History & Physical Examination    Patient Name: Dora Heading  MRN: [id number]  CSN: [id number]  YOB: 1945    Diagnosis: anemia, iron def. Screening    mupirocin 2 % External Ointment, 1 application in each nostril twice daily for 5 days.  St office. May substitute with Trilyte/generic equivalent if needed. , Disp: 1 Bottle, Rfl: 0  Glucose Blood (PRECISION XTRA BLOOD GLUCOSE) In Vitro Strip, USE 1 STRIP TO CHECK GLUCOSE THREE TIMES DAILY, Disp: , Rfl: 5, Taking  BD PEN NEEDLE GABRIELA U/F 32G X 4 M KNEE TOTAL REPLACEMENT Right 10/29/2018    Performed by Andrey Bell MD at [facility] MAIN OR   • OTHER  2003    debridement r/t spider bite - brown reclusive, right thigh   • OTHER      removal kidney stone   • PATELLA TENDON REPAIR Right 11/19/2018    Perform

## 2020-06-15 ENCOUNTER — HOSPITAL ENCOUNTER (OUTPATIENT)
Dept: GENERAL RADIOLOGY | Facility: HOSPITAL | Age: 75
Discharge: HOME | DRG: 247 | End: 2020-06-15
Attending: INTERNAL MEDICINE
Payer: MEDICARE

## 2020-06-15 DIAGNOSIS — D89.2 SERUM GAMMA GLOBULIN INCREASED: ICD-10-CM

## 2020-06-15 PROCEDURE — 77075 RADEX OSSEOUS SURVEY COMPL: CPT | Performed by: INTERNAL MEDICINE

## 2020-06-15 NOTE — TELEPHONE ENCOUNTER
Operative/surgical path report faxed to Dr. Gandara/Cardiology at [phone], fax confirmation received 6/15/2020 @ [phone].

## 2020-06-17 ENCOUNTER — LAB ENCOUNTER (OUTPATIENT)
Dept: LAB | Facility: HOSPITAL | Age: 75
DRG: 247 | End: 2020-06-17
Attending: INTERNAL MEDICINE
Payer: MEDICARE

## 2020-06-17 DIAGNOSIS — D89.2 SERUM GAMMA GLOBULIN INCREASED: ICD-10-CM

## 2020-06-17 PROCEDURE — 86335 IMMUNFIX E-PHORSIS/URINE/CSF: CPT

## 2020-06-17 PROCEDURE — 84166 PROTEIN E-PHORESIS/URINE/CSF: CPT

## 2020-06-17 PROCEDURE — 84156 ASSAY OF PROTEIN URINE: CPT

## 2020-06-18 ENCOUNTER — HOSPITAL ENCOUNTER (INPATIENT)
Facility: HOSPITAL | Age: 75
LOS: 8 days | Discharge: NURSING, REHAB OR LONG-TERM CARE | DRG: 247 | End: 2020-06-26
Attending: FAMILY MEDICINE | Admitting: FAMILY MEDICINE
Payer: MEDICARE

## 2020-06-18 ENCOUNTER — APPOINTMENT (OUTPATIENT)
Dept: GENERAL RADIOLOGY | Facility: HOSPITAL | Age: 75
DRG: 247 | End: 2020-06-18
Attending: FAMILY MEDICINE
Payer: MEDICARE

## 2020-06-18 PROBLEM — T84.012D FAILED TOTAL KNEE, RIGHT, SUBSEQUENT ENCOUNTER: Status: RESOLVED | Noted: 2019-12-04 | Resolved: 2020-06-18

## 2020-06-18 PROBLEM — K21.00 GERD WITH ESOPHAGITIS: Status: RESOLVED | Noted: 2018-10-17 | Resolved: 2020-06-18

## 2020-06-18 PROBLEM — T84.018S FAILED TOTAL KNEE ARTHROPLASTY, SEQUELA: Status: ACTIVE | Noted: 2020-06-18

## 2020-06-18 PROBLEM — I34.0 NONRHEUMATIC MITRAL VALVE REGURGITATION: Status: ACTIVE | Noted: 2020-06-18

## 2020-06-18 PROBLEM — I27.20 PULMONARY HYPERTENSION (HCC): Status: ACTIVE | Noted: 2020-06-18

## 2020-06-18 PROBLEM — Z96.659 FAILED TOTAL KNEE ARTHROPLASTY, SEQUELA: Status: ACTIVE | Noted: 2020-06-18

## 2020-06-18 PROBLEM — E11.65 TYPE 2 DIABETES MELLITUS WITH HYPERGLYCEMIA (HCC): Status: ACTIVE | Noted: 2020-06-18

## 2020-06-18 PROBLEM — K44.9 HIATAL HERNIA WITH GERD AND ESOPHAGITIS: Status: ACTIVE | Noted: 2020-06-18

## 2020-06-18 PROBLEM — K21.00 HIATAL HERNIA WITH GERD AND ESOPHAGITIS: Status: ACTIVE | Noted: 2020-06-18

## 2020-06-18 PROBLEM — N28.9 RENAL INSUFFICIENCY: Status: ACTIVE | Noted: 2020-06-18

## 2020-06-18 PROCEDURE — 83540 ASSAY OF IRON: CPT | Performed by: FAMILY MEDICINE

## 2020-06-18 PROCEDURE — 93010 ELECTROCARDIOGRAM REPORT: CPT | Performed by: FAMILY MEDICINE

## 2020-06-18 PROCEDURE — 71046 X-RAY EXAM CHEST 2 VIEWS: CPT | Performed by: FAMILY MEDICINE

## 2020-06-18 PROCEDURE — 30233N1 TRANSFUSION OF NONAUTOLOGOUS RED BLOOD CELLS INTO PERIPHERAL VEIN, PERCUTANEOUS APPROACH: ICD-10-PCS | Performed by: FAMILY MEDICINE

## 2020-06-18 PROCEDURE — 86850 RBC ANTIBODY SCREEN: CPT | Performed by: FAMILY MEDICINE

## 2020-06-18 PROCEDURE — 85060 BLOOD SMEAR INTERPRETATION: CPT | Performed by: FAMILY MEDICINE

## 2020-06-18 PROCEDURE — 86140 C-REACTIVE PROTEIN: CPT | Performed by: FAMILY MEDICINE

## 2020-06-18 PROCEDURE — 86920 COMPATIBILITY TEST SPIN: CPT

## 2020-06-18 PROCEDURE — 80053 COMPREHEN METABOLIC PANEL: CPT | Performed by: FAMILY MEDICINE

## 2020-06-18 PROCEDURE — 81001 URINALYSIS AUTO W/SCOPE: CPT | Performed by: FAMILY MEDICINE

## 2020-06-18 PROCEDURE — 93005 ELECTROCARDIOGRAM TRACING: CPT

## 2020-06-18 PROCEDURE — 36410 VNPNXR 3YR/> PHY/QHP DX/THER: CPT

## 2020-06-18 PROCEDURE — 83036 HEMOGLOBIN GLYCOSYLATED A1C: CPT | Performed by: FAMILY MEDICINE

## 2020-06-18 PROCEDURE — 86900 BLOOD TYPING SEROLOGIC ABO: CPT | Performed by: FAMILY MEDICINE

## 2020-06-18 PROCEDURE — 86901 BLOOD TYPING SEROLOGIC RH(D): CPT | Performed by: FAMILY MEDICINE

## 2020-06-18 PROCEDURE — 82728 ASSAY OF FERRITIN: CPT | Performed by: FAMILY MEDICINE

## 2020-06-18 PROCEDURE — 82306 VITAMIN D 25 HYDROXY: CPT | Performed by: FAMILY MEDICINE

## 2020-06-18 PROCEDURE — 82962 GLUCOSE BLOOD TEST: CPT

## 2020-06-18 PROCEDURE — 85025 COMPLETE CBC W/AUTO DIFF WBC: CPT | Performed by: FAMILY MEDICINE

## 2020-06-18 PROCEDURE — 85652 RBC SED RATE AUTOMATED: CPT | Performed by: FAMILY MEDICINE

## 2020-06-18 PROCEDURE — 84443 ASSAY THYROID STIM HORMONE: CPT | Performed by: FAMILY MEDICINE

## 2020-06-18 PROCEDURE — 82607 VITAMIN B-12: CPT | Performed by: FAMILY MEDICINE

## 2020-06-18 RX ORDER — ENOXAPARIN SODIUM 100 MG/ML
40 INJECTION SUBCUTANEOUS NIGHTLY
Status: DISCONTINUED | OUTPATIENT
Start: 2020-06-18 | End: 2020-06-20

## 2020-06-18 RX ORDER — SODIUM CHLORIDE 9 MG/ML
INJECTION, SOLUTION INTRAVENOUS ONCE
Status: COMPLETED | OUTPATIENT
Start: 2020-06-18 | End: 2020-06-18

## 2020-06-18 RX ORDER — DEXTROSE MONOHYDRATE 25 G/50ML
50 INJECTION, SOLUTION INTRAVENOUS
Status: DISCONTINUED | OUTPATIENT
Start: 2020-06-18 | End: 2020-06-26

## 2020-06-18 RX ORDER — TRAZODONE HYDROCHLORIDE 50 MG/1
50 TABLET ORAL NIGHTLY PRN
Status: DISCONTINUED | OUTPATIENT
Start: 2020-06-18 | End: 2020-06-26

## 2020-06-18 RX ORDER — ACETAMINOPHEN 325 MG/1
650 TABLET ORAL EVERY 6 HOURS SCHEDULED
Status: DISCONTINUED | OUTPATIENT
Start: 2020-06-18 | End: 2020-06-20

## 2020-06-18 RX ORDER — AMLODIPINE BESYLATE 5 MG/1
5 TABLET ORAL DAILY
Status: DISCONTINUED | OUTPATIENT
Start: 2020-06-19 | End: 2020-06-22

## 2020-06-18 RX ORDER — FOLIC ACID 1 MG/1
1 TABLET ORAL DAILY
Status: DISCONTINUED | OUTPATIENT
Start: 2020-06-19 | End: 2020-06-26

## 2020-06-18 RX ORDER — SODIUM CHLORIDE 0.9 % (FLUSH) 0.9 %
3 SYRINGE (ML) INJECTION AS NEEDED
Status: DISCONTINUED | OUTPATIENT
Start: 2020-06-18 | End: 2020-06-26

## 2020-06-18 RX ORDER — ENALAPRIL MALEATE 10 MG/1
10 TABLET ORAL 2 TIMES DAILY
Status: DISCONTINUED | OUTPATIENT
Start: 2020-06-18 | End: 2020-06-26

## 2020-06-18 RX ORDER — OXYCODONE HYDROCHLORIDE 5 MG/1
5 TABLET ORAL EVERY 6 HOURS PRN
Status: DISCONTINUED | OUTPATIENT
Start: 2020-06-18 | End: 2020-06-18

## 2020-06-18 RX ORDER — NITROGLYCERIN 0.4 MG/1
0.4 TABLET SUBLINGUAL EVERY 5 MIN PRN
Status: DISCONTINUED | OUTPATIENT
Start: 2020-06-18 | End: 2020-06-26

## 2020-06-18 RX ORDER — CARVEDILOL 25 MG/1
25 TABLET ORAL 2 TIMES DAILY WITH MEALS
Status: DISCONTINUED | OUTPATIENT
Start: 2020-06-18 | End: 2020-06-26

## 2020-06-18 RX ORDER — ATORVASTATIN CALCIUM 10 MG/1
10 TABLET, FILM COATED ORAL
Status: DISCONTINUED | OUTPATIENT
Start: 2020-06-19 | End: 2020-06-20

## 2020-06-18 RX ORDER — FLUTICASONE PROPIONATE 50 MCG
1 SPRAY, SUSPENSION (ML) NASAL DAILY
Status: DISCONTINUED | OUTPATIENT
Start: 2020-06-19 | End: 2020-06-26

## 2020-06-18 RX ORDER — BUMETANIDE 1 MG/1
1 TABLET ORAL
Status: DISCONTINUED | OUTPATIENT
Start: 2020-06-18 | End: 2020-06-26

## 2020-06-18 RX ORDER — PANTOPRAZOLE SODIUM 40 MG/1
40 TABLET, DELAYED RELEASE ORAL
Status: DISCONTINUED | OUTPATIENT
Start: 2020-06-19 | End: 2020-06-26

## 2020-06-18 RX ORDER — MELATONIN
325
Status: DISCONTINUED | OUTPATIENT
Start: 2020-06-19 | End: 2020-06-26

## 2020-06-18 RX ORDER — ACETAMINOPHEN 325 MG/1
650 TABLET ORAL EVERY 6 HOURS PRN
Status: DISCONTINUED | OUTPATIENT
Start: 2020-06-18 | End: 2020-06-26

## 2020-06-18 NOTE — PLAN OF CARE
Alert and oriented x 3, RA. Voiding freely. Stand pivot for ambulation. Oxy IR was ordered however patient is allergic to Codeine. Patient states that she normally takes acetaminophen 1300 mg for pain on occasion. EKG and Chest x-ray completed.  Type and complete, and accurate information to patient/family  - Incorporate patient and family knowledge, values, beliefs, and cultural backgrounds into the planning and delivery of care  - Encourage patient/family to participate in care and decision-making at the

## 2020-06-19 ENCOUNTER — APPOINTMENT (OUTPATIENT)
Dept: CV DIAGNOSTICS | Facility: HOSPITAL | Age: 75
DRG: 247 | End: 2020-06-19
Attending: FAMILY MEDICINE
Payer: MEDICARE

## 2020-06-19 ENCOUNTER — APPOINTMENT (OUTPATIENT)
Dept: NUCLEAR MEDICINE | Facility: HOSPITAL | Age: 75
DRG: 247 | End: 2020-06-19
Attending: FAMILY MEDICINE
Payer: MEDICARE

## 2020-06-19 PROCEDURE — 85018 HEMOGLOBIN: CPT | Performed by: FAMILY MEDICINE

## 2020-06-19 PROCEDURE — 93018 CV STRESS TEST I&R ONLY: CPT | Performed by: FAMILY MEDICINE

## 2020-06-19 PROCEDURE — 78452 HT MUSCLE IMAGE SPECT MULT: CPT | Performed by: FAMILY MEDICINE

## 2020-06-19 PROCEDURE — 93017 CV STRESS TEST TRACING ONLY: CPT | Performed by: FAMILY MEDICINE

## 2020-06-19 PROCEDURE — 80048 BASIC METABOLIC PNL TOTAL CA: CPT | Performed by: FAMILY MEDICINE

## 2020-06-19 PROCEDURE — 85025 COMPLETE CBC W/AUTO DIFF WBC: CPT | Performed by: FAMILY MEDICINE

## 2020-06-19 PROCEDURE — 93016 CV STRESS TEST SUPVJ ONLY: CPT | Performed by: FAMILY MEDICINE

## 2020-06-19 PROCEDURE — 82962 GLUCOSE BLOOD TEST: CPT

## 2020-06-19 RX ORDER — ACETAMINOPHEN 325 MG/1
650 TABLET ORAL EVERY 6 HOURS SCHEDULED
Status: DISCONTINUED | OUTPATIENT
Start: 2020-06-19 | End: 2020-06-26

## 2020-06-19 RX ORDER — HYDROMORPHONE HYDROCHLORIDE 4 MG/1
2 TABLET ORAL EVERY 4 HOURS PRN
Status: DISCONTINUED | OUTPATIENT
Start: 2020-06-19 | End: 2020-06-26

## 2020-06-19 RX ORDER — POTASSIUM CHLORIDE 20 MEQ/1
40 TABLET, EXTENDED RELEASE ORAL ONCE
Status: COMPLETED | OUTPATIENT
Start: 2020-06-19 | End: 2020-06-19

## 2020-06-19 NOTE — TELEPHONE ENCOUNTER
This patient has had her colonoscopy on 6/12/20, please see scheduling information on TE 6/1/20    Closing this TE

## 2020-06-19 NOTE — H&P
[facility]    PRE-[facility] NOTE    HPI:   I have been consulted by Dr. Gloria Reynolds to see Deandra Manning 75year old female for a preoperative evaluation and medical clearance. Flora had a right TKA by Dr. Gloria Reynolds in 2018.  She unfortunately has h Date   • CATARACT  2017   • COLONOSCOPY N/A 6/12/2020    Performed by Mamie Graham MD at [facility]     • ESOPHAGOGASTRODUODENOSCOPY (EGD) N/A 6/12/2020    Performed by Mamie Graham MD at [address] [facility] Gets together: Not on file        Attends [employer] service: Not on file        Active member of club or organization: Not on file        Attends meetings of clubs or organizations: Not on file        Relationship status: Not on file      Intimate partn age, well groomed. Physical Exam:  GEN:  Patient is alert, awake and oriented, well developed, well nourished, no apparent distress.   HEENT:  Head:  Normocephalic, atraumatic Eyes: EOMI, PERRLA, no scleral icterus, conjunctivae clear bilaterally, no eye d Dictated by (CST): David Haas MD on 6/18/2020 at 6:20 PM     Finalized by (CST): David Haas MD on 6/18/2020 at 6:22 PM            Ekg 12-lead    Result Date: 6/18/2020  ECG Report  Interpretation  -------------------------- Sinus Rhythm -First d intervention    Medical history:  High risk surgery (vascular, thoracic, intra-peritoneal):  No  CAD:yes  CHF: yes  Stroke: No  DM on insulin: yes  Serum Creatinine >2 mg/dl: No  Flora has an RCRI score that is high risk and is at >11% risk for major cardia

## 2020-06-19 NOTE — PROGRESS NOTES
[facility]    Progress Note    Whit Morris Patient Status:  Inpatient    1945 MRN [id number]   Location [facility] 4W/SW/SE Attending Maninder Donahue MD   Hosp Day # 1 PCP No primary care provider on file.        Subjective There is mild-to-moderate cardiomegaly with mild prominence of pulmonary vascularity, but no gregory pulmonary edema. Slight blunting the posterior costophrenic angles bilaterally. No acute focal airspace consolidation.     Dictated by (CST): Trace Bradshaw,

## 2020-06-19 NOTE — PLAN OF CARE
Alert and oriented x 3, RA. Scd and lovenox, tele#[phone], no calls received. UA sent down this morning. Voiding freely. ACHS per order. Hinge brace in place. Foam dressing on no new drainage noted. Cardiac stress test was done today.  Infectious disease on cons planning and delivery of care  - Encourage patient/family to participate in care and decision-making at the level they choose  - Honor patient and family perspectives and choices  Outcome: Progressing

## 2020-06-20 PROCEDURE — 85018 HEMOGLOBIN: CPT | Performed by: FAMILY MEDICINE

## 2020-06-20 PROCEDURE — 85018 HEMOGLOBIN: CPT | Performed by: PHYSICIAN ASSISTANT

## 2020-06-20 PROCEDURE — 80053 COMPREHEN METABOLIC PANEL: CPT | Performed by: PHYSICIAN ASSISTANT

## 2020-06-20 PROCEDURE — 82962 GLUCOSE BLOOD TEST: CPT

## 2020-06-20 PROCEDURE — 85014 HEMATOCRIT: CPT | Performed by: PHYSICIAN ASSISTANT

## 2020-06-20 PROCEDURE — 36430 TRANSFUSION BLD/BLD COMPNT: CPT

## 2020-06-20 RX ORDER — ENOXAPARIN SODIUM 100 MG/ML
30 INJECTION SUBCUTANEOUS NIGHTLY
Status: DISCONTINUED | OUTPATIENT
Start: 2020-06-20 | End: 2020-06-23

## 2020-06-20 RX ORDER — SODIUM CHLORIDE 9 MG/ML
INJECTION, SOLUTION INTRAVENOUS ONCE
Status: DISCONTINUED | OUTPATIENT
Start: 2020-06-20 | End: 2020-06-26

## 2020-06-20 RX ORDER — VANCOMYCIN HYDROCHLORIDE
25 ONCE
Status: COMPLETED | OUTPATIENT
Start: 2020-06-20 | End: 2020-06-20

## 2020-06-20 RX ORDER — POTASSIUM CHLORIDE 20 MEQ/1
40 TABLET, EXTENDED RELEASE ORAL ONCE
Status: COMPLETED | OUTPATIENT
Start: 2020-06-20 | End: 2020-06-20

## 2020-06-20 RX ORDER — ATORVASTATIN CALCIUM 40 MG/1
40 TABLET, FILM COATED ORAL NIGHTLY
Status: DISCONTINUED | OUTPATIENT
Start: 2020-06-21 | End: 2020-06-26

## 2020-06-20 NOTE — PLAN OF CARE
Daly is ambulating using rolling walker and WC to bathroom. Pain controlled with Tylenol as prescribed. Antibiotics started, one unit PRBC started as well. Hinged knee brace in place.    Flora  calls appropriately for assist. She will need surgery once h patient/family to participate in care and decision-making at the level they choose  - Honor patient and family perspectives and choices   Outcome: Progressing     Problem: PAIN - ADULT  Goal: Verbalizes/displays adequate comfort level or patient's stated p interpreters to assist at discharge as needed  - Consider post-discharge preferences of patient/family/discharge partner  - Complete POLST form as appropriate  - Assess patient's ability to be responsible for managing their own health  - Refer to [facility]

## 2020-06-20 NOTE — CONSULTS
[facility]     MHS/AMG Cardiology Consult Note    Thea Mclean Patient Status:  Inpatient    1945 MRN [id number]   Location [facility] 4W/SW/SE Attending Jakob Brand MD   Hosp Day # 2 PCP No primary care provider on shantel RLE edema d/t knee immobilization. ROS: 10 systems reviewed, pertinent findings above.   ROS    History:  Past Medical History:   Diagnosis Date   • Back problem    • Calculus of kidney    • Cataract 2017    surgery   • Diabetes ([facility] [address]     14 yrs   • Es Cigarettes        Quit date: 10/11/1980        Years since quittin.7      Smokeless tobacco: Never Used    Alcohol use: No      Frequency: Never    Drug use: No      Medications:  • acetaminophen  650 mg Oral 4 times per day   • acetaminophen  650 mg rhonchi or dullness. Normal excursions and effort. Abdomen: Soft, non-tender. BS-present. Extremities: tr LLE edema, brace on right leg  Neurologic: Non-focal  Skin: Warm and dry.      Labs:  Recent Labs   Lab 06/19/20  0412 06/20/20  0538   RBC 2.86*  - 8/2019 (coffee ground emesis) here for revision.     Preoperative risk stratification: preop testing has included echo with new moderate to severe LV dysfunction and stress test with fixed perfusion defect, both new when compared to prior studies (TTE 2/201

## 2020-06-20 NOTE — CONSULTS
[facility] ID CONSULT NOTE    Makayla Dash Patient Status:  Inpatient    1945 MRN [id number]   Location [facility] 4W/SW/SE Attending Francena Cockayne, MD   Hosp Day # 2 PCP No primary care provider on file. ESOPHAGOGASTRODUODENOSCOPY (EGD) N/A 6/12/2020    Performed by Layton Xiong MD at [address] [facility] Right 6/25/2019    Performed by Jerry Herrera DPM at [address] [facility]   • INJ TENDON/L mg, Oral, 4 times per day  •  HYDROmorphone HCl (DILAUDID) tab 2 mg, 2 mg, Oral, Q4H PRN  •  acetaminophen (TYLENOL) tab 650 mg, 650 mg, Oral, 4 times per day  •  amLODIPine Besylate (NORVASC) tab 5 mg, 5 mg, Oral, Daily  •  atorvastatin (LIPITOR) tab 10 m loss, blurred vision, double vision or yellow sclerae. Ears, Nose, Throat:  No hearing loss, sneezing, congestion, runny nose or sore throat. SKIN:  No rash or itching.   CARDIOVASCULAR:  No chest pain, chest pressure or chest discomfort  RESPIRATORY:  No Microbiology: Reviewed in EMR    Radiology: Reviewed    ASSESSMENT:    Antibiotics: off abx    Patient is a  75year-old female with a history of anemia, cardiomyopathy, CHF, DM, obesity, HTN/HLD, CKD    Pt presents for revision of R TKA (originally

## 2020-06-20 NOTE — PROGRESS NOTES
[facility] Pharmacy Note:  Renal Adjustment for cefepime (MAXIPIME)    Chad Kaiser is a 75year old female who has been prescribed cefepime (MAXIPIME) 1000 mg every 8 hrs.   CrCl is estimated creatinine clearance is 27.1 mL/min (A) (based on SCr of 1.45 mg/dL

## 2020-06-20 NOTE — PROGRESS NOTES
[facility]    Progress Note    Connor Rubalcava Patient Status:  Inpatient    1945 MRN [id number]   Location [facility] 4W/SW/SE Attending Clemencia Nieto MD   Hosp Day # 2 PCP No primary care provider on file.        Subjective mild-to-moderate cardiomegaly with mild prominence of pulmonary vascularity, but no gregory pulmonary edema. Slight blunting the posterior costophrenic angles bilaterally. No acute focal airspace consolidation.     Dictated by (CST): Arabella Hickman MD on 6/

## 2020-06-20 NOTE — PROGRESS NOTES
[facility] Pharmacy Note:  Renal Dose Adjustment for Enoxaparin (LOVENOX)    Whit Morris has been prescribed Enoxaparin (LOVENOX) 40 mg subcutaneously every 24 hours. Estimated Creatinine Clearance: 27.1 mL/min (A) (based on SCr of 1.45 mg/dL (H)).     Her

## 2020-06-20 NOTE — PROGRESS NOTES
[facility] Dosing Service    Initial Pharmacokinetic Consult for Vancomycin Dosing     Diane Franco is a 75year old female who is being treated for PJI .   Pharmacy has been asked to dose Vancomycin by Radha [facility]    She is allergic to hydrocodo

## 2020-06-21 PROCEDURE — 82962 GLUCOSE BLOOD TEST: CPT

## 2020-06-21 PROCEDURE — 80048 BASIC METABOLIC PNL TOTAL CA: CPT | Performed by: FAMILY MEDICINE

## 2020-06-21 PROCEDURE — 85027 COMPLETE CBC AUTOMATED: CPT | Performed by: FAMILY MEDICINE

## 2020-06-21 NOTE — PLAN OF CARE
Patient has scheduled tylenol for pain control. Uses rolling chair for bathroom assistance. IV Abx given. Will continue to monitor.      Problem: Diabetes/Glucose Control  Goal: Glucose maintained within prescribed range  Description  INTERVENTIONS:  - Lorrie comfort level or patient's stated pain goal  Description  INTERVENTIONS:  - Encourage pt to monitor pain and request assistance  - Assess pain using appropriate pain scale  - Administer analgesics based on type and severity of pain and evaluate response  - health  - Refer to Case Management Department for coordinating discharge planning if the patient needs post-hospital services based on physician/LIP order or complex needs related to functional status, cognitive ability or social support system  Outcome: P

## 2020-06-21 NOTE — PROGRESS NOTES
[facility]    Progress Note    Beatrice Sims Patient Status:  Inpatient    1945 MRN [id number]   Location [facility] 4W/SW/SE Attending Aye Duarte MD   Hosp Day # 3 PCP No primary care provider on file.         Subject coreg, enalapril  - well compensated,continue bumex 1mg BID     Presumed CAD:  - atorva 40 mg  - hold on ASA pending surgical plans     HTN: normotensive  - cont current regimen     HLD: high-intensity statin     AF: in sinus on tele  - cont BB  - coumadin

## 2020-06-21 NOTE — PROGRESS NOTES
[facility]    Progress Note    [facility] Patient Status:  Inpatient    1945 MRN [id number]   Location [facility] 4W/SW/SE Attending Vernie Cheadle, MD   Hosp Day # 3 PCP No primary care provider on file.        Subjective Assessment and Plan:     Failed total knee arthroplasty, sequela  Antibiotics started.  Awaiting surgical resection with spacer, cardiac clearance      Pulmonary hypertension (HCC)  severe      Cardiomyopathy (HCC)  EF 39%      Anemia  Transfused 2 unit

## 2020-06-21 NOTE — PLAN OF CARE
Problem: Diabetes/Glucose Control  Goal: Glucose maintained within prescribed range  Description  INTERVENTIONS:  - Monitor Blood Glucose as ordered  - Assess for signs and symptoms of hyperglycemia and hypoglycemia  - Administer ordered medications to m resources  Description  INTERVENTIONS:  - Identify barriers to discharge w/pt and caregiver  - Include patient/family/discharge partner in discharge planning  - Arrange for needed discharge resources and transportation as appropriate  - Identify discharge

## 2020-06-21 NOTE — PLAN OF CARE
Problem: Diabetes/Glucose Control  Goal: Glucose maintained within prescribed range  Description  INTERVENTIONS:  - Monitor Blood Glucose as ordered  - Assess for signs and symptoms of hyperglycemia and hypoglycemia  - Administer ordered medications to m Assess pain using appropriate pain scale  - Administer analgesics based on type and severity of pain and evaluate response  - Implement non-pharmacological measures as appropriate and evaluate response  - Consider cultural and social influences on pain and on physician/LIP order or complex needs related to functional status, cognitive ability or social support system  Outcome: Progressing     Problem: SKIN/TISSUE INTEGRITY - ADULT  Goal: Incision(s), wounds(s) or drain site(s) healing without S/S of infectio

## 2020-06-22 ENCOUNTER — APPOINTMENT (OUTPATIENT)
Dept: HEMATOLOGY/ONCOLOGY | Facility: HOSPITAL | Age: 75
End: 2020-06-22
Attending: INTERNAL MEDICINE
Payer: MEDICARE

## 2020-06-22 ENCOUNTER — TELEPHONE (OUTPATIENT)
Dept: GASTROENTEROLOGY | Facility: CLINIC | Age: 75
End: 2020-06-22

## 2020-06-22 ENCOUNTER — APPOINTMENT (OUTPATIENT)
Dept: INTERVENTIONAL RADIOLOGY/VASCULAR | Facility: HOSPITAL | Age: 75
DRG: 247 | End: 2020-06-22
Attending: INTERNAL MEDICINE
Payer: MEDICARE

## 2020-06-22 ENCOUNTER — APPOINTMENT (OUTPATIENT)
Dept: GENERAL RADIOLOGY | Facility: HOSPITAL | Age: 75
DRG: 247 | End: 2020-06-22
Attending: PHYSICIAN ASSISTANT
Payer: MEDICARE

## 2020-06-22 PROBLEM — I25.10 CORONARY ARTERY DISEASE DUE TO CALCIFIED CORONARY LESION: Status: ACTIVE | Noted: 2020-06-22

## 2020-06-22 PROBLEM — I25.84 CORONARY ARTERY DISEASE DUE TO CALCIFIED CORONARY LESION: Status: ACTIVE | Noted: 2020-06-22

## 2020-06-22 LAB
CHOLEST SERPL-MCNC: 121 MG/DL
HDLC SERPL-MCNC: 47 MG/DL
LDLC SERPL CALC-MCNC: 60 MG/DL
NONHDLC SERPL-MCNC: 74 MG/DL
TRIGL SERPL-MCNC: 72 MG/DL
VLDLC SERPL CALC-MCNC: 14 MG/DL

## 2020-06-22 PROCEDURE — B2111ZZ FLUOROSCOPY OF MULTIPLE CORONARY ARTERIES USING LOW OSMOLAR CONTRAST: ICD-10-PCS | Performed by: INTERNAL MEDICINE

## 2020-06-22 PROCEDURE — 80053 COMPREHEN METABOLIC PANEL: CPT | Performed by: INTERNAL MEDICINE

## 2020-06-22 PROCEDURE — 85025 COMPLETE CBC W/AUTO DIFF WBC: CPT | Performed by: INTERNAL MEDICINE

## 2020-06-22 PROCEDURE — 99211 OFF/OP EST MAY X REQ PHY/QHP: CPT

## 2020-06-22 PROCEDURE — 93458 L HRT ARTERY/VENTRICLE ANGIO: CPT

## 2020-06-22 PROCEDURE — 027034Z DILATION OF CORONARY ARTERY, ONE ARTERY WITH DRUG-ELUTING INTRALUMINAL DEVICE, PERCUTANEOUS APPROACH: ICD-10-PCS | Performed by: INTERNAL MEDICINE

## 2020-06-22 PROCEDURE — 80061 LIPID PANEL: CPT | Performed by: INTERNAL MEDICINE

## 2020-06-22 PROCEDURE — 99152 MOD SED SAME PHYS/QHP 5/>YRS: CPT

## 2020-06-22 PROCEDURE — 85610 PROTHROMBIN TIME: CPT | Performed by: INTERNAL MEDICINE

## 2020-06-22 PROCEDURE — 82962 GLUCOSE BLOOD TEST: CPT

## 2020-06-22 PROCEDURE — 93571 IV DOP VEL&/PRESS C FLO 1ST: CPT

## 2020-06-22 PROCEDURE — 99153 MOD SED SAME PHYS/QHP EA: CPT

## 2020-06-22 PROCEDURE — 85347 COAGULATION TIME ACTIVATED: CPT

## 2020-06-22 PROCEDURE — 80202 ASSAY OF VANCOMYCIN: CPT | Performed by: PHYSICIAN ASSISTANT

## 2020-06-22 PROCEDURE — 4A023N7 MEASUREMENT OF CARDIAC SAMPLING AND PRESSURE, LEFT HEART, PERCUTANEOUS APPROACH: ICD-10-PCS | Performed by: INTERNAL MEDICINE

## 2020-06-22 RX ORDER — SODIUM CHLORIDE 9 MG/ML
INJECTION, SOLUTION INTRAVENOUS CONTINUOUS
Status: ACTIVE | OUTPATIENT
Start: 2020-06-22 | End: 2020-06-22

## 2020-06-22 RX ORDER — PRASUGREL 10 MG/1
TABLET, FILM COATED ORAL
Status: COMPLETED
Start: 2020-06-22 | End: 2020-06-22

## 2020-06-22 RX ORDER — ONDANSETRON 2 MG/ML
INJECTION INTRAMUSCULAR; INTRAVENOUS
Status: COMPLETED
Start: 2020-06-22 | End: 2020-06-22

## 2020-06-22 RX ORDER — METOCLOPRAMIDE HYDROCHLORIDE 5 MG/ML
5 INJECTION INTRAMUSCULAR; INTRAVENOUS EVERY 6 HOURS PRN
Status: DISPENSED | OUTPATIENT
Start: 2020-06-22 | End: 2020-06-24

## 2020-06-22 RX ORDER — DOCUSATE SODIUM 100 MG/1
100 CAPSULE, LIQUID FILLED ORAL 2 TIMES DAILY
Status: DISCONTINUED | OUTPATIENT
Start: 2020-06-22 | End: 2020-06-26

## 2020-06-22 RX ORDER — PRASUGREL 10 MG/1
10 TABLET, FILM COATED ORAL DAILY
Status: DISCONTINUED | OUTPATIENT
Start: 2020-06-23 | End: 2020-06-26

## 2020-06-22 RX ORDER — VANCOMYCIN HYDROCHLORIDE
15 EVERY 12 HOURS
Status: DISCONTINUED | OUTPATIENT
Start: 2020-06-22 | End: 2020-06-22

## 2020-06-22 RX ORDER — BISACODYL 10 MG
10 SUPPOSITORY, RECTAL RECTAL
Status: DISCONTINUED | OUTPATIENT
Start: 2020-06-22 | End: 2020-06-26

## 2020-06-22 RX ORDER — SODIUM CHLORIDE, SODIUM LACTATE, POTASSIUM CHLORIDE, CALCIUM CHLORIDE 600; 310; 30; 20 MG/100ML; MG/100ML; MG/100ML; MG/100ML
INJECTION, SOLUTION INTRAVENOUS CONTINUOUS
Status: DISCONTINUED | OUTPATIENT
Start: 2020-06-22 | End: 2020-06-24

## 2020-06-22 RX ORDER — HYDROMORPHONE HYDROCHLORIDE 1 MG/ML
1.2 INJECTION, SOLUTION INTRAMUSCULAR; INTRAVENOUS; SUBCUTANEOUS EVERY 2 HOUR PRN
Status: DISCONTINUED | OUTPATIENT
Start: 2020-06-22 | End: 2020-06-26

## 2020-06-22 RX ORDER — CYCLOBENZAPRINE HCL 5 MG
5 TABLET ORAL EVERY 8 HOURS PRN
Status: DISCONTINUED | OUTPATIENT
Start: 2020-06-22 | End: 2020-06-26

## 2020-06-22 RX ORDER — DIPHENHYDRAMINE HYDROCHLORIDE 50 MG/ML
12.5 INJECTION INTRAMUSCULAR; INTRAVENOUS EVERY 4 HOURS PRN
Status: DISCONTINUED | OUTPATIENT
Start: 2020-06-22 | End: 2020-06-26

## 2020-06-22 RX ORDER — DIPHENHYDRAMINE HCL 25 MG
25 CAPSULE ORAL EVERY 4 HOURS PRN
Status: DISCONTINUED | OUTPATIENT
Start: 2020-06-22 | End: 2020-06-26

## 2020-06-22 RX ORDER — LIDOCAINE HYDROCHLORIDE 20 MG/ML
INJECTION, SOLUTION EPIDURAL; INFILTRATION; INTRACAUDAL; PERINEURAL
Status: COMPLETED
Start: 2020-06-22 | End: 2020-06-22

## 2020-06-22 RX ORDER — POLYETHYLENE GLYCOL 3350 17 G/17G
17 POWDER, FOR SOLUTION ORAL DAILY PRN
Status: DISCONTINUED | OUTPATIENT
Start: 2020-06-22 | End: 2020-06-26

## 2020-06-22 RX ORDER — ONDANSETRON 2 MG/ML
4 INJECTION INTRAMUSCULAR; INTRAVENOUS ONCE
Status: COMPLETED | OUTPATIENT
Start: 2020-06-22 | End: 2020-06-22

## 2020-06-22 RX ORDER — ASPIRIN 81 MG/1
81 TABLET ORAL DAILY
Status: DISCONTINUED | OUTPATIENT
Start: 2020-06-23 | End: 2020-06-22

## 2020-06-22 RX ORDER — METOCLOPRAMIDE HYDROCHLORIDE 5 MG/ML
10 INJECTION INTRAMUSCULAR; INTRAVENOUS EVERY 6 HOURS PRN
Status: DISCONTINUED | OUTPATIENT
Start: 2020-06-22 | End: 2020-06-22

## 2020-06-22 RX ORDER — FAMOTIDINE 20 MG/1
20 TABLET ORAL 2 TIMES DAILY
Status: DISCONTINUED | OUTPATIENT
Start: 2020-06-22 | End: 2020-06-22

## 2020-06-22 RX ORDER — PROCHLORPERAZINE EDISYLATE 5 MG/ML
10 INJECTION INTRAMUSCULAR; INTRAVENOUS EVERY 6 HOURS PRN
Status: ACTIVE | OUTPATIENT
Start: 2020-06-22 | End: 2020-06-24

## 2020-06-22 RX ORDER — HYDROMORPHONE HYDROCHLORIDE 1 MG/ML
0.8 INJECTION, SOLUTION INTRAMUSCULAR; INTRAVENOUS; SUBCUTANEOUS EVERY 2 HOUR PRN
Status: DISCONTINUED | OUTPATIENT
Start: 2020-06-22 | End: 2020-06-26

## 2020-06-22 RX ORDER — ONDANSETRON 2 MG/ML
4 INJECTION INTRAMUSCULAR; INTRAVENOUS EVERY 4 HOURS PRN
Status: DISPENSED | OUTPATIENT
Start: 2020-06-22 | End: 2020-06-24

## 2020-06-22 RX ORDER — SODIUM PHOSPHATE, DIBASIC AND SODIUM PHOSPHATE, MONOBASIC 7; 19 G/133ML; G/133ML
1 ENEMA RECTAL ONCE AS NEEDED
Status: DISCONTINUED | OUTPATIENT
Start: 2020-06-22 | End: 2020-06-26

## 2020-06-22 RX ORDER — MIDAZOLAM HYDROCHLORIDE 1 MG/ML
INJECTION INTRAMUSCULAR; INTRAVENOUS
Status: COMPLETED
Start: 2020-06-22 | End: 2020-06-22

## 2020-06-22 RX ORDER — ASPIRIN 81 MG/1
81 TABLET, CHEWABLE ORAL ONCE
Status: COMPLETED | OUTPATIENT
Start: 2020-06-22 | End: 2020-06-22

## 2020-06-22 RX ORDER — VANCOMYCIN HYDROCHLORIDE
1250 EVERY 24 HOURS
Status: DISCONTINUED | OUTPATIENT
Start: 2020-06-23 | End: 2020-06-26

## 2020-06-22 RX ORDER — DIPHENHYDRAMINE HYDROCHLORIDE 50 MG/ML
25 INJECTION INTRAMUSCULAR; INTRAVENOUS ONCE AS NEEDED
Status: ACTIVE | OUTPATIENT
Start: 2020-06-22 | End: 2020-06-22

## 2020-06-22 RX ORDER — HYDROMORPHONE HYDROCHLORIDE 1 MG/ML
0.4 INJECTION, SOLUTION INTRAMUSCULAR; INTRAVENOUS; SUBCUTANEOUS EVERY 2 HOUR PRN
Status: DISCONTINUED | OUTPATIENT
Start: 2020-06-22 | End: 2020-06-26

## 2020-06-22 RX ORDER — HEPARIN SODIUM 1000 [USP'U]/ML
INJECTION, SOLUTION INTRAVENOUS; SUBCUTANEOUS
Status: COMPLETED
Start: 2020-06-22 | End: 2020-06-22

## 2020-06-22 RX ORDER — FAMOTIDINE 10 MG/ML
20 INJECTION, SOLUTION INTRAVENOUS 2 TIMES DAILY
Status: DISCONTINUED | OUTPATIENT
Start: 2020-06-22 | End: 2020-06-22

## 2020-06-22 NOTE — PROGRESS NOTES
[facility]     MHS/AMG Cardiology Progress Note    Deandra Nigel Patient Status:  Inpatient    1945 MRN [id number]   Location [facility] 4W/SW/SE Attending Bridgette Almaraz MD   Hosp Day # 4 PCP No primary care provider on fi 31.1   RDW 17.3*  --  17.9*   NEPRELIM 4.08  --   --    WBC 6.0  --  7.8   .0  --  192.0    < > = values in this interval not displayed.      Recent Labs   Lab 06/18/20  1620 06/19/20  0412 06/20/20  0538 06/21/20  0537   * 151* 168* 147*   BU test with fixed perfusion defect, both new when compared to prior studies (TTE 2/2019, nuclear stress 10/2018). Findings are concerning for new unrevascularized CAD. This places her at high risk for perioperative CV complications.  Recommend cardiac cath, a

## 2020-06-22 NOTE — TELEPHONE ENCOUNTER
Romulo Scherer MD  [facility] Clinical Staff             GI staff: please place recall for colonoscopy in 3 years

## 2020-06-22 NOTE — PROGRESS NOTES
Kamilah La Grange  [id number]  6/22/2020    Post procedure/ recovery hand-off report given to [facility]. Patient's vital signs stable, access site dry and intact, no signs and symptoms of bleeding/ hematoma.     Mary Horta RN

## 2020-06-22 NOTE — PROGRESS NOTES
[facility] dosing service    Follow-up Pharmacokinetic Consult for Vancomycin Dosing     Makayla Dash is a 75year old female who is being treated for prosthetic joint infection .    Patient is on day 3 of Vancomycin and is currently receiving 1 gm IV

## 2020-06-22 NOTE — TELEPHONE ENCOUNTER
Entered into Epic:Recall colon in 3 years per Dr. Allen Mixon. Last Colon done 6/12/2020, next due 6/12/2023. HM updated. Letter mailed.

## 2020-06-22 NOTE — WOUND PROGRESS NOTE
SPOKE to bedside RN Feli Gagnon and advised the right knee surgical wound should be addressed by the surgeon, Dr. Mazin Munoz. If surgeon would like wound care services to evaluate the wound, please advise.

## 2020-06-22 NOTE — PROCEDURES
[facility]    MHS/AMG Cardiac Cath Procedure Note  Spokane Stalling Patient Status:  Inpatient    1945 MRN [id number]   Location [facility] 4W/SW/SE Attending Jamal Todd MD   Hosp Day # 4 PCP No primary care provider on fi intervention stenosis 0%.   Pre GONZALEZ 3, Post GONZALEZ 3.     Guide Catheter: EBU 3.5  Wire: IFR Volcano wire  IFR: Was measured at 0.85 which is significant  Stent: 3 mm x 18 mm Medtronic [place] drug-eluting stent x1 at 14 MICHEL    IV was maintained by RN and moder

## 2020-06-22 NOTE — PROGRESS NOTES
[place] [facility] - [facility] ID PROGRESS NOTE    Thea Mclean Patient Status:  Inpatient    1945 MRN [id number]   Location [facility] 4W/SW/SE Attending Jakob Brand MD   Hosp Day # 4 PCP No primary care provider on file.      Sub vascular dysfunction     Angioedema     PAF (paroxysmal atrial fibrillation) (HCC)     Polyp of colon     Nonrheumatic mitral valve regurgitation     Pulmonary hypertension (HCC)     Hiatal hernia with GERD and esophagitis     Failed total knee arthroplast [facility]  [phone]  6/22/2020

## 2020-06-22 NOTE — PLAN OF CARE
Aspirin listed as allergy in patient's chart - reaction being n/v and rash. Discussed with patient. She apparently had n/v and rash to full aspirin, but does ok on baby aspirin without rash/n/v.  Was maintained on baby aspirin for yrs without issue but take

## 2020-06-23 PROCEDURE — 82962 GLUCOSE BLOOD TEST: CPT

## 2020-06-23 PROCEDURE — 85027 COMPLETE CBC AUTOMATED: CPT | Performed by: INTERNAL MEDICINE

## 2020-06-23 PROCEDURE — 80048 BASIC METABOLIC PNL TOTAL CA: CPT | Performed by: INTERNAL MEDICINE

## 2020-06-23 RX ORDER — ONDANSETRON 2 MG/ML
4 INJECTION INTRAMUSCULAR; INTRAVENOUS EVERY 6 HOURS PRN
Status: DISCONTINUED | OUTPATIENT
Start: 2020-06-23 | End: 2020-06-26

## 2020-06-23 RX ORDER — ENOXAPARIN SODIUM 100 MG/ML
40 INJECTION SUBCUTANEOUS NIGHTLY
Status: DISCONTINUED | OUTPATIENT
Start: 2020-06-23 | End: 2020-06-26

## 2020-06-23 RX ORDER — ATORVASTATIN CALCIUM 40 MG/1
40 TABLET, FILM COATED ORAL NIGHTLY
Qty: 30 TABLET | Refills: 2 | Status: SHIPPED | OUTPATIENT
Start: 2020-06-23

## 2020-06-23 RX ORDER — PRASUGREL 10 MG/1
10 TABLET, FILM COATED ORAL DAILY
Qty: 30 TABLET | Refills: 11 | Status: ON HOLD | OUTPATIENT
Start: 2020-06-24 | End: 2020-08-28

## 2020-06-23 RX ORDER — ASPIRIN 81 MG/1
81 TABLET ORAL DAILY
Status: DISCONTINUED | OUTPATIENT
Start: 2020-06-23 | End: 2020-06-26

## 2020-06-23 RX ORDER — ASPIRIN 81 MG/1
81 TABLET ORAL DAILY
Qty: 30 TABLET | Refills: 11 | Status: SHIPPED | OUTPATIENT
Start: 2020-06-23

## 2020-06-23 NOTE — CARDIAC REHAB
Cardiac Rehab Phase I    Activity:  Distance   Assistance needed   Patient tolerated activity . Education:  Handouts provided and reviewed: [address] Diet: Healthy Cardiac diet reviewed.     Disease Process: Disease process rev

## 2020-06-23 NOTE — PLAN OF CARE
Alert and oriented. IV Cefepime infused. Leg brace in place. Occlusive dressing in place at cath site. No c/o chest or leg pain. Scheduled Tylenol given. Trazadone requested for sleep. Pt on bed rest until midnight. Bed in lowest position.  Bed alarm in jossy level they choose  - Honor patient and family perspectives and choices   Outcome: Progressing     Problem: PAIN - ADULT  Goal: Verbalizes/displays adequate comfort level or patient's stated pain goal  Description  INTERVENTIONS:  - Encourage pt to monitor preferences of patient/family/discharge partner  - Complete POLST form as appropriate  - Assess patient's ability to be responsible for managing their own health  - Refer to Case Management Department for coordinating discharge planning if the patient need

## 2020-06-23 NOTE — DIETARY NOTE
NUTRITION EDUCATION NOTE    Received consult for nutrition education per cardiac rehab order set. Appropriate education and handout(s) provided. See education section of Epic for specifics.     Marylin Headley, Dietetic Intern   [phone]

## 2020-06-23 NOTE — CM/SW NOTE
Received MDO for d/c planning. Per RN rounds, pt will need long term IV anbx and is currently requesting home infusions. SW/CM received call from [facility]/ [facility] informing that pt is not covered for home infusions.  SW also spoke w/ YARI Huber about possible

## 2020-06-23 NOTE — PROGRESS NOTES
[facility]    Ortho Medical Progress Note     Scarlet Heading Patient Status:  Inpatient    1945 MRN [id number]   Location [facility] 3W/SW Attending Silvana Kinsey MD   Hosp Day # 5 PCP No primary care provider on file. 06/23/2020    .0 06/23/2020    CREATSERUM 1.09 (H) 06/23/2020    BUN 26 (H) 06/23/2020     06/23/2020    K 3.9 06/23/2020     06/23/2020    CO2 27.0 06/23/2020    GLU 95 06/23/2020    CA 9.1 06/23/2020    ALB 2.2 (L) 06/22/2020    ALKPHO

## 2020-06-23 NOTE — PROGRESS NOTES
[facility]    Progress Note    Deandra Manning Patient Status:  Inpatient    1945 MRN [id number]   Location [facility] 3W/SW Attending Bridgette Almaraz MD   Hosp Day # 4 PCP No primary care provider on file.        Subjective: sequela        CAD with 70% lesion to LAD stented today.  Stable on tele        Pulmonary hypertension (HCC)        Cardiomyopathy ([address]        Anemia        Hyperlipidemia        Morbid obesity (HCC)        PAF (paroxysmal atrial fibrillation) ([address]

## 2020-06-23 NOTE — PROGRESS NOTES
[place] [facility]    Paul Solo Patient Status:  Inpatient    1945 MRN [id number]   Location [facility] 4W/SW/SE Attending Tal Gipson MD   Hosp Day # 5 PCP No primary care provider on file.      Sub Osteopenia     Pain in joint     Rotator cuff syndrome     Temporary cerebral vascular dysfunction     Angioedema     PAF (paroxysmal atrial fibrillation) (HCC)     Polyp of colon     Nonrheumatic mitral valve regurgitation     Pulmonary hypertension ([address] likely need PICC. -  Follow fever curve, wbc. Repeat CBC/diff tomorrow AM.  -  Reviewed labs, micro, imaging reports.  -  Case d/w patient, RN.     Sangeeta Higgins PA-C   [facility]  [phone]  6/22/2020

## 2020-06-23 NOTE — PROGRESS NOTES
[facility] - [facility]     MHS/AMG Cardiology Progress Note    [facility] Patient Status:  Inpatient    1945 MRN [id number]   Location [facility] 3W/SW Attending Elise Child MD   Hosp Day # 5 PCP No primary care provider on file. Labs:  Recent Labs   Lab 06/22/20  1143 06/23/20  0447   RBC 3.47* 3.35*   HGB 9.0* 8.6*   HCT 28.5* 27.5*   MCV 82.1 82.1   MCH 25.9* 25.7*   MCHC 31.6 31.3   RDW 17.7* 17.5*   NEPRELIM 4.55  --    WBC 6.0 6.7   .0 186.0     Recent Labs   Lab Catheter: EBU 3.5  Wire: IFR Volcano wire  IFR: Was measured at 0.85 which is significant  Stent: 3 mm x 18 mm Medtronic [place] drug-eluting stent x1 at 14 MICHEL    Regadenson SPECT:  CONCLUSION: Abnormal myocardial perfusion study demonstrating subtle fixed a ASA, prasugrel, statin, BB, acei     HTN:   - cont current regimen      CKD:   - Cr stable     HLD: atorva increased to 40 mg this admission     AF: in sinus on tele  - cont BB  - coumadin stopped in past after GIB.  Per outpt cardiology notes, Watchman had

## 2020-06-23 NOTE — CDS QUERY
DR. Tiffany Baldwin" To answer this query:   1. Click \"Edit\" button on the toolbar. 2. Type an \"X\" in the bracket for diagnosis(s) that apply. (You may also add additional clinical details as you feel necessary to substantiate your response.)  3.  Click on \"S DIAGNOSES THAT APPLY.     (    ) Acute Systolic Heart Failure   (    ) Acute on Chronic Systolic Heart Failure    (    ) Chronic Systolic Heart Failure              (    ) Acute Diastolic Heart Failure  (    ) Acute on Chronic Diastolic Heart Failure   (

## 2020-06-23 NOTE — PHYSICAL THERAPY NOTE
Attempted to see pt 2x for PT eval today. Pt refused both times. In AM, pt reported that she was not feeling well (nauseous, abdominal pain, etc.). RN aware.  Returned in PM. Pt refused again because she wanted to finishing eating her lunch despite educatio

## 2020-06-23 NOTE — PROGRESS NOTES
[facility] Pharmacy Note:  Renal Dose Adjustment for Enoxaparin (LOVENOX)    Whit Morris has been prescribed Enoxaparin (LOVENOX) 30 mg subcutaneously every 24 hours. Estimated Creatinine Clearance: 36.1 mL/min (A) (based on SCr of 1.09 mg/dL (H)).     Her

## 2020-06-24 ENCOUNTER — APPOINTMENT (OUTPATIENT)
Dept: PICC SERVICES | Facility: HOSPITAL | Age: 75
DRG: 247 | End: 2020-06-24
Attending: PHYSICIAN ASSISTANT
Payer: MEDICARE

## 2020-06-24 ENCOUNTER — APPOINTMENT (OUTPATIENT)
Dept: GENERAL RADIOLOGY | Facility: HOSPITAL | Age: 75
DRG: 247 | End: 2020-06-24
Attending: INTERNAL MEDICINE
Payer: MEDICARE

## 2020-06-24 PROCEDURE — 97530 THERAPEUTIC ACTIVITIES: CPT

## 2020-06-24 PROCEDURE — 82962 GLUCOSE BLOOD TEST: CPT

## 2020-06-24 PROCEDURE — 97161 PT EVAL LOW COMPLEX 20 MIN: CPT

## 2020-06-24 PROCEDURE — 71045 X-RAY EXAM CHEST 1 VIEW: CPT | Performed by: INTERNAL MEDICINE

## 2020-06-24 PROCEDURE — 85018 HEMOGLOBIN: CPT | Performed by: NURSE PRACTITIONER

## 2020-06-24 PROCEDURE — 85014 HEMATOCRIT: CPT | Performed by: NURSE PRACTITIONER

## 2020-06-24 PROCEDURE — 36573 INSJ PICC RS&I 5 YR+: CPT

## 2020-06-24 PROCEDURE — 02HV33Z INSERTION OF INFUSION DEVICE INTO SUPERIOR VENA CAVA, PERCUTANEOUS APPROACH: ICD-10-PCS | Performed by: INTERNAL MEDICINE

## 2020-06-24 RX ORDER — VANCOMYCIN HYDROCHLORIDE
1.25 EVERY 24 HOURS
Qty: 10000 ML | Refills: 0 | Status: SHIPPED | OUTPATIENT
Start: 2020-06-24 | End: 2020-07-28

## 2020-06-24 NOTE — PHYSICAL THERAPY NOTE
PHYSICAL THERAPY EVALUATION - INPATIENT     Room Number: 805/128-L  Evaluation Date: 6/24/2020  Type of Evaluation: Initial   Physician Order: PT Eval and Treat    Presenting Problem: failed TKA RLE, needed cardiac clearance, had stent placed 6/23/20, nee scoot her foot along the floor and is unable to lift LLE to take steps without putting too much pressure through RLE.  She has been managing at home with a w/c and family to assist. At start of session PT noted that [address] is not correctly on and patient is tommy [facility])    Coronary artery disease due to calcified coronary lesion      Past Medical History  Past Medical History:   Diagnosis Date   • Back problem    • Calculus of kidney    • Cataract 2017    surgery   • Diabetes ([facility] [address]     14 yrs   • Esophageal reflux reports in the last month that she has been living at home at w/c level. She has a bedside commode and w/c and walker and her family and friends check in several times a day.  She has been transferring herself to bed, chair, w/c and commode fairly well even on and standing up from a chair with arms (e.g., wheelchair, bedside commode, etc.): A Lot   -   Moving from lying on back to sitting on the side of the bed?: A Little   How much help from another person does the patient currently need. ..   -   Moving to a level: supervision with walker - rolling and knee immobilizer     Goal #2  Current Status    Goal #3 Patient is able to transfer bed to/from recliner with supervision and RW and knee immobilizer feet    Goal #3   Current Status    Goal #4 Patient will nego

## 2020-06-24 NOTE — PROGRESS NOTES
[facility]     MHS/AMG Cardiology Progress Note    Connor Rubalcava Patient Status:  Inpatient    1945 MRN [id number]   Location [facility] 3W/SW Attending Clemencia Nieto MD   Hosp Day # 6 PCP No primary care provider on file. --    HGB 9.0* 8.6* 8.3*   HCT 28.5* 27.5* 26.3*   MCV 82.1 82.1  --    MCH 25.9* 25.7*  --    MCHC 31.6 31.3  --    RDW 17.7* 17.5*  --    NEPRELIM 4.55  --   --    WBC 6.0 6.7  --    .0 186.0  --      Recent Labs   Lab 06/18/20  1620  06/20/20  0 Volcano wire  IFR: Was measured at 0.85 which is significant  Stent: 3 mm x 18 mm Medtronic [place] drug-eluting stent x1 at 14 MICHEL     Regadenson SPECT:  CONCLUSION: Abnormal myocardial perfusion study demonstrating subtle fixed apical and inferior wall perf DAPT  - cont high intensity statin, BB, acei  - pt should f/u with her primary cardiologist in 2 wks    ICM:  - now s/p PCI LAD  - recommend repeat echo in 3 mo to reassess LVEF   - cont coreg, enalapril  - well compensated. Cont current diuretic regimen.

## 2020-06-24 NOTE — PROGRESS NOTES
[place] [facility]    Progress Note    Samuel Hatch Patient Status:  Inpatient    1945 MRN [id number]   Location [facility] 3W/SW Attending Amy Fajardo MD   Hosp Day # 6 PCP No primary care provider on file.        Subjective: Coronary artery disease due to calcified coronary lesion  Now stented      Cardiomyopathy ([address]        Anemia        Hyperlipidemia        Morbid obesity (HCC)        PAF (paroxysmal atrial fibrillation) (HCC)        Nonrheumatic mitral valve regurgitation

## 2020-06-24 NOTE — PLAN OF CARE
Stent placed in LAD yesterday per Dr. Kwan Mora. Groin site intact. Patient up to the chair this afternoon. Plan is for Right knee surgery in about 4 weeks.  In the interim, ID is working with social work to determine an appropriate discharge plan with appropri decision-making at the level they choose  - Honor patient and family perspectives and choices   Outcome: Progressing     Problem: PAIN - ADULT  Goal: Verbalizes/displays adequate comfort level or patient's stated pain goal  Description  INTERVENTIONS:  - E Consider post-discharge preferences of patient/family/discharge partner  - Complete POLST form as appropriate  - Assess patient's ability to be responsible for managing their own health  - Refer to Case Management Department for coordinating discharge plan

## 2020-06-24 NOTE — CM/SW NOTE
Case Management/ Progression of Care    Provided patient with list of Skilled Nursing Facilities with in her insurance network. Patient will need IV antibiotics for 6 weeks, via PICC line.        PLAN: SNF or IV antibiotics pending patients decision on S

## 2020-06-24 NOTE — PROGRESS NOTES
[place] [facility]    Moraima Mills Patient Status:  Inpatient    1945 MRN [id number]   Location [facility] 4W/SW/SE Attending Afua Germain MD   Hosp Day # 6 PCP No primary care provider on file.      Sub deficiency anemia     Low back pain     Morbid obesity (HCC)     Muscle weakness     Neuropathy     Osteopenia     Pain in joint     Rotator cuff syndrome     Temporary cerebral vascular dysfunction     Angioedema     PAF (paroxysmal atrial fibrillation) ( Awaiting rehab placement. -  Follow fever curve, wbc. -  Reviewed labs, micro, imaging reports.  -  Case d/w patient, RN.     Wil Collier PA-C   [facility]  [phone]  6/22/2020

## 2020-06-24 NOTE — CM/SW NOTE
Case Management/ Progression of Care    Patient presented with open wounds to the Rt knee, S/P a Right total knee replacement 2018 and noted to have a tendon rupture and dislocation  In 5/2020  Per Infectious Disease will need PICC line for IV antibiotics

## 2020-06-24 NOTE — PLAN OF CARE
Alert and oriented. VSS. Brace on right leg. No tenderness or hematoma at cath site. Pt up to the rolling chair. Call light in reach. Bed alarm on. Bed in lowest position. Will continue to monitor.        Problem: Diabetes/Glucose Control  Goal: Glucose jorden Progressing     Problem: PAIN - ADULT  Goal: Verbalizes/displays adequate comfort level or patient's stated pain goal  Description  INTERVENTIONS:  - Encourage pt to monitor pain and request assistance  - Assess pain using appropriate pain scale  - Adminis appropriate  - Assess patient's ability to be responsible for managing their own health  - Refer to Case Management Department for coordinating discharge planning if the patient needs post-hospital services based on physician/LIP order or complex needs rel

## 2020-06-25 PROCEDURE — 86900 BLOOD TYPING SEROLOGIC ABO: CPT | Performed by: FAMILY MEDICINE

## 2020-06-25 PROCEDURE — 86901 BLOOD TYPING SEROLOGIC RH(D): CPT | Performed by: FAMILY MEDICINE

## 2020-06-25 PROCEDURE — 86850 RBC ANTIBODY SCREEN: CPT | Performed by: FAMILY MEDICINE

## 2020-06-25 PROCEDURE — 85014 HEMATOCRIT: CPT | Performed by: NURSE PRACTITIONER

## 2020-06-25 PROCEDURE — 86920 COMPATIBILITY TEST SPIN: CPT

## 2020-06-25 PROCEDURE — 85018 HEMOGLOBIN: CPT | Performed by: NURSE PRACTITIONER

## 2020-06-25 PROCEDURE — 85014 HEMATOCRIT: CPT | Performed by: FAMILY MEDICINE

## 2020-06-25 PROCEDURE — 85018 HEMOGLOBIN: CPT | Performed by: FAMILY MEDICINE

## 2020-06-25 PROCEDURE — 82962 GLUCOSE BLOOD TEST: CPT

## 2020-06-25 RX ORDER — SODIUM CHLORIDE 9 MG/ML
INJECTION, SOLUTION INTRAVENOUS ONCE
Status: COMPLETED | OUTPATIENT
Start: 2020-06-25 | End: 2020-06-25

## 2020-06-25 NOTE — PROGRESS NOTES
[facility]    Progress Note    Dheeraj Fearing Patient Status:  Inpatient    1945 MRN [id number]   Location [facility] 3W/SW Attending Ruth Ann Gonzales MD   Hosp Day # 7 PCP No primary care provider on file.        Subjective: cardiomegaly and normal pulmonary vascularity. No acute airspace disease. Prominent pulmonary markings throughout the interstitium more likely chronic.     Dictated by (CST): Maricruz Berry MD on 6/24/2020 at 3:09 PM     Finalized by (CST): Maricruz Berry,

## 2020-06-25 NOTE — PLAN OF CARE
Problem: Diabetes/Glucose Control  Goal: Glucose maintained within prescribed range  Description  INTERVENTIONS:  - Monitor Blood Glucose as ordered  - Assess for signs and symptoms of hyperglycemia and hypoglycemia  - Administer ordered medications to m monitor pain and request assistance  - Assess pain using appropriate pain scale  - Administer analgesics based on type and severity of pain and evaluate response  - Implement non-pharmacological measures as appropriate and evaluate response  - Consider cul discharge planning if the patient needs post-hospital services based on physician/LIP order or complex needs related to functional status, cognitive ability or social support system  Outcome: Adequate for Discharge     Problem: SKIN/TISSUE INTEGRITY - ADUL

## 2020-06-25 NOTE — PLAN OF CARE
This evening patient is comfortable, denies SOB/CP, and continues to receive IV vanco. Plan is to discharge to rehab with PICC and long-term IV antibiotics.  Bed locked in lowest position, bed alarm activated, call light within reach, and frequent rounding decision-making at the level they choose  - Honor patient and family perspectives and choices   Outcome: Progressing     Problem: PAIN - ADULT  Goal: Verbalizes/displays adequate comfort level or patient's stated pain goal  Description  INTERVENTIONS:  - E Consider post-discharge preferences of patient/family/discharge partner  - Complete POLST form as appropriate  - Assess patient's ability to be responsible for managing their own health  - Refer to Case Management Department for coordinating discharge plan

## 2020-06-25 NOTE — PLAN OF CARE
Problem: Diabetes/Glucose Control  Goal: Glucose maintained within prescribed range  Description  INTERVENTIONS:  - Monitor Blood Glucose as ordered  - Assess for signs and symptoms of hyperglycemia and hypoglycemia  - Administer ordered medications to m physical needs  - Identify cognitive and physical deficits and behaviors that affect risk of falls.   - [place] fall precautions as indicated by assessment.  - Educate pt/family on patient safety including physical limitations  - Instruct pt to call for a ADULT  Goal: Return mobility to safest level of function  Description  INTERVENTIONS:  - Assess patient stability and activity tolerance for standing, transferring and ambulating w/ or w/o assistive devices  - Assist with transfers and ambulation using saf

## 2020-06-25 NOTE — CM/SW NOTE
Case Management /Progression of Care    Chart reviewed, patient has been chosen a skilled nursing facility [facility], pending medical clearance. Reserved in [facility].      PLAN:   [address]

## 2020-06-26 VITALS
SYSTOLIC BLOOD PRESSURE: 137 MMHG | TEMPERATURE: 98 F | DIASTOLIC BLOOD PRESSURE: 52 MMHG | BODY MASS INDEX: 31 KG/M2 | OXYGEN SATURATION: 99 % | WEIGHT: 173.69 LBS | RESPIRATION RATE: 16 BRPM | HEART RATE: 60 BPM

## 2020-06-26 PROCEDURE — 36592 COLLECT BLOOD FROM PICC: CPT

## 2020-06-26 PROCEDURE — 82962 GLUCOSE BLOOD TEST: CPT

## 2020-06-26 PROCEDURE — 80048 BASIC METABOLIC PNL TOTAL CA: CPT | Performed by: FAMILY MEDICINE

## 2020-06-26 PROCEDURE — 36591 DRAW BLOOD OFF VENOUS DEVICE: CPT

## 2020-06-26 PROCEDURE — 85025 COMPLETE CBC W/AUTO DIFF WBC: CPT | Performed by: FAMILY MEDICINE

## 2020-06-26 PROCEDURE — 85014 HEMATOCRIT: CPT | Performed by: NURSE PRACTITIONER

## 2020-06-26 PROCEDURE — 85018 HEMOGLOBIN: CPT | Performed by: NURSE PRACTITIONER

## 2020-06-26 PROCEDURE — 80202 ASSAY OF VANCOMYCIN: CPT | Performed by: FAMILY MEDICINE

## 2020-06-26 RX ORDER — ACETAMINOPHEN 325 MG/1
650 TABLET ORAL EVERY 6 HOURS PRN
Refills: 0 | Status: ON HOLD | COMMUNITY
Start: 2020-06-26 | End: 2020-08-03

## 2020-06-26 RX ORDER — POLYETHYLENE GLYCOL 3350 17 G/17G
17 POWDER, FOR SOLUTION ORAL DAILY PRN
Refills: 0 | Status: SHIPPED | COMMUNITY
Start: 2020-06-26 | End: 2020-07-15

## 2020-06-26 RX ORDER — POTASSIUM CHLORIDE 29.8 MG/ML
40 INJECTION INTRAVENOUS ONCE
Status: COMPLETED | OUTPATIENT
Start: 2020-06-26 | End: 2020-06-26

## 2020-06-26 RX ORDER — ENOXAPARIN SODIUM 100 MG/ML
40 INJECTION SUBCUTANEOUS NIGHTLY
Qty: 30 SYRINGE | Refills: 0 | Status: ON HOLD | OUTPATIENT
Start: 2020-06-26 | End: 2020-08-27

## 2020-06-26 RX ORDER — TRAZODONE HYDROCHLORIDE 50 MG/1
50 TABLET ORAL NIGHTLY PRN
Qty: 30 TABLET | Refills: 0 | Status: SHIPPED | OUTPATIENT
Start: 2020-06-26 | End: 2021-01-01

## 2020-06-26 NOTE — PROGRESS NOTES
[facility]    Ortho Medical Progress Note     Vishal Kidd Patient Status:  Inpatient    1945 MRN [id number]   Location [facility] 3W/SW Attending Tawana Prieto MD   Hosp Day # 8 PCP No primary care provider on file. Results   Component Value Date    WBC 6.0 06/26/2020    HGB 8.7 (L) 06/26/2020    HCT 27.1 (L) 06/26/2020    .0 06/26/2020    CREATSERUM 1.07 (H) 06/26/2020    BUN 27 (H) 06/26/2020     06/26/2020    K 3.6 06/26/2020     06/26/2020    CO

## 2020-06-26 NOTE — CM/SW NOTE
06/26/20 1500   Discharge disposition   Expected discharge disposition Skilled Nurs   Name of Jonathon Forrestfrancisco. 97. Ext   Discharge transportation [address]   JENNI received for discharge to [address] [facility] facility.    Patient has be

## 2020-06-26 NOTE — PROGRESS NOTES
[facility] dosing service    Follow-up Pharmacokinetic Consult for Vancomycin Dosing     Ansley Thibodeaux is a 75year old patient who is being treated for PJI . Patient is on day 5 of Vancomycin and is currently receiving 1.25 gm IV Q 24 hours.   Goal t

## 2020-06-26 NOTE — PROGRESS NOTES
[facility] dosing service    Follow-up Pharmacokinetic Consult for Vancomycin Dosing     Eriberto Hopkins is a 75year old patient who is being treated for PJI . Patient is on day 5 of Vancomycin and is currently receiving 1.25 gm IV Q 24 hours.   Goal t

## 2020-06-26 NOTE — PLAN OF CARE
This evening patient is comfortable, denies SOB/CP. Patient received a PRN dose of temazepam for sleep and tylenol for pain. Plan is to discharge to a rehab with PICC for 6 weeks of IV antibiotic therapy.  Bed locked in lowest position, bed alarm activated, patient/family to participate in care and decision-making at the level they choose  - Honor patient and family perspectives and choices   Outcome: Progressing     Problem: PAIN - ADULT  Goal: Verbalizes/displays adequate comfort level or patient's stated p interpreters to assist at discharge as needed  - Consider post-discharge preferences of patient/family/discharge partner  - Complete POLST form as appropriate  - Assess patient's ability to be responsible for managing their own health  - Refer to [facility] FOR REHAB MEDICINE

## 2020-06-30 ENCOUNTER — EXTERNAL FACILITY (OUTPATIENT)
Dept: FAMILY MEDICINE CLINIC | Facility: CLINIC | Age: 75
End: 2020-06-30

## 2020-06-30 DIAGNOSIS — I25.84 CORONARY ARTERY DISEASE DUE TO CALCIFIED CORONARY LESION: Primary | ICD-10-CM

## 2020-06-30 DIAGNOSIS — Z96.659 FAILED TOTAL KNEE ARTHROPLASTY, SEQUELA: ICD-10-CM

## 2020-06-30 DIAGNOSIS — D50.0 IRON DEFICIENCY ANEMIA DUE TO CHRONIC BLOOD LOSS: ICD-10-CM

## 2020-06-30 DIAGNOSIS — T84.018S FAILED TOTAL KNEE ARTHROPLASTY, SEQUELA: ICD-10-CM

## 2020-06-30 DIAGNOSIS — I25.10 CORONARY ARTERY DISEASE DUE TO CALCIFIED CORONARY LESION: Primary | ICD-10-CM

## 2020-06-30 DIAGNOSIS — E11.65 TYPE 2 DIABETES MELLITUS WITH HYPERGLYCEMIA, WITHOUT LONG-TERM CURRENT USE OF INSULIN (HCC): ICD-10-CM

## 2020-06-30 PROCEDURE — 99306 1ST NF CARE HIGH MDM 50: CPT | Performed by: FAMILY MEDICINE

## 2020-06-30 NOTE — PROGRESS NOTES
HPI:    Patient ID: Taina Joshi is a 75year old female. HPI  Pt seen and evaluated for H and P at [place] [address] rehab facility. Transferred from [facility] for subacute rehab.       Past Medical History:   Diagnosis Date   • Back problem    • Sachin • Prasugrel HCl 10 MG Oral Tab Take 1 tablet (10 mg total) by mouth daily. 30 tablet 11   • Ferrous Sulfate 325 (65 Fe) MG Oral Tab Take 1 tablet (325 mg total) by mouth 3 (three) times daily.  (Patient taking differently: Take 325 mg by mouth daily with br Comment:Can tolerate low dose but not regular strength d/t             GI problemsStomach ulcers  Codeine                 NAUSEA AND VOMITING   PHYSICAL EXAM:   Physical Exam   Constitutional: She appears well-developed and well-nourished.    Cortez Novoa

## 2020-06-30 NOTE — DISCHARGE SUMMARY
[facility]    Discharge Summary    Meli Tubbs Patient Status:  Inpatient    1945 MRN [id number]   Location [facility] 3W/SW Attending No att. providers found   [address] Day # 8 PCP No primary care provider on file.      Date o Carolyn Garcia MD Consulting Physician  Family Medicine    Lucho Yun MD Consulting Physician  Interventional, Cardiology     Gelacio Chacon MD Consulting Physician  INFECTIOUS DISEASES    Keenan Olivas MD Consulting Physician  SURGERY, ORTHOPEDIC Historical    Fluticasone Propionate 50 MCG/ACT Nasal Suspension  USE 1 SPRAY(S) IN EACH NOSTRIL ONCE DAILY, Historical, R-4    Glucose Blood (PRECISION XTRA BLOOD GLUCOSE) In Vitro Strip  USE 1 STRIP TO CHECK GLUCOSE THREE TIMES DAILY, Historical, R-5

## 2020-07-01 ENCOUNTER — SNF ADMIT/H&P (OUTPATIENT)
Dept: INTERNAL MEDICINE CLINIC | Facility: SKILLED NURSING FACILITY | Age: 75
End: 2020-07-01

## 2020-07-01 DIAGNOSIS — Z01.89 ENCOUNTER FOR LABORATORY EXAMINATION: ICD-10-CM

## 2020-07-01 DIAGNOSIS — Z79.899 ENCOUNTER FOR MEDICATION REVIEW: ICD-10-CM

## 2020-07-01 DIAGNOSIS — D50.0 IRON DEFICIENCY ANEMIA DUE TO CHRONIC BLOOD LOSS: ICD-10-CM

## 2020-07-01 DIAGNOSIS — Z09 FOLLOW UP: ICD-10-CM

## 2020-07-01 DIAGNOSIS — D64.9 ANEMIA, UNSPECIFIED TYPE: ICD-10-CM

## 2020-07-01 DIAGNOSIS — E78.2 MIXED HYPERLIPIDEMIA: ICD-10-CM

## 2020-07-01 DIAGNOSIS — I50.22 CHRONIC SYSTOLIC CONGESTIVE HEART FAILURE (HCC): ICD-10-CM

## 2020-07-01 PROCEDURE — 99310 SBSQ NF CARE HIGH MDM 45: CPT | Performed by: NURSE PRACTITIONER

## 2020-07-03 NOTE — PROGRESS NOTES
Maria Isabel Gardner  : 1945  Age 75year old  female patient is admitted to [facility] for KASEY.     Chief complaint:Initial  NP Assessment/Follow up Infected/Failed RIght TKA and Anemia    HPI  Patient is a 41-year-old  fe 11/19/2018    Performed by Melanie Gannon MD at [address] [place] [address] MAIN OR   • PRESSURE ULCER BOOT Right 03/2019   • TMJ ARTHROSCOPY DEBRIDEMENT Right 07/2019    heel of the fooot   • TOTAL KNEE REPLACEMENT Right 10/29/2018     Family History   Problem Relation Age of On excessive bruising,hemoptysis, or any bleeding. PHYSICAL EXAM:  Gen: A+Ox3, pleasant and interactive. No distress. Calm and cooperative. Appropriate. HEENT: NCAT, neck supple, no carotid bruit. No JVD. CV: RRR and no murmur.   Pulm: Effort and

## 2020-07-06 ENCOUNTER — VIRTUAL PHONE E/M (OUTPATIENT)
Dept: HEMATOLOGY/ONCOLOGY | Facility: HOSPITAL | Age: 75
End: 2020-07-06
Attending: INTERNAL MEDICINE
Payer: MEDICARE

## 2020-07-06 DIAGNOSIS — D50.8 OTHER IRON DEFICIENCY ANEMIA: ICD-10-CM

## 2020-07-06 DIAGNOSIS — D52.8 OTHER FOLATE DEFICIENCY ANEMIAS: ICD-10-CM

## 2020-07-06 DIAGNOSIS — N18.9 CHRONIC KIDNEY DISEASE, UNSPECIFIED CKD STAGE: ICD-10-CM

## 2020-07-06 PROCEDURE — G2012 BRIEF CHECK IN BY MD/QHP: HCPCS | Performed by: INTERNAL MEDICINE

## 2020-07-06 NOTE — PROGRESS NOTES
Virtual/Telephone Check-In    Whit Morris verbally consents to a Virtual/Telephone Check-In service on 07/06/20. Patient understands and accepts financial responsibility for any deductible, co-insurance and/or co-pays associated with this service.   Bernett Scheuermann Enoxaparin Sodium 40 MG/0.4ML Subcutaneous Solution, Inject 0.4 mL (40 mg total) into the skin nightly., Disp: 30 Syringe, Rfl: 0  •  PEG 3350 17 g Oral Powd Pack, Take 17 g by mouth daily as needed. , Disp: , Rfl: 0  •  traZODone HCl 50 MG Oral Tab, Take 1 Misc, USE 1 4 TIMES DAILY, Disp: , Rfl: 1  •  Omeprazole 40 MG Oral Capsule Delayed Release, TAKE 1 CAPSULE BY MOUTH ONCE DAILY, Disp: , Rfl: 3  •  Loperamide HCl (IMODIUM A-D) 2 MG Oral Cap, Take 2 mg by mouth as needed for Diarrhea., Disp: , Rfl:   •  En the fooot   • TOTAL KNEE REPLACEMENT Right 10/29/2018      Family History   Problem Relation Age of Onset   • Cancer Father         prostate   • Diabetes Mother    • Anemia Mother    • Diabetes Sister    • Anemia Sister    • Other (Other) Sister         cv follow up in 3 months for her anemia     --NICHOL+/titer value at 160, anti-DSNA 40; may equal SLE; moderately positive for Sjogren's, strongly recommended for Rheumatology referral. She has established with Dr. Chhaya Sandoval; only has dry eyes     --current hg value  --Reviewed results with pt today via phone.  She has no evidence of osteolytic/osteoblastic lesion of skeletal survey, 24 urine protein collection negative for Bence-Muhammad proteins  --Patient CKD is likely contributing to increased kappa and lambda v Muhammad Protein). Reviewed By: Jameel Mena M.D.          XR bone survey 6/15/2020  FINDINGS:          SKULL: No osteolytic or osteoblastic lesions identified. The sella turcica is normal in size.     VERTEBRA:      No osteolytic or osteoblastic lesion

## 2020-07-07 DIAGNOSIS — D50.8 OTHER IRON DEFICIENCY ANEMIA: Primary | ICD-10-CM

## 2020-07-10 ENCOUNTER — SNF VISIT (OUTPATIENT)
Dept: INTERNAL MEDICINE CLINIC | Facility: SKILLED NURSING FACILITY | Age: 75
End: 2020-07-10

## 2020-07-10 DIAGNOSIS — Z09 FOLLOW UP: ICD-10-CM

## 2020-07-10 DIAGNOSIS — Z01.89 ENCOUNTER FOR LABORATORY EXAMINATION: ICD-10-CM

## 2020-07-10 PROCEDURE — 99308 SBSQ NF CARE LOW MDM 20: CPT | Performed by: NURSE PRACTITIONER

## 2020-07-10 NOTE — PROGRESS NOTES
Sushant Mckeon  : 1945  Age 75year old  female patient is admitted to [facility] for KASEY. Chief complaint: Follow up Infected/Failed RIght TKA and Anemia. Reviewed labs.     HPI  Patient is a 59-year-old [place] American female w Performed by Syeda Munoz MD at [facility] MAIN OR   • OTHER  2003    debridement r/t spider bite - brown reclusive, right thigh   • OTHER      removal kidney stone   • PATELLA TENDON REPAIR Right 11/19/2018    Performed by Syeda Munoz MD at [address] [place] [address] tenderness, diarrhea or constipation. Denies N or Pixie Parrot frequency/hematuria/dysuria. Skin: Denies rash or itching. Neuro: Denies weakness, syncope or headache. Hematologic: Denies excessive bruising,hemoptysis, or any bleeding.           PHYSI

## 2020-07-14 ENCOUNTER — EXTERNAL FACILITY (OUTPATIENT)
Dept: FAMILY MEDICINE CLINIC | Facility: CLINIC | Age: 75
End: 2020-07-14

## 2020-07-14 DIAGNOSIS — D50.0 IRON DEFICIENCY ANEMIA DUE TO CHRONIC BLOOD LOSS: Primary | ICD-10-CM

## 2020-07-14 DIAGNOSIS — K21.9 GASTROESOPHAGEAL REFLUX DISEASE, ESOPHAGITIS PRESENCE NOT SPECIFIED: ICD-10-CM

## 2020-07-14 DIAGNOSIS — M17.11 ARTHRITIS OF RIGHT KNEE: ICD-10-CM

## 2020-07-14 PROCEDURE — 99308 SBSQ NF CARE LOW MDM 20: CPT | Performed by: FAMILY MEDICINE

## 2020-07-14 NOTE — PROGRESS NOTES
HPI:    Patient ID: Zara Toney is a 75year old female. HPI  No chief complaint on file. Pt seen at [facility]. F/o diarrhea and emsis today. No fever. No abdominal pain.       Review of Systems   Constitutional: Positive for appetite Nasal Suspension USE 1 SPRAY(S) IN EACH NOSTRIL ONCE DAILY  4   • Glucose Blood (PRECISION XTRA BLOOD GLUCOSE) In Vitro Strip USE 1 STRIP TO CHECK GLUCOSE THREE TIMES DAILY  5   • BD PEN NEEDLE GABRIELA U/F 32G X 4 MM Does not apply Misc USE 1 PEN NEEDLE 4 PRAVEEN reflux disease, esophagitis presence not specified    Decrease iron due to nausea. Change PPI due to her request  Follow anemia. No orders of the defined types were placed in this encounter.       Meds This Visit:  Requested Prescriptions      No pres

## 2020-07-15 ENCOUNTER — OFFICE VISIT (OUTPATIENT)
Dept: FAMILY MEDICINE CLINIC | Facility: CLINIC | Age: 75
End: 2020-07-15
Payer: MEDICARE

## 2020-07-15 ENCOUNTER — LAB ENCOUNTER (OUTPATIENT)
Dept: LAB | Facility: HOSPITAL | Age: 75
End: 2020-07-15
Attending: FAMILY MEDICINE
Payer: MEDICARE

## 2020-07-15 ENCOUNTER — SNF VISIT (OUTPATIENT)
Dept: INTERNAL MEDICINE CLINIC | Facility: SKILLED NURSING FACILITY | Age: 75
End: 2020-07-15

## 2020-07-15 ENCOUNTER — TELEPHONE (OUTPATIENT)
Dept: HEMATOLOGY/ONCOLOGY | Facility: HOSPITAL | Age: 75
End: 2020-07-15

## 2020-07-15 ENCOUNTER — TELEPHONE (OUTPATIENT)
Dept: FAMILY MEDICINE CLINIC | Facility: CLINIC | Age: 75
End: 2020-07-15

## 2020-07-15 VITALS
HEIGHT: 62.5 IN | DIASTOLIC BLOOD PRESSURE: 62 MMHG | BODY MASS INDEX: 30.29 KG/M2 | WEIGHT: 168.81 LBS | RESPIRATION RATE: 16 BRPM | SYSTOLIC BLOOD PRESSURE: 118 MMHG

## 2020-07-15 DIAGNOSIS — Z96.659 FAILED TOTAL KNEE ARTHROPLASTY, SEQUELA: ICD-10-CM

## 2020-07-15 DIAGNOSIS — Z01.818 OTHER SPECIFIED PRE-OPERATIVE EXAMINATION: Primary | ICD-10-CM

## 2020-07-15 DIAGNOSIS — E66.01 MORBID OBESITY (HCC): ICD-10-CM

## 2020-07-15 DIAGNOSIS — D50.9 IRON DEFICIENCY ANEMIA, UNSPECIFIED IRON DEFICIENCY ANEMIA TYPE: ICD-10-CM

## 2020-07-15 DIAGNOSIS — Z01.818 OTHER SPECIFIED PRE-OPERATIVE EXAMINATION: ICD-10-CM

## 2020-07-15 DIAGNOSIS — Z01.812 PRE-OPERATIVE LABORATORY EXAMINATION: ICD-10-CM

## 2020-07-15 DIAGNOSIS — I27.20 PULMONARY HYPERTENSION (HCC): ICD-10-CM

## 2020-07-15 DIAGNOSIS — I25.84 CORONARY ARTERY DISEASE DUE TO CALCIFIED CORONARY LESION: ICD-10-CM

## 2020-07-15 DIAGNOSIS — E11.65 TYPE 2 DIABETES MELLITUS WITH HYPERGLYCEMIA, WITHOUT LONG-TERM CURRENT USE OF INSULIN (HCC): ICD-10-CM

## 2020-07-15 DIAGNOSIS — T84.018S FAILED TOTAL KNEE ARTHROPLASTY, SEQUELA: ICD-10-CM

## 2020-07-15 DIAGNOSIS — N28.9 RENAL INSUFFICIENCY: ICD-10-CM

## 2020-07-15 DIAGNOSIS — I25.10 CORONARY ARTERY DISEASE DUE TO CALCIFIED CORONARY LESION: ICD-10-CM

## 2020-07-15 DIAGNOSIS — I48.0 PAF (PAROXYSMAL ATRIAL FIBRILLATION) (HCC): ICD-10-CM

## 2020-07-15 DIAGNOSIS — I10 ESSENTIAL HYPERTENSION: ICD-10-CM

## 2020-07-15 DIAGNOSIS — E87.6 HYPOKALEMIA: ICD-10-CM

## 2020-07-15 DIAGNOSIS — R73.9 ELEVATED BLOOD SUGAR: ICD-10-CM

## 2020-07-15 DIAGNOSIS — Z96.651 STATUS POST RIGHT KNEE REPLACEMENT: ICD-10-CM

## 2020-07-15 DIAGNOSIS — Z01.89 ENCOUNTER FOR LABORATORY EXAMINATION: ICD-10-CM

## 2020-07-15 DIAGNOSIS — E78.2 MIXED HYPERLIPIDEMIA: ICD-10-CM

## 2020-07-15 LAB
ALBUMIN SERPL-MCNC: 2.7 G/DL (ref 3.4–5)
ALBUMIN/GLOB SERPL: 0.6 {RATIO} (ref 1–2)
ALP LIVER SERPL-CCNC: 144 U/L (ref 55–142)
ALT SERPL-CCNC: 34 U/L (ref 13–56)
ANION GAP SERPL CALC-SCNC: 5 MMOL/L (ref 0–18)
AST SERPL-CCNC: 16 U/L (ref 15–37)
BACTERIA UR QL AUTO: NEGATIVE /HPF
BASOPHILS # BLD AUTO: 0.01 X10(3) UL (ref 0–0.2)
BASOPHILS NFR BLD AUTO: 0.2 %
BILIRUB SERPL-MCNC: 0.4 MG/DL (ref 0.1–2)
BUN BLD-MCNC: 41 MG/DL (ref 7–18)
BUN/CREAT SERPL: 30.6 (ref 10–20)
CALCIUM BLD-MCNC: 9.5 MG/DL (ref 8.5–10.1)
CHLORIDE SERPL-SCNC: 107 MMOL/L (ref 98–112)
CO2 SERPL-SCNC: 28 MMOL/L (ref 21–32)
CREAT BLD-MCNC: 1.34 MG/DL (ref 0.55–1.02)
CRP SERPL-MCNC: <0.29 MG/DL (ref ?–0.3)
DEPRECATED RDW RBC AUTO: 61.1 FL (ref 35.1–46.3)
EOSINOPHIL # BLD AUTO: 0.26 X10(3) UL (ref 0–0.7)
EOSINOPHIL NFR BLD AUTO: 5.2 %
ERYTHROCYTE [DISTWIDTH] IN BLOOD BY AUTOMATED COUNT: 19.3 % (ref 11–15)
ERYTHROCYTE [SEDIMENTATION RATE] IN BLOOD: 80 MM/HR (ref 0–30)
EST. AVERAGE GLUCOSE BLD GHB EST-MCNC: 120 MG/DL (ref 68–126)
GLOBULIN PLAS-MCNC: 4.5 G/DL (ref 2.8–4.4)
GLUCOSE BLD-MCNC: 135 MG/DL (ref 70–99)
HBA1C MFR BLD HPLC: 5.8 % (ref ?–5.7)
HCT VFR BLD AUTO: 27.7 % (ref 35–48)
HGB BLD-MCNC: 8.5 G/DL (ref 12–16)
HYALINE CASTS #/AREA URNS AUTO: 17 /LPF
IMM GRANULOCYTES # BLD AUTO: 0.01 X10(3) UL (ref 0–1)
IMM GRANULOCYTES NFR BLD: 0.2 %
LYMPHOCYTES # BLD AUTO: 0.85 X10(3) UL (ref 1–4)
LYMPHOCYTES NFR BLD AUTO: 17.1 %
M PROTEIN MFR SERPL ELPH: 7.2 G/DL (ref 6.4–8.2)
MCH RBC QN AUTO: 26.5 PG (ref 26–34)
MCHC RBC AUTO-ENTMCNC: 30.7 G/DL (ref 31–37)
MCV RBC AUTO: 86.3 FL (ref 80–100)
MONOCYTES # BLD AUTO: 0.52 X10(3) UL (ref 0.1–1)
MONOCYTES NFR BLD AUTO: 10.5 %
NEUTROPHILS # BLD AUTO: 3.31 X10 (3) UL (ref 1.5–7.7)
NEUTROPHILS # BLD AUTO: 3.31 X10(3) UL (ref 1.5–7.7)
NEUTROPHILS NFR BLD AUTO: 66.8 %
OSMOLALITY SERPL CALC.SUM OF ELEC: 302 MOSM/KG (ref 275–295)
PATIENT FASTING Y/N/NP: NO
PLATELET # BLD AUTO: 137 10(3)UL (ref 150–450)
POTASSIUM SERPL-SCNC: 3.8 MMOL/L (ref 3.5–5.1)
RBC # BLD AUTO: 3.21 X10(6)UL (ref 3.8–5.3)
RBC #/AREA URNS AUTO: 5 /HPF
SODIUM SERPL-SCNC: 140 MMOL/L (ref 136–145)
WBC # BLD AUTO: 5 X10(3) UL (ref 4–11)
WBC #/AREA URNS AUTO: 1 /HPF

## 2020-07-15 PROCEDURE — 80053 COMPREHEN METABOLIC PANEL: CPT

## 2020-07-15 PROCEDURE — 85025 COMPLETE CBC W/AUTO DIFF WBC: CPT

## 2020-07-15 PROCEDURE — 36415 COLL VENOUS BLD VENIPUNCTURE: CPT

## 2020-07-15 PROCEDURE — 86140 C-REACTIVE PROTEIN: CPT

## 2020-07-15 PROCEDURE — 85652 RBC SED RATE AUTOMATED: CPT

## 2020-07-15 PROCEDURE — 81015 MICROSCOPIC EXAM OF URINE: CPT

## 2020-07-15 PROCEDURE — 99215 OFFICE O/P EST HI 40 MIN: CPT | Performed by: FAMILY MEDICINE

## 2020-07-15 PROCEDURE — 99308 SBSQ NF CARE LOW MDM 20: CPT | Performed by: NURSE PRACTITIONER

## 2020-07-15 PROCEDURE — 83036 HEMOGLOBIN GLYCOSYLATED A1C: CPT

## 2020-07-15 PROCEDURE — 87081 CULTURE SCREEN ONLY: CPT

## 2020-07-15 NOTE — TELEPHONE ENCOUNTER
Spoke to Dr. Destin Watson office for Cardiac Clearance request with info,cardiac cath, colonoscopy,  Claretha Money, telemetry tracings, labs from today,and cardiac note, and question if Dr. Emerita Delgado agrees with Dr. Amisha Sandoval note.  This was faxed to: Fax# [phone]

## 2020-07-15 NOTE — TELEPHONE ENCOUNTER
Surgery on 08/03/20, right knee resection with insertion of antibiotic spacer with Dr. Maricarmen Olson @ [facility]    H&P-  complete  Labs- RBC (3.21, HGB (8.5), HCT (27.7), MCHC (30.7), RDW-SD (61.1), RDW (19.3), PLT (137.0), Lymphocyte absolute (0.85), FBS (135), HGA1C

## 2020-07-16 NOTE — PROGRESS NOTES
Zara Toney  : 1945  Age 75year old  female patient is admitted to [facility] for KASEY. Chief complaint: Follow up Infected/Failed RIght TKA and Anemia. Reviewed labs.     HPI  Patient is a 48-year-old [place] American female w 6/25/2019    Performed by Ap Morris DPM at [address] [place] [address] MAIN OR   • [address] [place] [address] TENDON/LIGAMENT/CYST Right 01/2019   • KNEE TOTAL REPLACEMENT Right 10/29/2018    Performed by Sesar Boss MD at [address] [place] [place] [address] OR   • OTHER  2003    debridement abnormality. REVIEW OF SYSTEMS:See HPI   Constitutional: Denies fever, chills, unintentional weight loss or gain. CV: Denies SOB, dizziness,palpitations or CP. Respiratory: Denies SOB, cough or wheezing   GI: Denies black or bloody stools.  Denies Bryce Molina APRN

## 2020-07-17 NOTE — H&P (VIEW-ONLY)
[facility]-OP CLINIC [place]    PRE-OP NOTE    HPI:   I have been consulted by Dr. Yasmani Conde to see Whit Morris 75year old female for a preoperative evaluation and medical clearance.  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 30 tablet 0   • Vancomycin HCl in NaCl 1.25-0.9 GM/250ML-% Intravenous Solution Inject 250 mL (1.25 g total) into the vein daily.  ICD 10: T84.53XA Weekly CBC/diff, CMP, ESR, CRP, vanco trough 39101 History:   Diagnosis Date   • Back problem    • Calculus of kidney    • Cataract 2017    surgery   • Diabetes ([facility] [address]     14 yrs   • Esophageal reflux    • Essential hypertension    • High blood pressure    • High cholesterol    • History of blood transfusio file    Tobacco Use      Smoking status: Former Smoker        Packs/day: 0.50        Years: 8.00        Pack years: 4        Types: Cigarettes        Quit date: 10/11/1980        Years since quittin.7      Smokeless tobacco: Never Used    Substance an ataxia,  HEMATOLOGIC:  Denies anemia, bleeding or bruising. LYMPHATICS:  Denies enlarged nodes   PSYCHIATRIC:  Denies depression or anxiety.   ENDOCRINOLOGIC: DM 2 yes, Hypothyroid no  ALLERGIES:  Denies allergic response, history of asthma, hives,     EXA 07/15/2020    ALKPHO 144 (H) 07/15/2020    BILT 0.4 07/15/2020    TP 7.2 07/15/2020    AST 16 07/15/2020    ALT 34 07/15/2020    PTT 28.1 02/19/2020    INR 1.16 06/22/2020    TSH 1.630 06/18/2020    PUNEET 21 (L) 12/20/2019    LIP 14 (L) 12/20/2019    ESRML 8 insulin:  Serum Creatinine >2 mg/dl:No    Flora has an RCRI score that is moderate to higy risk and is at 19% risk for major cardiac event in the perioperative period. Patient is medically optimized and has a moderate to high surgical risk.  in view of

## 2020-07-17 NOTE — H&P
[facility] PRE-OP CLINIC [place]    PRE-OP NOTE    HPI:   I have been consulted by Dr. Nell Schwab to see Lissa Bridges 75year old female for a preoperative evaluation and medical clearance.  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 30 tablet 0   • Vancomycin HCl in NaCl 1.25-0.9 GM/250ML-% Intravenous Solution Inject 250 mL (1.25 g total) into the vein daily.  ICD 10: T84.53XA Weekly CBC/diff, CMP, ESR, CRP, vanco trough 80389 History:   Diagnosis Date   • Back problem    • Calculus of kidney    • Cataract 2017    surgery   • Diabetes ([facility] [address]     14 yrs   • Esophageal reflux    • Essential hypertension    • High blood pressure    • High cholesterol    • History of blood transfusio file    Tobacco Use      Smoking status: Former Smoker        Packs/day: 0.50        Years: 8.00        Pack years: 4        Types: Cigarettes        Quit date: 10/11/1980        Years since quittin.7      Smokeless tobacco: Never Used    Substance an ataxia,  HEMATOLOGIC:  Denies anemia, bleeding or bruising. LYMPHATICS:  Denies enlarged nodes   PSYCHIATRIC:  Denies depression or anxiety.   ENDOCRINOLOGIC: DM 2 yes, Hypothyroid no  ALLERGIES:  Denies allergic response, history of asthma, hives,     EXA 07/15/2020    ALKPHO 144 (H) 07/15/2020    BILT 0.4 07/15/2020    TP 7.2 07/15/2020    AST 16 07/15/2020    ALT 34 07/15/2020    PTT 28.1 02/19/2020    INR 1.16 06/22/2020    TSH 1.630 06/18/2020    PUNEET 21 (L) 12/20/2019    LIP 14 (L) 12/20/2019    ESRML 8 insulin:  Serum Creatinine >2 mg/dl:No    Flora has an RCRI score that is moderate to higy risk and is at 19% risk for major cardiac event in the perioperative period. Patient is medically optimized and has a moderate to high surgical risk.  in view of

## 2020-07-17 NOTE — TELEPHONE ENCOUNTER
Left message for Cardio Dr. Willis Lemus to call back or fax clearance.  Their office did confirm they received documents faxed and will make sure the Dr reviews them and comments as to if she is clear for surgery or not since she was just seen 07/13 but Dr did

## 2020-07-20 NOTE — TELEPHONE ENCOUNTER
Spoke to Anahi from Dr. Rosie Lyn office. She will make sure he looks at the records today and decides if he wants to clear the patient or not so we have time to get her in with [facility] Dr. Jimmy Webster.

## 2020-07-21 NOTE — TELEPHONE ENCOUNTER
Spoke to Anahi, she states Dr. Willis Lemus is OK clearing patient. However if she needs to hold blood thinners that will need to come from [address] [place] [address] Cardiologist that put in stent. She is faxing over clearance today. Normal

## 2020-07-22 ENCOUNTER — TELEPHONE (OUTPATIENT)
Dept: CARDIOLOGY | Age: 75
End: 2020-07-22

## 2020-07-22 PROBLEM — T84.59XA INFECTED PROSTHETIC KNEE JOINT (HCC): Status: ACTIVE | Noted: 2020-07-22

## 2020-07-22 PROBLEM — Z96.659 INFECTED PROSTHETIC KNEE JOINT (HCC): Status: ACTIVE | Noted: 2020-07-22

## 2020-07-22 NOTE — TELEPHONE ENCOUNTER
Spoke to [address] [place] [address] with Dr. Lucho Ramirez office [phone]. She states Dr. Ata Moran is out of town until Monday.  She will give her the message about needing anticoagulation recommendations and that she saw another Cardiologist 07/13 who cleared her for surgery

## 2020-07-22 NOTE — TELEPHONE ENCOUNTER
Received Cardiac Clearance from Dr. Danielle Ferrell office, they cleared her but deferred anticoagulation to Dr. Ольга Russell at [address] [place] [address].  I spoke to Dr. Hallie Larsen office, she is out this week but her Assistant [facility] will call back after noon today to let us know what to

## 2020-07-28 ENCOUNTER — EXTERNAL FACILITY (OUTPATIENT)
Dept: FAMILY MEDICINE CLINIC | Facility: CLINIC | Age: 75
End: 2020-07-28

## 2020-07-28 DIAGNOSIS — Z96.659 INFECTION OF PROSTHETIC KNEE JOINT, SUBSEQUENT ENCOUNTER: ICD-10-CM

## 2020-07-28 DIAGNOSIS — E11.65 TYPE 2 DIABETES MELLITUS WITH HYPERGLYCEMIA, WITHOUT LONG-TERM CURRENT USE OF INSULIN (HCC): ICD-10-CM

## 2020-07-28 DIAGNOSIS — N18.9 ANEMIA DUE TO CHRONIC KIDNEY DISEASE, UNSPECIFIED CKD STAGE: ICD-10-CM

## 2020-07-28 DIAGNOSIS — T84.59XD INFECTION OF PROSTHETIC KNEE JOINT, SUBSEQUENT ENCOUNTER: ICD-10-CM

## 2020-07-28 DIAGNOSIS — D50.0 IRON DEFICIENCY ANEMIA DUE TO CHRONIC BLOOD LOSS: Primary | ICD-10-CM

## 2020-07-28 DIAGNOSIS — I25.10 CORONARY ARTERY DISEASE DUE TO CALCIFIED CORONARY LESION: ICD-10-CM

## 2020-07-28 DIAGNOSIS — I25.84 CORONARY ARTERY DISEASE DUE TO CALCIFIED CORONARY LESION: ICD-10-CM

## 2020-07-28 DIAGNOSIS — D63.1 ANEMIA DUE TO CHRONIC KIDNEY DISEASE, UNSPECIFIED CKD STAGE: ICD-10-CM

## 2020-07-28 PROBLEM — N64.4 BREAST PAIN: Status: RESOLVED | Noted: 2017-11-09 | Resolved: 2020-07-28

## 2020-07-28 PROBLEM — T78.3XXA ANGIOEDEMA: Status: RESOLVED | Noted: 2019-12-13 | Resolved: 2020-07-28

## 2020-07-28 PROCEDURE — 99309 SBSQ NF CARE MODERATE MDM 30: CPT | Performed by: FAMILY MEDICINE

## 2020-07-28 NOTE — TELEPHONE ENCOUNTER
Spoke to Florecita CHRISTIAN at Dr. Candice Go office. Dr. Iggy Roberto had some recommendations but then also asked Interventional Radiologist that put in stent for more guidance.  We are awaiting a response back from them today or tomorrow since they were both off last week

## 2020-07-28 NOTE — TELEPHONE ENCOUNTER
Spoke with Dr. Tania Patton ([address]) office. They state Dr. Alana Hercules (Interventional Cardiology) would like to speak with Dr. Drea Harris regarding patient's condition. Dr. Clyde Cummings informed.  Spoke to [address] from Dr. Vincenzo Mares office and gave her the message to have

## 2020-07-28 NOTE — PROGRESS NOTES
HPI:    Patient ID: Rehana Borden is a 75year old female. HPI  Seen at [facility]. Getting ready for planned revision right knee replacement. Recent evaluation from hematologist for  Anemia. Current Hgb is 7.7.   Review of System MCG/ACT Nasal Suspension daily as needed.     4   • Glucose Blood (PRECISION XTRA BLOOD GLUCOSE) In Vitro Strip USE 1 STRIP TO CHECK GLUCOSE THREE TIMES DAILY  5   • BD PEN NEEDLE GABRIELA U/F 32G X 4 MM Does not apply Misc USE 1 PEN NEEDLE 4 TIMES DAILY  5   • chronic blood loss  (primary encounter diagnosis)    Multifactorial anemia. Currently stable. Per hematology may require pre op transfusion or at least units available for post op transfusion at descretion of the surgeon.   She is medically stable otherwis

## 2020-07-29 ENCOUNTER — SNF VISIT (OUTPATIENT)
Dept: INTERNAL MEDICINE CLINIC | Facility: SKILLED NURSING FACILITY | Age: 75
End: 2020-07-29

## 2020-07-29 ENCOUNTER — TELEPHONE (OUTPATIENT)
Dept: CARDIOLOGY | Age: 75
End: 2020-07-29

## 2020-07-29 DIAGNOSIS — Z09 FOLLOW UP: ICD-10-CM

## 2020-07-29 DIAGNOSIS — D50.9 IRON DEFICIENCY ANEMIA, UNSPECIFIED IRON DEFICIENCY ANEMIA TYPE: ICD-10-CM

## 2020-07-29 DIAGNOSIS — E87.6 HYPOKALEMIA: ICD-10-CM

## 2020-07-29 PROCEDURE — 99309 SBSQ NF CARE MODERATE MDM 30: CPT | Performed by: NURSE PRACTITIONER

## 2020-07-29 NOTE — TELEPHONE ENCOUNTER
Spoke to [employer] PA from Dr. Bulah Fabry office. She states Dr. Kalpesh Man spoke to Dr. Ton Nowak and patient will stop Plavix today, they will also d/c   Lovenox today. Patient will remain on ASA 81mg daily. Patient will be admitted 2 days prior to surgery (08/01).

## 2020-07-31 NOTE — PROGRESS NOTES
Mingo Valdivia  : 1945  Age 75year old  female patient is admitted to [facility] for KASEY. Chief complaint: Follow up Infected/Failed RIght TKA and Anemia. Reviewed labs and follow up hypokalemia.     HPI  Patient is a 77-year-old CORONARY ANGIOPLASTY     • COLONOSCOPY N/A 6/12/2020    Performed by Audrey Castorena MD at [facility]     • ESOPHAGOGASTRODUODENOSCOPY (EGD) N/A 6/12/2020    Performed by Audrey Castorena MD at [facility] ENDOSCOPY   • EXT VOMITING, RASH    Comment:Can tolerate low dose but not regular strength d/t             GI problemsStomach ulcers  Codeine                 NAUSEA AND VOMITING    CODE STATUS:FULL CODE. CURRENT MEDICATIONS:Reviewed in EMR.     VITALS:Reveals no major a Tylenol PRN    S/P Stent Placement  -CPM Statin/ASA  -CPM Enoxaparin Injection Subcutaneous daily  -CPM Prasugrel daily    Anemia  -CPM Ferrous sulfate  -CPM Folic Acid   -CPM Omeprazole    Constipation   -CPM Miralax    DM  -CPM NPH Insulin 12U daily  -MO

## 2020-08-01 ENCOUNTER — HOSPITAL ENCOUNTER (INPATIENT)
Facility: HOSPITAL | Age: 75
LOS: 27 days | Discharge: NURSING, REHAB OR LONG-TERM CARE | DRG: 464 | End: 2020-08-28
Attending: FAMILY MEDICINE | Admitting: FAMILY MEDICINE
Payer: MEDICARE

## 2020-08-01 PROBLEM — T84.012A FAILED TOTAL RIGHT KNEE REPLACEMENT (HCC): Status: ACTIVE | Noted: 2020-08-01

## 2020-08-01 LAB
ALBUMIN SERPL-MCNC: 2.6 G/DL (ref 3.4–5)
ALBUMIN/GLOB SERPL: 0.6 {RATIO} (ref 1–2)
ALP LIVER SERPL-CCNC: 148 U/L (ref 55–142)
ALT SERPL-CCNC: 29 U/L (ref 13–56)
ANION GAP SERPL CALC-SCNC: 3 MMOL/L (ref 0–18)
AST SERPL-CCNC: 13 U/L (ref 15–37)
BASOPHILS # BLD AUTO: 0.01 X10(3) UL (ref 0–0.2)
BASOPHILS NFR BLD AUTO: 0.2 %
BILIRUB SERPL-MCNC: 0.5 MG/DL (ref 0.1–2)
BUN BLD-MCNC: 26 MG/DL (ref 7–18)
BUN/CREAT SERPL: 21.3 (ref 10–20)
CALCIUM BLD-MCNC: 9.2 MG/DL (ref 8.5–10.1)
CHLORIDE SERPL-SCNC: 109 MMOL/L (ref 98–112)
CO2 SERPL-SCNC: 28 MMOL/L (ref 21–32)
CREAT BLD-MCNC: 1.22 MG/DL (ref 0.55–1.02)
DEPRECATED RDW RBC AUTO: 57.1 FL (ref 35.1–46.3)
EOSINOPHIL # BLD AUTO: 0.31 X10(3) UL (ref 0–0.7)
EOSINOPHIL NFR BLD AUTO: 5 %
ERYTHROCYTE [DISTWIDTH] IN BLOOD BY AUTOMATED COUNT: 17.9 % (ref 11–15)
GLOBULIN PLAS-MCNC: 4.2 G/DL (ref 2.8–4.4)
GLUCOSE BLD-MCNC: 124 MG/DL (ref 70–99)
GLUCOSE BLDC GLUCOMTR-MCNC: 167 MG/DL (ref 70–99)
HCT VFR BLD AUTO: 27.9 % (ref 35–48)
HGB BLD-MCNC: 8.6 G/DL (ref 12–16)
IMM GRANULOCYTES # BLD AUTO: 0.02 X10(3) UL (ref 0–1)
IMM GRANULOCYTES NFR BLD: 0.3 %
LYMPHOCYTES # BLD AUTO: 0.94 X10(3) UL (ref 1–4)
LYMPHOCYTES NFR BLD AUTO: 15.2 %
M PROTEIN MFR SERPL ELPH: 6.8 G/DL (ref 6.4–8.2)
MCH RBC QN AUTO: 26.8 PG (ref 26–34)
MCHC RBC AUTO-ENTMCNC: 30.8 G/DL (ref 31–37)
MCV RBC AUTO: 86.9 FL (ref 80–100)
MONOCYTES # BLD AUTO: 0.67 X10(3) UL (ref 0.1–1)
MONOCYTES NFR BLD AUTO: 10.8 %
NEUTROPHILS # BLD AUTO: 4.25 X10 (3) UL (ref 1.5–7.7)
NEUTROPHILS # BLD AUTO: 4.25 X10(3) UL (ref 1.5–7.7)
NEUTROPHILS NFR BLD AUTO: 68.5 %
OSMOLALITY SERPL CALC.SUM OF ELEC: 296 MOSM/KG (ref 275–295)
PLATELET # BLD AUTO: 136 10(3)UL (ref 150–450)
POTASSIUM SERPL-SCNC: 4.1 MMOL/L (ref 3.5–5.1)
RBC # BLD AUTO: 3.21 X10(6)UL (ref 3.8–5.3)
SODIUM SERPL-SCNC: 140 MMOL/L (ref 136–145)
WBC # BLD AUTO: 6.2 X10(3) UL (ref 4–11)

## 2020-08-01 RX ORDER — ENALAPRIL MALEATE 10 MG/1
10 TABLET ORAL 2 TIMES DAILY
Status: DISCONTINUED | OUTPATIENT
Start: 2020-08-04 | End: 2020-08-05

## 2020-08-01 RX ORDER — CARVEDILOL 25 MG/1
25 TABLET ORAL 2 TIMES DAILY WITH MEALS
Status: DISCONTINUED | OUTPATIENT
Start: 2020-08-01 | End: 2020-08-03

## 2020-08-01 RX ORDER — SODIUM CHLORIDE 0.9 % (FLUSH) 0.9 %
3 SYRINGE (ML) INJECTION AS NEEDED
Status: DISCONTINUED | OUTPATIENT
Start: 2020-08-01 | End: 2020-08-18

## 2020-08-01 RX ORDER — BISACODYL 10 MG
10 SUPPOSITORY, RECTAL RECTAL
Status: DISCONTINUED | OUTPATIENT
Start: 2020-08-01 | End: 2020-08-03

## 2020-08-01 RX ORDER — MULTIVIT WITH MINERALS/LUTEIN
1000 TABLET ORAL DAILY
Status: ON HOLD | COMMUNITY
End: 2021-01-01

## 2020-08-01 RX ORDER — DOCUSATE SODIUM 100 MG/1
100 CAPSULE, LIQUID FILLED ORAL 2 TIMES DAILY
Status: DISCONTINUED | OUTPATIENT
Start: 2020-08-01 | End: 2020-08-03

## 2020-08-01 RX ORDER — ACETAMINOPHEN 325 MG/1
650 TABLET ORAL EVERY 6 HOURS PRN
Status: DISCONTINUED | OUTPATIENT
Start: 2020-08-01 | End: 2020-08-03

## 2020-08-01 RX ORDER — NITROGLYCERIN 0.4 MG/1
0.4 TABLET SUBLINGUAL EVERY 5 MIN PRN
Status: DISCONTINUED | OUTPATIENT
Start: 2020-08-01 | End: 2020-08-28

## 2020-08-01 RX ORDER — LOPERAMIDE HYDROCHLORIDE 2 MG/1
2 CAPSULE ORAL EVERY 6 HOURS PRN
Status: DISCONTINUED | OUTPATIENT
Start: 2020-08-01 | End: 2020-08-28

## 2020-08-01 RX ORDER — PANTOPRAZOLE SODIUM 40 MG/1
40 TABLET, DELAYED RELEASE ORAL
Status: DISCONTINUED | OUTPATIENT
Start: 2020-08-01 | End: 2020-08-28

## 2020-08-01 RX ORDER — DEXTROSE MONOHYDRATE 25 G/50ML
50 INJECTION, SOLUTION INTRAVENOUS
Status: DISCONTINUED | OUTPATIENT
Start: 2020-08-01 | End: 2020-08-28

## 2020-08-01 RX ORDER — ASPIRIN 81 MG/1
81 TABLET ORAL DAILY
Status: DISCONTINUED | OUTPATIENT
Start: 2020-08-01 | End: 2020-08-03

## 2020-08-01 RX ORDER — MELATONIN
325 2 TIMES DAILY
Status: DISCONTINUED | OUTPATIENT
Start: 2020-08-01 | End: 2020-08-28

## 2020-08-01 RX ORDER — POLYETHYLENE GLYCOL 3350 17 G/17G
17 POWDER, FOR SOLUTION ORAL DAILY PRN
Status: DISCONTINUED | OUTPATIENT
Start: 2020-08-01 | End: 2020-08-01

## 2020-08-01 RX ORDER — TRAZODONE HYDROCHLORIDE 50 MG/1
50 TABLET ORAL NIGHTLY PRN
Status: DISCONTINUED | OUTPATIENT
Start: 2020-08-01 | End: 2020-08-28

## 2020-08-01 RX ORDER — ENALAPRIL MALEATE 10 MG/1
10 TABLET ORAL 2 TIMES DAILY
Status: DISPENSED | OUTPATIENT
Start: 2020-08-01 | End: 2020-08-03

## 2020-08-01 RX ORDER — ATORVASTATIN CALCIUM 40 MG/1
40 TABLET, FILM COATED ORAL NIGHTLY
Status: DISCONTINUED | OUTPATIENT
Start: 2020-08-01 | End: 2020-08-28

## 2020-08-01 RX ORDER — FOLIC ACID 1 MG/1
1 TABLET ORAL DAILY
Status: DISCONTINUED | OUTPATIENT
Start: 2020-08-01 | End: 2020-08-28

## 2020-08-01 RX ORDER — POLYETHYLENE GLYCOL 3350 17 G/17G
17 POWDER, FOR SOLUTION ORAL DAILY PRN
Status: DISCONTINUED | OUTPATIENT
Start: 2020-08-01 | End: 2020-08-03

## 2020-08-01 RX ORDER — FLUTICASONE PROPIONATE 50 MCG
2 SPRAY, SUSPENSION (ML) NASAL
Status: DISCONTINUED | OUTPATIENT
Start: 2020-08-01 | End: 2020-08-28

## 2020-08-01 RX ORDER — BUMETANIDE 1 MG/1
1 TABLET ORAL
Status: DISCONTINUED | OUTPATIENT
Start: 2020-08-01 | End: 2020-08-03

## 2020-08-01 RX ORDER — SODIUM PHOSPHATE, DIBASIC AND SODIUM PHOSPHATE, MONOBASIC 7; 19 G/133ML; G/133ML
1 ENEMA RECTAL ONCE AS NEEDED
Status: DISCONTINUED | OUTPATIENT
Start: 2020-08-01 | End: 2020-08-03

## 2020-08-01 RX ORDER — ERGOCALCIFEROL (VITAMIN D2) 10 MCG
400 TABLET ORAL DAILY
Status: DISCONTINUED | OUTPATIENT
Start: 2020-08-01 | End: 2020-08-28

## 2020-08-01 NOTE — H&P
[facility] [place] [facility]    PRE-OP NOTE    HPI:   I have been consulted by Dr. Nell Schwab to see Zarinaenderadrienne Cyrus 75year old female admitted for anticoagulation in preparation for right knee resection and antibiotic spacer placement scheduled 8/3/20.  Ruben Gallardo impairment     readers     Past Surgical History:   Procedure Laterality Date   • CATARACT  2017   • CATH BARE METAL STENT (BMS)     • CATH PERCUTANEOUS  TRANSLUMINAL CORONARY ANGIOPLASTY     • COLONOSCOPY N/A 6/12/2020    Performed by Reymundo Nam MD Not on file    Lifestyle      Physical activity:        Days per week: Not on file        Minutes per session: Not on file      Stress: Not on file    Relationships      Social connections:        Talks on phone: Not on file        Gets together: Not on fi 98 °F (36.7 °C) (Oral)   Resp 16   SpO2 100%  Estimated body mass index is 29.52 kg/m² as calculated from the following:    Height as of 7/28/20: 62.5\". Weight as of 7/28/20: 164 lb (74.4 kg). Vital signs reviewed. Appears stated age, well groomed.   P PICC line and six weeks of IV vancomycin. She is admitted for anticoagulation management prior to right knee resection and antibiotic spacer placement 8/3/20. Recent stent to LAD.      [unfilled]  Patient Active Problem List:     Hypertension     Kidney hyperglycemia ([address]        Hypertension     Pt is moderate to high risk for a necessary orthopedic procedure. She is aware of her risk and is proceeding with surgery as planned.  To have anticoagulation as per cardiology    PLAN:    Admit for anticoagulatio

## 2020-08-01 NOTE — CONSULTS
[facility]    Progress Note    Sim Nicely Patient Status:  Inpatient    1945 MRN [id number]   Location [facility] 4W/SW/SE Attending Solange Dhillon MD   Hosp Day # 0 PCP No primary care provider on file.        Subjective distress. No respiratory or constitutional distress. Neurologic: Alert and oriented, normal affect. No motor or coordinational deficit. Skin: Warm and dry. Neck: No JVD, carotids 2+, no bruits. Cardiac: RRR. S1, S2 normal. No murmur or gallop.   Lungs

## 2020-08-01 NOTE — CM/SW NOTE
NIRALI spoke with [address] at [place] [address] who states the pt was at their facility since 6/26. Pt was discharged from the snf at 6pm yesterday after her last dose of iv abx and went to her daughter's home. Pt then admitted to [facility] for the procedure today.  CLEMENTE rodríguez

## 2020-08-02 ENCOUNTER — APPOINTMENT (OUTPATIENT)
Dept: GENERAL RADIOLOGY | Facility: HOSPITAL | Age: 75
DRG: 464 | End: 2020-08-02
Attending: FAMILY MEDICINE
Payer: MEDICARE

## 2020-08-02 LAB
ANION GAP SERPL CALC-SCNC: 2 MMOL/L (ref 0–18)
ANTIBODY SCREEN: NEGATIVE
BUN BLD-MCNC: 26 MG/DL (ref 7–18)
BUN/CREAT SERPL: 23.4 (ref 10–20)
CALCIUM BLD-MCNC: 8.8 MG/DL (ref 8.5–10.1)
CHLORIDE SERPL-SCNC: 110 MMOL/L (ref 98–112)
CO2 SERPL-SCNC: 30 MMOL/L (ref 21–32)
CREAT BLD-MCNC: 1.11 MG/DL (ref 0.55–1.02)
CRP SERPL-MCNC: <0.29 MG/DL (ref ?–0.3)
DEPRECATED RDW RBC AUTO: 57.4 FL (ref 35.1–46.3)
ERYTHROCYTE [DISTWIDTH] IN BLOOD BY AUTOMATED COUNT: 18.2 % (ref 11–15)
EST. AVERAGE GLUCOSE BLD GHB EST-MCNC: 108 MG/DL (ref 68–126)
GLUCOSE BLD-MCNC: 107 MG/DL (ref 70–99)
GLUCOSE BLDC GLUCOMTR-MCNC: 127 MG/DL (ref 70–99)
GLUCOSE BLDC GLUCOMTR-MCNC: 128 MG/DL (ref 70–99)
GLUCOSE BLDC GLUCOMTR-MCNC: 155 MG/DL (ref 70–99)
HBA1C MFR BLD HPLC: 5.4 % (ref ?–5.7)
HCT VFR BLD AUTO: 26.3 % (ref 35–48)
HGB BLD-MCNC: 10.6 G/DL (ref 12–16)
HGB BLD-MCNC: 8.1 G/DL (ref 12–16)
MCH RBC QN AUTO: 26.7 PG (ref 26–34)
MCHC RBC AUTO-ENTMCNC: 30.8 G/DL (ref 31–37)
MCV RBC AUTO: 86.8 FL (ref 80–100)
OSMOLALITY SERPL CALC.SUM OF ELEC: 299 MOSM/KG (ref 275–295)
PLATELET # BLD AUTO: 116 10(3)UL (ref 150–450)
POTASSIUM SERPL-SCNC: 3.8 MMOL/L (ref 3.5–5.1)
RBC # BLD AUTO: 3.03 X10(6)UL (ref 3.8–5.3)
RH BLOOD TYPE: POSITIVE
SODIUM SERPL-SCNC: 142 MMOL/L (ref 136–145)
WBC # BLD AUTO: 4.7 X10(3) UL (ref 4–11)

## 2020-08-02 PROCEDURE — 71045 X-RAY EXAM CHEST 1 VIEW: CPT | Performed by: FAMILY MEDICINE

## 2020-08-02 PROCEDURE — 30233N1 TRANSFUSION OF NONAUTOLOGOUS RED BLOOD CELLS INTO PERIPHERAL VEIN, PERCUTANEOUS APPROACH: ICD-10-PCS | Performed by: FAMILY MEDICINE

## 2020-08-02 RX ORDER — HYDRALAZINE HYDROCHLORIDE 25 MG/1
25 TABLET, FILM COATED ORAL 3 TIMES DAILY PRN
Status: DISCONTINUED | OUTPATIENT
Start: 2020-08-02 | End: 2020-08-28

## 2020-08-02 RX ORDER — SODIUM CHLORIDE 9 MG/ML
INJECTION, SOLUTION INTRAVENOUS ONCE
Status: COMPLETED | OUTPATIENT
Start: 2020-08-02 | End: 2020-08-02

## 2020-08-02 RX ORDER — POTASSIUM CHLORIDE 20 MEQ/1
40 TABLET, EXTENDED RELEASE ORAL ONCE
Status: COMPLETED | OUTPATIENT
Start: 2020-08-02 | End: 2020-08-02

## 2020-08-02 RX ORDER — ACETAMINOPHEN 500 MG
TABLET ORAL EVERY 6 HOURS PRN
Status: DISCONTINUED | OUTPATIENT
Start: 2020-08-02 | End: 2020-08-28

## 2020-08-02 NOTE — PROGRESS NOTES
[facility]    Progress Note    Paul Solo Patient Status:  Inpatient    1945 MRN [id number]   Location [facility] 3W/SW Attending Tal Gipson MD   Hosp Day # 1 PCP No primary care provider on file.        Subjective: found.  Ekg 12-lead    Result Date: 8/2/2020  ECG Report  Interpretation  --------------------------     Ekg 12-lead    Result Date: 8/2/2020  ECG Report  Interpretation  --------------------------     Ekg 12-lead    Result Date: 8/1/2020  ECG Report  Inte

## 2020-08-02 NOTE — PLAN OF CARE
Problem: Diabetes/Glucose Control  Goal: Glucose maintained within prescribed range  Description  INTERVENTIONS:  - Monitor Blood Glucose as ordered  - Assess for signs and symptoms of hyperglycemia and hypoglycemia  - Administer ordered medications to m fall precautions as indicated by assessment.  - Educate pt/family on patient safety including physical limitations  - Instruct pt to call for assistance with activity based on assessment  - Modify environment to reduce risk of injury  - Provide assistive d ambulation using safe patient handling equipment as needed  - Ensure adequate protection for wounds/incisions during mobilization  - Obtain PT/OT consults as needed  - Advance activity as appropriate  - Communicate ordered activity level and limitations wi

## 2020-08-02 NOTE — PROGRESS NOTES
[facility]    Progress Note    Maria Isabel Gardner Patient Status:  Inpatient    1945 MRN [id number]   Location [facility] 3W/SW Attending Eh Vásquez MD   Hosp Day # 1 PCP No primary care provider on file.        Subjective: 67   Temp 98.2 °F (36.8 °C) (Oral)   Resp 18   Wt 169 lb (76.7 kg)   SpO2 98%   BMI 30.42 kg/m²   General: Alert and oriented x 3. No apparent distress. No respiratory or constitutional distress. Neurologic: Alert and oriented, normal affect.  No motor or 1.16 06/22/2020    TSH 1.630 06/18/2020    PUNEET 21 (L) 12/20/2019    LIP 14 (L) 12/20/2019    ESRML 80 (H) 07/15/2020    CRP <0.29 08/02/2020    B12 1,087 (H) 06/18/2020       No results found.   Ekg 12-lead    Result Date: 8/2/2020  ECG Report  Interpretati

## 2020-08-02 NOTE — PLAN OF CARE
Alert and oriented. Pt on Cangrelor gtt. Scheduled Lidocaine patch applied to left leg per pt request. No c/o thus far. Brace on left leg. Plan for surgery on 8/3. Bed alarm in place and bed locked. Call light in reach. Will continue to monitor.      Proble Outcome: Progressing     Problem: SAFETY ADULT - FALL  Goal: Free from fall injury  Description  INTERVENTIONS:  - Assess pt frequently for physical needs  - Identify cognitive and physical deficits and behaviors that affect risk of falls.   - Delano f

## 2020-08-03 ENCOUNTER — TELEPHONE (OUTPATIENT)
Dept: CARDIOLOGY | Age: 75
End: 2020-08-03

## 2020-08-03 ENCOUNTER — ANESTHESIA (OUTPATIENT)
Dept: SURGERY | Facility: HOSPITAL | Age: 75
DRG: 464 | End: 2020-08-03
Payer: MEDICARE

## 2020-08-03 ENCOUNTER — ANESTHESIA EVENT (OUTPATIENT)
Dept: SURGERY | Facility: HOSPITAL | Age: 75
DRG: 464 | End: 2020-08-03
Payer: MEDICARE

## 2020-08-03 ENCOUNTER — APPOINTMENT (OUTPATIENT)
Dept: GENERAL RADIOLOGY | Facility: HOSPITAL | Age: 75
DRG: 464 | End: 2020-08-03
Attending: PHYSICIAN ASSISTANT
Payer: MEDICARE

## 2020-08-03 LAB
ANION GAP SERPL CALC-SCNC: 4 MMOL/L (ref 0–18)
BASOPHILS NFR FLD: 0 %
BLOOD TYPE BARCODE: 5100
BUN BLD-MCNC: 26 MG/DL (ref 7–18)
BUN/CREAT SERPL: 24.1 (ref 10–20)
CALCIUM BLD-MCNC: 9.2 MG/DL (ref 8.5–10.1)
CHLORIDE SERPL-SCNC: 109 MMOL/L (ref 98–112)
CO2 SERPL-SCNC: 28 MMOL/L (ref 21–32)
CREAT BLD-MCNC: 1.08 MG/DL (ref 0.55–1.02)
DEPRECATED RDW RBC AUTO: 54.5 FL (ref 35.1–46.3)
DEPRECATED RDW RBC AUTO: 54.7 FL (ref 35.1–46.3)
EOSINOPHIL NFR FLD: 0 %
ERYTHROCYTE [DISTWIDTH] IN BLOOD BY AUTOMATED COUNT: 17.2 % (ref 11–15)
ERYTHROCYTE [DISTWIDTH] IN BLOOD BY AUTOMATED COUNT: 17.3 % (ref 11–15)
GLUCOSE BLD-MCNC: 93 MG/DL (ref 70–99)
GLUCOSE BLDC GLUCOMTR-MCNC: 102 MG/DL (ref 70–99)
GLUCOSE BLDC GLUCOMTR-MCNC: 106 MG/DL (ref 70–99)
GLUCOSE BLDC GLUCOMTR-MCNC: 109 MG/DL (ref 70–99)
GLUCOSE BLDC GLUCOMTR-MCNC: 125 MG/DL (ref 70–99)
GLUCOSE BLDC GLUCOMTR-MCNC: 146 MG/DL (ref 70–99)
HAV IGM SER QL: 1.9 MG/DL (ref 1.6–2.6)
HCT VFR BLD AUTO: 28.9 % (ref 35–48)
HCT VFR BLD AUTO: 29.4 % (ref 35–48)
HGB BLD-MCNC: 9.1 G/DL (ref 12–16)
HGB BLD-MCNC: 9.4 G/DL (ref 12–16)
LYMPHOCYTES NFR FLD: 4 %
MCH RBC QN AUTO: 27.1 PG (ref 26–34)
MCH RBC QN AUTO: 28.2 PG (ref 26–34)
MCHC RBC AUTO-ENTMCNC: 31.5 G/DL (ref 31–37)
MCHC RBC AUTO-ENTMCNC: 32 G/DL (ref 31–37)
MCV RBC AUTO: 86 FL (ref 80–100)
MCV RBC AUTO: 88.3 FL (ref 80–100)
MONOCYTES NFR FLD: 5 %
MRSA DNA SPEC QL NAA+PROBE: POSITIVE
NEUTROPHILS NFR FLD: 91 %
OSMOLALITY SERPL CALC.SUM OF ELEC: 296 MOSM/KG (ref 275–295)
PLATELET # BLD AUTO: 126 10(3)UL (ref 150–450)
PLATELET # BLD AUTO: 131 10(3)UL (ref 150–450)
POTASSIUM SERPL-SCNC: 3.8 MMOL/L (ref 3.5–5.1)
RBC # BLD AUTO: 3.33 X10(6)UL (ref 3.8–5.3)
RBC # BLD AUTO: 3.36 X10(6)UL (ref 3.8–5.3)
RBC # FLD: ABNORMAL /CUMM (ref ?–1)
SARS-COV-2 RNA RESP QL NAA+PROBE: NOT DETECTED
SODIUM SERPL-SCNC: 141 MMOL/L (ref 136–145)
WBC # BLD AUTO: 14.6 X10(3) UL (ref 4–11)
WBC # BLD AUTO: 5.2 X10(3) UL (ref 4–11)
WBC # FLD: ABNORMAL /CUMM

## 2020-08-03 PROCEDURE — 0SHC08Z INSERTION OF SPACER INTO RIGHT KNEE JOINT, OPEN APPROACH: ICD-10-PCS | Performed by: ORTHOPAEDIC SURGERY

## 2020-08-03 PROCEDURE — 36430 TRANSFUSION BLD/BLD COMPNT: CPT | Performed by: ANESTHESIOLOGY

## 2020-08-03 PROCEDURE — 73560 X-RAY EXAM OF KNEE 1 OR 2: CPT | Performed by: PHYSICIAN ASSISTANT

## 2020-08-03 PROCEDURE — 0SPC0JZ REMOVAL OF SYNTHETIC SUBSTITUTE FROM RIGHT KNEE JOINT, OPEN APPROACH: ICD-10-PCS | Performed by: ORTHOPAEDIC SURGERY

## 2020-08-03 DEVICE — CEM BN NLTX STRL REUSE MED VSC: Type: IMPLANTABLE DEVICE | Status: FUNCTIONAL

## 2020-08-03 RX ORDER — CYCLOBENZAPRINE HCL 10 MG
5 TABLET ORAL EVERY 8 HOURS PRN
Status: DISCONTINUED | OUTPATIENT
Start: 2020-08-03 | End: 2020-08-28

## 2020-08-03 RX ORDER — EPHEDRINE SULFATE 50 MG/ML
INJECTION, SOLUTION INTRAVENOUS AS NEEDED
Status: DISCONTINUED | OUTPATIENT
Start: 2020-08-03 | End: 2020-08-03 | Stop reason: SURG

## 2020-08-03 RX ORDER — VANCOMYCIN HYDROCHLORIDE
15 ONCE
Status: COMPLETED | OUTPATIENT
Start: 2020-08-04 | End: 2020-08-04

## 2020-08-03 RX ORDER — BISACODYL 10 MG
10 SUPPOSITORY, RECTAL RECTAL
Status: DISCONTINUED | OUTPATIENT
Start: 2020-08-03 | End: 2020-08-28

## 2020-08-03 RX ORDER — VANCOMYCIN HYDROCHLORIDE 1 G/20ML
INJECTION, POWDER, LYOPHILIZED, FOR SOLUTION INTRAVENOUS AS NEEDED
Status: DISCONTINUED | OUTPATIENT
Start: 2020-08-03 | End: 2020-08-03 | Stop reason: HOSPADM

## 2020-08-03 RX ORDER — SODIUM CHLORIDE, SODIUM LACTATE, POTASSIUM CHLORIDE, CALCIUM CHLORIDE 600; 310; 30; 20 MG/100ML; MG/100ML; MG/100ML; MG/100ML
INJECTION, SOLUTION INTRAVENOUS CONTINUOUS PRN
Status: DISCONTINUED | OUTPATIENT
Start: 2020-08-03 | End: 2020-08-03 | Stop reason: SURG

## 2020-08-03 RX ORDER — METOPROLOL TARTRATE 5 MG/5ML
2.5 INJECTION INTRAVENOUS ONCE
Status: DISCONTINUED | OUTPATIENT
Start: 2020-08-03 | End: 2020-08-03 | Stop reason: HOSPADM

## 2020-08-03 RX ORDER — SODIUM PHOSPHATE, DIBASIC AND SODIUM PHOSPHATE, MONOBASIC 7; 19 G/133ML; G/133ML
1 ENEMA RECTAL ONCE AS NEEDED
Status: DISCONTINUED | OUTPATIENT
Start: 2020-08-03 | End: 2020-08-03

## 2020-08-03 RX ORDER — TOBRAMYCIN 1.2 G/30ML
INJECTION, POWDER, LYOPHILIZED, FOR SOLUTION INTRAVENOUS AS NEEDED
Status: DISCONTINUED | OUTPATIENT
Start: 2020-08-03 | End: 2020-08-03 | Stop reason: HOSPADM

## 2020-08-03 RX ORDER — VANCOMYCIN HYDROCHLORIDE
15
Status: COMPLETED | OUTPATIENT
Start: 2020-08-03 | End: 2020-08-03

## 2020-08-03 RX ORDER — GLYCOPYRROLATE 0.2 MG/ML
INJECTION, SOLUTION INTRAMUSCULAR; INTRAVENOUS AS NEEDED
Status: DISCONTINUED | OUTPATIENT
Start: 2020-08-03 | End: 2020-08-03 | Stop reason: SURG

## 2020-08-03 RX ORDER — HYDROMORPHONE HYDROCHLORIDE 1 MG/ML
0.8 INJECTION, SOLUTION INTRAMUSCULAR; INTRAVENOUS; SUBCUTANEOUS EVERY 2 HOUR PRN
Status: DISCONTINUED | OUTPATIENT
Start: 2020-08-03 | End: 2020-08-03

## 2020-08-03 RX ORDER — DOCUSATE SODIUM 100 MG/1
100 CAPSULE, LIQUID FILLED ORAL 2 TIMES DAILY
Status: DISCONTINUED | OUTPATIENT
Start: 2020-08-03 | End: 2020-08-28

## 2020-08-03 RX ORDER — HYDROMORPHONE HYDROCHLORIDE 1 MG/ML
0.6 INJECTION, SOLUTION INTRAMUSCULAR; INTRAVENOUS; SUBCUTANEOUS EVERY 5 MIN PRN
Status: DISCONTINUED | OUTPATIENT
Start: 2020-08-03 | End: 2020-08-03 | Stop reason: HOSPADM

## 2020-08-03 RX ORDER — DEXTROSE MONOHYDRATE 25 G/50ML
50 INJECTION, SOLUTION INTRAVENOUS
Status: DISCONTINUED | OUTPATIENT
Start: 2020-08-03 | End: 2020-08-03 | Stop reason: HOSPADM

## 2020-08-03 RX ORDER — HYDROMORPHONE HYDROCHLORIDE 1 MG/ML
0.4 INJECTION, SOLUTION INTRAMUSCULAR; INTRAVENOUS; SUBCUTANEOUS EVERY 2 HOUR PRN
Status: DISCONTINUED | OUTPATIENT
Start: 2020-08-03 | End: 2020-08-28

## 2020-08-03 RX ORDER — DIPHENHYDRAMINE HYDROCHLORIDE 50 MG/ML
12.5 INJECTION INTRAMUSCULAR; INTRAVENOUS EVERY 4 HOURS PRN
Status: DISCONTINUED | OUTPATIENT
Start: 2020-08-03 | End: 2020-08-28

## 2020-08-03 RX ORDER — HYDROMORPHONE HYDROCHLORIDE 2 MG/1
TABLET ORAL EVERY 4 HOURS PRN
Qty: 60 TABLET | Refills: 0 | Status: ON HOLD | OUTPATIENT
Start: 2020-08-03 | End: 2020-01-01

## 2020-08-03 RX ORDER — MORPHINE SULFATE 4 MG/ML
4 INJECTION, SOLUTION INTRAMUSCULAR; INTRAVENOUS EVERY 10 MIN PRN
Status: DISCONTINUED | OUTPATIENT
Start: 2020-08-03 | End: 2020-08-03 | Stop reason: HOSPADM

## 2020-08-03 RX ORDER — HYDROMORPHONE HYDROCHLORIDE 2 MG/1
1 TABLET ORAL EVERY 4 HOURS PRN
Status: DISCONTINUED | OUTPATIENT
Start: 2020-08-03 | End: 2020-08-16

## 2020-08-03 RX ORDER — HYDROMORPHONE HYDROCHLORIDE 1 MG/ML
1.2 INJECTION, SOLUTION INTRAMUSCULAR; INTRAVENOUS; SUBCUTANEOUS EVERY 2 HOUR PRN
Status: DISCONTINUED | OUTPATIENT
Start: 2020-08-03 | End: 2020-08-03

## 2020-08-03 RX ORDER — POLYETHYLENE GLYCOL 3350 17 G/17G
17 POWDER, FOR SOLUTION ORAL DAILY PRN
Status: DISCONTINUED | OUTPATIENT
Start: 2020-08-03 | End: 2020-08-28

## 2020-08-03 RX ORDER — MIDAZOLAM HYDROCHLORIDE 1 MG/ML
INJECTION INTRAMUSCULAR; INTRAVENOUS AS NEEDED
Status: DISCONTINUED | OUTPATIENT
Start: 2020-08-03 | End: 2020-08-03 | Stop reason: SURG

## 2020-08-03 RX ORDER — SODIUM CHLORIDE, SODIUM LACTATE, POTASSIUM CHLORIDE, CALCIUM CHLORIDE 600; 310; 30; 20 MG/100ML; MG/100ML; MG/100ML; MG/100ML
INJECTION, SOLUTION INTRAVENOUS CONTINUOUS
Status: DISCONTINUED | OUTPATIENT
Start: 2020-08-03 | End: 2020-08-05

## 2020-08-03 RX ORDER — DIPHENHYDRAMINE HCL 25 MG
25 CAPSULE ORAL EVERY 4 HOURS PRN
Status: DISCONTINUED | OUTPATIENT
Start: 2020-08-03 | End: 2020-08-28

## 2020-08-03 RX ORDER — ENALAPRIL MALEATE 10 MG/1
10 TABLET ORAL 2 TIMES DAILY
Status: DISCONTINUED | OUTPATIENT
Start: 2020-08-03 | End: 2020-08-03

## 2020-08-03 RX ORDER — SODIUM CHLORIDE, SODIUM LACTATE, POTASSIUM CHLORIDE, CALCIUM CHLORIDE 600; 310; 30; 20 MG/100ML; MG/100ML; MG/100ML; MG/100ML
INJECTION, SOLUTION INTRAVENOUS CONTINUOUS
Status: DISCONTINUED | OUTPATIENT
Start: 2020-08-03 | End: 2020-08-03 | Stop reason: HOSPADM

## 2020-08-03 RX ORDER — LIDOCAINE HYDROCHLORIDE 10 MG/ML
INJECTION, SOLUTION EPIDURAL; INFILTRATION; INTRACAUDAL; PERINEURAL AS NEEDED
Status: DISCONTINUED | OUTPATIENT
Start: 2020-08-03 | End: 2020-08-03 | Stop reason: SURG

## 2020-08-03 RX ORDER — MORPHINE SULFATE 10 MG/ML
6 INJECTION, SOLUTION INTRAMUSCULAR; INTRAVENOUS EVERY 10 MIN PRN
Status: DISCONTINUED | OUTPATIENT
Start: 2020-08-03 | End: 2020-08-03 | Stop reason: HOSPADM

## 2020-08-03 RX ORDER — METOCLOPRAMIDE HYDROCHLORIDE 5 MG/ML
5 INJECTION INTRAMUSCULAR; INTRAVENOUS EVERY 6 HOURS PRN
Status: ACTIVE | OUTPATIENT
Start: 2020-08-03 | End: 2020-08-05

## 2020-08-03 RX ORDER — MORPHINE SULFATE 4 MG/ML
2 INJECTION, SOLUTION INTRAMUSCULAR; INTRAVENOUS EVERY 10 MIN PRN
Status: DISCONTINUED | OUTPATIENT
Start: 2020-08-03 | End: 2020-08-03 | Stop reason: HOSPADM

## 2020-08-03 RX ORDER — HYDROCODONE BITARTRATE AND ACETAMINOPHEN 5; 325 MG/1; MG/1
2 TABLET ORAL AS NEEDED
Status: DISCONTINUED | OUTPATIENT
Start: 2020-08-03 | End: 2020-08-03 | Stop reason: HOSPADM

## 2020-08-03 RX ORDER — CLOPIDOGREL BISULFATE 75 MG/1
75 TABLET ORAL DAILY
Status: DISCONTINUED | OUTPATIENT
Start: 2020-08-03 | End: 2020-08-17

## 2020-08-03 RX ORDER — CARVEDILOL 25 MG/1
25 TABLET ORAL 2 TIMES DAILY WITH MEALS
Status: DISCONTINUED | OUTPATIENT
Start: 2020-08-04 | End: 2020-08-28

## 2020-08-03 RX ORDER — DOCUSATE SODIUM 100 MG/1
100 CAPSULE, LIQUID FILLED ORAL 2 TIMES DAILY
Qty: 60 CAPSULE | Refills: 2 | Status: SHIPPED | OUTPATIENT
Start: 2020-08-03 | End: 2020-01-01

## 2020-08-03 RX ORDER — HYDROMORPHONE HYDROCHLORIDE 2 MG/1
2 TABLET ORAL
Status: DISCONTINUED | OUTPATIENT
Start: 2020-08-03 | End: 2020-08-03

## 2020-08-03 RX ORDER — DEXAMETHASONE SODIUM PHOSPHATE 4 MG/ML
VIAL (ML) INJECTION AS NEEDED
Status: DISCONTINUED | OUTPATIENT
Start: 2020-08-03 | End: 2020-08-03 | Stop reason: SURG

## 2020-08-03 RX ORDER — HYDROMORPHONE HYDROCHLORIDE 1 MG/ML
0.2 INJECTION, SOLUTION INTRAMUSCULAR; INTRAVENOUS; SUBCUTANEOUS EVERY 5 MIN PRN
Status: DISCONTINUED | OUTPATIENT
Start: 2020-08-03 | End: 2020-08-03 | Stop reason: HOSPADM

## 2020-08-03 RX ORDER — METOCLOPRAMIDE HYDROCHLORIDE 5 MG/ML
10 INJECTION INTRAMUSCULAR; INTRAVENOUS EVERY 6 HOURS PRN
Status: DISCONTINUED | OUTPATIENT
Start: 2020-08-03 | End: 2020-08-03

## 2020-08-03 RX ORDER — PROCHLORPERAZINE EDISYLATE 5 MG/ML
10 INJECTION INTRAMUSCULAR; INTRAVENOUS EVERY 6 HOURS PRN
Status: ACTIVE | OUTPATIENT
Start: 2020-08-03 | End: 2020-08-05

## 2020-08-03 RX ORDER — HYDROMORPHONE HYDROCHLORIDE 1 MG/ML
0.4 INJECTION, SOLUTION INTRAMUSCULAR; INTRAVENOUS; SUBCUTANEOUS EVERY 5 MIN PRN
Status: DISCONTINUED | OUTPATIENT
Start: 2020-08-03 | End: 2020-08-03 | Stop reason: HOSPADM

## 2020-08-03 RX ORDER — DIPHENHYDRAMINE HYDROCHLORIDE 50 MG/ML
25 INJECTION INTRAMUSCULAR; INTRAVENOUS ONCE AS NEEDED
Status: ACTIVE | OUTPATIENT
Start: 2020-08-03 | End: 2020-08-03

## 2020-08-03 RX ORDER — ONDANSETRON 2 MG/ML
4 INJECTION INTRAMUSCULAR; INTRAVENOUS ONCE AS NEEDED
Status: DISCONTINUED | OUTPATIENT
Start: 2020-08-03 | End: 2020-08-03 | Stop reason: HOSPADM

## 2020-08-03 RX ORDER — BUMETANIDE 1 MG/1
1 TABLET ORAL
Status: DISCONTINUED | OUTPATIENT
Start: 2020-08-04 | End: 2020-08-05

## 2020-08-03 RX ORDER — NALOXONE HYDROCHLORIDE 0.4 MG/ML
80 INJECTION, SOLUTION INTRAMUSCULAR; INTRAVENOUS; SUBCUTANEOUS AS NEEDED
Status: DISCONTINUED | OUTPATIENT
Start: 2020-08-03 | End: 2020-08-03 | Stop reason: HOSPADM

## 2020-08-03 RX ORDER — PROCHLORPERAZINE EDISYLATE 5 MG/ML
5 INJECTION INTRAMUSCULAR; INTRAVENOUS ONCE AS NEEDED
Status: DISCONTINUED | OUTPATIENT
Start: 2020-08-03 | End: 2020-08-03 | Stop reason: HOSPADM

## 2020-08-03 RX ORDER — POTASSIUM CHLORIDE 20 MEQ/1
40 TABLET, EXTENDED RELEASE ORAL ONCE
Status: DISCONTINUED | OUTPATIENT
Start: 2020-08-03 | End: 2020-08-06

## 2020-08-03 RX ORDER — HYDROCODONE BITARTRATE AND ACETAMINOPHEN 5; 325 MG/1; MG/1
1 TABLET ORAL AS NEEDED
Status: DISCONTINUED | OUTPATIENT
Start: 2020-08-03 | End: 2020-08-03 | Stop reason: HOSPADM

## 2020-08-03 RX ORDER — PANTOPRAZOLE SODIUM 40 MG/1
40 TABLET, DELAYED RELEASE ORAL
Qty: 30 TABLET | Refills: 0 | Status: SHIPPED | OUTPATIENT
Start: 2020-08-03 | End: 2020-01-01

## 2020-08-03 RX ORDER — ONDANSETRON 2 MG/ML
4 INJECTION INTRAMUSCULAR; INTRAVENOUS EVERY 4 HOURS PRN
Status: DISPENSED | OUTPATIENT
Start: 2020-08-03 | End: 2020-08-05

## 2020-08-03 RX ADMIN — DEXAMETHASONE SODIUM PHOSPHATE 4 MG: 4 MG/ML VIAL (ML) INJECTION at 15:35:00

## 2020-08-03 RX ADMIN — GLYCOPYRROLATE 0.2 MG: 0.2 INJECTION, SOLUTION INTRAMUSCULAR; INTRAVENOUS at 16:34:00

## 2020-08-03 RX ADMIN — SODIUM CHLORIDE, SODIUM LACTATE, POTASSIUM CHLORIDE, CALCIUM CHLORIDE: 600; 310; 30; 20 INJECTION, SOLUTION INTRAVENOUS at 15:14:00

## 2020-08-03 RX ADMIN — VANCOMYCIN HYDROCHLORIDE 1.25 G: at 16:08:00

## 2020-08-03 RX ADMIN — EPHEDRINE SULFATE 5 MG: 50 INJECTION, SOLUTION INTRAVENOUS at 16:36:00

## 2020-08-03 RX ADMIN — LIDOCAINE HYDROCHLORIDE 50 MG: 10 INJECTION, SOLUTION EPIDURAL; INFILTRATION; INTRACAUDAL; PERINEURAL at 15:20:00

## 2020-08-03 RX ADMIN — MIDAZOLAM HYDROCHLORIDE 2 MG: 1 INJECTION INTRAMUSCULAR; INTRAVENOUS at 15:14:00

## 2020-08-03 NOTE — ANESTHESIA POSTPROCEDURE EVALUATION
Patient: Dora Heading    Procedure Summary     Date:  08/03/20 Room / Location:  [facility] OR 04 / [facility] OR    Anesthesia Start:  0905 Anesthesia Stop:  5444    Procedure:  KNEE TOTAL REVISION (Right Knee) Diagnosis:  (infection of right knee)    Surg

## 2020-08-03 NOTE — PROGRESS NOTES
[facility]    Progress Note    Vishal Kidd Patient Status:  Inpatient    1945 MRN [id number]   Location [facility] 3W/SW Attending Tawana Prieto MD   Hosp Day # 2 PCP No primary care provider on file.      74 y/o female breakfast   • lidocaine-menthol  1 patch Transdermal Q24H   • Pantoprazole Sodium  40 mg Oral QAM AC   • Vitamin D (Cholecalciferol)  400 Units Oral Daily   • docusate sodium  100 mg Oral BID   • Insulin Aspart Pen  1-5 Units Subcutaneous TID CC 6.8 08/01/2020    AST 13 (L) 08/01/2020    ALT 29 08/01/2020    PTT 28.1 02/19/2020    INR 1.16 06/22/2020    TSH 1.630 06/18/2020    PUNEET 21 (L) 12/20/2019    LIP 14 (L) 12/20/2019    ESRML 80 (H) 07/15/2020    CRP <0.29 08/02/2020    B12 1,087 (H) 06/18/2

## 2020-08-03 NOTE — INTERVAL H&P NOTE
Pre-op Diagnosis: infection of right knee    The above referenced H&P was reviewed by Yuliya Girard PA-C on 8/3/2020, the patient was examined and no significant changes have occurred in the patient's condition since the H&P was performed.   I discussed wit

## 2020-08-03 NOTE — DIETARY NOTE
ADULT NUTRITION INITIAL ASSESSMENT    Pt is at moderate nutrition risk. Pt does not meet malnutrition criteria.       RECOMMENDATIONS TO MD:  None at this time     NUTRITION DIAGNOSIS/PROBLEM:  Unintentional weight loss related to prolonged hospitalization (2020), and Visual impairment. ANTHROPOMETRICS:  HT:  5'2.5\"  WT: 76.9 kg (169 lb 8 oz)   BMI: Body mass index is 30.51 kg/m².   BMI CLASSIFICATION: 30-34.9 kg/m2 - obesity class I  IBW: 113 lbs        150% IBW  Usual Body Wt: 180-185 lbs       92-94% U

## 2020-08-03 NOTE — ANESTHESIA PROCEDURE NOTES
Airway  Date/Time: 8/3/2020 3:22 PM  Urgency: elective    Airway not difficult    General Information and Staff    Patient location during procedure: OR  Anesthesiologist: Johan Giang MD  Resident/CRNA: Jody Juares CRNA  Performed: CRNA     Indic

## 2020-08-03 NOTE — ANESTHESIA PREPROCEDURE EVALUATION
Anesthesia PreOp Note    HPI:     Makayla Dash is a 75year old female who presents for preoperative consultation requested by: Trudi Wilcox MD    Date of Surgery: 8/1/2020 - 8/3/2020    Procedure(s):  KNEE TOTAL REVISION  Indication: infection of rig Noted: 03/07/2019      Muscle weakness         Date Noted: 03/07/2019      Patellar tendon rupture, right, initial encounter         Date Noted: 11/19/2018      S/P revision of total knee         Date Noted: 11/19/2018      S/P knee replacement         Tj Performed by Thaddeus Betancourt MD at [facility] Right 6/25/2019    Performed by Parveen Amezcua DPM at [address]   • INJ TENDON/LIGAMENT/CYST Right 01/2019   • KNEE TOTAL REPLACEM Tab, Take 1 tablet (1 mg total) by mouth daily. , Disp: 30 tablet, Rfl: 1, 7/31/2020 at Unknown time  Vitamin D, Cholecalciferol, (VITAMIN D3) 10 MCG (400 UNIT) Oral Tab, Take 400 Units by mouth daily. , Disp: , Rfl: , 7/31/2020 at Unknown time  Insulin NPH, EC tab 81 mg, 81 mg, Oral, Daily, Kristen Vreas MD, 81 mg at 08/02/20 0828  [facility] Hold] atorvastatin (LIPITOR) tab 40 mg, 40 mg, Oral, Nightly, Kristen Veras MD, 40 mg at 08/02/20 2126  [MAR Hold] bumetanide (BUMEX) tab 1 mg, 1 mg, Oral, BID (Diuretic), Leland Cee MD  [MAR Hold] PEG 3350 (MIRALAX) powder packet 17 g, 17 g, Oral, Daily PRN, Melisa Veras MD  [MAR Hold] magnesium hydroxide (MILK OF MAGNESIA) 400 MG/5ML suspension 30 mL, 30 mL, Oral, Daily PRN, Melisa Veras MD  [facility] Hold] bisacodyl Neg    • Bleeding Disorders Neg      Social History    Socioeconomic History      Marital status:       Spouse name: Not on file      Number of children: Not on file      Years of education: Not on file      Highest education level: Not on file 86.0 08/03/2020    MCH 27.1 08/03/2020    MCHC 31.5 08/03/2020    RDW 17.3 (H) 08/03/2020    .0 (L) 08/03/2020     Lab Results   Component Value Date     08/03/2020    K 3.8 08/03/2020     08/03/2020    CO2 28.0 08/03/2020    BUN 26 (H)

## 2020-08-03 NOTE — CM/SW NOTE
08/03/20 1000   CM/SW Referral Data   Referral Source Social Work (self-referral)   Reason for Referral Discharge planning   Informant Patient   Patient Info   Advanced directives? No   Information provided?  Yes   Patient's Mental Status Alert;Oriented

## 2020-08-03 NOTE — PLAN OF CARE
Tylenol administered for R knee pain and trazadone for sleep. PICC in place. Cangrelor infusing. NPO since midnight. Scheduled for R knee resection at 1340.      Problem: Diabetes/Glucose Control  Goal: Glucose maintained within prescribed range  Descriptio injury  Description  INTERVENTIONS:  - Assess pt frequently for physical needs  - Identify cognitive and physical deficits and behaviors that affect risk of falls.   - [place] fall precautions as indicated by assessment.  - Educate pt/family on patient sa wounds/incisions during mobilization  - Obtain PT/OT consults as needed  - Advance activity as appropriate  - Communicate ordered activity level and limitations with patient/family  Outcome: Progressing  Goal: Maintain proper alignment of affected body par

## 2020-08-04 LAB
ANION GAP SERPL CALC-SCNC: 7 MMOL/L (ref 0–18)
BLOOD TYPE BARCODE: 5100
BUN BLD-MCNC: 24 MG/DL (ref 7–18)
BUN/CREAT SERPL: 16.1 (ref 10–20)
CALCIUM BLD-MCNC: 8.5 MG/DL (ref 8.5–10.1)
CHLORIDE SERPL-SCNC: 113 MMOL/L (ref 98–112)
CO2 SERPL-SCNC: 18 MMOL/L (ref 21–32)
CREAT BLD-MCNC: 1.49 MG/DL (ref 0.55–1.02)
DEPRECATED RDW RBC AUTO: 53.1 FL (ref 35.1–46.3)
DEPRECATED RDW RBC AUTO: 62.4 FL (ref 35.1–46.3)
ERYTHROCYTE [DISTWIDTH] IN BLOOD BY AUTOMATED COUNT: 16.5 % (ref 11–15)
ERYTHROCYTE [DISTWIDTH] IN BLOOD BY AUTOMATED COUNT: 17.3 % (ref 11–15)
GLUCOSE BLD-MCNC: 123 MG/DL (ref 70–99)
GLUCOSE BLDC GLUCOMTR-MCNC: 121 MG/DL (ref 70–99)
GLUCOSE BLDC GLUCOMTR-MCNC: 130 MG/DL (ref 70–99)
GLUCOSE BLDC GLUCOMTR-MCNC: 132 MG/DL (ref 70–99)
HCT VFR BLD AUTO: 26.2 % (ref 35–48)
HCT VFR BLD AUTO: 28.4 % (ref 35–48)
HGB BLD-MCNC: 8.2 G/DL (ref 12–16)
HGB BLD-MCNC: 8.6 G/DL (ref 12–16)
MCH RBC QN AUTO: 28.3 PG (ref 26–34)
MCH RBC QN AUTO: 28.8 PG (ref 26–34)
MCHC RBC AUTO-ENTMCNC: 28.9 G/DL (ref 31–37)
MCHC RBC AUTO-ENTMCNC: 32.8 G/DL (ref 31–37)
MCV RBC AUTO: 87.6 FL (ref 80–100)
MCV RBC AUTO: 97.9 FL (ref 80–100)
OSMOLALITY SERPL CALC.SUM OF ELEC: 291 MOSM/KG (ref 275–295)
PLATELET # BLD AUTO: 111 10(3)UL (ref 150–450)
PLATELET # BLD AUTO: 143 10(3)UL (ref 150–450)
POTASSIUM SERPL-SCNC: 5.2 MMOL/L (ref 3.5–5.1)
RBC # BLD AUTO: 2.9 X10(6)UL (ref 3.8–5.3)
RBC # BLD AUTO: 2.99 X10(6)UL (ref 3.8–5.3)
SODIUM SERPL-SCNC: 138 MMOL/L (ref 136–145)
WBC # BLD AUTO: 13.5 X10(3) UL (ref 4–11)
WBC # BLD AUTO: 15.8 X10(3) UL (ref 4–11)

## 2020-08-04 RX ORDER — VANCOMYCIN HYDROCHLORIDE
15 EVERY 24 HOURS
Status: DISCONTINUED | OUTPATIENT
Start: 2020-08-04 | End: 2020-08-05

## 2020-08-04 NOTE — PROGRESS NOTES
[address] [place] [address]    Initial Pharmacokinetic Consult for Vancomycin Dosing     Ansley Thibodeaux is a 75year old patient who is being treated for cellulitis.   Pharmacy has been asked to dose Vancomycin by Dr. Michelle Lynch    Hydrocodone; Metronidazole

## 2020-08-04 NOTE — PHYSICAL THERAPY NOTE
Called YARI Kuo in AM and confirmed that pt is TOE-TOUCH WB. Dr. aFusto Arceo note has pt's WB status listed incorrectly per YARI Kuo.      Attempted to see pt 2x today for PT eval. On first attempt, consulted w/ RN prior to seeing pt for PT/OT c

## 2020-08-04 NOTE — PROGRESS NOTES
[facility]    Progress Note    Dheeraj Fearing Patient Status:  Inpatient    1945 MRN [id number]   Location [facility] 2W/SW Attending Ruth Ann Gonzales MD   Hosp Day # 3 PCP No primary care provider on file.        Subjective: 8/3/2020  CONCLUSION:   BONES: Removal of previous right total knee arthroplasty, and replacement with a antibiotic impregnated cement spacer and a arthrodesis nail which traverses the spacer from the femur to the tibia. Alignment anatomic.   No procedure

## 2020-08-04 NOTE — PROGRESS NOTES
The Patient had a very low out put this morning in her crespo <50 ml in bag and was bladder scanned and only had 14ml. Dr Chen Balderrama was notified on an order for one unit of blood was obtained.

## 2020-08-04 NOTE — PLAN OF CARE
Problem: Diabetes/Glucose Control  Goal: Glucose maintained within prescribed range  Description  INTERVENTIONS:  - Monitor Blood Glucose as ordered  - Assess for signs and symptoms of hyperglycemia and hypoglycemia  - Administer ordered medications to m fall precautions as indicated by assessment.  - Educate pt/family on patient safety including physical limitations  - Instruct pt to call for assistance with activity based on assessment  - Modify environment to reduce risk of injury  - Provide assistive d patient/family  Outcome: Progressing  Goal: Maintain proper alignment of affected body part  Description  INTERVENTIONS:  - Support and protect limb and body alignment per provider's orders  - Instruct and reinforce with patient and family use of appropria

## 2020-08-04 NOTE — PLAN OF CARE
Pt received awake and alert. C/o pain to right knee. IV pain medication given with good relief. 1 unit PRBC given without complication. Sanguineous fluid noted to be saturating dressing on right knee incision. YARI Evans for Dr. Albin Rasmussen made aware.  Re physical limitations  - Instruct pt to call for assistance with activity based on assessment  - Modify environment to reduce risk of injury  - Provide assistive devices as appropriate  - Consider OT/PT consult to assist with strengthening/mobility  - Encou

## 2020-08-04 NOTE — PROGRESS NOTES
Amendment to Dr. Evangelina Rubalcava note:    Procedure: R knee resection with antibiotic spacer  WB status: TDWB, no knee flexion, knee immobilizer at all times    Jose Luis Downs PA-C  Physician Assistant for Dr. Felicita Valente: [phone]  F: [phone] c

## 2020-08-04 NOTE — PROGRESS NOTES
[facility]    Progress Note    Makayla Dash Patient Status:  Inpatient    1945 MRN [id number]   Location [facility] 2W/SW Attending Francena Cockayne, MD   Hosp Day # 3 PCP No primary care provider on file.        Subjective: Orthopedics          Results:     Lab Results   Component Value Date    WBC 13.5 (H) 08/04/2020    HGB 8.2 (L) 08/04/2020    HCT 28.4 (L) 08/04/2020    .0 (L) 08/04/2020    CREATSERUM 1.49 (H) 08/04/2020    BUN 24 (H) 08/04/2020     08/04/2020

## 2020-08-04 NOTE — CM/SW NOTE
Received MDO for surgery. PT/OT evaluations pending. Met with patient alone at bedside. Patient states she was [facility]26[facility].  She was discharged to her daughter Estephania's home in [facility] for the evening before admitting to [address] [place] [address] with patient w/ all costs/options.     Paola Duane: [facility], [address] [place], [address]a Mullen

## 2020-08-04 NOTE — PROGRESS NOTES
Received report from [facility]. Patient is A&Ox4. Received patient in bed. On 2L O2 via NC. Tele in place, no calls received. Dressing was saturated in blood, dripping onto linen. Dressing changed per orders. Immobilizer on at all times.  Patient had one epis

## 2020-08-04 NOTE — OCCUPATIONAL THERAPY NOTE
Attempted to see pt for Occupational Therapy evaluation x2 on this date. Pt confirmed with pt PA that WB status to RLE is TTWB with hinged knee brace locked in extension.      AM: pt bandage noted to be saturated with blood as well as sheets and pt declinin

## 2020-08-04 NOTE — PROGRESS NOTES
[facility]    Progress Note    [facility]:  [facility]    1945 MRN [id number]   Location [facility] 4W/SW/SE Attending Elise Child MD   Hosp Day # 2 PCP No primary care provider on file.        Subjective (yal=[id number])    Result Date: 8/3/2020  CONCLUSION:   BONES: Removal of previous right total knee arthroplasty, and replacement with a antibiotic impregnated cement spacer and a arthrodesis nail which traverses the spacer from the femur to the tibia.   Jeancarlos sliding scale insulin as needed   Renal protection  Cognitive protection    Pain management and Physical therapy as per Orthopedic service. Home Health as needed          Melisa Farley.  Judit Cohen MD  8/3/2020

## 2020-08-04 NOTE — PROGRESS NOTES
Dr. Yumi Ordonez made aware of hypotension and no urine output. Pt denies dizziness/lightheadedness. Also aware of continued oozing from right knee incision. Order for repeat H/H and NS 500ml bolus over 3 hours. MD to round later this evening.

## 2020-08-04 NOTE — CONSULTS
[place] [facility]    Sim Nicely Patient Status:  Inpatient    1945 MRN [id number]   Location [facility] 2W/SW Attending Solange Dhillon MD   Hosp Day # 3 PCP No primary care provider on file.        Tylero Performed by Hyun Desai MD at [address] [facility]     • ESOPHAGOGASTRODUODENOSCOPY (EGD) N/A 6/12/2020    Performed by Hyun Desai MD at [address] [facility] Right 6/25/2019 NAUSEA AND VOMITING    Medications:    Current Facility-Administered Medications:   •  vancomycin IVPB premix 1.25g in 0.9% NaCl 250 mL, 15 mg/kg, Intravenous, Q24H  •  Potassium Chloride ER (K-DUR M20) CR tab 40 mEq, 40 mEq, Oral, Once  •  lactated ringer (FLONASE) 50 MCG/ACT nasal spray 2 spray, 2 spray, Each Nare, Daily PRN  •  folic acid (FOLVITE) tab 1 mg, 1 mg, Oral, Daily  •  Insulin NPH (Human) (Isophane) 100 UNIT/ML injection 12 Units, 12 Units, Subcutaneous, Daily with breakfast  •  lidocaine-menth rhinitis. Physical Exam:  Vital signs: Blood pressure 100/50, pulse 59, temperature 97.4 °F (36.3 °C), temperature source Temporal, resp. rate 16, weight 169 lb 8 oz (76.9 kg), SpO2 100 %. General: Awake, NAD  HEENT: Moist mucous membranes.  EOMI  Nec surgery was postponed. Discharged to rehab with IV vancomycin which has still been on.  Now presented to [facility] on 8/1 to preoperatively transition anticoagulation prior to surgery, s/p [facility] with spacer placement 8/3, intraoperative fluid with [id number] wbcs, 91% s

## 2020-08-04 NOTE — PROGRESS NOTES
[place] [facility] - [facility]    Progress Note    Mingo Valdivia Patient Status:  Inpatient    1945 MRN [id number]   Location [facility] 3W/SW Attending Igor Sandoval MD   [facility] Day # 3 PCP No primary care provider on file.      74 y/o female Daily   • Insulin NPH (Human) (Isophane)  12 Units Subcutaneous Daily with breakfast   • lidocaine-menthol  1 patch Transdermal Q24H   • Pantoprazole Sodium  40 mg Oral QAM AC   • Vitamin D (Cholecalciferol)  400 Units Oral Daily   • Insulin Aspart Pen  1- 08/04/2020     (H) 08/04/2020    CA 8.5 08/04/2020    ALB 2.6 (L) 08/01/2020    ALKPHO 148 (H) 08/01/2020    BILT 0.5 08/01/2020    TP 6.8 08/01/2020    AST 13 (L) 08/01/2020    ALT 29 08/01/2020    PTT 28.1 02/19/2020    INR 1.16 06/22/2020    TSH

## 2020-08-05 PROBLEM — I95.81 POSTOPERATIVE HYPOTENSION: Status: ACTIVE | Noted: 2020-08-05

## 2020-08-05 LAB
ANION GAP SERPL CALC-SCNC: 5 MMOL/L (ref 0–18)
BASOPHILS # BLD AUTO: 0.01 X10(3) UL (ref 0–0.2)
BASOPHILS NFR BLD AUTO: 0.1 %
BUN BLD-MCNC: 46 MG/DL (ref 7–18)
BUN/CREAT SERPL: 15.9 (ref 10–20)
CALCIUM BLD-MCNC: 8.6 MG/DL (ref 8.5–10.1)
CHLORIDE SERPL-SCNC: 107 MMOL/L (ref 98–112)
CK SERPL-CCNC: 43 U/L (ref 26–192)
CO2 SERPL-SCNC: 26 MMOL/L (ref 21–32)
CREAT BLD-MCNC: 2.89 MG/DL (ref 0.55–1.02)
DEPRECATED RDW RBC AUTO: 52.8 FL (ref 35.1–46.3)
EOSINOPHIL # BLD AUTO: 0.21 X10(3) UL (ref 0–0.7)
EOSINOPHIL NFR BLD AUTO: 1.9 %
ERYTHROCYTE [DISTWIDTH] IN BLOOD BY AUTOMATED COUNT: 16.3 % (ref 11–15)
GLUCOSE BLD-MCNC: 200 MG/DL (ref 70–99)
GLUCOSE BLDC GLUCOMTR-MCNC: 179 MG/DL (ref 70–99)
GLUCOSE BLDC GLUCOMTR-MCNC: 223 MG/DL (ref 70–99)
GLUCOSE BLDC GLUCOMTR-MCNC: 240 MG/DL (ref 70–99)
GLUCOSE BLDC GLUCOMTR-MCNC: 267 MG/DL (ref 70–99)
HCT VFR BLD AUTO: 21.4 % (ref 35–48)
HGB BLD-MCNC: 6.9 G/DL (ref 12–16)
IMM GRANULOCYTES # BLD AUTO: 0.07 X10(3) UL (ref 0–1)
IMM GRANULOCYTES NFR BLD: 0.6 %
LYMPHOCYTES # BLD AUTO: 1.22 X10(3) UL (ref 1–4)
LYMPHOCYTES NFR BLD AUTO: 10.9 %
MCH RBC QN AUTO: 28.6 PG (ref 26–34)
MCHC RBC AUTO-ENTMCNC: 32.2 G/DL (ref 31–37)
MCV RBC AUTO: 88.8 FL (ref 80–100)
MONOCYTES # BLD AUTO: 1.8 X10(3) UL (ref 0.1–1)
MONOCYTES NFR BLD AUTO: 16.1 %
NEUTROPHILS # BLD AUTO: 7.87 X10 (3) UL (ref 1.5–7.7)
NEUTROPHILS # BLD AUTO: 7.87 X10(3) UL (ref 1.5–7.7)
NEUTROPHILS NFR BLD AUTO: 70.4 %
OSMOLALITY SERPL CALC.SUM OF ELEC: 304 MOSM/KG (ref 275–295)
PLATELET # BLD AUTO: 99 10(3)UL (ref 150–450)
POTASSIUM SERPL-SCNC: 4.9 MMOL/L (ref 3.5–5.1)
RBC # BLD AUTO: 2.41 X10(6)UL (ref 3.8–5.3)
SODIUM SERPL-SCNC: 138 MMOL/L (ref 136–145)
VANCOMYCIN SERPL-MCNC: 34.6 UG/ML
WBC # BLD AUTO: 11.2 X10(3) UL (ref 4–11)

## 2020-08-05 RX ORDER — ENALAPRIL MALEATE 10 MG/1
5 TABLET ORAL 2 TIMES DAILY
Status: DISCONTINUED | OUTPATIENT
Start: 2020-08-05 | End: 2020-08-06

## 2020-08-05 RX ORDER — CLOPIDOGREL BISULFATE 75 MG/1
300 TABLET ORAL ONCE
Status: COMPLETED | OUTPATIENT
Start: 2020-08-05 | End: 2020-08-05

## 2020-08-05 RX ORDER — SODIUM CHLORIDE 9 MG/ML
INJECTION, SOLUTION INTRAVENOUS CONTINUOUS
Status: DISCONTINUED | OUTPATIENT
Start: 2020-08-05 | End: 2020-08-09

## 2020-08-05 RX ORDER — ASPIRIN 81 MG/1
81 TABLET ORAL DAILY
Status: DISCONTINUED | OUTPATIENT
Start: 2020-08-05 | End: 2020-08-06

## 2020-08-05 RX ORDER — SODIUM CHLORIDE 9 MG/ML
INJECTION, SOLUTION INTRAVENOUS ONCE
Status: COMPLETED | OUTPATIENT
Start: 2020-08-05 | End: 2020-08-05

## 2020-08-05 NOTE — PHYSICAL THERAPY NOTE
PHYSICAL THERAPY KNEE EVALUATION - INPATIENT       Room Number: 216/216-A  Evaluation Date: 8/5/2020  Type of Evaluation: Initial  Physician Order: PT Eval and Treat    Presenting Problem: R TKA revision (R knee resection & antibiotic spacer placement) on she needs at home (walker, w/c, shower chair, bedside commode). If pt is able to transfer from bed < > chair/toilet/w/c, she may be capable of returning home w/ HHPT & 24/7 supervision/care. If not, may need to think about sub-acute rehab.  Will re-assess i knee and insertion of antibiotic spacer 8/3/20.      No history of JOAN or DVT. Denies tobacco use. \"      Problem List  Active Problems:    Hypertension    Cardiomyopathy ([address]    Anemia    Hyperlipidemia    PAF (paroxysmal atrial fibrillation) ([facility])    No debridement r/t spider bite - brown reclusive, right thigh   • OTHER      removal kidney stone   • OTHER SURGICAL HISTORY      Cardiac stent placed 06/22/20   • PATELLA TENDON REPAIR Right 11/19/2018    Performed by Swapna Watt MD at [address] [place] [address]   • PRE O2 WALK                  AM-PAC '6-Clicks' INPATIENT SHORT FORM - BASIC MOBILITY  How much difficulty does the patient currently have. ..  -   Turning over in bed (including adjusting bedclothes, sheets and blankets)?: A Little   -   Sitting do chair/wheelchair/toilet with supervision while maintaining RLE TDWB status   Goal #4   Current Status    Goal #5 Patient will negotiate 1 platform step w/ assistive device and minimal assistance   Goal #5   Current Status    Goal #6    Goal #6  Current Sta

## 2020-08-05 NOTE — OCCUPATIONAL THERAPY NOTE
OCCUPATIONAL THERAPY EVALUATION - INPATIENT      Room Number: 216/216-A  Evaluation Date: 8/5/2020  Type of Evaluation: Initial  Presenting Problem: (failed R TKA)    Physician Order: IP Consult to Occupational Therapy  Reason for Therapy: ADL/IADL Dysfunc Problems:    Hypertension    Cardiomyopathy [facility]    Anemia    Hyperlipidemia    PAF (paroxysmal atrial fibrillation) (HCC)    Nonrheumatic mitral valve regurgitation    Pulmonary hypertension (HCC)    Hiatal hernia with GERD and esophagitis    Renal insuf SURGICAL HISTORY      Cardiac stent placed 06/22/20   • PATELLA TENDON REPAIR Right 11/19/2018    Performed by Luis F Green MD at [address] [place] [address]   • PRESSURE ULCER BOOT Right 03/2019   • TMJ ARTHROSCOPY DEBRIDEMENT Right 07/2019    heel of the fooot   • TOT tested  Toilet Transfer: NT  Shower Transfer: NT  Chair Transfer: NT  Car Transfer: NT    Bedroom Mobility: NT    BALANCE ASSESSMENT  Static Sitting: GOOD  Dynamic Sitting: FAIR+  Static Standing: NT  Dynamic Standing: NT    FUNCTIONAL ADL ASSESSMENT  Jeferson Castillo

## 2020-08-05 NOTE — PROGRESS NOTES
[facility]    [facility] Note    Lori Valente Patient Status:  Inpatient    1945 MRN [id number]   Location [facility] 2W/SW Attending Dayami Austin MD   Hosp Day # 4 PCP No primary care provider on file.        Subjective: (eda=[id number])    Result Date: 8/3/2020  CONCLUSION:   BONES: Removal of previous right total knee arthroplasty, and replacement with a antibiotic impregnated cement spacer and a arthrodesis nail which traverses the spacer from the femur to the tibia.   Jeancarlos

## 2020-08-05 NOTE — PROGRESS NOTES
[facility]  Cardiology Progress Note    [facility] Patient Status:  Inpatient    1945 MRN [id number]   Location [facility] 2W/SW Attending Vernie Cheadle, MD   Hosp Day # 4 PCP No primary care provider on file.        Subjective: F MG  --   --  1.9  --   --    * 107* 93 123* 200*       Recent Labs   Lab 08/01/20  1416   ALT 29   AST 13*   ALB 2.6*       No results for input(s): TROP in the last 168 hours.     Allergies:     Hydrocodone             HIVES, NAUSEA AND VOMITING meals  bumetanide (BUMEX) tab 1 mg, 1 mg, Oral, BID (Diuretic)  Clopidogrel Bisulfate (PLAVIX) tab 75 mg, 75 mg, Oral, Daily  hydrALAzine HCl (APRESOLINE) tab 25 mg, 25 mg, Oral, TID PRN  acetaminophen (TYLENOL EXTRA STRENGTH) tab 500-1,000 mg, 500-1,000 m

## 2020-08-05 NOTE — PROGRESS NOTES
[place] [facility] - [facility] PROGRESS NOTE    Eriberto Hopkins Patient Status:  Inpatient    1945 MRN [id number]   Location [facility] 2W/SW Attending Jose Thomas MD   Hosp Day # 4 PCP No primary care provider on file.      Subjec Rotator cuff syndrome     Temporary cerebral vascular dysfunction     PAF (paroxysmal atrial fibrillation) (HCC)     Polyp of colon     Nonrheumatic mitral valve regurgitation     Pulmonary hypertension ([address]     Hiatal hernia with GERD and esophagitis Given RUEL, will stop vancomycin and start daptomycin. Anticipate six week course of IV abx upon DC (EOT 9/14/20). -  Follow fever curve, wbc. Repeat CBC/diff tomorrow AM.  -  Reviewed labs, micro, imaging reports.  -  Case d/w patient, RN.     Laine Carmona

## 2020-08-05 NOTE — PROGRESS NOTES
[facility]    Progress Note    Morris Specking Patient Status:  Inpatient    1945 MRN [id number]   Location [facility] 2W/SW Attending Paz Garcia MD   Hosp Day # 4 PCP No primary care provider on file.        Subjective: 1,087 (H) 06/18/2020       Xr Knee (1 Or 2 Views), Right (cpt=[id number])    Result Date: 8/3/2020  CONCLUSION:   BONES: Removal of previous right total knee arthroplasty, and replacement with a antibiotic impregnated cement spacer and a arthrodesis nail which

## 2020-08-05 NOTE — PLAN OF CARE
Pt received awake and alert. C/o pain to right knee. Dilaudid PO given with good relief. Denies SOB. To received transfusion of 2 units PRBC today. Seen by PT/OT and sat at edge of bed. BP improved. Continues to have low urine output.  Dressing to right kne and decision-making at the level they choose  - Honor patient and family perspectives and choices   Outcome: Progressing     Problem: SAFETY ADULT - FALL  Goal: Free from fall injury  Description  INTERVENTIONS:  - Assess pt frequently for physical needs Progressing  Goal: Maintain proper alignment of affected body part  Description  INTERVENTIONS:  - Support and protect limb and body alignment per provider's orders  - Instruct and reinforce with patient and family use of appropriate assistive device and p

## 2020-08-06 PROBLEM — N17.9 ACUTE RENAL FAILURE (ARF) (HCC): Chronic | Status: ACTIVE | Noted: 2020-08-06

## 2020-08-06 LAB
ALBUMIN SERPL-MCNC: 2 G/DL (ref 3.4–5)
ALP LIVER SERPL-CCNC: 96 U/L (ref 55–142)
ALT SERPL-CCNC: 16 U/L (ref 13–56)
ANION GAP SERPL CALC-SCNC: 5 MMOL/L (ref 0–18)
AST SERPL-CCNC: 8 U/L (ref 15–37)
BASOPHILS # BLD AUTO: 0.01 X10(3) UL (ref 0–0.2)
BASOPHILS NFR BLD AUTO: 0.1 %
BILIRUB DIRECT SERPL-MCNC: 0.2 MG/DL (ref 0–0.2)
BILIRUB SERPL-MCNC: 0.7 MG/DL (ref 0.1–2)
BLOOD TYPE BARCODE: 5100
BLOOD TYPE BARCODE: 5100
BUN BLD-MCNC: 48 MG/DL (ref 7–18)
BUN/CREAT SERPL: 14.1 (ref 10–20)
CALCIUM BLD-MCNC: 8.5 MG/DL (ref 8.5–10.1)
CHLORIDE SERPL-SCNC: 107 MMOL/L (ref 98–112)
CO2 SERPL-SCNC: 24 MMOL/L (ref 21–32)
CREAT BLD-MCNC: 3.41 MG/DL (ref 0.55–1.02)
DEPRECATED RDW RBC AUTO: 51.5 FL (ref 35.1–46.3)
EOSINOPHIL # BLD AUTO: 0.46 X10(3) UL (ref 0–0.7)
EOSINOPHIL NFR BLD AUTO: 4.5 %
ERYTHROCYTE [DISTWIDTH] IN BLOOD BY AUTOMATED COUNT: 16.1 % (ref 11–15)
GLUCOSE BLD-MCNC: 121 MG/DL (ref 70–99)
GLUCOSE BLDC GLUCOMTR-MCNC: 132 MG/DL (ref 70–99)
GLUCOSE BLDC GLUCOMTR-MCNC: 158 MG/DL (ref 70–99)
GLUCOSE BLDC GLUCOMTR-MCNC: 177 MG/DL (ref 70–99)
GLUCOSE BLDC GLUCOMTR-MCNC: 182 MG/DL (ref 70–99)
GLUCOSE BLDC GLUCOMTR-MCNC: 188 MG/DL (ref 70–99)
HCT VFR BLD AUTO: 27.6 % (ref 35–48)
HCT VFR BLD AUTO: 28.5 % (ref 35–48)
HGB BLD-MCNC: 9.1 G/DL (ref 12–16)
HGB BLD-MCNC: 9.3 G/DL (ref 12–16)
IMM GRANULOCYTES # BLD AUTO: 0.05 X10(3) UL (ref 0–1)
IMM GRANULOCYTES NFR BLD: 0.5 %
LYMPHOCYTES # BLD AUTO: 1.28 X10(3) UL (ref 1–4)
LYMPHOCYTES NFR BLD AUTO: 12.4 %
M PROTEIN MFR SERPL ELPH: 5.4 G/DL (ref 6.4–8.2)
MCH RBC QN AUTO: 29 PG (ref 26–34)
MCHC RBC AUTO-ENTMCNC: 33 G/DL (ref 31–37)
MCV RBC AUTO: 87.9 FL (ref 80–100)
MONOCYTES # BLD AUTO: 1.74 X10(3) UL (ref 0.1–1)
MONOCYTES NFR BLD AUTO: 16.9 %
NEUTROPHILS # BLD AUTO: 6.78 X10 (3) UL (ref 1.5–7.7)
NEUTROPHILS # BLD AUTO: 6.78 X10(3) UL (ref 1.5–7.7)
NEUTROPHILS NFR BLD AUTO: 65.6 %
OSMOLALITY SERPL CALC.SUM OF ELEC: 296 MOSM/KG (ref 275–295)
PLATELET # BLD AUTO: 120 10(3)UL (ref 150–450)
POTASSIUM SERPL-SCNC: 5.3 MMOL/L (ref 3.5–5.1)
RBC # BLD AUTO: 3.14 X10(6)UL (ref 3.8–5.3)
SODIUM SERPL-SCNC: 136 MMOL/L (ref 136–145)
WBC # BLD AUTO: 10.3 X10(3) UL (ref 4–11)

## 2020-08-06 PROCEDURE — 99223 1ST HOSP IP/OBS HIGH 75: CPT | Performed by: INTERNAL MEDICINE

## 2020-08-06 RX ORDER — ONDANSETRON 2 MG/ML
4 INJECTION INTRAMUSCULAR; INTRAVENOUS EVERY 6 HOURS PRN
Status: DISCONTINUED | OUTPATIENT
Start: 2020-08-06 | End: 2020-08-28

## 2020-08-06 NOTE — CM/SW NOTE
NIRALI follow-up:  Per ANAHI Beebe during rounds this morning, pt's kidney functioning is declining. SW received many calls from referral sources throughout the day.     Jerry Smith called to ask about d/c.  SW explained pt is not med clear for d/c today, n

## 2020-08-06 NOTE — PROGRESS NOTES
[facility]    Ortho Medical Progress Note     Maria Isabel Jj Patient Status:  Inpatient    1945 MRN [id number]   Location [facility] 2W/SW Attending Eh Vásquez MD   Hosp Day # 5 PCP No primary care provider on file. with insulin      Coronary artery disease due to calcified coronary lesion - history of stent placement.   Cardiology following        Failed total right knee replacement ([address]  Post op day # 3      Postoperative hypotension  resolved      Acute renal failu

## 2020-08-06 NOTE — OPERATIVE REPORT
[facility]    PATIENT'S NAME: Apple Crisostomo   ATTENDING PHYSICIAN: Kristen Arnold. Junito Gregg MD   OPERATING PHYSICIAN: Christie Meng MD   PATIENT ACCOUNT#:   [id number]    LOCATION:  [address] [place] [address] #:   [id number]       DATE OF BIRTH:  1 general anesthetic was administered. A tourniquet was placed on the patient's right thigh. Right lower extremity was prepped and draped in usual sterile fashion.     Upon demonstration of adequate anesthesia, a surgical incision was made in the patient's for the dowel and placed in the tibia as well as the femur. Once this had polymerized, longitudinal traction was then applied to the knee and a static puck was placed. The medial arthrotomy was then closed with interrupted #1 PDS suture.   Subcutaneous ti

## 2020-08-06 NOTE — OCCUPATIONAL THERAPY NOTE
OCCUPATIONAL THERAPY TREATMENT NOTE - INPATIENT    Room Number: 105/532-X         Presenting Problem: (failed R TKA)     Problem List  Active Problems:    Hypertension    Cardiomyopathy ([address]    Anemia    Hyperlipidemia    PAF (paroxysmal atrial fibrillati OBJECTIVE  Precautions: Knee immobilizer;Bed/chair alarm    WEIGHT BEARING RESTRICTION  Weight Bearing Restriction: R lower extremity        R Lower Extremity: Touch Down Weight Bearing       PAIN ASSESSMENT  Rating: Unable to rate  Location: (E)  Ma with MIN A   Comment: progressing    Patient will complete self care task at sink level with CGA WHILE MAINTAINING WB RESTRICTIONS    Comment:NT                  Goals  on:   Frequency: 3-5X WEEK

## 2020-08-06 NOTE — PLAN OF CARE
Patient slept on and off last night. Hgb 9.1 post blood transfusion of 2 units of PRBC. Right knee dressing dry and intact. Right knee immobilizer in place. Medicated with Dilaudid for pain. Uses call light appropriately. Call light within reach.     Proble Outcome: Progressing     Problem: SAFETY ADULT - FALL  Goal: Free from fall injury  Description  INTERVENTIONS:  - Assess pt frequently for physical needs  - Identify cognitive and physical deficits and behaviors that affect risk of falls.   - [place] f patient handling equipment as needed  - Ensure adequate protection for wounds/incisions during mobilization  - Obtain PT/OT consults as needed  - Advance activity as appropriate  - Communicate ordered activity level and limitations with patient/family  Out

## 2020-08-06 NOTE — PHYSICAL THERAPY NOTE
PHYSICAL THERAPY KNEE TREATMENT NOTE - INPATIENT     Room Number: 806/909-O       Presenting Problem: R TKA revision (R knee resection & antibiotic spacer placement) on 8/3; post-op anemia, low BP, HF    Problem List  Active Problems:    Hypertension    Ca PAIN ASSESSMENT       BALANCE  Static Sitting: Fair +  Dynamic Sitting: Fair -  Static Standing: Not tested  Dynamic Standing: Not tested    ACTIVITY TOLERANCE; Fair           AM-PAC '6-Clicks' INPATIENT SHORT FORM - BASIC MOBILITY  How much difficulty d ambulate 15 feet with assistive device at assistance level: supervision while maintaining RLE TDWB status   Goal #3   Current Status     Goal #4 Patient is able to transfer from bed < > chair/wheelchair/toilet with supervision while maintaining RLE TDWB st

## 2020-08-06 NOTE — PROGRESS NOTES
Telephone note: discussion with Dr. Lyons Kelli team regarding continued oozing from surgical site and wanting to see if anticoagulation can be adjusted temporarily.  Discussed with cardiology and will hold aspirin for a few days and continue plavix due to ANGELO

## 2020-08-06 NOTE — PLAN OF CARE
Continue aspirin and Plavix  We will sign off, call with any questions. Follow-up with Dr. Samuel Siddiqui as an outpatient.

## 2020-08-07 ENCOUNTER — APPOINTMENT (OUTPATIENT)
Dept: ULTRASOUND IMAGING | Facility: HOSPITAL | Age: 75
DRG: 464 | End: 2020-08-07
Attending: INTERNAL MEDICINE
Payer: MEDICARE

## 2020-08-07 LAB
ALBUMIN SERPL-MCNC: 1.9 G/DL (ref 3.4–5)
ANION GAP SERPL CALC-SCNC: 5 MMOL/L (ref 0–18)
BILIRUB UR QL: NEGATIVE
BUN BLD-MCNC: 53 MG/DL (ref 7–18)
BUN/CREAT SERPL: 14.5 (ref 10–20)
CALCIUM BLD-MCNC: 8.1 MG/DL (ref 8.5–10.1)
CHLORIDE SERPL-SCNC: 108 MMOL/L (ref 98–112)
CO2 SERPL-SCNC: 24 MMOL/L (ref 21–32)
COLOR UR: YELLOW
CREAT BLD-MCNC: 3.66 MG/DL (ref 0.55–1.02)
CREAT UR-SCNC: 84.3 MG/DL
DEPRECATED RDW RBC AUTO: 50.5 FL (ref 35.1–46.3)
ERYTHROCYTE [DISTWIDTH] IN BLOOD BY AUTOMATED COUNT: 15.6 % (ref 11–15)
GLUCOSE BLD-MCNC: 127 MG/DL (ref 70–99)
GLUCOSE BLDC GLUCOMTR-MCNC: 123 MG/DL (ref 70–99)
GLUCOSE BLDC GLUCOMTR-MCNC: 156 MG/DL (ref 70–99)
GLUCOSE BLDC GLUCOMTR-MCNC: 176 MG/DL (ref 70–99)
GLUCOSE BLDC GLUCOMTR-MCNC: 206 MG/DL (ref 70–99)
GLUCOSE UR-MCNC: NEGATIVE MG/DL
HAV IGM SER QL: 1.6 MG/DL (ref 1.6–2.6)
HCT VFR BLD AUTO: 26.3 % (ref 35–48)
HGB BLD-MCNC: 8.5 G/DL (ref 12–16)
IRON SATURATION: 8 % (ref 15–50)
IRON SERPL-MCNC: 14 UG/DL (ref 50–170)
KETONES UR-MCNC: NEGATIVE MG/DL
MCH RBC QN AUTO: 28.6 PG (ref 26–34)
MCHC RBC AUTO-ENTMCNC: 32.3 G/DL (ref 31–37)
MCV RBC AUTO: 88.6 FL (ref 80–100)
NITRITE UR QL STRIP.AUTO: NEGATIVE
OSMOLALITY SERPL CALC.SUM OF ELEC: 300 MOSM/KG (ref 275–295)
PH UR: 5 [PH] (ref 5–8)
PHOSPHATE SERPL-MCNC: 4 MG/DL (ref 2.5–4.9)
PLATELET # BLD AUTO: 96 10(3)UL (ref 150–450)
POTASSIUM SERPL-SCNC: 5.2 MMOL/L (ref 3.5–5.1)
PROT UR-MCNC: NEGATIVE MG/DL
RBC # BLD AUTO: 2.97 X10(6)UL (ref 3.8–5.3)
RBC #/AREA URNS AUTO: 5 /HPF
SODIUM SERPL-SCNC: 137 MMOL/L (ref 136–145)
SODIUM SERPL-SCNC: 36 MMOL/L
SP GR UR STRIP: 1.01 (ref 1–1.03)
TOTAL IRON BINDING CAPACITY: 173 UG/DL (ref 240–450)
TRANSFERRIN SERPL-MCNC: 116 MG/DL (ref 200–360)
UROBILINOGEN UR STRIP-ACNC: <2
WBC # BLD AUTO: 8.2 X10(3) UL (ref 4–11)
WBC #/AREA URNS AUTO: 8 /HPF

## 2020-08-07 PROCEDURE — 99233 SBSQ HOSP IP/OBS HIGH 50: CPT | Performed by: INTERNAL MEDICINE

## 2020-08-07 PROCEDURE — 76770 US EXAM ABDO BACK WALL COMP: CPT | Performed by: INTERNAL MEDICINE

## 2020-08-07 RX ORDER — MAGNESIUM SULFATE HEPTAHYDRATE 40 MG/ML
2 INJECTION, SOLUTION INTRAVENOUS ONCE
Status: COMPLETED | OUTPATIENT
Start: 2020-08-07 | End: 2020-08-07

## 2020-08-07 NOTE — PLAN OF CARE
Double RN skin check done prior to transfer off Unit. Skin check performed by this RN and heriberto    Wounds are as follows: Ace wrap to operative leg and Immobilizer. mepilex to coccyx       Will remain available for any further questions or concerns.

## 2020-08-07 NOTE — CM/SW NOTE
SW follow up:    SW noted Neph consult for acute renal failure. Per RN Salome Umaña during rounds this AM, pt continues to have poor kidney functioning. Pt may transfer out of the PCU to another floor today.     D/c planning/referrals in progress:  Anticipatin

## 2020-08-07 NOTE — DIETARY NOTE
ADULT NUTRITION REASSESSMENT     Pt is at moderate nutrition risk. Pt does not meet malnutrition criteria.       RECOMMENDATIONS TO MD:  None at this time     NUTRITION DIAGNOSIS/PROBLEM:  Unintentional weight loss related to prolonged hospitalization & art has a past medical history of Back problem, Calculus of kidney, Cataract (2017), Coronary atherosclerosis, Diabetes ([facility] Esophageal reflux, Essential hypertension, High blood pressure, High cholesterol, History of blood transfusion (06/2020), History Accumulation: lower extremity edema rt. - Skin Integrity:  RN documentation reviewed. Knee and leg incision, coccyx skin tear.      NUTRITION PRESCRIPTION:  Diet: Carbohydrate Controlled 1800 calorie/60g CHO/meal and renal    Oral Supplements: Nepro  ESTIM

## 2020-08-07 NOTE — PROGRESS NOTES
[place] [facility] - [facility] ID PROGRESS NOTE    Mingo Valdivia Patient Status:  Inpatient    1945 MRN [id number]   Location [facility] 2W/SW Attending Igor Sandoval MD   Hosp Day # 6 PCP No primary care provider on file.      Subjec deficiency anemia     Low back pain     Morbid obesity (HCC)     Muscle weakness     Neuropathy     Osteopenia     Pain in joint     Rotator cuff syndrome     Temporary cerebral vascular dysfunction     PAF (paroxysmal atrial fibrillation) (HCC)     Polyp CM               -s/p LHC and PCI of mid LAD with ANGELO  # RUEL  # Anemia               -s/p EGD/colonoscopy 6/2020- tubular adenoma, gastritis on path   # DM  # CKD  # HTN  # HLD     PLAN:  -  Continue on daptomycin. Micro to run daptomycin susceptibilities.

## 2020-08-07 NOTE — PHYSICAL THERAPY NOTE
PHYSICAL THERAPY TREATMENT NOTE - INPATIENT     Room Number: 207/338-V       Presenting Problem: R TKA revision (R knee resection & antibiotic spacer placement) on 8/3; post-op anemia, low BP, HF    Problem List  Active Problems:    Hypertension    Cardiom Static Sitting: Fair +  Dynamic Sitting: Fair -           Static Standing: Not tested  Dynamic Standing: Not tested    ACTIVITY TOLERANCE                         O2 WALK supervision while maintaining RLE TDWB status   Goal #4   Current Status  not met Mod Ax2   Goal #5 Patient will negotiate 1 platform step w/ assistive device and minimal assistance   Goal #5   Current Status  NT   Goal #6     Goal #6  Current Status

## 2020-08-07 NOTE — PROGRESS NOTES
[facility]    Progress Note    Samuel Hatch Patient Status:  Inpatient    1945 MRN [id number]   Location [facility] 2W/SW Attending Amy Fajardo MD   Hosp Day # 6 PCP No primary care provider on file.        Subjective: planning to SNF        Results:     Lab Results   Component Value Date    WBC 8.2 08/07/2020    HGB 8.5 (L) 08/07/2020    HCT 26.3 (L) 08/07/2020    PLT 96.0 (L) 08/07/2020    CREATSERUM 3.66 (H) 08/07/2020    BUN 53 (H) 08/07/2020     08/07/2020

## 2020-08-07 NOTE — CONSULTS
[facility]    PATIENT'S NAME: Rcronald Pena   ATTENDING PHYSICIAN: Katy Damico. Solange Cox MD   CONSULTING PHYSICIAN: Mora Walsh MD   PATIENT ACCOUNT#:   [id number]    LOCATION:  [address] [place] [address] #:   [id number]       DATE OF BIRTH: her right knee. Infectious Disease is on the case. They recommended continue antibiotics, vancomycin, will need about 6 weeks postoperative course, but we need to follow the levels.      The patient has been seen by Cardiology during the admission and she slow.     LABORATORY DATA:  No urinalysis is done. Vancomycin level high at 35. Blood tests:  Sodium 136, potassium 5.3, chloride 107, bicarb 24, BUN 48, creatinine 3.41. Liver functions normal.  Albumin 2.0, which is quite low.   Hemoglobin was 6.9; it

## 2020-08-07 NOTE — PROGRESS NOTES
[facility]    Progress Note    Deandra Manning Patient Status:  Inpatient    1945 MRN [id number]   Location [facility] 4W/SW/SE Attending Bridgette Almaraz MD   Hosp Day # 6 PCP No primary care provider on file.        Subjective rashes present, no abnormal bruising noted  Back/Spine: no abnormalities noted  Musculoskeletal: Right knee bandage  Extremities: no edema, cyanosis  Neurological:  Grossly normal    Results:     Laboratory Data:  Lab Results   Component Value Date    WBC

## 2020-08-07 NOTE — PLAN OF CARE
Problem: Diabetes/Glucose Control  Goal: Glucose maintained within prescribed range  Description  INTERVENTIONS:  - Monitor Blood Glucose as ordered  - Assess for signs and symptoms of hyperglycemia and hypoglycemia  - Administer ordered medications to m fall precautions as indicated by assessment.  - Educate pt/family on patient safety including physical limitations  - Instruct pt to call for assistance with activity based on assessment  - Modify environment to reduce risk of injury  - Provide assistive d patient/family  Outcome: Progressing  PT/OT following  Goal: Maintain proper alignment of affected body part  Description  INTERVENTIONS:  - Support and protect limb and body alignment per provider's orders  - Instruct and reinforce with patient and family

## 2020-08-07 NOTE — PLAN OF CARE
Received report from [address] [place] [address] CCU;stated MDs and Dr Villa Keepcecile aware of labs. Patient stated she stood with therapy at bedside. Right knee immobilizer in place, R PICC patent. Tele. [place]. Plavix and SCds.  MRSA in fluid, no contact isolation per Pete Vizcaino

## 2020-08-08 LAB
ALBUMIN SERPL-MCNC: 1.7 G/DL (ref 3.4–5)
ANION GAP SERPL CALC-SCNC: 6 MMOL/L (ref 0–18)
BUN BLD-MCNC: 53 MG/DL (ref 7–18)
BUN/CREAT SERPL: 13.4 (ref 10–20)
CALCIUM BLD-MCNC: 8.4 MG/DL (ref 8.5–10.1)
CHLORIDE SERPL-SCNC: 108 MMOL/L (ref 98–112)
CO2 SERPL-SCNC: 23 MMOL/L (ref 21–32)
CREAT BLD-MCNC: 3.95 MG/DL (ref 0.55–1.02)
DEPRECATED RDW RBC AUTO: 50 FL (ref 35.1–46.3)
ERYTHROCYTE [DISTWIDTH] IN BLOOD BY AUTOMATED COUNT: 15.3 % (ref 11–15)
GLUCOSE BLD-MCNC: 118 MG/DL (ref 70–99)
GLUCOSE BLDC GLUCOMTR-MCNC: 109 MG/DL (ref 70–99)
GLUCOSE BLDC GLUCOMTR-MCNC: 127 MG/DL (ref 70–99)
GLUCOSE BLDC GLUCOMTR-MCNC: 128 MG/DL (ref 70–99)
GLUCOSE BLDC GLUCOMTR-MCNC: 135 MG/DL (ref 70–99)
HAV IGM SER QL: 2.2 MG/DL (ref 1.6–2.6)
HCT VFR BLD AUTO: 24.9 % (ref 35–48)
HGB BLD-MCNC: 8.1 G/DL (ref 12–16)
MCH RBC QN AUTO: 28.8 PG (ref 26–34)
MCHC RBC AUTO-ENTMCNC: 32.5 G/DL (ref 31–37)
MCV RBC AUTO: 88.6 FL (ref 80–100)
OSMOLALITY SERPL CALC.SUM OF ELEC: 299 MOSM/KG (ref 275–295)
PHOSPHATE SERPL-MCNC: 3.8 MG/DL (ref 2.5–4.9)
PLATELET # BLD AUTO: 138 10(3)UL (ref 150–450)
POTASSIUM SERPL-SCNC: 5.2 MMOL/L (ref 3.5–5.1)
RBC # BLD AUTO: 2.81 X10(6)UL (ref 3.8–5.3)
SODIUM SERPL-SCNC: 137 MMOL/L (ref 136–145)
VANCOMYCIN SERPL-MCNC: 24.4 UG/ML
WBC # BLD AUTO: 8.1 X10(3) UL (ref 4–11)

## 2020-08-08 PROCEDURE — 99233 SBSQ HOSP IP/OBS HIGH 50: CPT | Performed by: INTERNAL MEDICINE

## 2020-08-08 NOTE — PHYSICAL THERAPY NOTE
PHYSICAL THERAPY KNEE TREATMENT NOTE - INPATIENT     Room Number: 408/408-A             Presenting Problem: R TKA revision (R knee resection & antibiotic spacer placement) on 8/3; post-op anemia, low BP, HF    Problem List  Active Problems:    Hypertension extremity        R Lower Extremity: Touch Down Weight Bearing       PAIN ASSESSMENT   Ratin  Location: r KNEE  Management Techniques: Activity promotion; Body mechanics; Relaxation;Repositioning    BALANCE  Static Sitting: Fair +  Dynamic Sitting: Fair - RLE TDWB status   Goal #3   Current Status  NOT met   Goal #4 Patient is able to transfer from bed < > chair/wheelchair/toilet with supervision while maintaining RLE TDWB status   Goal #4   Current Status NT   Goal #5 Patient will negotiate 1 platform step

## 2020-08-08 NOTE — PROGRESS NOTES
[facility]  Nephrology Daily Progress Note    Connor Rubalcava  [id number]  75year old      HPI:   Connor Rubalcava is a 75year old female. Feeling okay except does complain of right knee pain status post working out with physical therapy. edema, cyanosis, or clubbing  Neurological: exam appropriate for age reflexes and motor skills appropriate for age    Labs:  Lab Results   Component Value Date    WBC 8.1 08/08/2020    HGB 8.1 08/08/2020    HCT 24.9 08/08/2020    .0 08/08/2020    CR Q4H PRN **OR** diphenhydrAMINE HCl (BENADRYL) injection 12.5 mg, 12.5 mg, Intravenous, Q4H PRN  •  HYDROmorphone HCl (DILAUDID) 1 MG/ML injection 0.4 mg, 0.4 mg, Intravenous, Q2H PRN **OR** [DISCONTINUED] HYDROmorphone HCl (DILAUDID) 1 MG/ML injection 0.8 PRN  •  Insulin Aspart Pen (NOVOLOG) 100 UNIT/ML flexpen 1-5 Units, 1-5 Units, Subcutaneous, TID CC    Allergies:    Hydrocodone             HIVES, NAUSEA AND VOMITING    Comment:Tolerates morphine, hydromorphone and fentanyl  Metronidazole           HIVES knee joint ([facility]     Anemia due to chronic kidney disease     Failed total right knee replacement (HCC)     Postoperative hypotension     Acute renal failure (ARF) ([facility])      Urine output only fair at 675 cc. Creatinine increased to 3.95.   Potassium 5.2 o

## 2020-08-08 NOTE — PLAN OF CARE
Patient A&Ox4. Stood with therapy at bedside max-A on Fri.  R knee immobilizer in place at all times. JEROME PICC. Tele monitor #52. IVF running. Kindra in place. Accuhecks ACHS. Plavix and SCDs for VTE prophylaxis.   Per ID, no contact isolation for MRS had an angiogram with stent placement  - Provide timely, complete, and accurate information to patient/family  - Incorporate patient and family knowledge, values, beliefs, and cultural backgrounds into the planning and delivery of care  - Encourage patient Use soft bristle tooth brush  - Limit straining and forceful nose blowing  Outcome: Progressing     Problem: MUSCULOSKELETAL - ADULT  Goal: Return mobility to safest level of function  Description  INTERVENTIONS:  - Assess patient stability and activity to

## 2020-08-08 NOTE — PROGRESS NOTES
[facility]    Progress Note    Zara Toney Patient Status:  Inpatient    1945 MRN [id number]   Location [facility] 4W/SW/SE Attending Alexandra Oscar MD   Hosp Day # 7 PCP No primary care provider on file.        Subjective to calcified coronary lesion  Asymptomatic      Failed total right knee replacement (HCC)  S/P surgery      Postoperative hypotension  Controlled now      Acute renal failure (ARF) ([address]  As above        Results:     Lab Results   Component Value Date    W

## 2020-08-08 NOTE — PLAN OF CARE
Problem: Diabetes/Glucose Control  Goal: Glucose maintained within prescribed range  Description  INTERVENTIONS:  - Monitor Blood Glucose as ordered  - Assess for signs and symptoms of hyperglycemia and hypoglycemia  - Administer ordered medications to m fall precautions as indicated by assessment.  - Educate pt/family on patient safety including physical limitations  - Instruct pt to call for assistance with activity based on assessment  - Modify environment to reduce risk of injury  - Provide assistive d patient/family  Outcome: Not Progressing  Goal: Maintain proper alignment of affected body part  Description  INTERVENTIONS:  - Support and protect limb and body alignment per provider's orders  - Instruct and reinforce with patient and family use of appro

## 2020-08-08 NOTE — PROGRESS NOTES
[facility]    Progress Note    Meli Tubbs Patient Status:  Inpatient    1945 MRN [id number]   Location [facility] 2W/SW Attending Luda Grace MD   Hosp Day # 7 PCP No primary care provider on file.        Subjective: 3.8 08/08/2020    CK 43 08/05/2020    B12 1,087 (H) 06/18/2020       No results found.             Akiko Walter MD  Orthopaedic Fellow   [facility]

## 2020-08-09 PROBLEM — N28.9 RENAL INSUFFICIENCY: Status: RESOLVED | Noted: 2020-06-18 | Resolved: 2020-08-09

## 2020-08-09 LAB
ALBUMIN SERPL-MCNC: 1.5 G/DL (ref 3.4–5)
ALBUMIN/GLOB SERPL: 0.5 {RATIO} (ref 1–2)
ALP LIVER SERPL-CCNC: 79 U/L (ref 55–142)
ALT SERPL-CCNC: 8 U/L (ref 13–56)
ANION GAP SERPL CALC-SCNC: 7 MMOL/L (ref 0–18)
ANTIBODY SCREEN: NEGATIVE
AST SERPL-CCNC: 9 U/L (ref 15–37)
BASOPHILS # BLD AUTO: 0.01 X10(3) UL (ref 0–0.2)
BASOPHILS NFR BLD AUTO: 0.1 %
BILIRUB SERPL-MCNC: 0.3 MG/DL (ref 0.1–2)
BUN BLD-MCNC: 49 MG/DL (ref 7–18)
BUN/CREAT SERPL: 12.3 (ref 10–20)
CALCIUM BLD-MCNC: 8.4 MG/DL (ref 8.5–10.1)
CHLORIDE SERPL-SCNC: 111 MMOL/L (ref 98–112)
CO2 SERPL-SCNC: 20 MMOL/L (ref 21–32)
CREAT BLD-MCNC: 3.98 MG/DL (ref 0.55–1.02)
DEPRECATED RDW RBC AUTO: 49.7 FL (ref 35.1–46.3)
DEPRECATED RDW RBC AUTO: 49.7 FL (ref 35.1–46.3)
EOSINOPHIL # BLD AUTO: 0.38 X10(3) UL (ref 0–0.7)
EOSINOPHIL NFR BLD AUTO: 5 %
ERYTHROCYTE [DISTWIDTH] IN BLOOD BY AUTOMATED COUNT: 15.2 % (ref 11–15)
ERYTHROCYTE [DISTWIDTH] IN BLOOD BY AUTOMATED COUNT: 15.2 % (ref 11–15)
GLOBULIN PLAS-MCNC: 3.3 G/DL (ref 2.8–4.4)
GLUCOSE BLD-MCNC: 96 MG/DL (ref 70–99)
GLUCOSE BLDC GLUCOMTR-MCNC: 131 MG/DL (ref 70–99)
GLUCOSE BLDC GLUCOMTR-MCNC: 137 MG/DL (ref 70–99)
GLUCOSE BLDC GLUCOMTR-MCNC: 137 MG/DL (ref 70–99)
GLUCOSE BLDC GLUCOMTR-MCNC: 158 MG/DL (ref 70–99)
HAV IGM SER QL: 2.1 MG/DL (ref 1.6–2.6)
HCT VFR BLD AUTO: 24.2 % (ref 35–48)
HCT VFR BLD AUTO: 24.2 % (ref 35–48)
HGB BLD-MCNC: 7.8 G/DL (ref 12–16)
HGB BLD-MCNC: 7.8 G/DL (ref 12–16)
IMM GRANULOCYTES # BLD AUTO: 0.03 X10(3) UL (ref 0–1)
IMM GRANULOCYTES NFR BLD: 0.4 %
LYMPHOCYTES # BLD AUTO: 0.88 X10(3) UL (ref 1–4)
LYMPHOCYTES NFR BLD AUTO: 11.5 %
M PROTEIN MFR SERPL ELPH: 4.8 G/DL (ref 6.4–8.2)
MCH RBC QN AUTO: 28.8 PG (ref 26–34)
MCH RBC QN AUTO: 28.8 PG (ref 26–34)
MCHC RBC AUTO-ENTMCNC: 32.2 G/DL (ref 31–37)
MCHC RBC AUTO-ENTMCNC: 32.2 G/DL (ref 31–37)
MCV RBC AUTO: 89.3 FL (ref 80–100)
MCV RBC AUTO: 89.3 FL (ref 80–100)
MONOCYTES # BLD AUTO: 1.06 X10(3) UL (ref 0.1–1)
MONOCYTES NFR BLD AUTO: 13.9 %
NEUTROPHILS # BLD AUTO: 5.29 X10 (3) UL (ref 1.5–7.7)
NEUTROPHILS # BLD AUTO: 5.29 X10(3) UL (ref 1.5–7.7)
NEUTROPHILS NFR BLD AUTO: 69.1 %
OSMOLALITY SERPL CALC.SUM OF ELEC: 299 MOSM/KG (ref 275–295)
PHOSPHATE SERPL-MCNC: 3.8 MG/DL (ref 2.5–4.9)
PLATELET # BLD AUTO: 139 10(3)UL (ref 150–450)
PLATELET # BLD AUTO: 139 10(3)UL (ref 150–450)
POTASSIUM SERPL-SCNC: 5.2 MMOL/L (ref 3.5–5.1)
RBC # BLD AUTO: 2.71 X10(6)UL (ref 3.8–5.3)
RBC # BLD AUTO: 2.71 X10(6)UL (ref 3.8–5.3)
RH BLOOD TYPE: POSITIVE
SODIUM SERPL-SCNC: 138 MMOL/L (ref 136–145)
VANCOMYCIN SERPL-MCNC: 22.5 UG/ML
WBC # BLD AUTO: 7.7 X10(3) UL (ref 4–11)
WBC # BLD AUTO: 7.7 X10(3) UL (ref 4–11)

## 2020-08-09 PROCEDURE — 99233 SBSQ HOSP IP/OBS HIGH 50: CPT | Performed by: INTERNAL MEDICINE

## 2020-08-09 RX ORDER — METOCLOPRAMIDE HYDROCHLORIDE 5 MG/ML
5 INJECTION INTRAMUSCULAR; INTRAVENOUS EVERY 8 HOURS PRN
Status: DISCONTINUED | OUTPATIENT
Start: 2020-08-09 | End: 2020-08-28

## 2020-08-09 NOTE — PROGRESS NOTES
[facility]    Progress Note    Mingo Valdivia Patient Status:  Inpatient    1945 MRN [id number]   Location [facility] 4W/SW/SE Attending Igor Sandoval MD   Hosp Day # 8 PCP No primary care provider on file.        Subjective and Plan:     Pulmonary hypertension ([address]  severe      Coronary artery disease due to calcified coronary lesion  S/p ANGLEO 6/22/20. She is currently on plavix and is to resume aspirin 81 mg when no longer oozing.        Failed total right knee replacement (H

## 2020-08-09 NOTE — OCCUPATIONAL THERAPY NOTE
OCCUPATIONAL THERAPY TREATMENT NOTE - INPATIENT    Room Number: 263/807-Q    Presenting Problem: (failed R TKA)     Problem List  Active Problems:    Hypertension    Cardiomyopathy ([address]    Anemia    Hyperlipidemia    PAF (paroxysmal atrial fibrillation) ( RESTRICTION  Weight Bearing Restriction: R lower extremity        R Lower Extremity: Touch Down Weight Bearing       PAIN ASSESSMENT  Ratin  Location: R LE  Management Techniques:  Activity promotion;Breathing techniques;Relaxation;Repositioning     ACT

## 2020-08-09 NOTE — PHYSICAL THERAPY NOTE
Chart reviewed, pt ok to be seen per ANAHI Valdez. Pt asked therapist to return as she as feeling nauseated. PT returned, pt still feeling nauseated. Assisted with repositioning in bed x2, then removed SCD as ANAHI Rene said it was ok.  Pt feeling like she will th

## 2020-08-09 NOTE — PROGRESS NOTES
[facility]  Nephrology Daily Progress Note    Makayla Dash  [id number]  75year old      HPI:   aMkayla Dash is a 75year old female. Had 1 small episode of emesis this am.  Now better. Overall po in take has improve.        ROS: 7.7 08/09/2020    WBC 7.7 08/09/2020    HGB 7.8 08/09/2020    HGB 7.8 08/09/2020    HCT 24.2 08/09/2020    HCT 24.2 08/09/2020    .0 08/09/2020    .0 08/09/2020    CREATSERUM 3.98 08/09/2020    BUN 49 08/09/2020     08/09/2020    K 5.2 rectal suppository 10 mg, 10 mg, Rectal, Daily PRN  •  diphenhydrAMINE (BENADRYL) cap/tab 25 mg, 25 mg, Oral, Q4H PRN **OR** diphenhydrAMINE HCl (BENADRYL) injection 12.5 mg, 12.5 mg, Intravenous, Q4H PRN  •  HYDROmorphone HCl (DILAUDID) 1 MG/ML injection g, 30 g, Oral, Q15 Min PRN **OR** Glucose-Vitamin C (DEX-4) chewable tab 8 tablet, 8 tablet, Oral, Q15 Min PRN  •  Insulin Aspart Pen (NOVOLOG) 100 UNIT/ML flexpen 1-5 Units, 1-5 Units, Subcutaneous, TID CC    Allergies:    Hydrocodone             HIVES, N Coronary artery disease due to calcified coronary lesion     Infected prosthetic knee joint (HCC)     Anemia due to chronic kidney disease     Failed total right knee replacement (HCC)     Postoperative hypotension     Acute renal failure (ARF) ([facility] [address]

## 2020-08-10 LAB
ALBUMIN SERPL-MCNC: 1.7 G/DL (ref 3.4–5)
ANION GAP SERPL CALC-SCNC: 6 MMOL/L (ref 0–18)
BASOPHILS # BLD AUTO: 0 X10(3) UL (ref 0–0.2)
BASOPHILS NFR BLD AUTO: 0 %
BUN BLD-MCNC: 53 MG/DL (ref 7–18)
BUN/CREAT SERPL: 11.9 (ref 10–20)
CALCIUM BLD-MCNC: 8.6 MG/DL (ref 8.5–10.1)
CHLORIDE SERPL-SCNC: 109 MMOL/L (ref 98–112)
CO2 SERPL-SCNC: 22 MMOL/L (ref 21–32)
CREAT BLD-MCNC: 4.46 MG/DL (ref 0.55–1.02)
DEPRECATED RDW RBC AUTO: 49.9 FL (ref 35.1–46.3)
EOSINOPHIL # BLD AUTO: 0.44 X10(3) UL (ref 0–0.7)
EOSINOPHIL NFR BLD AUTO: 5.4 %
ERYTHROCYTE [DISTWIDTH] IN BLOOD BY AUTOMATED COUNT: 15.2 % (ref 11–15)
GLUCOSE BLD-MCNC: 94 MG/DL (ref 70–99)
GLUCOSE BLDC GLUCOMTR-MCNC: 75 MG/DL (ref 70–99)
GLUCOSE BLDC GLUCOMTR-MCNC: 82 MG/DL (ref 70–99)
GLUCOSE BLDC GLUCOMTR-MCNC: 91 MG/DL (ref 70–99)
GLUCOSE BLDC GLUCOMTR-MCNC: 99 MG/DL (ref 70–99)
HAV IGM SER QL: 2.1 MG/DL (ref 1.6–2.6)
HCT VFR BLD AUTO: 24.7 % (ref 35–48)
HGB BLD-MCNC: 7.9 G/DL (ref 12–16)
IMM GRANULOCYTES # BLD AUTO: 0.04 X10(3) UL (ref 0–1)
IMM GRANULOCYTES NFR BLD: 0.5 %
LYMPHOCYTES # BLD AUTO: 0.99 X10(3) UL (ref 1–4)
LYMPHOCYTES NFR BLD AUTO: 12.1 %
MCH RBC QN AUTO: 28.6 PG (ref 26–34)
MCHC RBC AUTO-ENTMCNC: 32 G/DL (ref 31–37)
MCV RBC AUTO: 89.5 FL (ref 80–100)
MONOCYTES # BLD AUTO: 1.15 X10(3) UL (ref 0.1–1)
MONOCYTES NFR BLD AUTO: 14 %
NEUTROPHILS # BLD AUTO: 5.58 X10 (3) UL (ref 1.5–7.7)
NEUTROPHILS # BLD AUTO: 5.58 X10(3) UL (ref 1.5–7.7)
NEUTROPHILS NFR BLD AUTO: 68 %
OSMOLALITY SERPL CALC.SUM OF ELEC: 298 MOSM/KG (ref 275–295)
PHOSPHATE SERPL-MCNC: 3.9 MG/DL (ref 2.5–4.9)
PLATELET # BLD AUTO: 168 10(3)UL (ref 150–450)
POTASSIUM SERPL-SCNC: 5.4 MMOL/L (ref 3.5–5.1)
RBC # BLD AUTO: 2.76 X10(6)UL (ref 3.8–5.3)
SODIUM SERPL-SCNC: 137 MMOL/L (ref 136–145)
VANCOMYCIN SERPL-MCNC: 21.4 UG/ML
WBC # BLD AUTO: 8.2 X10(3) UL (ref 4–11)

## 2020-08-10 PROCEDURE — 99233 SBSQ HOSP IP/OBS HIGH 50: CPT | Performed by: INTERNAL MEDICINE

## 2020-08-10 RX ORDER — SENNOSIDES 8.6 MG
8.6 TABLET ORAL 2 TIMES DAILY
Status: DISCONTINUED | OUTPATIENT
Start: 2020-08-10 | End: 2020-08-28

## 2020-08-10 NOTE — PHYSICAL THERAPY NOTE
PHYSICAL THERAPY KNEE TREATMENT NOTE - INPATIENT     Room Number: 408/408-A             Presenting Problem: R TKA revision (R knee resection & antibiotic spacer placement) on 8/3; post-op anemia, low BP, HF    Problem List  Active Problems:    Hypertension ASSESSMENT   Rating: Unable to rate  Location: r KNEE  Management Techniques: Activity promotion; Body mechanics; Relaxation;Repositioning    BALANCE  Static Sitting: Fair +  Dynamic Sitting: Fair -  Static Standing: Not tested  Dynamic Standing: Not tested feet with assistive device at assistance level: supervision while maintaining RLE TDWB status   Goal #3   Current Status  NOT met   Goal #4 Patient is able to transfer from bed < > chair/wheelchair/toilet with supervision while maintaining RLE TDWB status

## 2020-08-10 NOTE — PLAN OF CARE
Patient is alert and oriented X3. VS WNL, on room air. Blood glucose checks AC and HS. CMS intact to RLE, immobilizer in place, dressing change completed with pressure dressing. Up with x2 max assist/lift. Tele in place. Saline locked.  PICC to RUE, clean, care  - Encourage patient/family to participate in care and decision-making at the level they choose  - Honor patient and family perspectives and choices   Outcome: Progressing     Problem: SAFETY ADULT - FALL  Goal: Free from fall injury  Description  INT stability and activity tolerance for standing, transferring and ambulating w/ or w/o assistive devices  - Assist with transfers and ambulation using safe patient handling equipment as needed  - Ensure adequate protection for wounds/incisions during mobiliz

## 2020-08-10 NOTE — CDS QUERY
Acute Impaired Renal Function  CLINICAL DOCUMENTATION CLARIFICATION FORM  How To Answer This Query:  1)  Click \"Edit Button\" on the toolbar. 2)  Type an \"X\" in the bracket for the diagnosis that applies.   (You may also add additional clinical details

## 2020-08-10 NOTE — PROGRESS NOTES
[facility]    Progress Note    Morrow Stalling Patient Status:  Inpatient    1945 MRN [id number]   Location [facility] 4W/SW/SE Attending Jamal Todd MD   Hosp Day # 9 PCP No primary care provider on file.        Subjective (H) 07/15/2020    CRP <0.29 08/02/2020    MG 2.1 08/10/2020    PHOS 3.9 08/10/2020    CK 43 08/05/2020    B12 1,087 (H) 06/18/2020       No results found.   Ekg 12-lead    Result Date: 8/9/2020  ECG Report  Interpretation  --------------------------

## 2020-08-10 NOTE — PROGRESS NOTES
[facility] Note:  Renal Dose Adjustment for Metoclopramide (REGLAN)    Geoff Kumar has been prescribed Metoclopramide (REGLAN) 10 mg every 8 hours as needed for n/v.    Estimated Creatinine Clearance: 9.9 mL/min (A) (based on SCr of 3.98 mg/dL (H)).

## 2020-08-10 NOTE — CM/SW NOTE
Care Progression Note- per chart review:  Active Acute Medical Issue:   8/3 Rt total knee revision, antibiotic spacer placement by Dr. Go Alvarez.   -POD#7  -TDWB RT LLE, no knee flexion, knee immobilizer at all times.   - Dr. Barbara Kraus following for RUEL:  UO carlin

## 2020-08-10 NOTE — PLAN OF CARE
VSS, no acute events overnight. Pt is aox4, ambulating TTWB, x2a to SP. Voiding per crespo. Home Plavix for DVT prophylaxis. Dressing Mepilex, ABD pad, and tape, immobilizer in place at all times. Dilaudid 1mg PO Q4h PRN for pain.  Disease process discussed values, beliefs, and cultural backgrounds into the planning and delivery of care  - Encourage patient/family to participate in care and decision-making at the level they choose  - Honor patient and family perspectives and choices   Outcome: Progressing to safest level of function  Description  INTERVENTIONS:  - Assess patient stability and activity tolerance for standing, transferring and ambulating w/ or w/o assistive devices  - Assist with transfers and ambulation using safe patient handling equipment

## 2020-08-10 NOTE — PROGRESS NOTES
[facility]OSP - [facility]    Progress Note    Scarlet Heading Patient Status:  Inpatient    1945 MRN [id number]   Location [facility] 4W/SW/SE Attending Silvana Kinsey MD   Hosp Day # 9 PCP No primary care provider on file.        Subjective -------------------------- Sinus Rhythm -Left bundle branch block.  ABNORMAL When compared with ECG of 08/02/2020 05:06:28 No significant changes have occurred Electronically signed on 08/10/2020 at 13:05 by Josafat Leo MD      Assessment and Plan:

## 2020-08-11 LAB
ALBUMIN SERPL-MCNC: 1.7 G/DL (ref 3.4–5)
ANION GAP SERPL CALC-SCNC: 6 MMOL/L (ref 0–18)
BASOPHILS # BLD AUTO: 0 X10(3) UL (ref 0–0.2)
BASOPHILS NFR BLD AUTO: 0 %
BUN BLD-MCNC: 53 MG/DL (ref 7–18)
BUN/CREAT SERPL: 11.3 (ref 10–20)
CALCIUM BLD-MCNC: 8.8 MG/DL (ref 8.5–10.1)
CHLORIDE SERPL-SCNC: 108 MMOL/L (ref 98–112)
CO2 SERPL-SCNC: 21 MMOL/L (ref 21–32)
CREAT BLD-MCNC: 4.71 MG/DL (ref 0.55–1.02)
CREAT UR-SCNC: 59.5 MG/DL
DEPRECATED RDW RBC AUTO: 49.2 FL (ref 35.1–46.3)
EOSINOPHIL # BLD AUTO: 0.48 X10(3) UL (ref 0–0.7)
EOSINOPHIL NFR BLD AUTO: 6.4 %
ERYTHROCYTE [DISTWIDTH] IN BLOOD BY AUTOMATED COUNT: 15 % (ref 11–15)
GLUCOSE BLD-MCNC: 83 MG/DL (ref 70–99)
GLUCOSE BLDC GLUCOMTR-MCNC: 131 MG/DL (ref 70–99)
GLUCOSE BLDC GLUCOMTR-MCNC: 142 MG/DL (ref 70–99)
GLUCOSE BLDC GLUCOMTR-MCNC: 152 MG/DL (ref 70–99)
GLUCOSE BLDC GLUCOMTR-MCNC: 90 MG/DL (ref 70–99)
HCT VFR BLD AUTO: 24.6 % (ref 35–48)
HGB BLD-MCNC: 7.9 G/DL (ref 12–16)
IMM GRANULOCYTES # BLD AUTO: 0.03 X10(3) UL (ref 0–1)
IMM GRANULOCYTES NFR BLD: 0.4 %
LYMPHOCYTES # BLD AUTO: 0.95 X10(3) UL (ref 1–4)
LYMPHOCYTES NFR BLD AUTO: 12.6 %
MCH RBC QN AUTO: 28.6 PG (ref 26–34)
MCHC RBC AUTO-ENTMCNC: 32.1 G/DL (ref 31–37)
MCV RBC AUTO: 89.1 FL (ref 80–100)
MONOCYTES # BLD AUTO: 0.99 X10(3) UL (ref 0.1–1)
MONOCYTES NFR BLD AUTO: 13.2 %
NEUTROPHILS # BLD AUTO: 5.07 X10 (3) UL (ref 1.5–7.7)
NEUTROPHILS # BLD AUTO: 5.07 X10(3) UL (ref 1.5–7.7)
NEUTROPHILS NFR BLD AUTO: 67.4 %
OSMOLALITY SERPL CALC.SUM OF ELEC: 294 MOSM/KG (ref 275–295)
PHOSPHATE SERPL-MCNC: 3.9 MG/DL (ref 2.5–4.9)
PLATELET # BLD AUTO: 201 10(3)UL (ref 150–450)
POTASSIUM SERPL-SCNC: 5.7 MMOL/L (ref 3.5–5.1)
RBC # BLD AUTO: 2.76 X10(6)UL (ref 3.8–5.3)
SODIUM SERPL-SCNC: 135 MMOL/L (ref 136–145)
SODIUM SERPL-SCNC: 22 MMOL/L
WBC # BLD AUTO: 7.5 X10(3) UL (ref 4–11)

## 2020-08-11 PROCEDURE — 99233 SBSQ HOSP IP/OBS HIGH 50: CPT | Performed by: INTERNAL MEDICINE

## 2020-08-11 RX ORDER — ALBUMIN (HUMAN) 12.5 G/50ML
12.5 SOLUTION INTRAVENOUS ONCE
Status: COMPLETED | OUTPATIENT
Start: 2020-08-11 | End: 2020-08-11

## 2020-08-11 RX ORDER — SODIUM CHLORIDE 9 MG/ML
INJECTION, SOLUTION INTRAVENOUS CONTINUOUS
Status: ACTIVE | OUTPATIENT
Start: 2020-08-11 | End: 2020-08-11

## 2020-08-11 RX ORDER — FUROSEMIDE 10 MG/ML
40 INJECTION INTRAMUSCULAR; INTRAVENOUS ONCE
Status: COMPLETED | OUTPATIENT
Start: 2020-08-11 | End: 2020-08-11

## 2020-08-11 NOTE — PLAN OF CARE
Patient is alert and oriented X3. VS WNL, on room air. Blood glucose checks AC and HS. CMS intact to RLE, immobilizer in place, dressing change to RLE BID. Up with x2 max assist/lift. Tele in place. Saline locked.  PICC to RUE, clean, dry, intact, and paten participate in care and decision-making at the level they choose  - Honor patient and family perspectives and choices   Outcome: Progressing     Problem: SAFETY ADULT - FALL  Goal: Free from fall injury  Description  INTERVENTIONS:  - Assess pt frequently standing, transferring and ambulating w/ or w/o assistive devices  - Assist with transfers and ambulation using safe patient handling equipment as needed  - Ensure adequate protection for wounds/incisions during mobilization  - Obtain PT/OT consults as nee

## 2020-08-11 NOTE — PHYSICAL THERAPY NOTE
PHYSICAL THERAPY KNEE TREATMENT NOTE - INPATIENT     Room Number: 408/408-A             Presenting Problem: R TKA revision (R knee resection & antibiotic spacer placement) on 8/3; post-op anemia, low BP, HF    Problem List  Active Problems:    Hypertension Bearing       PAIN ASSESSMENT   Rating: Unable to rate  Location: r KNEE  Management Techniques: Activity promotion; Body mechanics; Relaxation;Repositioning    BALANCE  Static Sitting: Fair +  Dynamic Sitting: Fair -  Static Standing: Not tested  Howie Walker able to ambulate 15 feet with assistive device at assistance level: supervision while maintaining RLE TDWB status   Goal #3   Current Status  NOT met;sttod only   Goal #4 Patient is able to transfer from bed < > chair/wheelchair/toilet with supervision jeniffer

## 2020-08-11 NOTE — PROGRESS NOTES
[facility]    Progress Note    Deandra Manning Patient Status:  Inpatient    1945 MRN [id number]   Location [facility] 4W/SW/SE Attending Bridgette Almaraz MD   Hosp Day # 10 PCP No primary care provider on file.        Subjectiv 201.0 08/11/2020    CREATSERUM 4.71 (H) 08/11/2020    BUN 53 (H) 08/11/2020     (L) 08/11/2020    K 5.7 (H) 08/11/2020     08/11/2020    CO2 21.0 08/11/2020    GLU 83 08/11/2020    CA 8.8 08/11/2020    ALB 1.7 (L) 08/11/2020    ALKPHO 79 08/09/

## 2020-08-11 NOTE — PLAN OF CARE
Pt is POD #7-8. Vital signs within normal limits. PRN PO Dilaudid and Tylenol provided for pain. Alert and oriented x4. CMS intact. Tele #52 in place. On room air. Tolerating soft diet. Arguelles in place. Strict I/O d/t poor renal function.  Small amount of  to patient/family  - Incorporate patient and family knowledge, values, beliefs, and cultural backgrounds into the planning and delivery of care  - Encourage patient/family to participate in care and decision-making at the level they choose  - Honor patient Progressing     Problem: MUSCULOSKELETAL - ADULT  Goal: Return mobility to safest level of function  Description  INTERVENTIONS:  - Assess patient stability and activity tolerance for standing, transferring and ambulating w/ or w/o assistive devices  - Ass

## 2020-08-11 NOTE — PROGRESS NOTES
[facility]    Progress Note    Diane Franco Patient Status:  Inpatient    1945 MRN [id number]   Location [facility] 4W/SW/SE Attending Leland Cee MD   Hosp Day # 9 PCP No primary care provider on file.        Subjective abnormal bruising noted  Back/Spine: no abnormalities noted  Musculoskeletal: full ROM all extremities good strength  no deformities  Extremities: brace around r leg  1+ edema  Neurological:  Grossly normal    Results:     Laboratory Data:  Lab Results   C

## 2020-08-11 NOTE — PROGRESS NOTES
[facility]    Progress Note      Subjective:     Patient comfortable - denies any sob, abdominal pain or chest pain    No NV    No leg swelling      Review of Systems:     Constitutional: negative for fatigue  Eyes: negative for irritatio PRN  docusate sodium (COLACE) cap 100 mg, 100 mg, Oral, BID  PEG 3350 (MIRALAX) powder packet 17 g, 17 g, Oral, Daily PRN  magnesium hydroxide (MILK OF MAGNESIA) 400 MG/5ML suspension 30 mL, 30 mL, Oral, Daily PRN  bisacodyl (DULCOLAX) rectal suppository 1 Or  Glucose-Vitamin C (DEX-4) chewable tab 8 tablet, 8 tablet, Oral, Q15 Min PRN  Insulin Aspart Pen (NOVOLOG) 100 UNIT/ML flexpen 1-5 Units, 1-5 Units, Subcutaneous, TID CC               Results:     Recent Labs   Lab 08/09/20  0518 08/10/20  0542 08/11/2 -------------------------- Sinus Rhythm -Left bundle branch block.  ABNORMAL When compared with ECG of 08/02/2020 05:06:28 No significant changes have occurred Electronically signed on 08/10/2020 at 13:05 by Alejandra Chacon MD      Assessment and Plan:

## 2020-08-12 LAB
ANION GAP SERPL CALC-SCNC: 5 MMOL/L (ref 0–18)
BUN BLD-MCNC: 56 MG/DL (ref 7–18)
BUN/CREAT SERPL: 11.3 (ref 10–20)
CALCIUM BLD-MCNC: 8.7 MG/DL (ref 8.5–10.1)
CHLORIDE SERPL-SCNC: 106 MMOL/L (ref 98–112)
CK SERPL-CCNC: 19 U/L (ref 26–192)
CO2 SERPL-SCNC: 22 MMOL/L (ref 21–32)
CREAT BLD-MCNC: 4.96 MG/DL (ref 0.55–1.02)
DEPRECATED RDW RBC AUTO: 47.8 FL (ref 35.1–46.3)
ERYTHROCYTE [DISTWIDTH] IN BLOOD BY AUTOMATED COUNT: 14.9 % (ref 11–15)
GLUCOSE BLD-MCNC: 103 MG/DL (ref 70–99)
GLUCOSE BLDC GLUCOMTR-MCNC: 131 MG/DL (ref 70–99)
GLUCOSE BLDC GLUCOMTR-MCNC: 140 MG/DL (ref 70–99)
GLUCOSE BLDC GLUCOMTR-MCNC: 159 MG/DL (ref 70–99)
GLUCOSE BLDC GLUCOMTR-MCNC: 160 MG/DL (ref 70–99)
HCT VFR BLD AUTO: 23.1 % (ref 35–48)
HGB BLD-MCNC: 7.4 G/DL (ref 12–16)
MCH RBC QN AUTO: 28.1 PG (ref 26–34)
MCHC RBC AUTO-ENTMCNC: 32 G/DL (ref 31–37)
MCV RBC AUTO: 87.8 FL (ref 80–100)
OSMOLALITY SERPL CALC.SUM OF ELEC: 292 MOSM/KG (ref 275–295)
PLATELET # BLD AUTO: 201 10(3)UL (ref 150–450)
POTASSIUM SERPL-SCNC: 5.6 MMOL/L (ref 3.5–5.1)
RBC # BLD AUTO: 2.63 X10(6)UL (ref 3.8–5.3)
SODIUM SERPL-SCNC: 133 MMOL/L (ref 136–145)
WBC # BLD AUTO: 7.1 X10(3) UL (ref 4–11)

## 2020-08-12 PROCEDURE — 99233 SBSQ HOSP IP/OBS HIGH 50: CPT | Performed by: INTERNAL MEDICINE

## 2020-08-12 RX ORDER — SODIUM CHLORIDE 9 MG/ML
INJECTION, SOLUTION INTRAVENOUS CONTINUOUS
Status: DISCONTINUED | OUTPATIENT
Start: 2020-08-12 | End: 2020-08-17

## 2020-08-12 RX ORDER — SODIUM CHLORIDE 9 MG/ML
INJECTION, SOLUTION INTRAVENOUS ONCE
Status: DISCONTINUED | OUTPATIENT
Start: 2020-08-12 | End: 2020-08-28

## 2020-08-12 RX ORDER — ASPIRIN 81 MG/1
81 TABLET, CHEWABLE ORAL DAILY
Status: DISCONTINUED | OUTPATIENT
Start: 2020-08-12 | End: 2020-08-14

## 2020-08-12 NOTE — OCCUPATIONAL THERAPY NOTE
Chart reviewed, attempted OT treatment, however patient declining reporting pain and nausea. Discussed goals with patient, she is hopeful to progress mobility and participation with ADLs daily. Made goal to transfer to [facility] tomorrow.  Will reatte

## 2020-08-12 NOTE — PROGRESS NOTES
[facility]    Progress Note    Whit Morris Patient Status:  Inpatient    1945 MRN [id number]   Location [facility] 4W/SW/SE Attending Maninder Donahue MD   Hosp Day # 11 PCP No primary care provider on file.        Subjectiv Plan:     Pulmonary hypertension ([address]  severe      Coronary artery disease due to calcified coronary lesion  S/p ANGELO 6/22/20.  She is able to resume aspirin 81 mg with plavix per ortho      Failed total right knee replacement (HCC)  Now antibiotic spacer /

## 2020-08-12 NOTE — PLAN OF CARE
Problem: Diabetes/Glucose Control  Goal: Glucose maintained within prescribed range  Description  INTERVENTIONS:  - Monitor Blood Glucose as ordered  - Assess for signs and symptoms of hyperglycemia and hypoglycemia  - Administer ordered medications to m precautions as indicated by assessment.  - Educate pt/family on patient safety including physical limitations  - Instruct pt to call for assistance with activity based on assessment  - Modify environment to reduce risk of injury  - Provide assistive device Progressing  Goal: Maintain proper alignment of affected body part  Description  INTERVENTIONS:  - Support and protect limb and body alignment per provider's orders  - Instruct and reinforce with patient and family use of appropriate assistive device and p

## 2020-08-12 NOTE — CM/SW NOTE
SW followed up on DC plan. SW spoke with Taz Castellanos who states [facility] has no bed for the patient. SW spoke with pt at bedside who is agreeable for other referrals to be sent. [facility] and waiting for responses.

## 2020-08-12 NOTE — PROGRESS NOTES
[facility]    Progress Note    [facility]:  [facility]    1945 MRN [id number]   Location [facility] 4W/SW/SE Attending Elise Child MD   Hosp Day # 11 PCP No primary care provider on file.        Subjectiv noted  Musculoskeletal: full ROM all extremities good strength  no deformities  Extremities: R knee area bandaged  Neurological:  Grossly normal    Results:     Laboratory Data:  Lab Results   Component Value Date    WBC 7.1 08/12/2020    HGB 7.4 (L) 08/12

## 2020-08-12 NOTE — PLAN OF CARE
Pt is POD #8-9. Vital signs within normal limits. PRN Dilaudid PO and Tylenol provided for pain. Scheduled Veltassa provided for elevated potassium. Alert and oriented x4. CMS intact. Tele #52 in place, NSR to SB. On room air. Intermittent nausea per pt.  P Provide timely, complete, and accurate information to patient/family  - Incorporate patient and family knowledge, values, beliefs, and cultural backgrounds into the planning and delivery of care  - Encourage patient/family to participate in care and decisi straining and forceful nose blowing  Outcome: Progressing     Problem: MUSCULOSKELETAL - ADULT  Goal: Return mobility to safest level of function  Description  INTERVENTIONS:  - Assess patient stability and activity tolerance for standing, transferring and

## 2020-08-12 NOTE — PROGRESS NOTES
[facility]    Progress Note    Ansley Thibodeaux Patient Status:  Inpatient    1945 MRN [id number]   Location [facility] 4W/SW/SE Attending Panchito Diallo MD   Hosp Day # 11 PCP No primary care provider on file.        Subjectiv PTT 28.1 02/19/2020    INR 1.16 06/22/2020    TSH 1.630 06/18/2020    PUNEET 21 (L) 12/20/2019    LIP 14 (L) 12/20/2019    ESRML 80 (H) 07/15/2020    CRP <0.29 08/02/2020    MG 2.1 08/10/2020    PHOS 3.9 08/11/2020    CK 19 (L) 08/12/2020    B12 1,087 (H) 06/

## 2020-08-12 NOTE — PHYSICAL THERAPY NOTE
PHYSICAL THERAPY KNEE TREATMENT NOTE - INPATIENT     Room Number: 408/408-A             Presenting Problem: R TKA revision (R knee resection & antibiotic spacer placement) on 8/3; post-op anemia, low BP, HF    Problem List  Active Problems:    Hypertension Weight Bearing       PAIN ASSESSMENT   Rating: Unable to rate  Location: r KNEE  Management Techniques: Activity promotion; Body mechanics; Relaxation;Repositioning    BALANCE  Static Sitting: Fair +  Dynamic Sitting: Fair -  Static Standing: Not tested  Dyn is able to ambulate 15 feet with assistive device at assistance level: supervision while maintaining RLE TDWB status   Goal #3   Current Status  NOT met;sttod only   Goal #4 Patient is able to transfer from bed < > chair/wheelchair/toilet with supervision

## 2020-08-13 LAB
ALBUMIN SERPL-MCNC: 1.9 G/DL (ref 3.4–5)
ANION GAP SERPL CALC-SCNC: 6 MMOL/L (ref 0–18)
BASOPHILS # BLD AUTO: 0.01 X10(3) UL (ref 0–0.2)
BASOPHILS NFR BLD AUTO: 0.1 %
BUN BLD-MCNC: 57 MG/DL (ref 7–18)
BUN/CREAT SERPL: 11.2 (ref 10–20)
CALCIUM BLD-MCNC: 8.5 MG/DL (ref 8.5–10.1)
CHLORIDE SERPL-SCNC: 106 MMOL/L (ref 98–112)
CO2 SERPL-SCNC: 21 MMOL/L (ref 21–32)
CREAT BLD-MCNC: 5.07 MG/DL (ref 0.55–1.02)
DEPRECATED RDW RBC AUTO: 47.4 FL (ref 35.1–46.3)
EOSINOPHIL # BLD AUTO: 0.38 X10(3) UL (ref 0–0.7)
EOSINOPHIL NFR BLD AUTO: 4.9 %
ERYTHROCYTE [DISTWIDTH] IN BLOOD BY AUTOMATED COUNT: 14.9 % (ref 11–15)
GLUCOSE BLD-MCNC: 105 MG/DL (ref 70–99)
GLUCOSE BLDC GLUCOMTR-MCNC: 122 MG/DL (ref 70–99)
GLUCOSE BLDC GLUCOMTR-MCNC: 139 MG/DL (ref 70–99)
GLUCOSE BLDC GLUCOMTR-MCNC: 178 MG/DL (ref 70–99)
GLUCOSE BLDC GLUCOMTR-MCNC: 98 MG/DL (ref 70–99)
HCT VFR BLD AUTO: 27.3 % (ref 35–48)
HGB BLD-MCNC: 8.9 G/DL (ref 12–16)
IMM GRANULOCYTES # BLD AUTO: 0.05 X10(3) UL (ref 0–1)
IMM GRANULOCYTES NFR BLD: 0.6 %
LYMPHOCYTES # BLD AUTO: 0.9 X10(3) UL (ref 1–4)
LYMPHOCYTES NFR BLD AUTO: 11.6 %
MCH RBC QN AUTO: 28.8 PG (ref 26–34)
MCHC RBC AUTO-ENTMCNC: 32.6 G/DL (ref 31–37)
MCV RBC AUTO: 88.3 FL (ref 80–100)
MONOCYTES # BLD AUTO: 0.96 X10(3) UL (ref 0.1–1)
MONOCYTES NFR BLD AUTO: 12.3 %
NEUTROPHILS # BLD AUTO: 5.49 X10 (3) UL (ref 1.5–7.7)
NEUTROPHILS # BLD AUTO: 5.49 X10(3) UL (ref 1.5–7.7)
NEUTROPHILS NFR BLD AUTO: 70.5 %
OSMOLALITY SERPL CALC.SUM OF ELEC: 292 MOSM/KG (ref 275–295)
PHOSPHATE SERPL-MCNC: 4.2 MG/DL (ref 2.5–4.9)
PLATELET # BLD AUTO: 230 10(3)UL (ref 150–450)
POTASSIUM SERPL-SCNC: 5.9 MMOL/L (ref 3.5–5.1)
RBC # BLD AUTO: 3.09 X10(6)UL (ref 3.8–5.3)
SODIUM SERPL-SCNC: 133 MMOL/L (ref 136–145)
VANCOMYCIN SERPL-MCNC: 18.2 UG/ML
WBC # BLD AUTO: 7.8 X10(3) UL (ref 4–11)

## 2020-08-13 PROCEDURE — 99233 SBSQ HOSP IP/OBS HIGH 50: CPT | Performed by: INTERNAL MEDICINE

## 2020-08-13 RX ORDER — ONDANSETRON 4 MG/1
4 TABLET, ORALLY DISINTEGRATING ORAL EVERY 8 HOURS PRN
Status: DISCONTINUED | OUTPATIENT
Start: 2020-08-13 | End: 2020-08-28

## 2020-08-13 NOTE — PLAN OF CARE
Pt alert and oriented. No reports of numbness or tingling. Room air. Vitals have remained stable. Tele in place. SCD to L. Immobilizer maintained in place. Dressing monitored for bleeding. Arguelles in place - monitoring output.  Pain managed with prn medicatio placement  - Provide timely, complete, and accurate information to patient/family  - Incorporate patient and family knowledge, values, beliefs, and cultural backgrounds into the planning and delivery of care  - Encourage patient/family to participate in ca brush  - Limit straining and forceful nose blowing  Outcome: Progressing     Problem: MUSCULOSKELETAL - ADULT  Goal: Return mobility to safest level of function  Description  INTERVENTIONS:  - Assess patient stability and activity tolerance for standing, t

## 2020-08-13 NOTE — DIETARY NOTE
ADULT NUTRITION REASSESSMENT     Pt is at moderate nutrition risk. Pt does not meet malnutrition criteria.       RECOMMENDATIONS TO MD:  None at this time     NUTRITION DIAGNOSIS/PROBLEM:  Unintentional weight loss related to prolonged hospitalization & art education: education completed on 6/23/2020   - Coordination of nutrition care: collaboration with other providers  - Discharge and transfer of nutrition care to new setting or provider: to be determined    ADMITTING DIAGNOSIS:   anticoagulation for surger No  Cultural/Ethnic/Oriental orthodox Preferences: None    MEDICATIONS: reviewed colace, Fe, folic acid, NPH insulin, Corrective insulin. Others noted.    LABS: reviewed  K+ restriction in diet  Recent Labs     08/10/20  0541 08/11/20  0505 08/12/20  0458 08/13/20

## 2020-08-13 NOTE — CM/SW NOTE
SW provided pt and pt's dtr with a list of the accepting SNFs. SW waiting on pt and pt's dtr to make a final choice on the accepting SNFs. Per RN pt is not medically stable at this time.      Clinical updates sent in aidin    PLAN: SNF - pending locatio

## 2020-08-13 NOTE — PROGRESS NOTES
[place] [facility]    Ortho Medical Progress Note     Moraima Mills Patient Status:  Inpatient    1945 MRN [id number]   Location [facility] 4W/SW/SE Attending Afua Germain MD   Hosp Day # 12 PCP No primary care provider on file. sliding scale insulin but has not needed      Coronary artery disease due to calcified coronary lesion  Had stent recently      Failed total right knee replacement (HCC)  POD #10 with acute renal failure      Postoperative hypotension        Acute renal fa

## 2020-08-14 LAB
ANION GAP SERPL CALC-SCNC: 7 MMOL/L (ref 0–18)
BLOOD TYPE BARCODE: 5100
BUN BLD-MCNC: 61 MG/DL (ref 7–18)
BUN/CREAT SERPL: 11.6 (ref 10–20)
CALCIUM BLD-MCNC: 8.7 MG/DL (ref 8.5–10.1)
CHLORIDE SERPL-SCNC: 104 MMOL/L (ref 98–112)
CO2 SERPL-SCNC: 20 MMOL/L (ref 21–32)
CREAT BLD-MCNC: 5.24 MG/DL (ref 0.55–1.02)
GLUCOSE BLD-MCNC: 102 MG/DL (ref 70–99)
GLUCOSE BLDC GLUCOMTR-MCNC: 113 MG/DL (ref 70–99)
GLUCOSE BLDC GLUCOMTR-MCNC: 136 MG/DL (ref 70–99)
GLUCOSE BLDC GLUCOMTR-MCNC: 146 MG/DL (ref 70–99)
GLUCOSE BLDC GLUCOMTR-MCNC: 193 MG/DL (ref 70–99)
OSMOLALITY SERPL CALC.SUM OF ELEC: 289 MOSM/KG (ref 275–295)
POTASSIUM SERPL-SCNC: 5.6 MMOL/L (ref 3.5–5.1)
SODIUM SERPL-SCNC: 131 MMOL/L (ref 136–145)

## 2020-08-14 PROCEDURE — 99233 SBSQ HOSP IP/OBS HIGH 50: CPT | Performed by: INTERNAL MEDICINE

## 2020-08-14 NOTE — OCCUPATIONAL THERAPY NOTE
Per RN, pt experiencing excessive bleeding from her R knee wound.  Immobilizer currently off and pt awaiting MD. OT treatment deffered

## 2020-08-14 NOTE — PROGRESS NOTES
[facility]    Progress Note    [facility] Patient Status:  Inpatient    1945 MRN [id number]   Location [facility] 4W/SW/SE Attending Vernie Cheadle, MD   Hosp Day # 12 PCP No primary care provider on file.        Subjectiv abnormalities noted  Musculoskeletal: full ROM all extremities good strength  no deformities  Extremities: no edema, cyanosis  Neurological:  Grossly normal    Results:     Laboratory Data:  Lab Results   Component Value Date    WBC 7.8 08/13/2020    HGB 8

## 2020-08-14 NOTE — PLAN OF CARE
Pt aox4, cms intact, pt has edema in upper right and left extremity. Pt present  with monitoring after right knee revision, with history of mrsa in knee. Pt was on IV antibiotics that caused acute kidney injury.  Pt feeling nauseous and relieved with zofran patient and family knowledge, values, beliefs, and cultural backgrounds into the planning and delivery of care  - Encourage patient/family to participate in care and decision-making at the level they choose  - Honor patient and family perspectives and rodgers ADULT  Goal: Return mobility to safest level of function  Description  INTERVENTIONS:  - Assess patient stability and activity tolerance for standing, transferring and ambulating w/ or w/o assistive devices  - Assist with transfers and ambulation using saf

## 2020-08-14 NOTE — PROGRESS NOTES
[facility]    Progress Note    Connor Rubalcava Patient Status:  Inpatient    1945 MRN [id number]   Location [facility] 4W/SW/SE Attending Clemencia Nieto MD   Hosp Day # 13 PCP No primary care provider on file.        Subjectiv Plan:     Pulmonary hypertension ([address]  severe      Coronary artery disease due to calcified coronary lesion  S/p ANGELO 6/22 on plavix and aspirin.  Will hold aspirin       Failed total right knee replacement (HCC)  Now with spacer       Cardiomyopathy ([address]

## 2020-08-14 NOTE — PLAN OF CARE
Pt A&O. X4 VSS. CMS (+) with RA.  IS.  SCD/CHRISTIN. Gas (+), BM yesterday and is taking senna and colace. Pt refusing miralax for now, and reports she isn ot regular and will go a lot tomorrow. IVABX changed to daptomyocin every 48 hours.  Will continue to mo

## 2020-08-14 NOTE — PROGRESS NOTES
[place] [facility]    Progress Note    Samuel Hatch Patient Status:  Inpatient    1945 MRN [id number]   Location [facility] 4W/SW/SE Attending Amy Fajardo MD   Hosp Day # 13 PCP No primary care provider on file.        Subjectiv abnormal bruising noted  Back/Spine: no abnormalities noted  Musculoskeletal: full ROM all extremities good strength  no deformities  Extremities: no edema, cyanosis  Neurological:  Grossly normal    Results:     Laboratory Data:  Lab Results   Component V

## 2020-08-14 NOTE — CM/SW NOTE
NIRALI spoke with the pt's dtr. [facility] [phone] regarding rehab options and choice for K[facility]. [facility] stated she would prefer [facility] in [place]. [address] has been reserved in [place]. Uncertain of DC date at this time.   NIRALI will co

## 2020-08-14 NOTE — PROGRESS NOTES
[place] [facility]    Progress Note    Moni Sykes Patient Status:  Inpatient    1945 MRN [id number]   Location [facility] 4W/SW/SE Attending Pat Rosario MD   Hosp Day # 13 PCP No primary care provider on file.        Subjectiv hypertension (HCC)  Severe       Coronary artery disease due to calcified coronary lesion  S/p ANGLEO 6/22/20. Although both aspirin and plavix have been recommended, her bleeding has increased and she was just transfused yesterday.  Will stop aspirin and cont

## 2020-08-14 NOTE — PROGRESS NOTES
[place] [facility] - [facility] PROGRESS NOTE    Neena Morley Patient Status:  Inpatient    1945 MRN [id number]   Location [facility] 2W/SW Attending Winston Escobar MD   [facility] Day # 13 PCP No primary care provider on file.      Gerald Fritz cuff syndrome     Temporary cerebral vascular dysfunction     PAF (paroxysmal atrial fibrillation) (HCC)     Polyp of colon     Nonrheumatic mitral valve regurgitation     Pulmonary hypertension ([address]     Hiatal hernia with GERD and esophagitis     Failed path   # DM  # CKD  # HTN  # HLD     PLAN:  -  Continue on daptomycin. Anticipate six week course of IV abx upon DC (EOT 9/14/20). Plans for rehab. PICC in place.    -  Monitor Cr.  -  Follow fever curve, wbc.  Repeat CBC/diff tomorrow AM.  -  Reviewed labs

## 2020-08-14 NOTE — PHYSICAL THERAPY NOTE
PHYSICAL THERAPY KNEE TREATMENT NOTE - INPATIENT     Room Number: 408/408-A             Presenting Problem: R TKA revision (R knee resection & antibiotic spacer placement) on 8/3; post-op anemia, low BP, HF    Problem List  Active Problems:    Hypertension R Lower Extremity: Toe Touch Weight Bearing       PAIN ASSESSMENT   Rating: Unable to rate  Location: r KNEE  Management Techniques: Activity promotion; Body mechanics; Relaxation;Repositioning    BALANCE  Static Sitting: Fair +  Dynamic Sitting: Fair - #2  Current Status Max a   Goal #3 Patient is able to ambulate 15 feet with assistive device at assistance level: supervision while maintaining RLE TDWB status   Goal #3   Current Status  NOT met;sttod only   Goal #4 Patient is able to transfer from bed <

## 2020-08-15 LAB
ANION GAP SERPL CALC-SCNC: 6 MMOL/L (ref 0–18)
BUN BLD-MCNC: 62 MG/DL (ref 7–18)
BUN/CREAT SERPL: 11.6 (ref 10–20)
CALCIUM BLD-MCNC: 8.5 MG/DL (ref 8.5–10.1)
CHLORIDE SERPL-SCNC: 104 MMOL/L (ref 98–112)
CO2 SERPL-SCNC: 20 MMOL/L (ref 21–32)
CREAT BLD-MCNC: 5.33 MG/DL (ref 0.55–1.02)
DEPRECATED RDW RBC AUTO: 48.1 FL (ref 35.1–46.3)
ERYTHROCYTE [DISTWIDTH] IN BLOOD BY AUTOMATED COUNT: 15 % (ref 11–15)
GLUCOSE BLD-MCNC: 106 MG/DL (ref 70–99)
GLUCOSE BLDC GLUCOMTR-MCNC: 107 MG/DL (ref 70–99)
GLUCOSE BLDC GLUCOMTR-MCNC: 143 MG/DL (ref 70–99)
GLUCOSE BLDC GLUCOMTR-MCNC: 143 MG/DL (ref 70–99)
GLUCOSE BLDC GLUCOMTR-MCNC: 160 MG/DL (ref 70–99)
HCT VFR BLD AUTO: 25.8 % (ref 35–48)
HGB BLD-MCNC: 8.4 G/DL (ref 12–16)
MCH RBC QN AUTO: 28.9 PG (ref 26–34)
MCHC RBC AUTO-ENTMCNC: 32.6 G/DL (ref 31–37)
MCV RBC AUTO: 88.7 FL (ref 80–100)
OSMOLALITY SERPL CALC.SUM OF ELEC: 288 MOSM/KG (ref 275–295)
PLATELET # BLD AUTO: 234 10(3)UL (ref 150–450)
POTASSIUM SERPL-SCNC: 5.6 MMOL/L (ref 3.5–5.1)
RBC # BLD AUTO: 2.91 X10(6)UL (ref 3.8–5.3)
SODIUM SERPL-SCNC: 130 MMOL/L (ref 136–145)
WBC # BLD AUTO: 7.2 X10(3) UL (ref 4–11)

## 2020-08-15 PROCEDURE — 99233 SBSQ HOSP IP/OBS HIGH 50: CPT | Performed by: INTERNAL MEDICINE

## 2020-08-15 NOTE — PHYSICAL THERAPY NOTE
PHYSICAL THERAPY KNEE TREATMENT NOTE - INPATIENT     Room Number: 408/408-A             Presenting Problem: R TKA revision (R knee resection & antibiotic spacer placement) on 8/3; post-op anemia, low BP, HF    Problem List  Active Problems:    Hypertension training;Balance training    SUBJECTIVE  I have a bleeding knee.     OBJECTIVE  Precautions: Knee immobilizer    WEIGHT BEARING STATUS  Weight Bearing Restriction: R lower extremity        R Lower Extremity: Toe Touch Weight Bearing       PAIN ASSESSMENT Patient's self-stated goal is: to go home   Goal #1 Patient is able to demonstrate supine - sit EOB @ level: minimal assistance x 1      Goal #1   Current Status Max a   Goal #2 Patient is able to demonstrate transfers Sit to/from Stand at assistance level

## 2020-08-15 NOTE — PROGRESS NOTES
[facility]    Progress Note    Beatricegodwin Sims Patient Status:  Inpatient    1945 MRN [id number]   Location [facility] 4W/SW/SE Attending Aye Duarte MD   [facility] Day # 14 PCP No primary care provider on file.        Subjectiv rashes present, no abnormal bruising noted  Back/Spine: no abnormalities noted  Musculoskeletal: r knee bandaged  Extremities: no edema, cyanosis  Neurological:  Grossly normal    Results:     Laboratory Data:  Lab Results   Component Value Date    WBC 7.2

## 2020-08-15 NOTE — PLAN OF CARE
Patient is alert and oriented X3. VS WNL, on room air. Blood glucose checks AC and HS. CMS intact to RLE, immobilizer in place, dressing change to RLE BID. Will continue to monitor for bleeding, over night nurse stated increased bleeding noted.  Up with x2 as we care for you?  Pt had knee replacement and then had an angiogram with stent placement  - Provide timely, complete, and accurate information to patient/family  - Incorporate patient and family knowledge, values, beliefs, and cultural backgrounds into t dental floss  - Use electric shaver for shaving  - Use soft bristle tooth brush  - Limit straining and forceful nose blowing  Outcome: Progressing     Problem: MUSCULOSKELETAL - ADULT  Goal: Return mobility to safest level of function  Description  INTERVE life threatening arrhythmias  - Monitor electrolytes and administer replacement therapy as ordered  Outcome: Progressing     Problem: GENITOURINARY - ADULT  Goal: Absence of urinary retention  Description  INTERVENTIONS:  - Assess patient’s ability to void ADULT  Goal: Absence of fever/infection during anticipated neutropenic period  Description  INTERVENTIONS  - Monitor WBC  - Administer growth factors as ordered  - Implement neutropenic guidelines  Outcome: Progressing     Problem: DISCHARGE PLANNING  Goal

## 2020-08-15 NOTE — PLAN OF CARE
Alert and oriented. CMS intact. Dorsi/plantar flexion moderate bilaterally. Plavix therapy/scds. Tele in place; no calls. BLE pitting edema noted. Room air. Encouraged incentive spirometry 10x/hr while awake. LBM 8/12/20, tolerated diet well.  Arguelles patent had knee replacement and then had an angiogram with stent placement  - Provide timely, complete, and accurate information to patient/family  - Incorporate patient and family knowledge, values, beliefs, and cultural backgrounds into the planning and deliver electric shaver for shaving  - Use soft bristle tooth brush  - Limit straining and forceful nose blowing  Outcome: Progressing     Problem: MUSCULOSKELETAL - ADULT  Goal: Return mobility to safest level of function  Description  INTERVENTIONS:  - Assess pa arrhythmias  - Monitor electrolytes and administer replacement therapy as ordered  Outcome: Progressing     Problem: GENITOURINARY - ADULT  Goal: Absence of urinary retention  Description  INTERVENTIONS:  - Assess patient’s ability to void and empty bladde comfort level or patient's stated pain goal  Description  INTERVENTIONS:  - Encourage pt to monitor pain and request assistance  - Assess pain using appropriate pain scale  - Administer analgesics based on type and severity of pain and evaluate response  -

## 2020-08-16 LAB
ANION GAP SERPL CALC-SCNC: 8 MMOL/L (ref 0–18)
BUN BLD-MCNC: 66 MG/DL (ref 7–18)
BUN/CREAT SERPL: 12.5 (ref 10–20)
CALCIUM BLD-MCNC: 8.7 MG/DL (ref 8.5–10.1)
CHLORIDE SERPL-SCNC: 106 MMOL/L (ref 98–112)
CO2 SERPL-SCNC: 19 MMOL/L (ref 21–32)
CREAT BLD-MCNC: 5.27 MG/DL (ref 0.55–1.02)
DEPRECATED RDW RBC AUTO: 48.1 FL (ref 35.1–46.3)
ERYTHROCYTE [DISTWIDTH] IN BLOOD BY AUTOMATED COUNT: 14.9 % (ref 11–15)
GLUCOSE BLD-MCNC: 94 MG/DL (ref 70–99)
GLUCOSE BLDC GLUCOMTR-MCNC: 118 MG/DL (ref 70–99)
GLUCOSE BLDC GLUCOMTR-MCNC: 119 MG/DL (ref 70–99)
GLUCOSE BLDC GLUCOMTR-MCNC: 134 MG/DL (ref 70–99)
GLUCOSE BLDC GLUCOMTR-MCNC: 147 MG/DL (ref 70–99)
HCT VFR BLD AUTO: 26.1 % (ref 35–48)
HGB BLD-MCNC: 8.5 G/DL (ref 12–16)
MCH RBC QN AUTO: 29 PG (ref 26–34)
MCHC RBC AUTO-ENTMCNC: 32.6 G/DL (ref 31–37)
MCV RBC AUTO: 89.1 FL (ref 80–100)
OSMOLALITY SERPL CALC.SUM OF ELEC: 295 MOSM/KG (ref 275–295)
PLATELET # BLD AUTO: 265 10(3)UL (ref 150–450)
POTASSIUM SERPL-SCNC: 5.4 MMOL/L (ref 3.5–5.1)
RBC # BLD AUTO: 2.93 X10(6)UL (ref 3.8–5.3)
SODIUM SERPL-SCNC: 133 MMOL/L (ref 136–145)
VANCOMYCIN SERPL-MCNC: 13.7 UG/ML
WBC # BLD AUTO: 8.3 X10(3) UL (ref 4–11)

## 2020-08-16 PROCEDURE — 99233 SBSQ HOSP IP/OBS HIGH 50: CPT | Performed by: INTERNAL MEDICINE

## 2020-08-16 RX ORDER — HYDROMORPHONE HYDROCHLORIDE 2 MG/1
1 TABLET ORAL EVERY 4 HOURS PRN
Status: DISCONTINUED | OUTPATIENT
Start: 2020-08-18 | End: 2020-08-28

## 2020-08-16 RX ORDER — HYDROMORPHONE HYDROCHLORIDE 2 MG/1
2 TABLET ORAL EVERY 4 HOURS PRN
Status: DISPENSED | OUTPATIENT
Start: 2020-08-16 | End: 2020-08-18

## 2020-08-16 NOTE — PLAN OF CARE
Took over care for [facility]. Gave pt dose if velrtassa @ 1530. 3.25 hrs after last medication. Will Hold 1800 BB until at least 1830.   Will also do accucheck for pt's dinner

## 2020-08-16 NOTE — PROGRESS NOTES
[facility]    Progress Note    Makayla Dash Patient Status:  Inpatient    1945 MRN [id number]   Location [facility] 4W/SW/SE Attending Francena Cockayne, MD   Hosp Day # 15 PCP No primary care provider on file.        Subjectiv normal  Skin/Hair: no unusual rashes present, no abnormal bruising noted  Back/Spine: no abnormalities noted  Musculoskeletal: full ROM all extremities good strength  no deformities  Extremities: 1+ edema right knee is in brace  Neurological:  Grossly norm

## 2020-08-16 NOTE — PLAN OF CARE
Patient is alert and oriented X3. VS WNL, on room air. Blood glucose checks AC and HS. CMS intact to RLE, immobilizer in place, dressing change to RLE BID. Will continue to monitor for bleeding, plavix on hold until tomorrow. Up with x2 max assist/lift.  Te of care  - Encourage patient/family to participate in care and decision-making at the level they choose  - Honor patient and family perspectives and choices   Outcome: Not Progressing     Problem: SAFETY ADULT - FALL  Goal: Free from fall injury  [address] [facility] function  Description  INTERVENTIONS:  - Assess patient stability and activity tolerance for standing, transferring and ambulating w/ or w/o assistive devices  - Assist with transfers and ambulation using safe patient handling equipment as needed  - Ensure retention  Description  INTERVENTIONS:  - Assess patient’s ability to void and empty bladder  - Monitor intake/output and perform bladder scan as needed  - Follow urinary retention protocol/standard of care  - Consider collaborating with pharmacy to review appropriate pain scale  - Administer analgesics based on type and severity of pain and evaluate response  - Implement non-pharmacological measures as appropriate and evaluate response  - Consider cultural and social influences on pain and pain management

## 2020-08-16 NOTE — PHYSICAL THERAPY NOTE
PHYSICAL THERAPY KNEE TREATMENT NOTE - INPATIENT     Room Number: 408/408-A             Presenting Problem: R TKA revision (R knee resection & antibiotic spacer placement) on 8/3; post-op anemia, low BP, HF    Problem List  Active Problems:    Hypertension hurting today.     OBJECTIVE  Precautions: Knee immobilizer    WEIGHT BEARING STATUS  Weight Bearing Restriction: R lower extremity        R Lower Extremity: Toe Touch Weight Bearing       PAIN ASSESSMENT   Ratin  Location: R knee  Management Techniques provided    CURRENT GOALS      Patient Goal Patient's self-stated goal is: to go home   Goal #1 Patient is able to demonstrate supine - sit EOB @ level: minimal assistance x 1      Goal #1   Current Status Max a    Goal #2 Patient is able to demonstrate tr

## 2020-08-17 LAB
ALBUMIN SERPL-MCNC: 1.8 G/DL (ref 3.4–5)
ANION GAP SERPL CALC-SCNC: 7 MMOL/L (ref 0–18)
BUN BLD-MCNC: 66 MG/DL (ref 7–18)
BUN/CREAT SERPL: 12.5 (ref 10–20)
CALCIUM BLD-MCNC: 8.5 MG/DL (ref 8.5–10.1)
CHLORIDE SERPL-SCNC: 107 MMOL/L (ref 98–112)
CO2 SERPL-SCNC: 19 MMOL/L (ref 21–32)
CREAT BLD-MCNC: 5.29 MG/DL (ref 0.55–1.02)
DEPRECATED RDW RBC AUTO: 48.5 FL (ref 35.1–46.3)
ERYTHROCYTE [DISTWIDTH] IN BLOOD BY AUTOMATED COUNT: 14.9 % (ref 11–15)
GLUCOSE BLD-MCNC: 103 MG/DL (ref 70–99)
GLUCOSE BLDC GLUCOMTR-MCNC: 107 MG/DL (ref 70–99)
GLUCOSE BLDC GLUCOMTR-MCNC: 126 MG/DL (ref 70–99)
GLUCOSE BLDC GLUCOMTR-MCNC: 130 MG/DL (ref 70–99)
GLUCOSE BLDC GLUCOMTR-MCNC: 149 MG/DL (ref 70–99)
HCT VFR BLD AUTO: 25.5 % (ref 35–48)
HGB BLD-MCNC: 8.1 G/DL (ref 12–16)
MCH RBC QN AUTO: 28.4 PG (ref 26–34)
MCHC RBC AUTO-ENTMCNC: 31.8 G/DL (ref 31–37)
MCV RBC AUTO: 89.5 FL (ref 80–100)
OSMOLALITY SERPL CALC.SUM OF ELEC: 295 MOSM/KG (ref 275–295)
PHOSPHATE SERPL-MCNC: 3.8 MG/DL (ref 2.5–4.9)
PLATELET # BLD AUTO: 253 10(3)UL (ref 150–450)
POTASSIUM SERPL-SCNC: 5.2 MMOL/L (ref 3.5–5.1)
RBC # BLD AUTO: 2.85 X10(6)UL (ref 3.8–5.3)
SODIUM SERPL-SCNC: 133 MMOL/L (ref 136–145)
WBC # BLD AUTO: 7.7 X10(3) UL (ref 4–11)

## 2020-08-17 PROCEDURE — 99232 SBSQ HOSP IP/OBS MODERATE 35: CPT | Performed by: INTERNAL MEDICINE

## 2020-08-17 NOTE — OCCUPATIONAL THERAPY NOTE
OCCUPATIONAL THERAPY TREATMENT NOTE - INPATIENT    Room Number: 349/786-N    Presenting Problem: (failed R TKA)     Problem List  Active Problems:    Hypertension    Cardiomyopathy ([address]    Anemia    Hyperlipidemia    PAF (paroxysmal atrial fibrillation) ( ASSESSMENT  Rating: Unable to rate  Location: (RLE)  Management Techniques:  Activity promotion;Breathing techniques;Relaxation;Repositioning          ACTIVITIES OF DAILY LIVING ASSESSMENT  AM-PAC ‘6-Clicks’ Inpatient Daily Activity Short Form  How much hel

## 2020-08-17 NOTE — PLAN OF CARE
Problem: Diabetes/Glucose Control  Goal: Glucose maintained within prescribed range  Description  INTERVENTIONS:  - Monitor Blood Glucose as ordered  - Assess for signs and symptoms of hyperglycemia and hypoglycemia  - Administer ordered medications to m precautions as indicated by assessment.  - Educate pt/family on patient safety including physical limitations  - Instruct pt to call for assistance with activity based on assessment  - Modify environment to reduce risk of injury  - Provide assistive device Progressing  Goal: Maintain proper alignment of affected body part  Description  INTERVENTIONS:  - Support and protect limb and body alignment per provider's orders  - Instruct and reinforce with patient and family use of appropriate assistive device and p for managing their own health  - Refer to Case Management Department for coordinating discharge planning if the patient needs post-hospital services based on physician/LIP order or complex needs related to functional status, cognitive ability or social sup knee immobilizer noted and tolerating well.

## 2020-08-17 NOTE — PROGRESS NOTES
[facility]    Progress Note    Deandra Manning Patient Status:  Inpatient    1945 MRN [id number]   [facility] 4W/SW/SE Attending Bridgette Almaraz MD   Hosp Day # 16 PCP No primary care provider on file.         Subjecti

## 2020-08-17 NOTE — PHYSICAL THERAPY NOTE
PHYSICAL THERAPY TREATMENT NOTE - INPATIENT     Room Number: 408/408-A       Presenting Problem: R TKA revision (R knee resection & antibiotic spacer placement) on 8/3; post-op anemia, low BP, HF    Problem List  Active Problems:    Hypertension    Cardiom facility. DISCHARGE RECOMMENDATIONS  PT Discharge Recommendations: 24 hour care/supervision;Sub-acute rehabilitation     PLAN  PT Treatment Plan: Bed mobility; Body mechanics; Patient education;Gait training;Transfer training;Balance training;Strengthenin Patient's self-stated goal is: to go home   Goal #1 Patient is able to demonstrate supine - sit EOB @ level: minimal assistance x 1      Goal #1   Current Status Mod A x 2    Goal #2 Patient is able to demonstrate transfers Sit to/from Stand at assistance

## 2020-08-17 NOTE — PLAN OF CARE
Dr Latasha Murray PA at bedside this morning at 0600. He evaluated and redressed incision at R knee. Is going to discuss possibility of wound vac with Dr Ramirez Night d/t the continued bleeding.   Patient's central line dressing was changed this morning after drawing accurate information to patient/family  - Incorporate patient and family knowledge, values, beliefs, and cultural backgrounds into the planning and delivery of care  - Encourage patient/family to participate in care and decision-making at the level they ch blowing  Outcome: Progressing     Problem: MUSCULOSKELETAL - ADULT  Goal: Return mobility to safest level of function  Description  INTERVENTIONS:  - Assess patient stability and activity tolerance for standing, transferring and ambulating w/ or w/o assist PLANNING  Goal: Discharge to home or other facility with appropriate resources  Description  INTERVENTIONS:  - Identify barriers to discharge w/pt and caregiver  - Include patient/family/discharge partner in discharge planning  - Arrange for needed dischar ADULT  Goal: Absence of urinary retention  Description  INTERVENTIONS:  - Assess patient’s ability to void and empty bladder  - Monitor intake/output and perform bladder scan as needed  - Follow urinary retention protocol/standard of care  - Consider colla

## 2020-08-17 NOTE — PROGRESS NOTES
[facility]    [facility] Note    Maria Isabel Gardner Patient Status:  Inpatient    1945 MRN [id number]   Location [facility] 5SW/SE Attending Eh Vásquez MD   [facility] Day # 16 PCP No primary care provider on file.        Subjective: rashes present, no abnormal bruising noted  Back/Spine: no abnormalities noted  Musculoskeletal: full ROM all extremities good strength  no deformities  Extremities: 2+ edema left leg right leg bandaged  Neurological:  Grossly normal    Results:     Anthony Kingsley

## 2020-08-18 LAB
ALBUMIN SERPL-MCNC: 1.9 G/DL (ref 3.4–5)
ANION GAP SERPL CALC-SCNC: 8 MMOL/L (ref 0–18)
BASOPHILS # BLD AUTO: 0.01 X10(3) UL (ref 0–0.2)
BASOPHILS NFR BLD AUTO: 0.1 %
BUN BLD-MCNC: 65 MG/DL (ref 7–18)
BUN/CREAT SERPL: 12.1 (ref 10–20)
CALCIUM BLD-MCNC: 9 MG/DL (ref 8.5–10.1)
CHLORIDE SERPL-SCNC: 107 MMOL/L (ref 98–112)
CO2 SERPL-SCNC: 18 MMOL/L (ref 21–32)
CREAT BLD-MCNC: 5.39 MG/DL (ref 0.55–1.02)
DEPRECATED RDW RBC AUTO: 49.3 FL (ref 35.1–46.3)
EOSINOPHIL # BLD AUTO: 0.35 X10(3) UL (ref 0–0.7)
EOSINOPHIL NFR BLD AUTO: 4.2 %
ERYTHROCYTE [DISTWIDTH] IN BLOOD BY AUTOMATED COUNT: 15.2 % (ref 11–15)
GLUCOSE BLD-MCNC: 108 MG/DL (ref 70–99)
HCT VFR BLD AUTO: 27.2 % (ref 35–48)
HGB BLD-MCNC: 8.9 G/DL (ref 12–16)
IMM GRANULOCYTES # BLD AUTO: 0.05 X10(3) UL (ref 0–1)
IMM GRANULOCYTES NFR BLD: 0.6 %
LYMPHOCYTES # BLD AUTO: 0.82 X10(3) UL (ref 1–4)
LYMPHOCYTES NFR BLD AUTO: 9.7 %
MCH RBC QN AUTO: 29.2 PG (ref 26–34)
MCHC RBC AUTO-ENTMCNC: 32.7 G/DL (ref 31–37)
MCV RBC AUTO: 89.2 FL (ref 80–100)
MONOCYTES # BLD AUTO: 0.66 X10(3) UL (ref 0.1–1)
MONOCYTES NFR BLD AUTO: 7.8 %
NEUTROPHILS # BLD AUTO: 6.53 X10 (3) UL (ref 1.5–7.7)
NEUTROPHILS # BLD AUTO: 6.53 X10(3) UL (ref 1.5–7.7)
NEUTROPHILS NFR BLD AUTO: 77.6 %
OSMOLALITY SERPL CALC.SUM OF ELEC: 295 MOSM/KG (ref 275–295)
PHOSPHATE SERPL-MCNC: 4.1 MG/DL (ref 2.5–4.9)
PLATELET # BLD AUTO: 222 10(3)UL (ref 150–450)
POTASSIUM SERPL-SCNC: 5.1 MMOL/L (ref 3.5–5.1)
RBC # BLD AUTO: 3.05 X10(6)UL (ref 3.8–5.3)
SODIUM SERPL-SCNC: 133 MMOL/L (ref 136–145)
WBC # BLD AUTO: 8.4 X10(3) UL (ref 4–11)

## 2020-08-18 PROCEDURE — 99233 SBSQ HOSP IP/OBS HIGH 50: CPT | Performed by: INTERNAL MEDICINE

## 2020-08-18 RX ORDER — 0.9 % SODIUM CHLORIDE 0.9 %
20 VIAL (ML) INJECTION AS NEEDED
Status: DISCONTINUED | OUTPATIENT
Start: 2020-08-18 | End: 2020-08-28

## 2020-08-18 RX ORDER — ALBUMIN (HUMAN) 12.5 G/50ML
12.5 SOLUTION INTRAVENOUS ONCE
Status: COMPLETED | OUTPATIENT
Start: 2020-08-18 | End: 2020-08-18

## 2020-08-18 RX ORDER — BUMETANIDE 0.25 MG/ML
2 INJECTION, SOLUTION INTRAMUSCULAR; INTRAVENOUS ONCE
Status: COMPLETED | OUTPATIENT
Start: 2020-08-18 | End: 2020-08-18

## 2020-08-18 RX ORDER — VANCOMYCIN HYDROCHLORIDE 125 MG/1
125 CAPSULE ORAL DAILY
Status: DISCONTINUED | OUTPATIENT
Start: 2020-08-18 | End: 2020-08-28

## 2020-08-18 RX ORDER — SODIUM CHLORIDE 0.9 % (FLUSH) 0.9 %
10 SYRINGE (ML) INJECTION AS NEEDED
Status: DISCONTINUED | OUTPATIENT
Start: 2020-08-18 | End: 2020-08-28

## 2020-08-18 NOTE — PROGRESS NOTES
[facility] [facility]    Progress Note    Whit Morris Patient Status:  Inpatient    1945 MRN [id number]   Location [facility] 5SW/SE Attending Maninder Donahue MD   [facility] Day # 16 PCP No primary care provider on file.        Subjective: 07/15/2020    CRP <0.29 08/02/2020    MG 2.1 08/10/2020    PHOS 3.8 08/17/2020    CK 19 (L) 08/12/2020    B12 1,087 (H) 06/18/2020         No results found.         Assessment and Plan:     Pulmonary hypertension ([facility] [address]  severe      Coronary artery disease d

## 2020-08-18 NOTE — PROGRESS NOTES
[facility]    Progress Note      Subjective:     Patient comfortable - denies any sob, abdominal pain or chest pain    No NV    More leg swelling and upper arm swelling     Review of Systems:     Constitutional: negative for fatigue  Eyes PRN  patiromer (VELTASSA) 8.4 g packet 8.4 g, 8.4 g, Oral, Daily  ondansetron (ZOFRAN-ODT) disintegrating tab 4 mg, 4 mg, Oral, Q8H PRN  epoetin lashae-epbx (RETACRIT) 40464 UNIT/ML injection 10,000 Units, 10,000 Units, Subcutaneous, Once per day on Mon Wed Daily  nitroGLYCERIN (NITROSTAT) SL tab 0.4 mg, 0.4 mg, Sublingual, Q5 Min PRN  glucose (DEX4) oral liquid 15 g, 15 g, Oral, Q15 Min PRN    Or  Glucose-Vitamin C (DEX-4) chewable tab 4 tablet, 4 tablet, Oral, Q15 Min PRN    Or  dextrose 50 % injection 50 m Lab 08/13/20  0451 08/17/20  0444 08/18/20  0654   PHOS 4.2 3.8 4.1   ALB 1.9* 1.8* 1.9*       No results found. Assessment and Plan:       1.  RUEL: nonoliguric but low UO  - RUEL presumably secondary to vancomycin related toxicity   - creatinine wa

## 2020-08-18 NOTE — PLAN OF CARE
Problem: Diabetes/Glucose Control  Goal: Glucose maintained within prescribed range  Description  INTERVENTIONS:  - Monitor Blood Glucose as ordered  - Assess for signs and symptoms of hyperglycemia and hypoglycemia  - Administer ordered medications to m risk of falls.   - [place] fall precautions as indicated by assessment.  - Educate pt/family on patient safety including physical limitations  - Instruct pt to call for assistance with activity based on assessment  - Modify environment to reduce risk of i limitations with patient/family  Outcome: Progressing  Goal: Maintain proper alignment of affected body part  Description  INTERVENTIONS:  - Support and protect limb and body alignment per provider's orders  - Instruct and reinforce with patient and family puncture sites for bleeding and/or hematoma  - Assess quality of pulses, skin color and temperature  - Assess for signs of decreased coronary artery perfusion - ex.  Angina  - Evaluate fluid balance, assess for edema, trend weights  Outcome: Progressing  Go Implement wound care per orders  - Initiate isolation precautions as appropriate  - Initiate Pressure Ulcer prevention bundle as indicated  Outcome: Progressing     Problem: Impaired Functional Mobility  Goal: Achieve highest/safest level of mobility/gait

## 2020-08-18 NOTE — PROGRESS NOTES
[facility] Progress Note     Mal Holloway Patient Status:  Inpatient    1945 MRN [id number]   Location [facility] 5SW/SE Attending Yoli Duenas MD   Hosp Day # 16 PCP No primary care provider on file. managing - creatinine 5.39      RUEL (acute kidney injury) ([facility] [address]  - likely due to vanco toxicity      Results:     Lab Results   Component Value Date    WBC 8.4 08/18/2020    HGB 8.9 (L) 08/18/2020    HCT 27.2 (L) 08/18/2020    .0 08/18/2020    CREAT

## 2020-08-18 NOTE — PLAN OF CARE
Problem: Diabetes/Glucose Control  Goal: Glucose maintained within prescribed range  Description  INTERVENTIONS:  - Monitor Blood Glucose as ordered  - Assess for signs and symptoms of hyperglycemia and hypoglycemia  - Administer ordered medications to m risk of falls.   - [place] fall precautions as indicated by assessment.  - Educate pt/family on patient safety including physical limitations  - Instruct pt to call for assistance with activity based on assessment  - Modify environment to reduce risk of i limitations with patient/family  Outcome: Progressing  Goal: Maintain proper alignment of affected body part  Description  INTERVENTIONS:  - Support and protect limb and body alignment per provider's orders  - Instruct and reinforce with patient and family Assess patient's ability to be responsible for managing their own health  - Refer to Case Management Department for coordinating discharge planning if the patient needs post-hospital services based on physician/LIP order or complex needs related to functio rhythm and assess patient for signs and symptoms of electrolyte imbalances  - Administer electrolyte replacement as ordered  - Monitor response to electrolyte replacements, including rhythm and repeat lab results as appropriate  - Fluid restriction as orde

## 2020-08-19 LAB — CRP SERPL-MCNC: 0.98 MG/DL (ref ?–0.3)

## 2020-08-19 PROCEDURE — 99233 SBSQ HOSP IP/OBS HIGH 50: CPT | Performed by: INTERNAL MEDICINE

## 2020-08-19 RX ORDER — BUMETANIDE 0.25 MG/ML
1 INJECTION, SOLUTION INTRAMUSCULAR; INTRAVENOUS ONCE
Status: COMPLETED | OUTPATIENT
Start: 2020-08-19 | End: 2020-08-19

## 2020-08-19 RX ORDER — CLOPIDOGREL BISULFATE 75 MG/1
75 TABLET ORAL DAILY
Status: DISCONTINUED | OUTPATIENT
Start: 2020-08-19 | End: 2020-08-28

## 2020-08-19 NOTE — PLAN OF CARE
Problem: Diabetes/Glucose Control  Goal: Glucose maintained within prescribed range  Description  INTERVENTIONS:  - Monitor Blood Glucose as ordered  - Assess for signs and symptoms of hyperglycemia and hypoglycemia  - Administer ordered medications to m risk of falls.   - [place] fall precautions as indicated by assessment.  - Educate pt/family on patient safety including physical limitations  - Instruct pt to call for assistance with activity based on assessment  - Modify environment to reduce risk of i limitations with patient/family  Outcome: Progressing  Goal: Maintain proper alignment of affected body part  Description  INTERVENTIONS:  - Support and protect limb and body alignment per provider's orders  - Instruct and reinforce with patient and family puncture sites for bleeding and/or hematoma  - Assess quality of pulses, skin color and temperature  - Assess for signs of decreased coronary artery perfusion - ex.  Angina  - Evaluate fluid balance, assess for edema, trend weights  Outcome: Progressing  Go Implement wound care per orders  - Initiate isolation precautions as appropriate  - Initiate Pressure Ulcer prevention bundle as indicated  Outcome: Progressing     Problem: Impaired Functional Mobility  Goal: Achieve highest/safest level of mobility/gait

## 2020-08-19 NOTE — PHYSICAL THERAPY NOTE
PHYSICAL THERAPY TREATMENT NOTE - INPATIENT     Room Number: 549/549-A       Presenting Problem: R TKA revision (R knee resection & antibiotic spacer placement) on 8/3; post-op anemia, low BP, HF    Problem List  Active Problems:    Hypertension    Cardiom 76.75% has been calculated based on documentation in the [facility] '6 clicks' Inpatient Basic Mobility Short Form. Research supports that patients with this level of impairment may benefit from [facility], however patient would benefit from rehab facility.     [facility] Gait Assistance: Not tested  Distance (ft): 0        Stoop/Curb Assistance: Not tested  Comment : /    Patient End of Session: In bed;Needs met;Call light within reach;RN aware of session/findings; All patient questions and concerns addressed    CURRENT G

## 2020-08-19 NOTE — PROGRESS NOTES
[facility]    Progress Note    [facility] Patient Status:  Inpatient    1945 MRN [id number]   Location [facility] 5SW/SE Attending Vernie Cheadle, MD   [facility] Day # 18 PCP No primary care provider on file.        Subjective: rashes present, no abnormal bruising noted  Back/Spine: no abnormalities noted  Musculoskeletal: full ROM all extremities good strength  no deformities  Extremities: 1+ edema  Neurological:  Grossly normal    Results:     Laboratory Data:  Lab Results   Co

## 2020-08-19 NOTE — PLAN OF CARE
A&O x4. RA. VSS. Right knee dressing changed, old drainage noted. Call light in reach. Will continue to monitor.      Problem: Diabetes/Glucose Control  Goal: Glucose maintained within prescribed range  Description  INTERVENTIONS:  - Monitor Blood Glucose a injury  Description  INTERVENTIONS:  - Assess pt frequently for physical needs  - Identify cognitive and physical deficits and behaviors that affect risk of falls.   - [place] fall precautions as indicated by assessment.  - Educate pt/family on patient sa wounds/incisions during mobilization  - Obtain PT/OT consults as needed  - Advance activity as appropriate  - Communicate ordered activity level and limitations with patient/family  Outcome: Progressing  Goal: Maintain proper alignment of affected body par (meds, wound care, etc)  - Arrange for interpreters to assist at discharge as needed  - Consider post-discharge preferences of patient/family/discharge partner  - Complete POLST form as appropriate  - Assess patient's ability to be responsible for managing promote bladder emptying  Outcome: Progressing     Problem: METABOLIC/FLUID AND ELECTROLYTES - ADULT  Goal: Electrolytes maintained within normal limits  Description  INTERVENTIONS:  - Monitor labs and rhythm and assess patient for signs and symptoms of el

## 2020-08-19 NOTE — PLAN OF CARE
Problem: Diabetes/Glucose Control  Goal: Glucose maintained within prescribed range  Description  INTERVENTIONS:  - Monitor Blood Glucose as ordered  - Assess for signs and symptoms of hyperglycemia and hypoglycemia  - Administer ordered medications to m risk of falls.   - [place] fall precautions as indicated by assessment.  - Educate pt/family on patient safety including physical limitations  - Instruct pt to call for assistance with activity based on assessment  - Modify environment to reduce risk of i limitations with patient/family  Outcome: Progressing  Goal: Maintain proper alignment of affected body part  Description  INTERVENTIONS:  - Support and protect limb and body alignment per provider's orders  - Instruct and reinforce with patient and family partner  - Complete POLST form as appropriate  - Assess patient's ability to be responsible for managing their own health  - Refer to Case Management Department for coordinating discharge planning if the patient needs post-hospital services based on physic limits  Description  INTERVENTIONS:  - Monitor labs and rhythm and assess patient for signs and symptoms of electrolyte imbalances  - Administer electrolyte replacement as ordered  - Monitor response to electrolyte replacements, including rhythm and repeat

## 2020-08-19 NOTE — DIETARY NOTE
ADULT NUTRITION REASSESSMENT     Pt is at moderate nutrition risk. Pt does not meet malnutrition criteria.       RECOMMENDATIONS TO MD:None at this time     NUTRITION DIAGNOSIS/PROBLEM:Unintentional weight loss related to prolonged hospitalization & subacu documentation d/t leg brace included ( post s/p TKA revision). However, wt increase anticipated due to fluid retention. Received Albumin and Bumex yesterday. Pt reports urinating frequently yesterday.  RD lifted Carb controlled diet to allow increase in po, lbs  (176# prior to acute illness based on wt history).      120% UBW  WEIGHT HISTORY:  Patient Weight(s) for the past 336 hrs:   Weight   08/19/20 0637 96.5 kg (212 lb 12.8 oz)   08/18/20 0543 93.5 kg (206 lb 3.2 oz)   08/13/20 0400 88.5 kg (195 lb)   08/1 108*   BUN 66* 66* 65*   CREATSERUM 5.27* 5.29* 5.39*   CA 8.7 8.5 9.0   * 133* 133*   K 5.4* 5.2* 5.1    107 107   CO2 19.0* 19.0* 18.0*   PHOS  --  3.8 4.1   OSMOCALC 295 295 295     NUTRITION RELATED PHYSICAL FINDINGS:  - Body Fat/Muscle Mas

## 2020-08-19 NOTE — PROGRESS NOTES
[facility]    [facility] Note    Neldaia José Patient Status:  Inpatient    1945 MRN [id number]   Location [facility] [place] 5SW/SE Attending Fany Muniz MD   [facility] Day # 18 PCP No primary care provider on file.        Subjective: Assessment and Plan:     Pulmonary hypertension ([address]  severe      Coronary artery disease due to calcified coronary lesion  Sp ANGELO 6/22. Resume plavix       Failed total right knee replacement (HCC)        Cardiomyopathy (HCC)        Anemia.  Improved

## 2020-08-19 NOTE — CM/SW NOTE
Clinical updates uploaded via [employer] to [address], MSW, [place]  Social Work   AJW:#[id number]

## 2020-08-20 LAB
ANION GAP SERPL CALC-SCNC: 7 MMOL/L (ref 0–18)
BUN BLD-MCNC: 64 MG/DL (ref 7–18)
BUN/CREAT SERPL: 11.5 (ref 10–20)
CALCIUM BLD-MCNC: 9.2 MG/DL (ref 8.5–10.1)
CHLORIDE SERPL-SCNC: 109 MMOL/L (ref 98–112)
CK SERPL-CCNC: 60 U/L (ref 26–192)
CO2 SERPL-SCNC: 19 MMOL/L (ref 21–32)
CREAT BLD-MCNC: 5.57 MG/DL (ref 0.55–1.02)
DEPRECATED RDW RBC AUTO: 49.6 FL (ref 35.1–46.3)
ERYTHROCYTE [DISTWIDTH] IN BLOOD BY AUTOMATED COUNT: 15.6 % (ref 11–15)
ERYTHROCYTE [SEDIMENTATION RATE] IN BLOOD: 31 MM/HR (ref 0–30)
GLUCOSE BLD-MCNC: 112 MG/DL (ref 70–99)
HCT VFR BLD AUTO: 27.4 % (ref 35–48)
HGB BLD-MCNC: 8.9 G/DL (ref 12–16)
MCH RBC QN AUTO: 28.7 PG (ref 26–34)
MCHC RBC AUTO-ENTMCNC: 32.5 G/DL (ref 31–37)
MCV RBC AUTO: 88.4 FL (ref 80–100)
OSMOLALITY SERPL CALC.SUM OF ELEC: 299 MOSM/KG (ref 275–295)
PLATELET # BLD AUTO: 244 10(3)UL (ref 150–450)
POTASSIUM SERPL-SCNC: 5.1 MMOL/L (ref 3.5–5.1)
RBC # BLD AUTO: 3.1 X10(6)UL (ref 3.8–5.3)
SODIUM SERPL-SCNC: 135 MMOL/L (ref 136–145)
WBC # BLD AUTO: 9.6 X10(3) UL (ref 4–11)

## 2020-08-20 PROCEDURE — 99232 SBSQ HOSP IP/OBS MODERATE 35: CPT | Performed by: INTERNAL MEDICINE

## 2020-08-20 RX ORDER — ALBUMIN (HUMAN) 12.5 G/50ML
100 SOLUTION INTRAVENOUS AS NEEDED
Status: DISCONTINUED | OUTPATIENT
Start: 2020-08-20 | End: 2020-08-21

## 2020-08-20 RX ORDER — HEPARIN SODIUM 1000 [USP'U]/ML
1.5 INJECTION, SOLUTION INTRAVENOUS; SUBCUTANEOUS ONCE
Status: DISCONTINUED | OUTPATIENT
Start: 2020-08-20 | End: 2020-08-21

## 2020-08-20 NOTE — PROGRESS NOTES
[facility]    Progress Note    Lissa Bridges Patient Status:  Inpatient    1945 MRN [id number]   Location [facility] 5SW/SE Attending Thompson Parra MD   [facility] Day # 19 PCP No primary care provider on file.        Subjective: normal  Skin/Hair: no unusual rashes present, no abnormal bruising noted  Back/Spine: no abnormalities noted  Musculoskeletal: full ROM all extremities good strength  no deformities  Extremities:  1+ edema  Neurological:  Grossly normal    Results:     Lab

## 2020-08-20 NOTE — PHYSICAL THERAPY NOTE
Attempted to see patient for physical therapy session. Patient would prefer to not have therapy today due to unfortunate news about needing HD. Asked if there was anything patient needed, she declined.  Will attempt to see patient tomorrow for physical ther

## 2020-08-20 NOTE — PROGRESS NOTES
I spoke with Dr. Celina Ornelas and with christina  She has been praying for her kidneys to recover but the really stagnant she will think about dialysis her brother was told he may also need dialysis in the next few days she will let her doctor and I both know tomor

## 2020-08-20 NOTE — PROGRESS NOTES
[facility]    Progress Note    Geoff Kumar Patient Status:  Inpatient    1945 MRN [id number]   Location [facility] 5SW/SE Attending Fany Muniz MD   [facility] Day # 19 PCP No primary care provider on file.        Subjective: CK 60 08/20/2020    B12 1,087 (H) 06/18/2020         No results found.       Assessment and Plan:     Pulmonary hypertension ([address]        Coronary artery disease due to calcified coronary lesion        Failed total right knee replacement (HCC)  Antibiotic s

## 2020-08-20 NOTE — PLAN OF CARE
Problem: Diabetes/Glucose Control  Goal: Glucose maintained within prescribed range  Description  INTERVENTIONS:  - Monitor Blood Glucose as ordered  - Assess for signs and symptoms of hyperglycemia and hypoglycemia  - Administer ordered medications to m risk of falls.   - [place] fall precautions as indicated by assessment.  - Educate pt/family on patient safety including physical limitations  - Instruct pt to call for assistance with activity based on assessment  - Modify environment to reduce risk of i limitations with patient/family  Outcome: Progressing  Goal: Maintain proper alignment of affected body part  Description  INTERVENTIONS:  - Support and protect limb and body alignment per provider's orders  - Instruct and reinforce with patient and family partner  - Complete POLST form as appropriate  - Assess patient's ability to be responsible for managing their own health  - Refer to Case Management Department for coordinating discharge planning if the patient needs post-hospital services based on physic limits  Description  INTERVENTIONS:  - Monitor labs and rhythm and assess patient for signs and symptoms of electrolyte imbalances  - Administer electrolyte replacement as ordered  - Monitor response to electrolyte replacements, including rhythm and repeat

## 2020-08-20 NOTE — PLAN OF CARE
Problem: Diabetes/Glucose Control  Goal: Glucose maintained within prescribed range  Description  INTERVENTIONS:  - Monitor Blood Glucose as ordered  - Assess for signs and symptoms of hyperglycemia and hypoglycemia  - Administer ordered medications to m risk of falls.   - [place] fall precautions as indicated by assessment.  - Educate pt/family on patient safety including physical limitations  - Instruct pt to call for assistance with activity based on assessment  - Modify environment to reduce risk of i limitations with patient/family  Outcome: Progressing  Goal: Maintain proper alignment of affected body part  Description  INTERVENTIONS:  - Support and protect limb and body alignment per provider's orders  - Instruct and reinforce with patient and family partner  - Complete POLST form as appropriate  - Assess patient's ability to be responsible for managing their own health  - Refer to Case Management Department for coordinating discharge planning if the patient needs post-hospital services based on physic limits  Description  INTERVENTIONS:  - Monitor labs and rhythm and assess patient for signs and symptoms of electrolyte imbalances  - Administer electrolyte replacement as ordered  - Monitor response to electrolyte replacements, including rhythm and repeat

## 2020-08-21 ENCOUNTER — APPOINTMENT (OUTPATIENT)
Dept: INTERVENTIONAL RADIOLOGY/VASCULAR | Facility: HOSPITAL | Age: 75
DRG: 464 | End: 2020-08-21
Attending: CLINICAL NURSE SPECIALIST
Payer: MEDICARE

## 2020-08-21 LAB
ALBUMIN SERPL-MCNC: 1.9 G/DL (ref 3.4–5)
ANION GAP SERPL CALC-SCNC: 7 MMOL/L (ref 0–18)
BUN BLD-MCNC: 63 MG/DL (ref 7–18)
BUN/CREAT SERPL: 11.4 (ref 10–20)
CALCIUM BLD-MCNC: 9.1 MG/DL (ref 8.5–10.1)
CHLORIDE SERPL-SCNC: 109 MMOL/L (ref 98–112)
CO2 SERPL-SCNC: 20 MMOL/L (ref 21–32)
CREAT BLD-MCNC: 5.55 MG/DL (ref 0.55–1.02)
DEPRECATED RDW RBC AUTO: 50.8 FL (ref 35.1–46.3)
ERYTHROCYTE [DISTWIDTH] IN BLOOD BY AUTOMATED COUNT: 15.8 % (ref 11–15)
GLUCOSE BLD-MCNC: 118 MG/DL (ref 70–99)
HBV CORE AB SERPL QL IA: NONREACTIVE
HBV SURFACE AB SER QL: NONREACTIVE
HBV SURFACE AB SERPL IA-ACNC: <3.1 MIU/ML
HBV SURFACE AG SER-ACNC: <0.1 [IU]/L
HBV SURFACE AG SERPL QL IA: NONREACTIVE
HCT VFR BLD AUTO: 26.4 % (ref 35–48)
HGB BLD-MCNC: 8.4 G/DL (ref 12–16)
MCH RBC QN AUTO: 28.3 PG (ref 26–34)
MCHC RBC AUTO-ENTMCNC: 31.8 G/DL (ref 31–37)
MCV RBC AUTO: 88.9 FL (ref 80–100)
OSMOLALITY SERPL CALC.SUM OF ELEC: 301 MOSM/KG (ref 275–295)
PHOSPHATE SERPL-MCNC: 4.1 MG/DL (ref 2.5–4.9)
PLATELET # BLD AUTO: 225 10(3)UL (ref 150–450)
POTASSIUM SERPL-SCNC: 5.2 MMOL/L (ref 3.5–5.1)
RBC # BLD AUTO: 2.97 X10(6)UL (ref 3.8–5.3)
SARS-COV-2 RNA RESP QL NAA+PROBE: NOT DETECTED
SODIUM SERPL-SCNC: 136 MMOL/L (ref 136–145)
VANCOMYCIN SERPL-MCNC: 9.9 UG/ML
WBC # BLD AUTO: 6.6 X10(3) UL (ref 4–11)

## 2020-08-21 PROCEDURE — 0JH60XZ INSERTION OF TUNNELED VASCULAR ACCESS DEVICE INTO CHEST SUBCUTANEOUS TISSUE AND FASCIA, OPEN APPROACH: ICD-10-PCS | Performed by: RADIOLOGY

## 2020-08-21 PROCEDURE — 02HV33Z INSERTION OF INFUSION DEVICE INTO SUPERIOR VENA CAVA, PERCUTANEOUS APPROACH: ICD-10-PCS | Performed by: RADIOLOGY

## 2020-08-21 PROCEDURE — 5A1D70Z PERFORMANCE OF URINARY FILTRATION, INTERMITTENT, LESS THAN 6 HOURS PER DAY: ICD-10-PCS | Performed by: FAMILY MEDICINE

## 2020-08-21 PROCEDURE — 90935 HEMODIALYSIS ONE EVALUATION: CPT | Performed by: INTERNAL MEDICINE

## 2020-08-21 RX ORDER — ONDANSETRON 2 MG/ML
INJECTION INTRAMUSCULAR; INTRAVENOUS
Status: DISPENSED
Start: 2020-08-21 | End: 2020-08-21

## 2020-08-21 RX ORDER — MIDAZOLAM HYDROCHLORIDE 1 MG/ML
INJECTION INTRAMUSCULAR; INTRAVENOUS
Status: COMPLETED
Start: 2020-08-21 | End: 2020-08-21

## 2020-08-21 RX ORDER — HEPARIN SODIUM 1000 [USP'U]/ML
INJECTION, SOLUTION INTRAVENOUS; SUBCUTANEOUS
Status: COMPLETED
Start: 2020-08-21 | End: 2020-08-21

## 2020-08-21 RX ORDER — HEPARIN SODIUM 1000 [USP'U]/ML
1.5 INJECTION, SOLUTION INTRAVENOUS; SUBCUTANEOUS ONCE
Status: DISCONTINUED | OUTPATIENT
Start: 2020-08-21 | End: 2020-08-28

## 2020-08-21 RX ORDER — LIDOCAINE HYDROCHLORIDE 20 MG/ML
INJECTION, SOLUTION EPIDURAL; INFILTRATION; INTRACAUDAL; PERINEURAL
Status: COMPLETED
Start: 2020-08-21 | End: 2020-08-21

## 2020-08-21 RX ORDER — ALBUMIN (HUMAN) 12.5 G/50ML
100 SOLUTION INTRAVENOUS AS NEEDED
Status: DISCONTINUED | OUTPATIENT
Start: 2020-08-21 | End: 2020-08-28

## 2020-08-21 RX ORDER — CLINDAMYCIN PHOSPHATE 600 MG/50ML
INJECTION INTRAVENOUS
Status: COMPLETED
Start: 2020-08-21 | End: 2020-08-21

## 2020-08-21 NOTE — BRIEF PROCEDURE NOTE
[facility]  Procedure Note    Andre Cowart Patient Status:  Inpatient    1945 MRN [id number]   Location [facility] Attending Kinza Sanchez MD   Hosp Day # 20 PCP No primary care provider on file.

## 2020-08-21 NOTE — PLAN OF CARE
Problem: Diabetes/Glucose Control  Goal: Glucose maintained within prescribed range  Description  INTERVENTIONS:  - Monitor Blood Glucose as ordered  - Assess for signs and symptoms of hyperglycemia and hypoglycemia  - Administer ordered medications to m risk of falls.   - [place] fall precautions as indicated by assessment.  - Educate pt/family on patient safety including physical limitations  - Instruct pt to call for assistance with activity based on assessment  - Modify environment to reduce risk of i limitations with patient/family  Outcome: Progressing  Goal: Maintain proper alignment of affected body part  Description  INTERVENTIONS:  - Support and protect limb and body alignment per provider's orders  - Instruct and reinforce with patient and family partner  - Complete POLST form as appropriate  - Assess patient's ability to be responsible for managing their own health  - Refer to Case Management Department for coordinating discharge planning if the patient needs post-hospital services based on physic limits  Description  INTERVENTIONS:  - Monitor labs and rhythm and assess patient for signs and symptoms of electrolyte imbalances  - Administer electrolyte replacement as ordered  - Monitor response to electrolyte replacements, including rhythm and repeat

## 2020-08-21 NOTE — PROGRESS NOTES
[facility]    Progress Note    Vishal Kidd Patient Status:  Inpatient    1945 MRN [id number]   Location [facility] 5SW/SE Attending Tawana Prieto MD   Hosp Day # 20 PCP No primary care provider on file.        Subjective: 6.6 08/21/2020    HGB 8.4 (L) 08/21/2020    HCT 26.4 (L) 08/21/2020    .0 08/21/2020    CREATSERUM 5.55 (H) 08/21/2020    BUN 63 (H) 08/21/2020     08/21/2020    K 5.2 (H) 08/21/2020     08/21/2020    CO2 20.0 (L) 08/21/2020     (

## 2020-08-21 NOTE — PLAN OF CARE
Problem: Diabetes/Glucose Control  Goal: Glucose maintained within prescribed range  Description  INTERVENTIONS:  - Monitor Blood Glucose as ordered  - Assess for signs and symptoms of hyperglycemia and hypoglycemia  - Administer ordered medications to m risk of falls.   - [place] fall precautions as indicated by assessment.  - Educate pt/family on patient safety including physical limitations  - Instruct pt to call for assistance with activity based on assessment  - Modify environment to reduce risk of i limitations with patient/family  Outcome: Progressing  Goal: Maintain proper alignment of affected body part  Description  INTERVENTIONS:  - Support and protect limb and body alignment per provider's orders  - Instruct and reinforce with patient and family partner  - Complete POLST form as appropriate  - Assess patient's ability to be responsible for managing their own health  - Refer to Case Management Department for coordinating discharge planning if the patient needs post-hospital services based on physic limits  Description  INTERVENTIONS:  - Monitor labs and rhythm and assess patient for signs and symptoms of electrolyte imbalances  - Administer electrolyte replacement as ordered  - Monitor response to electrolyte replacements, including rhythm and repeat

## 2020-08-21 NOTE — CM/SW NOTE
Cm notified that pt will be started on HD. Permacath to be placed today and first HD after. Nephrologist is Dr Chris Walsh, preferred facility is  [facility] . Ref sent via [facility] and faxed to intake at [address] [facility] in  [phone].     Cm updated KASEY Bismark

## 2020-08-21 NOTE — PROGRESS NOTES
[place] [facility]    Morris Specking Patient Status:  Inpatient    1945 MRN [id number]   Location [facility] 2W/SW Attending Paz Garcia MD   Hosp Day # 20 PCP No primary care provider on file.      Carolina Da Silva in joint     Rotator cuff syndrome     Temporary cerebral vascular dysfunction     PAF (paroxysmal atrial fibrillation) (HCC)     Polyp of colon     Nonrheumatic mitral valve regurgitation     Pulmonary hypertension ([address]     Hiatal hernia with GERD and es 6/2020- tubular adenoma, gastritis on path   # DM  # CKD  # HTN  # HLD     PLAN:  -  Continue on daptomycin. Anticipate six week course of IV abx (EOT 9/14/20). -  HD to be started per nephrology. -  Follow fever curve, wbc.  Repeat CBC/diff tomorrow AM.

## 2020-08-21 NOTE — PROGRESS NOTES
[facility]    Progress Note    Hernán Saldana Patient Status:  Inpatient    1945 MRN [id number]   Location [facility] 5SW/SE Attending Sera Wilkes MD   Hosp Day # 20 PCP No primary care provider on file.        Subjective: present, no abnormal bruising noted  Back/Spine: no abnormalities noted  Musculoskeletal: full ROM all extremities good strength  no deformities  Extremities: Trace edema  Neurological:  Grossly normal    Results:     Laboratory Data:  Lab Results   Compon

## 2020-08-22 PROBLEM — L60.2 HYPERTROPHY OF NAIL: Status: RESOLVED | Noted: 2019-12-11 | Resolved: 2020-08-22

## 2020-08-22 PROBLEM — I95.81 POSTOPERATIVE HYPOTENSION: Status: RESOLVED | Noted: 2020-08-05 | Resolved: 2020-08-22

## 2020-08-22 PROBLEM — R13.10 DYSPHAGIA: Status: RESOLVED | Noted: 2019-12-11 | Resolved: 2020-08-22

## 2020-08-22 PROBLEM — K30 INDIGESTION: Status: RESOLVED | Noted: 2019-12-11 | Resolved: 2020-08-22

## 2020-08-22 PROBLEM — N17.9 ACUTE RENAL FAILURE (ARF) (HCC): Chronic | Status: RESOLVED | Noted: 2020-08-06 | Resolved: 2020-08-22

## 2020-08-22 LAB
ALBUMIN SERPL-MCNC: 1.9 G/DL (ref 3.4–5)
ANION GAP SERPL CALC-SCNC: 5 MMOL/L (ref 0–18)
BUN BLD-MCNC: 47 MG/DL (ref 7–18)
BUN/CREAT SERPL: 10.4 (ref 10–20)
CALCIUM BLD-MCNC: 8.9 MG/DL (ref 8.5–10.1)
CHLORIDE SERPL-SCNC: 108 MMOL/L (ref 98–112)
CO2 SERPL-SCNC: 24 MMOL/L (ref 21–32)
CREAT BLD-MCNC: 4.53 MG/DL (ref 0.55–1.02)
DEPRECATED RDW RBC AUTO: 51.4 FL (ref 35.1–46.3)
ERYTHROCYTE [DISTWIDTH] IN BLOOD BY AUTOMATED COUNT: 15.8 % (ref 11–15)
GLUCOSE BLD-MCNC: 138 MG/DL (ref 70–99)
HCT VFR BLD AUTO: 26.3 % (ref 35–48)
HGB BLD-MCNC: 8.4 G/DL (ref 12–16)
MCH RBC QN AUTO: 28.5 PG (ref 26–34)
MCHC RBC AUTO-ENTMCNC: 31.9 G/DL (ref 31–37)
MCV RBC AUTO: 89.2 FL (ref 80–100)
OSMOLALITY SERPL CALC.SUM OF ELEC: 298 MOSM/KG (ref 275–295)
PHOSPHATE SERPL-MCNC: 3.5 MG/DL (ref 2.5–4.9)
PLATELET # BLD AUTO: 213 10(3)UL (ref 150–450)
POTASSIUM SERPL-SCNC: 4.7 MMOL/L (ref 3.5–5.1)
RBC # BLD AUTO: 2.95 X10(6)UL (ref 3.8–5.3)
SODIUM SERPL-SCNC: 137 MMOL/L (ref 136–145)
WBC # BLD AUTO: 7.2 X10(3) UL (ref 4–11)

## 2020-08-22 PROCEDURE — 99233 SBSQ HOSP IP/OBS HIGH 50: CPT | Performed by: INTERNAL MEDICINE

## 2020-08-22 RX ORDER — ASPIRIN 81 MG/1
81 TABLET, CHEWABLE ORAL DAILY
Status: DISCONTINUED | OUTPATIENT
Start: 2020-08-22 | End: 2020-08-28

## 2020-08-22 NOTE — PROGRESS NOTES
[facility]    Progress Note      Subjective:     Patient comfortable - denies any sob, abdominal pain or chest pain    Seeing during HD - tolerating well so far     Less arm swelling     Review of Systems:     Constitutional: negative for 10 mL, Intravenous, PRN  Sodium Chloride 0.9 % solution 20 mL, 20 mL, Intravenous, PRN  vancomycin HCl (VANCOCIN) cap 125 mg, 125 mg, Oral, Daily  HYDROmorphone HCl (DILAUDID) tab 1 mg, 1 mg, Oral, Q4H PRN  ondansetron (ZOFRAN-ODT) disintegrating tab 4 mg, Oral, QAM AC  traZODone HCl (DESYREL) tab 50 mg, 50 mg, Oral, Nightly PRN  Vitamin D (Cholecalciferol) tab 400 Units, 400 Units, Oral, Daily  nitroGLYCERIN (NITROSTAT) SL tab 0.4 mg, 0.4 mg, Sublingual, Q5 Min PRN  glucose (DEX4) oral liquid 15 g, 15 g, Or ALB 1.9* 1.9* 1.9*       No results found. Assessment and Plan:       1.  RUEL: nonoliguric but low UO  - RUEL presumably secondary to vancomycin related toxicity   - kidney function failed to improve - started on dialysis yesterday - 2nd treatment t

## 2020-08-22 NOTE — PROGRESS NOTES
[facility]    Progress Note    Eriberto Hopkins Patient Status:  Inpatient    1945 MRN [id number]   Location [facility] 5SW/SE Attending Jose Thomas MD   Hosp Day # 21 PCP No primary care provider on file.        Subjective: [facility])  severe      Coronary artery disease due to calcified coronary lesion  S/p stent ANGELO to LAD 6/22. Resume aspirin. Currently on plavix as well.  Duo anticoagulation per cardiology      Failed total right knee replacement (HCC)  Antibiotic spacer placed

## 2020-08-22 NOTE — PLAN OF CARE
Pt is alert and oriented. VSS. Dialysis scheduled today in the am. Central line dressing intact with small amount of blood in dressing. Call light in reach. Will continue to monitor.         Problem: Diabetes/Glucose Control  Goal: Glucose maintained within SAFETY ADULT - FALL  Goal: Free from fall injury  Description  INTERVENTIONS:  - Assess pt frequently for physical needs  - Identify cognitive and physical deficits and behaviors that affect risk of falls.   - [place] fall precautions as indicated by asse needed  - Ensure adequate protection for wounds/incisions during mobilization  - Obtain PT/OT consults as needed  - Advance activity as appropriate  - Communicate ordered activity level and limitations with patient/family  Outcome: Progressing  Goal: Maint appropriate  - Identify discharge learning needs (meds, wound care, etc)  - Arrange for interpreters to assist at discharge as needed  - Consider post-discharge preferences of patient/family/discharge partner  - Complete POLST form as appropriate  - Assess medication profile  - Implement strategies to promote bladder emptying  Outcome: Progressing     Problem: METABOLIC/FLUID AND ELECTROLYTES - ADULT  Goal: Electrolytes maintained within normal limits  Description  INTERVENTIONS:  - Monitor labs and rhythm a

## 2020-08-22 NOTE — PLAN OF CARE
Problem: Diabetes/Glucose Control  Goal: Glucose maintained within prescribed range  Description  INTERVENTIONS:  - Monitor Blood Glucose as ordered  - Assess for signs and symptoms of hyperglycemia and hypoglycemia  - Administer ordered medications to m and physical deficits and behaviors that affect risk of falls.   - [place] fall precautions as indicated by assessment.  - Educate pt/family on patient safety including physical limitations  - Instruct pt to call for assistance with activity based on asse Communicate ordered activity level and limitations with patient/family  Outcome: Progressing  Goal: Maintain proper alignment of affected body part  Description  INTERVENTIONS:  - Support and protect limb and body alignment per provider's orders  - Instruc maintained within normal limits  Description  INTERVENTIONS:  - Monitor labs and rhythm and assess patient for signs and symptoms of electrolyte imbalances  - Administer electrolyte replacement as ordered  - Monitor response to electrolyte replacements, in management  - Manage/alleviate anxiety  - Utilize distraction and/or relaxation techniques  - Monitor for opioid side effects  - Notify MD/LIP if interventions unsuccessful or patient reports new pain  - Anticipate increased pain with activity and pre-medi

## 2020-08-23 ENCOUNTER — APPOINTMENT (OUTPATIENT)
Dept: GENERAL RADIOLOGY | Facility: HOSPITAL | Age: 75
DRG: 464 | End: 2020-08-23
Attending: FAMILY MEDICINE
Payer: MEDICARE

## 2020-08-23 LAB
ANION GAP SERPL CALC-SCNC: 8 MMOL/L (ref 0–18)
BASOPHILS # BLD AUTO: 0.01 X10(3) UL (ref 0–0.2)
BASOPHILS NFR BLD AUTO: 0.1 %
BUN BLD-MCNC: 31 MG/DL (ref 7–18)
BUN/CREAT SERPL: 9.6 (ref 10–20)
CALCIUM BLD-MCNC: 7.9 MG/DL (ref 8.5–10.1)
CHLORIDE SERPL-SCNC: 109 MMOL/L (ref 98–112)
CO2 SERPL-SCNC: 24 MMOL/L (ref 21–32)
CREAT BLD-MCNC: 3.24 MG/DL (ref 0.55–1.02)
DEPRECATED RDW RBC AUTO: 51.6 FL (ref 35.1–46.3)
EOSINOPHIL # BLD AUTO: 0.3 X10(3) UL (ref 0–0.7)
EOSINOPHIL NFR BLD AUTO: 4.2 %
ERYTHROCYTE [DISTWIDTH] IN BLOOD BY AUTOMATED COUNT: 15.9 % (ref 11–15)
GLUCOSE BLD-MCNC: 116 MG/DL (ref 70–99)
HCT VFR BLD AUTO: 24.9 % (ref 35–48)
HGB BLD-MCNC: 7.9 G/DL (ref 12–16)
IMM GRANULOCYTES # BLD AUTO: 0.03 X10(3) UL (ref 0–1)
IMM GRANULOCYTES NFR BLD: 0.4 %
LYMPHOCYTES # BLD AUTO: 0.72 X10(3) UL (ref 1–4)
LYMPHOCYTES NFR BLD AUTO: 10.1 %
MCH RBC QN AUTO: 28.4 PG (ref 26–34)
MCHC RBC AUTO-ENTMCNC: 31.7 G/DL (ref 31–37)
MCV RBC AUTO: 89.6 FL (ref 80–100)
MONOCYTES # BLD AUTO: 1.01 X10(3) UL (ref 0.1–1)
MONOCYTES NFR BLD AUTO: 14.2 %
NEUTROPHILS # BLD AUTO: 5.06 X10 (3) UL (ref 1.5–7.7)
NEUTROPHILS # BLD AUTO: 5.06 X10(3) UL (ref 1.5–7.7)
NEUTROPHILS NFR BLD AUTO: 71 %
OSMOLALITY SERPL CALC.SUM OF ELEC: 300 MOSM/KG (ref 275–295)
PLATELET # BLD AUTO: 170 10(3)UL (ref 150–450)
POTASSIUM SERPL-SCNC: 4 MMOL/L (ref 3.5–5.1)
RBC # BLD AUTO: 2.78 X10(6)UL (ref 3.8–5.3)
SODIUM SERPL-SCNC: 141 MMOL/L (ref 136–145)
WBC # BLD AUTO: 7.1 X10(3) UL (ref 4–11)

## 2020-08-23 PROCEDURE — 71045 X-RAY EXAM CHEST 1 VIEW: CPT | Performed by: FAMILY MEDICINE

## 2020-08-23 PROCEDURE — 99233 SBSQ HOSP IP/OBS HIGH 50: CPT | Performed by: INTERNAL MEDICINE

## 2020-08-23 NOTE — PHYSICAL THERAPY NOTE
PHYSICAL THERAPY KNEE TREATMENT NOTE - INPATIENT     Room Number: 793/403-C             Presenting Problem: R TKA revison     Problem List  Active Problems:    Hypertension    Cardiomyopathy ([address]    Anemia    Hyperlipidemia    PAF (paroxysmal atrial fibri FORM - BASIC MOBILITY  How much difficulty does the patient currently have. ..  -   Turning over in bed (including adjusting bedclothes, sheets and blankets)?: A Little   -   Sitting down on and standing up from a chair with arms (e.g., wheelchair, bedside chair/wheelchair/toilet with supervision while maintaining RLE TDWB status   Goal #4   Current Status In progress   Goal #5 Patient will negotiate 1 platform step w/ assistive device and minimal assistance   Goal #5   Current Status Not tested    Goal #6

## 2020-08-23 NOTE — PLAN OF CARE
Pt is alert and oriented. Pt received dialysis yesterday and 1.5L was pulled. VSS. Pt wound care complete. Call light in reach. Will continue to monitor.        Problem: Diabetes/Glucose Control  Goal: Glucose maintained within prescribed range  Description from fall injury  Description  INTERVENTIONS:  - Assess pt frequently for physical needs  - Identify cognitive and physical deficits and behaviors that affect risk of falls.   - [place] fall precautions as indicated by assessment.  - Educate pt/family on wounds/incisions during mobilization  - Obtain PT/OT consults as needed  - Advance activity as appropriate  - Communicate ordered activity level and limitations with patient/family  Outcome: Progressing  Goal: Maintain proper alignment of affected body par (meds, wound care, etc)  - Arrange for interpreters to assist at discharge as needed  - Consider post-discharge preferences of patient/family/discharge partner  - Complete POLST form as appropriate  - Assess patient's ability to be responsible for managing promote bladder emptying  Outcome: Progressing     Problem: METABOLIC/FLUID AND ELECTROLYTES - ADULT  Goal: Electrolytes maintained within normal limits  Description  INTERVENTIONS:  - Monitor labs and rhythm and assess patient for signs and symptoms of el

## 2020-08-23 NOTE — PLAN OF CARE
Problem: Diabetes/Glucose Control  Goal: Glucose maintained within prescribed range  Description  INTERVENTIONS:  - Monitor Blood Glucose as ordered  - Assess for signs and symptoms of hyperglycemia and hypoglycemia  - Administer ordered medications to m and physical deficits and behaviors that affect risk of falls.   - [place] fall precautions as indicated by assessment.  - Educate pt/family on patient safety including physical limitations  - Instruct pt to call for assistance with activity based on asse Advance activity as appropriate  - Communicate ordered activity level and limitations with patient/family  Outcome: Progressing  Goal: Maintain proper alignment of affected body part  Description  INTERVENTIONS:  - Support and protect limb and body alignme draining yellow clear urine.      Problem: METABOLIC/FLUID AND ELECTROLYTES - ADULT  Goal: Electrolytes maintained within normal limits  Description  INTERVENTIONS:  - Monitor labs and rhythm and assess patient for signs and symptoms of electrolyte imbalanc influences on pain and pain management  - Manage/alleviate anxiety  - Utilize distraction and/or relaxation techniques  - Monitor for opioid side effects  - Notify MD/LIP if interventions unsuccessful or patient reports new pain  - Anticipate increased shanon

## 2020-08-23 NOTE — PROGRESS NOTES
[place] [facility] - [facility]    Progress Note    Whit Morris Patient Status:  Inpatient    1945 MRN [id number]   Location [facility] 5SW/SE Attending Maninder Donahue MD   [address] Day # 22 PCP No primary care provider on file.        Subjective: [facility])        Coronary artery disease due to calcified coronary lesion  ANGELO to LAD 6/22 now on both aspirin and plavix      Failed total right knee replacement (HCC)        Cardiomyopathy (HCC)        Anemia  epogen      Hyperlipidemia        PAF (paroxysma

## 2020-08-23 NOTE — PROGRESS NOTES
[facility]    Progress Note      Subjective:     Patient comfortable - denies any sob, abdominal pain or chest pain    Tolerated HD well yesterday     Arm swelling resolved. Bit leg swelling still     No sob .  Appetite good     Review of Daily  Normal Saline Flush 0.9 % injection 10 mL, 10 mL, Intravenous, PRN  Sodium Chloride 0.9 % solution 20 mL, 20 mL, Intravenous, PRN  vancomycin HCl (VANCOCIN) cap 125 mg, 125 mg, Oral, Daily  HYDROmorphone HCl (DILAUDID) tab 1 mg, 1 mg, Oral, Q4H PRN PRN  Pantoprazole Sodium (PROTONIX) EC tab 40 mg, 40 mg, Oral, QAM AC  traZODone HCl (DESYREL) tab 50 mg, 50 mg, Oral, Nightly PRN  Vitamin D (Cholecalciferol) tab 400 Units, 400 Units, Oral, Daily  nitroGLYCERIN (NITROSTAT) SL tab 0.4 mg, 0.4 mg, Sublingu prosthetic cardiac valves. No results for input(s): BNP in the last 168 hours.   Recent Labs   Lab 08/18/20  0654 08/21/20  0507 08/22/20  0607   PHOS 4.1 4.1 3.5        Recent Labs   Lab 08/18/20  0654 08/21/20  0507 08/22/20  0607   PHOS 4.1 4.1 3.5

## 2020-08-24 LAB
ANION GAP SERPL CALC-SCNC: 4 MMOL/L (ref 0–18)
BASOPHILS # BLD AUTO: 0.01 X10(3) UL (ref 0–0.2)
BASOPHILS NFR BLD AUTO: 0.1 %
BUN BLD-MCNC: 35 MG/DL (ref 7–18)
BUN/CREAT SERPL: 8.8 (ref 10–20)
CALCIUM BLD-MCNC: 9 MG/DL (ref 8.5–10.1)
CHLORIDE SERPL-SCNC: 105 MMOL/L (ref 98–112)
CO2 SERPL-SCNC: 28 MMOL/L (ref 21–32)
CREAT BLD-MCNC: 3.98 MG/DL (ref 0.55–1.02)
DEPRECATED RDW RBC AUTO: 51.7 FL (ref 35.1–46.3)
EOSINOPHIL # BLD AUTO: 0.36 X10(3) UL (ref 0–0.7)
EOSINOPHIL NFR BLD AUTO: 4.6 %
ERYTHROCYTE [DISTWIDTH] IN BLOOD BY AUTOMATED COUNT: 15.9 % (ref 11–15)
GLUCOSE BLD-MCNC: 135 MG/DL (ref 70–99)
HCT VFR BLD AUTO: 29.2 % (ref 35–48)
HGB BLD-MCNC: 9.2 G/DL (ref 12–16)
IMM GRANULOCYTES # BLD AUTO: 0.04 X10(3) UL (ref 0–1)
IMM GRANULOCYTES NFR BLD: 0.5 %
LYMPHOCYTES # BLD AUTO: 1.05 X10(3) UL (ref 1–4)
LYMPHOCYTES NFR BLD AUTO: 13.4 %
MCH RBC QN AUTO: 28.1 PG (ref 26–34)
MCHC RBC AUTO-ENTMCNC: 31.5 G/DL (ref 31–37)
MCV RBC AUTO: 89.3 FL (ref 80–100)
MONOCYTES # BLD AUTO: 1.07 X10(3) UL (ref 0.1–1)
MONOCYTES NFR BLD AUTO: 13.6 %
NEUTROPHILS # BLD AUTO: 5.31 X10 (3) UL (ref 1.5–7.7)
NEUTROPHILS # BLD AUTO: 5.31 X10(3) UL (ref 1.5–7.7)
NEUTROPHILS NFR BLD AUTO: 67.8 %
OSMOLALITY SERPL CALC.SUM OF ELEC: 294 MOSM/KG (ref 275–295)
PLATELET # BLD AUTO: 183 10(3)UL (ref 150–450)
POTASSIUM SERPL-SCNC: 4.4 MMOL/L (ref 3.5–5.1)
RBC # BLD AUTO: 3.27 X10(6)UL (ref 3.8–5.3)
SODIUM SERPL-SCNC: 137 MMOL/L (ref 136–145)
WBC # BLD AUTO: 7.8 X10(3) UL (ref 4–11)

## 2020-08-24 PROCEDURE — 99233 SBSQ HOSP IP/OBS HIGH 50: CPT | Performed by: INTERNAL MEDICINE

## 2020-08-24 NOTE — PHYSICAL THERAPY NOTE
PHYSICAL THERAPY TREATMENT NOTE - INPATIENT     Room Number: 429/268-F       Presenting Problem: R TKA revison     Problem List  Active Problems:    Hypertension    Cardiomyopathy ([address]    Anemia    Hyperlipidemia    PAF (paroxysmal atrial fibrillation) (H PLAN  PT Treatment Plan: Bed mobility; Body mechanics; Patient education;Gait training;Strengthening;Transfer training;Balance training    SUBJECTIVE  Pt was agreeable to therapy session.      OBJECTIVE  Precautions: Knee immobilizer    WEIGHT BEARING RES GOALS     Patient Goal Patient's self-stated goal is: to go home   Goal #1 Patient is able to demonstrate supine - sit EOB @ level: minimal assistance x 1      Goal #1   Current Status Min A x 1    Goal #2 Patient is able to demonstrate transfers Sit to/fr

## 2020-08-24 NOTE — PLAN OF CARE
Pt is alert and oriented. Pt does not have any further dialysis appts scheduled. VSS. Pt complains the oral pain medication does not do much for the pain but on IV med helps. Dressing to be completed in the am. Will continue to monitor.       Problem: Rupinder perspectives and choices   Outcome: Progressing     Problem: SAFETY ADULT - FALL  Goal: Free from fall injury  Description  INTERVENTIONS:  - Assess pt frequently for physical needs  - Identify cognitive and physical deficits and behaviors that affect risk ambulation using safe patient handling equipment as needed  - Ensure adequate protection for wounds/incisions during mobilization  - Obtain PT/OT consults as needed  - Advance activity as appropriate  - Communicate ordered activity level and limitations wi stability  Description  INTERVENTIONS:  - Monitor vital signs, rhythm, and trends  - Monitor for bleeding, hypotension and signs of decreased cardiac output  - Evaluate effectiveness of vasoactive medications to optimize hemodynamic stability  - Monitor ar drain site(s) healing without S/S of infection  Description  INTERVENTIONS:  - Assess and document risk factors for pressure ulcer development  - Assess and document skin integrity  - Assess and document dressing/incision, wound bed, drain sites and surrou

## 2020-08-24 NOTE — PROGRESS NOTES
[facility]  Nephrology Daily Progress Note    Eriberto Hopkins  [id number]  75year old      HPI:   Eriberto Hopkins is a 75year old female. Overall feels better with HD but hoping this will be just temporary. No SOB.        ROS:     Adele Results   Component Value Date    WBC 7.8 08/24/2020    HGB 9.2 08/24/2020    HCT 29.2 08/24/2020    .0 08/24/2020    CREATSERUM 3.98 08/24/2020    BUN 35 08/24/2020     08/24/2020    K 4.4 08/24/2020     08/24/2020    CO2 28.0 08/24/202 Human (ALBUMINAR) 25 % solution 100 mL, 100 mL, Intravenous, PRN  •  Clopidogrel Bisulfate (PLAVIX) tab 75 mg, 75 mg, Oral, Daily  •  Normal Saline Flush 0.9 % injection 10 mL, 10 mL, Intravenous, PRN  •  Sodium Chloride 0.9 % solution 20 mL, 20 mL, Geryl Hunt 500-1,000 mg, Oral, Q6H PRN  •  atorvastatin (LIPITOR) tab 40 mg, 40 mg, Oral, Nightly  •  ferrous sulfate EC tab 325 mg, 325 mg, Oral, BID  •  Fluticasone Propionate (FLONASE) 50 MCG/ACT nasal spray 2 spray, 2 spray, Each Nare, Daily PRN  •  folic acid (F rupture, right, initial encounter     S/P revision of total knee     Ulcer of heel (HCC)     Anemia     Artificial knee joint present     Carpal tunnel syndrome     Cataract     Congestive heart failure ([facility] [address]     History of urinary stone     Hyperlipidemia

## 2020-08-24 NOTE — OCCUPATIONAL THERAPY NOTE
OCCUPATIONAL THERAPY TREATMENT NOTE - INPATIENT    Room Number: 886/396-H         Presenting Problem: (failed R TKA)     Problem List  Active Problems:    Hypertension    Cardiomyopathy ([address]    Anemia    Hyperlipidemia    PAF (paroxysmal atrial fibrillati patients with this level of impairment may benefit from [facility]. However, --Pt does not demonstrate the physical skills required to safely return home. Recommend KASEY to maximize participation, independence, and safety.        DISCHARGE RECOMMENDATIONS  OT D Sitting: fair  Static Standing: not able to come to full stand     FUNCTIONAL ADL ASSESSMENT  Grooming: set-up in sitting   Lower Body Dressing: Max A --pt assisting in readjusting / repositioning KI    Education Provided: role of OT, activity promotion

## 2020-08-24 NOTE — CM/SW NOTE
Interdisciplinary Rounds: 08/24/20  Admitted: 8/1/2020 LOS: 23  Disciplines in attendance: charge nurse, staff nurse, CM, [address] and discharge plan reviewed. Active issues needing resolution prior to discharge:     New HD started Friday.  Pt to

## 2020-08-24 NOTE — PLAN OF CARE
Patient is alert and oriented, x4, pain increased with movement, Dr. Alexi Rivera removed staples and applied steri strips at bedside, small dehisced area under the knee cleansed and applied dry dressing with ace bandage wrap. Immobilizer in place.   PT/OT worke mobilization of patient  - Hold pressure on venipuncture sites to achieve adequate hemostasis  - Assess for signs and symptoms of internal bleeding  - Monitor lab trends  - Patient is to report abnormal signs of bleeding to staff  - Avoid use of toothpicks relaxation techniques  - Monitor for opioid side effects  - Notify MD/LIP if interventions unsuccessful or patient reports new pain  - Anticipate increased pain with activity and pre-medicate as appropriate  Outcome: Progressing     Problem: RISK FOR INFEC

## 2020-08-25 LAB
ALBUMIN SERPL-MCNC: 1.9 G/DL
ALBUMIN SERPL-MCNC: 1.9 G/DL (ref 3.4–5)
ALBUMIN/GLOB SERPL: 0.5 {RATIO}
ALBUMIN/GLOB SERPL: 0.5 {RATIO} (ref 1–2)
ALP LIVER SERPL-CCNC: 136 U/L (ref 55–142)
ALP SERPL-CCNC: 136 U/L
ALT SERPL-CCNC: 8 U/L (ref 13–56)
ALT SERPL-CCNC: 8 UNITS/L
ANION GAP SERPL CALC-SCNC: 5 MMOL/L
ANION GAP SERPL CALC-SCNC: 5 MMOL/L (ref 0–18)
ANION GAP SERPL CALC-SCNC: 5 MMOL/L (ref 0–18)
AST SERPL-CCNC: 5 U/L (ref 15–37)
AST SERPL-CCNC: 5 UNITS/L
BASOPHILS # BLD AUTO: 0.01 X10(3) UL (ref 0–0.2)
BASOPHILS NFR BLD AUTO: 0.1 %
BILIRUB SERPL-MCNC: 0.4 MG/DL
BILIRUB SERPL-MCNC: 0.4 MG/DL (ref 0.1–2)
BUN BLD-MCNC: 34 MG/DL (ref 7–18)
BUN BLD-MCNC: 34 MG/DL (ref 7–18)
BUN SERPL-MCNC: 34 MG/DL
BUN/CREAT SERPL: 8.4
BUN/CREAT SERPL: 8.4 (ref 10–20)
BUN/CREAT SERPL: 8.4 (ref 10–20)
CALCIUM BLD-MCNC: 9.1 MG/DL (ref 8.5–10.1)
CALCIUM BLD-MCNC: 9.1 MG/DL (ref 8.5–10.1)
CALCIUM SERPL-MCNC: 9.1 MG/DL
CHLORIDE SERPL-SCNC: 104 MMOL/L
CHLORIDE SERPL-SCNC: 104 MMOL/L (ref 98–112)
CHLORIDE SERPL-SCNC: 104 MMOL/L (ref 98–112)
CO2 SERPL-SCNC: 27 MMOL/L
CO2 SERPL-SCNC: 27 MMOL/L (ref 21–32)
CO2 SERPL-SCNC: 27 MMOL/L (ref 21–32)
CREAT BLD-MCNC: 4.03 MG/DL (ref 0.55–1.02)
CREAT BLD-MCNC: 4.03 MG/DL (ref 0.55–1.02)
CREAT SERPL-MCNC: 4.03 MG/DL
DEPRECATED RDW RBC AUTO: 51.7 FL (ref 35.1–46.3)
EOSINOPHIL # BLD AUTO: 0.37 X10(3) UL (ref 0–0.7)
EOSINOPHIL NFR BLD AUTO: 5 %
ERYTHROCYTE [DISTWIDTH] IN BLOOD BY AUTOMATED COUNT: 15.9 % (ref 11–15)
GLOBULIN PLAS-MCNC: 3.6 G/DL (ref 2.8–4.4)
GLOBULIN SER-MCNC: 3.6 G/DL
GLUCOSE BLD-MCNC: 131 MG/DL (ref 70–99)
GLUCOSE BLD-MCNC: 131 MG/DL (ref 70–99)
GLUCOSE SERPL-MCNC: 131 MG/DL
HAV IGM SER QL: 1.9 MG/DL (ref 1.6–2.6)
HCT VFR BLD AUTO: 30.1 % (ref 35–48)
HGB BLD-MCNC: 9.7 G/DL (ref 12–16)
IMM GRANULOCYTES # BLD AUTO: 0.03 X10(3) UL (ref 0–1)
IMM GRANULOCYTES NFR BLD: 0.4 %
LYMPHOCYTES # BLD AUTO: 0.71 X10(3) UL (ref 1–4)
LYMPHOCYTES NFR BLD AUTO: 9.6 %
M PROTEIN MFR SERPL ELPH: 5.5 G/DL (ref 6.4–8.2)
MCH RBC QN AUTO: 28.8 PG (ref 26–34)
MCHC RBC AUTO-ENTMCNC: 32.2 G/DL (ref 31–37)
MCV RBC AUTO: 89.3 FL (ref 80–100)
MONOCYTES # BLD AUTO: 0.82 X10(3) UL (ref 0.1–1)
MONOCYTES NFR BLD AUTO: 11.1 %
NEUTROPHILS # BLD AUTO: 5.45 X10 (3) UL (ref 1.5–7.7)
NEUTROPHILS # BLD AUTO: 5.45 X10(3) UL (ref 1.5–7.7)
NEUTROPHILS NFR BLD AUTO: 73.8 %
OSMOLALITY SERPL CALC.SUM OF ELEC: 291 MOSM/KG (ref 275–295)
OSMOLALITY SERPL CALC.SUM OF ELEC: 291 MOSM/KG (ref 275–295)
PHOSPHATE SERPL-MCNC: 3.5 MG/DL (ref 2.5–4.9)
PLATELET # BLD AUTO: 194 10(3)UL (ref 150–450)
POTASSIUM SERPL-SCNC: 4.3 MMOL/L
POTASSIUM SERPL-SCNC: 4.3 MMOL/L (ref 3.5–5.1)
POTASSIUM SERPL-SCNC: 4.3 MMOL/L (ref 3.5–5.1)
PROT SERPL-MCNC: 5.5 G/DL
RBC # BLD AUTO: 3.37 X10(6)UL (ref 3.8–5.3)
SODIUM SERPL-SCNC: 136 MMOL/L
SODIUM SERPL-SCNC: 136 MMOL/L (ref 136–145)
SODIUM SERPL-SCNC: 136 MMOL/L (ref 136–145)
WBC # BLD AUTO: 7.4 X10(3) UL (ref 4–11)

## 2020-08-25 PROCEDURE — 99232 SBSQ HOSP IP/OBS MODERATE 35: CPT | Performed by: INTERNAL MEDICINE

## 2020-08-25 NOTE — PROGRESS NOTES
[facility] [place] [facility]  Nephrology Daily Progress Note    Julissa Lomeli  [id number]  75year old      HPI:   Julissa Lomeli is a 75year old female. Feeling OK. No CP or SOB. Eating OK.        ROS:     Constitutional:  Negative for decreased [place] HCT 30.1 08/25/2020    .0 08/25/2020    CREATSERUM 4.03 08/25/2020    CREATSERUM 4.03 08/25/2020    BUN 34 08/25/2020    BUN 34 08/25/2020     08/25/2020     08/25/2020    K 4.3 08/25/2020    K 4.3 08/25/2020     08/25/2020    C Oral, Daily  •  heparin sodium 1000 UNIT/ML injection 1,500 Units, 1.5 mL, Intravenous, Once  •  Albumin Human (ALBUMINAR) 25 % solution 100 mL, 100 mL, Intravenous, PRN  •  Clopidogrel Bisulfate (PLAVIX) tab 75 mg, 75 mg, Oral, Daily  •  Normal Saline Flu (APRESOLINE) tab 25 mg, 25 mg, Oral, TID PRN  •  acetaminophen (TYLENOL EXTRA STRENGTH) tab 500-1,000 mg, 500-1,000 mg, Oral, Q6H PRN  •  atorvastatin (LIPITOR) tab 40 mg, 40 mg, Oral, Nightly  •  ferrous sulfate EC tab 325 mg, 325 mg, Oral, BID  •  Flutic List:     Hypertension     Kidney disorder     Cardiomyopathy ([facility] [address]     S/P knee replacement     Patellar tendon rupture, right, initial encounter     S/P revision of total knee     Ulcer of heel ([facility] [address]     Anemia     Artificial knee joint present     Carpa

## 2020-08-25 NOTE — CM/SW NOTE
Per Evelyne Mejía @ [facility] need SS#, COVID, and flow sheets.  faxed requested information to [phone]. Pt SS# . Awaiting chair time @ [address] [place] [address]  Phone: [phone]  Fax: [phone]

## 2020-08-25 NOTE — CDS QUERY
Heart Failure  CLINICAL DOCUMENTATION CLARIFICATION FORM  How To Answer This Query:  1)  Click \"Edit Button\" on the toolbar. 2)  Type an \"X\" in the bracket for the diagnosis that applies.   (You may also add additional clinical details as you feel nece

## 2020-08-25 NOTE — PROGRESS NOTES
[place] [facility]    Progress Note    Makayla Dash Patient Status:  Inpatient    1945 MRN [id number]   Location [facility] 5SW/SE Attending Francena Cockayne, MD   [address] Day # 24 PCP No primary care provider on file.        Subjective: 08/25/2020    BUN 34 (H) 08/25/2020     08/25/2020     08/25/2020    K 4.3 08/25/2020    K 4.3 08/25/2020     08/25/2020     08/25/2020    CO2 27.0 08/25/2020    CO2 27.0 08/25/2020     (H) 08/25/2020     (H) 08/25/202

## 2020-08-25 NOTE — PROGRESS NOTES
[facility]    Progress Note    Beatricegodwin Sims Patient Status:  Inpatient    1945 MRN [id number]   Location [facility] 5SW/SE Attending Aye Duarte MD   Hosp Day # 23 PCP No primary care provider on file.        Subjective: tip now projecting at the junction of the right subclavian vein and right brachiocephalic vein. 2. Mild CHF. Increased left retrocardiac opacification.     Dictated by (CST): Igor Alejandro MD on 8/23/2020 at 1:58 PM     Finalized by (CST): Janette

## 2020-08-25 NOTE — PHYSICAL THERAPY NOTE
Attempted to see patient for physical therapy session. Patient sitting in bedside chair, comfortable and declined participation at this time. Will attempt to see patient tomorrow for physical therapy session.

## 2020-08-25 NOTE — DIETARY NOTE
ADULT NUTRITION REASSESSMENT     Pt is at moderate nutrition risk. Pt does not meet malnutrition criteria.       RECOMMENDATIONS TO MD:None at this time     NUTRITION DIAGNOSIS/PROBLEM:Unintentional weight loss related to prolonged hospitalization & subacu from zofran to limit N/V)  Inaccurate wt documentation d/t leg brace included ( post s/p TKA revision). However, wt increase anticipated due to fluid retention. Received Albumin and Bumex yesterday. Pt reports urinating frequently yesterday.  RD lifted Carb impairment. ANTHROPOMETRICS:  HT:  5'2.5\"  WT: 92.1 kg (203 lb 1.6 oz) --true wt masked by fluid, edema , post op and leg brace. 203-208# stable the past 6 days. Admit wt: 169 lbs-- ? Accuracy.   Pt is 176# in May and June 2020 prior to acute illness- atorvastatin  40 mg Oral Nightly   • ferrous sulfate  325 mg Oral BID   • folic acid  1 mg Oral Daily   • lidocaine-menthol  1 patch Transdermal Q24H   • Pantoprazole Sodium  40 mg Oral QAM AC   • Vitamin D (Cholecalciferol)  400 Units Oral Daily     LABS:

## 2020-08-25 NOTE — PLAN OF CARE
Problem: Diabetes/Glucose Control  Goal: Glucose maintained within prescribed range  Description  INTERVENTIONS:  - Monitor Blood Glucose as ordered  - Assess for signs and symptoms of hyperglycemia and hypoglycemia  - Administer ordered medications to m risk of falls.   - [place] fall precautions as indicated by assessment.  - Educate pt/family on patient safety including physical limitations  - Instruct pt to call for assistance with activity based on assessment  - Modify environment to reduce risk of i WBC,culture results,temps  - Administer antibiotics as ordered  - Implement neutropenic guidelines when appropriate  -ID consult   Outcome: Progressing     Problem: DISCHARGE PLANNING  Goal: Discharge to home or other facility with appropriate resources  D emergency measures for life threatening arrhythmias  - Monitor electrolytes and administer replacement therapy as ordered  Outcome: Progressing     Problem: METABOLIC/FLUID AND ELECTROLYTES - ADULT  Goal: Electrolytes maintained within normal limits  Descr appropriate pain scale  - Administer analgesics based on type and severity of pain and evaluate response  - Implement non-pharmacological measures as appropriate and evaluate response  - Consider cultural and social influences on pain and pain management

## 2020-08-25 NOTE — PLAN OF CARE
Patient alert and oriented, vss, given dilaudid iv x1 for pain post dressing change with relief, immobilizer intact and inplace, dehisced area of incision below knee cleaned, moderate serosanguinous drainage noted.     Q48 hour daptomycin given via picc, pe from fall injury  Description  INTERVENTIONS:  - Assess pt frequently for physical needs  - Identify cognitive and physical deficits and behaviors that affect risk of falls.   - [place] fall precautions as indicated by assessment.  - Educate pt/family on management  - Manage/alleviate anxiety  - Utilize distraction and/or relaxation techniques  - Monitor for opioid side effects  - Notify MD/LIP if interventions unsuccessful or patient reports new pain  - Anticipate increased pain with activity and pre-medi

## 2020-08-26 LAB
ALBUMIN SERPL-MCNC: 1.9 G/DL (ref 3.4–5)
ANION GAP SERPL CALC-SCNC: 8 MMOL/L (ref 0–18)
ANION GAP SERPL CALC-SCNC: 8 MMOL/L (ref 0–18)
BASOPHILS # BLD AUTO: 0 X10(3) UL (ref 0–0.2)
BASOPHILS NFR BLD AUTO: 0 %
BUN BLD-MCNC: 36 MG/DL (ref 7–18)
BUN BLD-MCNC: 36 MG/DL (ref 7–18)
BUN/CREAT SERPL: 8.5 (ref 10–20)
BUN/CREAT SERPL: 8.5 (ref 10–20)
CALCIUM BLD-MCNC: 8.9 MG/DL (ref 8.5–10.1)
CALCIUM BLD-MCNC: 8.9 MG/DL (ref 8.5–10.1)
CHLORIDE SERPL-SCNC: 104 MMOL/L (ref 98–112)
CHLORIDE SERPL-SCNC: 104 MMOL/L (ref 98–112)
CO2 SERPL-SCNC: 25 MMOL/L (ref 21–32)
CO2 SERPL-SCNC: 25 MMOL/L (ref 21–32)
CREAT BLD-MCNC: 4.22 MG/DL (ref 0.55–1.02)
CREAT BLD-MCNC: 4.22 MG/DL (ref 0.55–1.02)
DEPRECATED RDW RBC AUTO: 50.7 FL (ref 35.1–46.3)
EOSINOPHIL # BLD AUTO: 0.35 X10(3) UL (ref 0–0.7)
EOSINOPHIL NFR BLD AUTO: 4.7 %
ERYTHROCYTE [DISTWIDTH] IN BLOOD BY AUTOMATED COUNT: 15.9 % (ref 11–15)
GLUCOSE BLD-MCNC: 176 MG/DL (ref 70–99)
GLUCOSE BLD-MCNC: 176 MG/DL (ref 70–99)
GLUCOSE BLDC GLUCOMTR-MCNC: 160 MG/DL (ref 70–99)
HAV IGM SER QL: 1.8 MG/DL (ref 1.6–2.6)
HCT VFR BLD AUTO: 29.5 % (ref 35–48)
HGB BLD-MCNC: 9.3 G/DL (ref 12–16)
IMM GRANULOCYTES # BLD AUTO: 0.04 X10(3) UL (ref 0–1)
IMM GRANULOCYTES NFR BLD: 0.5 %
LYMPHOCYTES # BLD AUTO: 0.63 X10(3) UL (ref 1–4)
LYMPHOCYTES NFR BLD AUTO: 8.5 %
MCH RBC QN AUTO: 28 PG (ref 26–34)
MCHC RBC AUTO-ENTMCNC: 31.5 G/DL (ref 31–37)
MCV RBC AUTO: 88.9 FL (ref 80–100)
MONOCYTES # BLD AUTO: 0.78 X10(3) UL (ref 0.1–1)
MONOCYTES NFR BLD AUTO: 10.5 %
NEUTROPHILS # BLD AUTO: 5.62 X10 (3) UL (ref 1.5–7.7)
NEUTROPHILS # BLD AUTO: 5.62 X10(3) UL (ref 1.5–7.7)
NEUTROPHILS NFR BLD AUTO: 75.8 %
OSMOLALITY SERPL CALC.SUM OF ELEC: 297 MOSM/KG (ref 275–295)
OSMOLALITY SERPL CALC.SUM OF ELEC: 297 MOSM/KG (ref 275–295)
PHOSPHATE SERPL-MCNC: 3.4 MG/DL (ref 2.5–4.9)
PLATELET # BLD AUTO: 187 10(3)UL (ref 150–450)
POTASSIUM SERPL-SCNC: 4.3 MMOL/L (ref 3.5–5.1)
POTASSIUM SERPL-SCNC: 4.3 MMOL/L (ref 3.5–5.1)
RBC # BLD AUTO: 3.32 X10(6)UL (ref 3.8–5.3)
SODIUM SERPL-SCNC: 137 MMOL/L (ref 136–145)
SODIUM SERPL-SCNC: 137 MMOL/L (ref 136–145)
VANCOMYCIN SERPL-MCNC: 5.8 UG/ML
WBC # BLD AUTO: 7.4 X10(3) UL (ref 4–11)

## 2020-08-26 PROCEDURE — 99232 SBSQ HOSP IP/OBS MODERATE 35: CPT | Performed by: INTERNAL MEDICINE

## 2020-08-26 NOTE — PROGRESS NOTES
[facility] - [facility]  Nephrology Daily Progress Note    Fatuma Santiago  [id number]  75year old      HPI:   Fatuma Santiago is a 75year old female. Patient up in chair. No chest pain or shortness of breath. Somewhat anorectic.   Feels somewhat we 29.5 08/26/2020    .0 08/26/2020    CREATSERUM 4.22 08/26/2020    CREATSERUM 4.22 08/26/2020    BUN 36 08/26/2020    BUN 36 08/26/2020     08/26/2020     08/26/2020    K 4.3 08/26/2020    K 4.3 08/26/2020     08/26/2020     0 Oral, Daily  •  HYDROmorphone HCl (DILAUDID) tab 1 mg, 1 mg, Oral, Q4H PRN  •  ondansetron (ZOFRAN-ODT) disintegrating tab 4 mg, 4 mg, Oral, Q8H PRN  •  epoetin lashae-epbx (RETACRIT) 67217 UNIT/ML injection 10,000 Units, 10,000 Units, Subcutaneous, Once per patch, 1 patch, Transdermal, Q24H  •  Loperamide HCl (IMODIUM) cap 2 mg, 2 mg, Oral, Q6H PRN  •  Pantoprazole Sodium (PROTONIX) EC tab 40 mg, 40 mg, Oral, QAM AC  •  traZODone HCl (DESYREL) tab 50 mg, 50 mg, Oral, Nightly PRN  •  Vitamin D (Cholecalciferol weakness     Neuropathy     Osteopenia     Pain in joint     Rotator cuff syndrome     Temporary cerebral vascular dysfunction     PAF (paroxysmal atrial fibrillation) (HCC)     Polyp of colon     Nonrheumatic mitral valve regurgitation     Pulmonary hyper

## 2020-08-26 NOTE — PHYSICAL THERAPY NOTE
PHYSICAL THERAPY TREATMENT NOTE - INPATIENT     Room Number: 876/585-E       Presenting Problem: R TKA revison     Problem List  Active Problems:    Hypertension    Cardiomyopathy ([address]    Anemia    Hyperlipidemia    PAF (paroxysmal atrial fibrillation) (H Treatment Plan: Bed mobility; Body mechanics; Patient education;Gait training;Transfer training;Balance training    SUBJECTIVE  \"That's the best I ever stood\"    OBJECTIVE  Precautions: Knee immobilizer    WEIGHT BEARING RESTRICTION  Weight Bearing Restric supine - sit EOB @ level: minimal assistance x 1      Goal #1   Current Status Min A x 1    Goal #2 Patient is able to demonstrate transfers Sit to/from Stand at assistance level: supervision while maintaining RLE TDWB status      Goal #2  Current Status M

## 2020-08-26 NOTE — PLAN OF CARE
Problem: Diabetes/Glucose Control  Goal: Glucose maintained within prescribed range  Description  INTERVENTIONS:  - Monitor Blood Glucose as ordered  - Assess for signs and symptoms of hyperglycemia and hypoglycemia  - Administer ordered medications to m risk of falls.   - [place] fall precautions as indicated by assessment.  - Educate pt/family on patient safety including physical limitations  - Instruct pt to call for assistance with activity based on assessment  - Modify environment to reduce risk of i limitations with patient/family  Outcome: Progressing  Goal: Maintain proper alignment of affected body part  Description  INTERVENTIONS:  - Support and protect limb and body alignment per provider's orders  - Instruct and reinforce with patient and family limits  Description  INTERVENTIONS:  - Monitor labs and rhythm and assess patient for signs and symptoms of electrolyte imbalances  - Administer electrolyte replacement as ordered  - Monitor response to electrolyte replacements, including rhythm and repeat for opioid side effects  - Notify MD/LIP if interventions unsuccessful or patient reports new pain  - Anticipate increased pain with activity and pre-medicate as appropriate  Outcome: Progressing     Problem: RISK FOR INFECTION - ADULT  Goal: Absence of fe reach, bed in lowest position and ibed and bed alarm active.

## 2020-08-26 NOTE — PLAN OF CARE
Problem: Diabetes/Glucose Control  Goal: Glucose maintained within prescribed range  Description  INTERVENTIONS:  - Monitor Blood Glucose as ordered  - Assess for signs and symptoms of hyperglycemia and hypoglycemia  - Administer ordered medications to m risk of falls.   - [place] fall precautions as indicated by assessment.  - Educate pt/family on patient safety including physical limitations  - Instruct pt to call for assistance with activity based on assessment  - Modify environment to reduce risk of i limitations with patient/family  Outcome: Progressing  Goal: Maintain proper alignment of affected body part  Description  INTERVENTIONS:  - Support and protect limb and body alignment per provider's orders  - Instruct and reinforce with patient and family partner  - Complete POLST form as appropriate  - Assess patient's ability to be responsible for managing their own health  - Refer to Case Management Department for coordinating discharge planning if the patient needs post-hospital services based on physic limits  Description  INTERVENTIONS:  - Monitor labs and rhythm and assess patient for signs and symptoms of electrolyte imbalances  - Administer electrolyte replacement as ordered  - Monitor response to electrolyte replacements, including rhythm and repeat

## 2020-08-26 NOTE — PROGRESS NOTES
[facility]    Progress Note    Dheeraj Fearing Patient Status:  Inpatient    1945 MRN [id number]   Location [facility] 5SW/SE Attending Ruth Ann Gonzales MD   Hosp Day # 25 PCP No primary care provider on file.        Subjective: 0.98 (H) 08/18/2020    MG 1.8 08/26/2020    PHOS 3.4 08/26/2020    CK 60 08/20/2020    B12 1,087 (H) 06/18/2020         No results found.         Assessment and Plan:     Pulmonary hypertension ([address]  severe      Coronary artery disease due to calcified cor

## 2020-08-27 LAB
ALBUMIN SERPL-MCNC: 1.9 G/DL (ref 3.4–5)
ANION GAP SERPL CALC-SCNC: 5 MMOL/L (ref 0–18)
BASOPHILS # BLD AUTO: 0.02 X10(3) UL (ref 0–0.2)
BASOPHILS NFR BLD AUTO: 0.3 %
BUN BLD-MCNC: 23 MG/DL (ref 7–18)
BUN/CREAT SERPL: 8.9 (ref 10–20)
CALCIUM BLD-MCNC: 8.6 MG/DL (ref 8.5–10.1)
CHLORIDE SERPL-SCNC: 105 MMOL/L (ref 98–112)
CK SERPL-CCNC: 38 U/L (ref 26–192)
CO2 SERPL-SCNC: 28 MMOL/L (ref 21–32)
CREAT BLD-MCNC: 2.57 MG/DL (ref 0.55–1.02)
CRP SERPL-MCNC: 0.49 MG/DL (ref ?–0.3)
DEPRECATED RDW RBC AUTO: 51.8 FL (ref 35.1–46.3)
EOSINOPHIL # BLD AUTO: 0.3 X10(3) UL (ref 0–0.7)
EOSINOPHIL NFR BLD AUTO: 4.4 %
ERYTHROCYTE [DISTWIDTH] IN BLOOD BY AUTOMATED COUNT: 15.9 % (ref 11–15)
ERYTHROCYTE [SEDIMENTATION RATE] IN BLOOD: 13 MM/HR (ref 0–30)
GLUCOSE BLD-MCNC: 125 MG/DL (ref 70–99)
HAV IGM SER QL: 1.9 MG/DL (ref 1.6–2.6)
HCT VFR BLD AUTO: 28.7 % (ref 35–48)
HGB BLD-MCNC: 9.1 G/DL (ref 12–16)
IMM GRANULOCYTES # BLD AUTO: 0.03 X10(3) UL (ref 0–1)
IMM GRANULOCYTES NFR BLD: 0.4 %
LYMPHOCYTES # BLD AUTO: 0.74 X10(3) UL (ref 1–4)
LYMPHOCYTES NFR BLD AUTO: 10.9 %
MCH RBC QN AUTO: 28.3 PG (ref 26–34)
MCHC RBC AUTO-ENTMCNC: 31.7 G/DL (ref 31–37)
MCV RBC AUTO: 89.4 FL (ref 80–100)
MONOCYTES # BLD AUTO: 0.74 X10(3) UL (ref 0.1–1)
MONOCYTES NFR BLD AUTO: 10.9 %
NEUTROPHILS # BLD AUTO: 4.93 X10 (3) UL (ref 1.5–7.7)
NEUTROPHILS # BLD AUTO: 4.93 X10(3) UL (ref 1.5–7.7)
NEUTROPHILS NFR BLD AUTO: 73.1 %
OSMOLALITY SERPL CALC.SUM OF ELEC: 291 MOSM/KG (ref 275–295)
PHOSPHATE SERPL-MCNC: 2.2 MG/DL (ref 2.5–4.9)
PLATELET # BLD AUTO: 177 10(3)UL (ref 150–450)
POTASSIUM SERPL-SCNC: 3.7 MMOL/L (ref 3.5–5.1)
RBC # BLD AUTO: 3.21 X10(6)UL (ref 3.8–5.3)
SODIUM SERPL-SCNC: 138 MMOL/L (ref 136–145)
WBC # BLD AUTO: 6.8 X10(3) UL (ref 4–11)

## 2020-08-27 PROCEDURE — 99233 SBSQ HOSP IP/OBS HIGH 50: CPT | Performed by: INTERNAL MEDICINE

## 2020-08-27 RX ORDER — SENNA PLUS 8.6 MG/1
8.6 TABLET ORAL 2 TIMES DAILY
Refills: 0 | Status: SHIPPED | COMMUNITY
Start: 2020-08-27 | End: 2020-01-01

## 2020-08-27 RX ORDER — ONDANSETRON 4 MG/1
4 TABLET, ORALLY DISINTEGRATING ORAL EVERY 8 HOURS PRN
Refills: 0 | Status: ON HOLD | COMMUNITY
Start: 2020-08-27 | End: 2021-01-01

## 2020-08-27 RX ORDER — POLYETHYLENE GLYCOL 3350 17 G/17G
17 POWDER, FOR SOLUTION ORAL DAILY PRN
Refills: 0 | Status: ON HOLD | COMMUNITY
Start: 2020-08-27 | End: 2020-01-01

## 2020-08-27 NOTE — PROGRESS NOTES
Per Dr Virginia Jameson stop HD at this time. Artemio Tejada HD RN was called [id number] and discussed Dr Louise Barnes orders. Stated he will stop HD now.

## 2020-08-27 NOTE — PROGRESS NOTES
[facility] - [facility]    Ortho Medical Progress Note     Moraima Mills Patient Status:  Inpatient    1945 MRN [id number]   Location [facility] 5SW/SE Attending Afua Germain MD   [facility] Day # 26 PCP No primary care provider on file. with right knee infection - on daptomycin        RUEL (acute kidney injury) ([address]  Resolving after vanco levels lowering    Renal failure - hemodialysis lowed creatinine from over 4 to 2.57            Results:     Lab Results   Component Value Date    WBC 6

## 2020-08-27 NOTE — PLAN OF CARE
Problem: Diabetes/Glucose Control  Goal: Glucose maintained within prescribed range  Description  INTERVENTIONS:  - Monitor Blood Glucose as ordered  - Assess for signs and symptoms of hyperglycemia and hypoglycemia  - Administer ordered medications to m risk of falls.   - [place] fall precautions as indicated by assessment.  - Educate pt/family on patient safety including physical limitations  - Instruct pt to call for assistance with activity based on assessment  - Modify environment to reduce risk of i limitations with patient/family  Outcome: Progressing  Goal: Maintain proper alignment of affected body part  Description  INTERVENTIONS:  - Support and protect limb and body alignment per provider's orders  - Instruct and reinforce with patient and family partner  - Complete POLST form as appropriate  - Assess patient's ability to be responsible for managing their own health  - Refer to Case Management Department for coordinating discharge planning if the patient needs post-hospital services based on physic limits  Description  INTERVENTIONS:  - Monitor labs and rhythm and assess patient for signs and symptoms of electrolyte imbalances  - Administer electrolyte replacement as ordered  - Monitor response to electrolyte replacements, including rhythm and repeat

## 2020-08-27 NOTE — PLAN OF CARE
Problem: Diabetes/Glucose Control  Goal: Glucose maintained within prescribed range  Description  INTERVENTIONS:  - Monitor Blood Glucose as ordered  - Assess for signs and symptoms of hyperglycemia and hypoglycemia  - Administer ordered medications to m risk of falls.   - [place] fall precautions as indicated by assessment.  - Educate pt/family on patient safety including physical limitations  - Instruct pt to call for assistance with activity based on assessment  - Modify environment to reduce risk of i limitations with patient/family  Outcome: Progressing  Goal: Maintain proper alignment of affected body part  Description  INTERVENTIONS:  - Support and protect limb and body alignment per provider's orders  - Instruct and reinforce with patient and family partner  - Complete POLST form as appropriate  - Assess patient's ability to be responsible for managing their own health  - Refer to Case Management Department for coordinating discharge planning if the patient needs post-hospital services based on physic limits  Description  INTERVENTIONS:  - Monitor labs and rhythm and assess patient for signs and symptoms of electrolyte imbalances  - Administer electrolyte replacement as ordered  - Monitor response to electrolyte replacements, including rhythm and repeat this is still new to her, this RN reinforced dialysis nurse's education.

## 2020-08-27 NOTE — WOUND PROGRESS NOTE
Wound Care Services  Received nursing consult to see the pt's right knee incision. Pt. s/p surgery with Dr. Yasmani Conde, he is following the pt. and I advised her to notify the surgeon on right knee concerns. Wound Services is available if needed by Dr. Yasmani Conde.

## 2020-08-28 VITALS
TEMPERATURE: 98 F | WEIGHT: 202.88 LBS | HEART RATE: 70 BPM | DIASTOLIC BLOOD PRESSURE: 69 MMHG | OXYGEN SATURATION: 99 % | RESPIRATION RATE: 18 BRPM | SYSTOLIC BLOOD PRESSURE: 134 MMHG | BODY MASS INDEX: 37 KG/M2

## 2020-08-28 LAB
ALBUMIN SERPL-MCNC: 1.8 G/DL (ref 3.4–5)
ANION GAP SERPL CALC-SCNC: 4 MMOL/L (ref 0–18)
BASOPHILS # BLD AUTO: 0.01 X10(3) UL (ref 0–0.2)
BASOPHILS NFR BLD AUTO: 0.1 %
BUN BLD-MCNC: 25 MG/DL (ref 7–18)
BUN/CREAT SERPL: 7.4 (ref 10–20)
CALCIUM BLD-MCNC: 8.9 MG/DL (ref 8.5–10.1)
CHLORIDE SERPL-SCNC: 105 MMOL/L (ref 98–112)
CO2 SERPL-SCNC: 29 MMOL/L (ref 21–32)
CREAT BLD-MCNC: 3.37 MG/DL (ref 0.55–1.02)
DEPRECATED RDW RBC AUTO: 51.8 FL (ref 35.1–46.3)
EOSINOPHIL # BLD AUTO: 0.31 X10(3) UL (ref 0–0.7)
EOSINOPHIL NFR BLD AUTO: 4.5 %
ERYTHROCYTE [DISTWIDTH] IN BLOOD BY AUTOMATED COUNT: 16.1 % (ref 11–15)
GLUCOSE BLD-MCNC: 119 MG/DL (ref 70–99)
HCT VFR BLD AUTO: 27.4 % (ref 35–48)
HGB BLD-MCNC: 8.8 G/DL (ref 12–16)
IMM GRANULOCYTES # BLD AUTO: 0.02 X10(3) UL (ref 0–1)
IMM GRANULOCYTES NFR BLD: 0.3 %
LYMPHOCYTES # BLD AUTO: 0.82 X10(3) UL (ref 1–4)
LYMPHOCYTES NFR BLD AUTO: 12 %
MCH RBC QN AUTO: 28.7 PG (ref 26–34)
MCHC RBC AUTO-ENTMCNC: 32.1 G/DL (ref 31–37)
MCV RBC AUTO: 89.3 FL (ref 80–100)
MONOCYTES # BLD AUTO: 1 X10(3) UL (ref 0.1–1)
MONOCYTES NFR BLD AUTO: 14.6 %
NEUTROPHILS # BLD AUTO: 4.7 X10 (3) UL (ref 1.5–7.7)
NEUTROPHILS # BLD AUTO: 4.7 X10(3) UL (ref 1.5–7.7)
NEUTROPHILS NFR BLD AUTO: 68.5 %
OSMOLALITY SERPL CALC.SUM OF ELEC: 292 MOSM/KG (ref 275–295)
PHOSPHATE SERPL-MCNC: 3.2 MG/DL (ref 2.5–4.9)
PLATELET # BLD AUTO: 173 10(3)UL (ref 150–450)
POTASSIUM SERPL-SCNC: 4.1 MMOL/L (ref 3.5–5.1)
RBC # BLD AUTO: 3.07 X10(6)UL (ref 3.8–5.3)
SODIUM SERPL-SCNC: 138 MMOL/L (ref 136–145)
WBC # BLD AUTO: 6.9 X10(3) UL (ref 4–11)

## 2020-08-28 PROCEDURE — 99232 SBSQ HOSP IP/OBS MODERATE 35: CPT | Performed by: INTERNAL MEDICINE

## 2020-08-28 RX ORDER — CLOPIDOGREL BISULFATE 75 MG/1
75 TABLET ORAL DAILY
Qty: 90 TABLET | Refills: 0 | Status: SHIPPED | OUTPATIENT
Start: 2020-08-29 | End: 2021-01-01

## 2020-08-28 RX ORDER — VANCOMYCIN HYDROCHLORIDE 125 MG/1
125 CAPSULE ORAL DAILY
Qty: 60 CAPSULE | Refills: 0 | Status: SHIPPED | OUTPATIENT
Start: 2020-08-29 | End: 2020-01-01 | Stop reason: ALTCHOICE

## 2020-08-28 NOTE — PROGRESS NOTES
[facility]    Progress Note    Deandra Manning Patient Status:  Inpatient    1945 MRN [id number]   Location [facility] 5SW/SE Attending Bridgette Almaraz MD   [facility] Day # 26 PCP No primary care provider on file.        Subjective: rashes present, no abnormal bruising noted  Back/Spine: no abnormalities noted  Musculoskeletal: full ROM all extremities good strength  no deformities  Extremities: no edema, cyanosis  Neurological:  Grossly normal    Results:     Laboratory Data:  Lab Re

## 2020-08-28 NOTE — PLAN OF CARE
Problem: Diabetes/Glucose Control  Goal: Glucose maintained within prescribed range  Description  INTERVENTIONS:  - Monitor Blood Glucose as ordered  - Assess for signs and symptoms of hyperglycemia and hypoglycemia  - Administer ordered medications to m risk of falls.   - [place] fall precautions as indicated by assessment.  - Educate pt/family on patient safety including physical limitations  - Instruct pt to call for assistance with activity based on assessment  - Modify environment to reduce risk of i limitations with patient/family  Outcome: Progressing  Goal: Maintain proper alignment of affected body part  Description  INTERVENTIONS:  - Support and protect limb and body alignment per provider's orders  - Instruct and reinforce with patient and family partner  - Complete POLST form as appropriate  - Assess patient's ability to be responsible for managing their own health  - Refer to Case Management Department for coordinating discharge planning if the patient needs post-hospital services based on physic limits  Description  INTERVENTIONS:  - Monitor labs and rhythm and assess patient for signs and symptoms of electrolyte imbalances  - Administer electrolyte replacement as ordered  - Monitor response to electrolyte replacements, including rhythm and repeat

## 2020-08-28 NOTE — CM/SW NOTE
08/28/20 1400   Discharge disposition   Expected discharge disposition Skilled Nurs   Name of [address]   [facility]   Discharge transportation [facility]  (5:30 per Ambr

## 2020-08-28 NOTE — CM/SW NOTE
NIRALI spoke to Yoel at [address] Renal to finalize HD chair time as pt has d/c order in. Pt has confirmed chair time TTHSa@ 1pm.  Pt will need to arrive for first appointment on Tuesday September 1, 2020 @ 1230. [facility] \[facility] [place] [facility]\"" informed.     NIRALI/CLEMENTE to remain av

## 2020-08-28 NOTE — PROGRESS NOTES
[facility]    Progress Note    Zara Toney Patient Status:  Inpatient    1945 MRN [id number]   Location [facility] 5SW/SE Attending Alexandra Oscar MD   [facility] Day # 27 PCP No primary care provider on file.        Subjective: 105 08/28/2020    CO2 29.0 08/28/2020     (H) 08/28/2020    CA 8.9 08/28/2020    ALB 1.8 (L) 08/28/2020    ALKPHO 136 08/25/2020    BILT 0.4 08/25/2020    TP 5.5 (L) 08/25/2020    AST 5 (L) 08/25/2020    ALT 8 (L) 08/25/2020    PTT 28.1 02/19/2020

## 2020-08-28 NOTE — PROGRESS NOTES
[facility] - [facility] PROGRESS NOTE    Marai Isabel Gardner Patient Status:  Inpatient    1945 MRN [id number]   Location [facility] 2W/SW Attending Eh Vásquez MD   [facility] Day # 27 PCP No primary care provider on file.      Pedro Naranjo (paroxysmal atrial fibrillation) (HCC)     Polyp of colon     Nonrheumatic mitral valve regurgitation     Pulmonary hypertension (HCC)     Hiatal hernia with GERD and esophagitis     Failed total knee arthroplasty, sequela     Type 2 diabetes mellitus with on OVP until 9/21.  -  HD to be started per nephrology. -  Follow fever curve, wbc. -  Reviewed labs, micro, imaging reports.  -  Case d/w patient, RN.     Ricardo Vásquez PA-C   [facility]  [phone]  8/5/2020

## 2020-08-29 NOTE — PLAN OF CARE
Problem: Diabetes/Glucose Control  Goal: Glucose maintained within prescribed range  Description  INTERVENTIONS:  - Monitor Blood Glucose as ordered  - Assess for signs and symptoms of hyperglycemia and hypoglycemia  - Administer ordered medications to m physical deficits and behaviors that affect risk of falls.   - [place] fall precautions as indicated by assessment.  - Educate pt/family on patient safety including physical limitations  - Instruct pt to call for assistance with activity based on assessme Advance activity as appropriate  - Communicate ordered activity level and limitations with patient/family  Outcome: Adequate for Discharge  Goal: Maintain proper alignment of affected body part  Description  INTERVENTIONS:  - Support and protect limb and b for interpreters to assist at discharge as needed  - Consider post-discharge preferences of patient/family/discharge partner  - Complete POLST form as appropriate  - Assess patient's ability to be responsible for managing their own health  - Refer to Case promote bladder emptying  Outcome: Adequate for Discharge     Problem: METABOLIC/FLUID AND ELECTROLYTES - ADULT  Goal: Electrolytes maintained within normal limits  Description  INTERVENTIONS:  - Monitor labs and rhythm and assess patient for signs and sym

## 2020-08-29 NOTE — PROGRESS NOTES
[place] [facility] - [facility]    Progress Note    Liv Valerio Patient Status:  Inpatient    1945 MRN [id number]   Location [facility] 5SW/SE Attending No att. providers found   [address] Day # 27 PCP No primary care provider on file.        Subjec rashes present, no abnormal bruising noted  Back/Spine: no abnormalities noted  Musculoskeletal: full ROM all extremities good strength  no deformities  Extremities 2+ edema  Neurological:  Grossly normal    Results:     Laboratory Data:  Lab Results   Com

## 2020-08-31 NOTE — DISCHARGE SUMMARY
[facility] - [facility]    Discharge Summary    Morris Severino Patient Status:  Inpatient    1945 MRN [id number]   Location [facility] 5SW/SE Attending No att. providers found   [address] Day # 27 PCP No primary care provider on file.      Date General: No acute distress. Alert and oriented x 3. HEENT: Moist mucous membranes. EOM-I. PERRL  Neck: No lymphadenopathy. No JVD. No carotid bruits. Respiratory: Clear to auscultation bilaterally. No wheezes. No rhonchi.   Cardiovascular: S1, S2.  Re  [facility] MAIN OR Comp            Discharge Plan:   Discharge Condition: Stable    Discharge Medication List as of 8/28/2020  5:42 PM    New Orders    Clopidogrel Bisulfate 75 MG Oral Tab  Take 1 tablet (75 mg total) by mouth daily. , Normal, Disp-90 tablet, R R-11    Ferrous Sulfate 325 (65 Fe) MG Oral Tab  Take 1 tablet (325 mg total) by mouth 3 (three) times daily. , Normal, Disp-90 tablet, R-1    folic acid 1 MG Oral Tab  Take 1 tablet (1 mg total) by mouth daily. , Normal, Disp-30 tablet, R-1    Vitamin D, Ch [facility]’s team?  • You should follow up with Dr. Jessica Acosta his Physician Assistants approximately 2-3 weeks and 6 weeks after your surgery.   These appointments should have been made at the time you scheduled your surgery, and the dates/times should be in your physical therapy:  a. Knee replacement- 5x/ week the 1st week, 3x/ week thereafter. b. Hip replacement- 3x/ week  • If you have home health care, this will be for a total of two weeks  a. Knee replacement- 5x the 1st week, 3x the 2nd week.   b. Hip replace wet while staples are in place. 10. When can I go in a hot tub/bath/pool? • 6 weeks from your surgery if you incision has COMPLETELY healed (no scabs or open areas)      11.  How do I get a refill on my pain medications if necessary before my next appoint this is to lay completely flat and put the leg on an arm rest of your couch with a few pillows under the ankle. Please make sure the leg is straight when elevating.   If you are short of breath or have calf pain please call us or go to the ER before elevat

## 2020-09-01 NOTE — PROGRESS NOTES
Location [facility]   Type in person   Date of service 9/1/2020     43-year-old female with a past medical history of coronary artery disease status post recent PCI, dilated cardiomyopathy with ejection fraction of 39%, anemia status post end active BS+, soft, non-distended; no organomegaly.   EXTREMITIES – no edema BL   NEUROLOGIC: Alert orient to 3, no motor deficits, no sensory deficits  PSYCHIATRIC: Affect appropriate, calm and cooperative     Assessment and plan    Right knee prosthetic alec

## 2020-09-05 NOTE — PROGRESS NOTES
Location [facility]  Type in person  Date of service 9/4/2020    pt seen ad examined in rehab today AM   doing well  no fever or chills reports pain in LLE with activity as she is using that leg     PHYSICAL EXAM:  Vitals reviewed and stable continue with aspirin Plavix    Labs from [facility]-hemoglobin 8.8 creatinine 3.37    Plan  I have discussed with Dr Chula Drake , plan for catheter removal with IR in [facility]  Continue daptomycin till September 42,020 and vancomycin till September 21, 2020

## 2020-09-09 NOTE — TELEPHONE ENCOUNTER
Hello,  This is the pt I told you about , needs catheter removal.    I have placed the consult.  Thanks    Joyce House

## 2020-09-09 NOTE — PROGRESS NOTES
Loc [facility]  Type inperson  DOS 9/8/2020    doing ok, looking forward to removal of catheter   still on NWB  pain well controlled     PHYSICAL EXAM:  Vitals reviewed and stable  GENERAL HEALTH:  no distress, resting comfortably  EYES:  EOMI,  conjunc 3.2    Plan  I have discussed with IR  , plan for catheter removal with IR in [facility]  Continue daptomycin till September 42,020 and vancomycin till September 21, 2020  Follow orthopedic recommendation regarding weightbearing  Continue to fax BMP report to nep

## 2020-09-10 NOTE — PROGRESS NOTES
Location [facility]  Type in person  Date of service 9/10/2020    pat is doing ok, she has swelling in LE marco a left LE   no warmth or erythema  no cp or sob   no fever or chills    PHYSICAL EXAM:  Vitals reviewed and stable  GENERAL HEALTH:  n with a cardiologist outpatient    DVT prophylaxis recommendation from hospitalist continue with aspirin Plavix    Labs from [facility]-hemoglobin 8.8 creatinine 3.37 to 3.2    Plan  start bumex 1 gm q day check BMP tuesday   plan for catheter remova

## 2020-09-15 NOTE — PROGRESS NOTES
Location [facility]  Type in person  Date of service 9/15/2020    doing ok, edema down significantly   still has AM nausea , gets better later   denies any fever or chills  no cp or sob   BM present, eats good     PHYSICAL EXAM:  Vitals revie 3.2    Plan  BMP CBC reviewed and discussed with pt   Dapto completed yesterday and has tele visit with ID today , will follow recs  plan for catheter removal with IR in [facility] on Sep 23rd vancomycin till September 21, 2020  Follow orthopedic recommendation r

## 2020-09-17 LAB
ANION GAP SERPL CALC-SCNC: 7 MMOL/L
BUN SERPL-MCNC: 26 MG/DL
BUN/CREAT SERPL: 9.5
CALCIUM SERPL-MCNC: 9.8 MG/DL
CHLORIDE SERPL-SCNC: 107 MMOL/L
CO2 SERPL-SCNC: 26 MMOL/L
CREAT SERPL-MCNC: 2.73 MG/DL
ERYTHROCYTE [DISTWIDTH] IN BLOOD BY AUTOMATED COUNT: 51 %
ERYTHROCYTE [DISTWIDTH] IN BLOOD: 15.9 %
GLUCOSE SERPL-MCNC: 152 MG/DL
HCT VFR BLD CALC: 37.1 %
HGB BLD-MCNC: 11.6 G/DL
MCH RBC QN AUTO: 27.6 PG
MCHC RBC AUTO-ENTMCNC: 31.3 G/DL
MCV RBC AUTO: 88.1 FL
PLATELET # BLD: 123 K/MCL
POTASSIUM SERPL-SCNC: 4.4 MMOL/L
RBC # BLD: 4.21 10*6/UL
SODIUM SERPL-SCNC: 140 MMOL/L
WBC # BLD: 6 K/MCL

## 2020-09-17 NOTE — PROGRESS NOTES
Location [facility]  Type in person  Date of service 9/17/2020      no complaints   happy that swelling is coming down   saw ortho - plan for surgery Oct 27th   no fever or chills  no abd pain or emesis today     PHYSICAL EXAM:  Vitals review catheter removal with IR in [facility] on Sep 23rd vancomycin till September 21, 2020  Follow orthopedic recommendation regarding weightbearing and plan for surgery Oct 27th  repeat cbc bmp next monday   Odell  Ha Berrios [phone]  i have update

## 2020-09-18 NOTE — ED PROVIDER NOTES
Patient Seen in: [facility] AND [facility] Emergency Department      History   Patient presents with:  Nausea/Vomiting/Diarrhea    Stated Complaint: Vomiting, diarrhea    HPI    21-year-old female presents for complaint of nausea vomiting and diarrhea.   Patient reactive to light. Neck:      Musculoskeletal: Normal range of motion and neck supple. Cardiovascular:      Rate and Rhythm: Normal rate and regular rhythm. Pulses: Normal pulses. Heart sounds: No murmur.    Pulmonary:      Effort: Pulmonary e Narrative: The following orders were created for panel order CBC WITH DIFFERENTIAL WITH PLATELET.   Procedure                               Abnormality         Status                     ---------                               -----------         ---

## 2020-09-18 NOTE — ED NOTES
Magdi Jin gave report to sabiha caldwell [place], and informed them that patient will be returning.  Transport set up with ETA of 1 hour

## 2020-09-18 NOTE — ED NOTES
Pt arrived via ambulance from NH, c/o N/V/D. Per pt sh usually throws u once every morning but today it wont stop to has had multiple time tonight, pt did vomit up clear fluid upon arrival to ER.

## 2020-09-23 NOTE — IVS NOTE
Pt was able to eat and drink. She did not receive sedation. Site remained clean, dry and intact. Discharge instruction was given to pt.  Report was given to [facility] in [facility]

## 2020-09-23 NOTE — INTERVAL H&P NOTE
Renal function has now improved, and the patient no longer requires hemodialysis.   Plan for tunneled HD catheter removal.    The above referenced H&P was reviewed by Dariana Leone MD on 9/23/2020, the patient was examined and no significant changes ha

## 2020-09-23 NOTE — PROGRESS NOTES
Location [facility]  Type in person date of service 9/21/2020      events noted   went to ER post nausea and emesis - it has been resolved now   I reviewed CT report from [facility] - no acute findings    PHYSICAL EXAM:  Vitals reviewed and stable removal with IR in [facility] on Sep 23rd   Follow orthopedic recommendation regarding weightbearing and plan for surgery Oct 27th  repeat cbc bmp   clint  [place] [phone]-

## 2020-09-23 NOTE — PROCEDURES
[facility]  Procedure Note    Paul Solo Patient Status:  Outpatient in a Bed    1945 MRN [id number]   Location [facility] Attending Anaid Dougherty MD   Hosp Day # 0 PCP No primary care provider o

## 2020-09-23 NOTE — IVS NOTE
Patient in IR for tunneled dialysis catheter removal completed in holding rm 8. Timeout/universal protocol completed. 7ml of 2% lidocaine given by Chencho Daniel MD to  Right chest.  No sedation given, Patient monitored. Patient tolerated procedure well.

## 2020-09-24 NOTE — PROGRESS NOTES
Location [facility]  Type in person  Date of service 9/24/2020    no more recurrence of n/v  labs showed elevation of creatinine  otherwise no complaints    PHYSICAL EXAM:  Vitals reviewed and stable  GENERAL HEALTH:  no distress, resting com later.    Pulmonary hypertension-follows up with a cardiologist outpatient    DVT prophylaxis recommendation from hospitalist continue with aspirin Plavix      Plan  catheter removed   Follow orthopedic recommendation regarding weightbearing and plan for s

## 2020-09-28 LAB
ABSOLUTE IMMATURE GRANULOCYTES (OFFPRE24): 0.01
BASOPHILS # BLD: 0.01 10*3/UL
EOSINOPHIL # BLD: 0.2 10*3/UL
EOSINOPHIL NFR BLD: 4 %
HCT VFR BLD CALC: 31.7 %
HGB BLD-MCNC: 9.8 G/DL
IMMATURE GRANULOCYTES (OFFPRE25): 0.2
LYMPHOCYTES # BLD: 1.15 10*3/UL
LYMPHOCYTES NFR BLD: 25 %
MCH RBC QN AUTO: 26.9 PG
MCHC RBC AUTO-ENTMCNC: 30.9 G/DL
MCV RBC AUTO: 87 FL
MONOCYTES # BLD: 0.61 10*3/UL
MONOCYTES NFR BLD: 13 %
NEUTROPHILS # BLD: 2.62 10*3/UL
NEUTROPHILS NFR BLD: 57 %
PLATELET # BLD: 153 K/MCL
RBC # BLD: 3.64 10*6/UL
WBC # BLD: 4.6 K/MCL

## 2020-09-29 LAB
SARS-COV-2 RNA SPEC QL NAA+PROBE: NOT DETECTED
SPECIMEN SOURCE: NORMAL

## 2020-09-29 NOTE — PROGRESS NOTES
Location [facility]  Type in person  Date of service 9/28/2020    she is doing ok, now has edema more on thigh and on right side   no cp or sob   no abd pain ,v,n  eating ok     PHYSICAL EXAM:  Vitals reviewed and stable  GENERAL HEALTH:  no hospitalist continue with aspirin Plavix      Plan  Bumex held by nephro   will fax report to Dr Sherry Cueva   CXR tomorrow   repeat BMP on thursday   Follow orthopedic recommendation regarding weightbearing and plan for surgery Oct 27th  clint  Gaby Gaytan

## 2020-10-01 NOTE — PROGRESS NOTES
Hematology Progress Note    Patient Name: Scarlet Heading   YOB: 1945   Medical Record Number: [id number]   CSN: [id number]   Consulting Physician: Татьяна Ramirez MD  Referring Physician(s): No ref.  provider found  Date of Visit: 10/1/2020 noted. Patient had previously been on folic acid and oral iron replacement which she is now using with improved hgb values appreciated from 9/2020 blood work.   Most recent CBC shows hemoglobin 11.6 g/dL with normal hematocrit 37.1% in the setting of cierra Right 11/19/2018    Performed by Swapna Watt MD at [address] [place] [address] MAIN OR   • PRESSURE ULCER BOOT Right 03/2019   • TMJ ARTHROSCOPY DEBRIDEMENT Right 07/2019    heel of the fooot   • TOTAL KNEE REPLACEMENT Right 10/29/2018       Family Medical History:  Family His file        Emotionally abused: Not on file        Physically abused: Not on file        Forced sexual activity: Not on file    Other Topics      Concerns:        Not on file    Social History Narrative      Sofía Sutton is  and now .  Mother of 2 a 1000 MG Oral Tab, Take 1,000 mg by mouth daily. , Disp: , Rfl:     •  traZODone HCl 50 MG Oral Tab, Take 1 tablet (50 mg total) by mouth nightly as needed for Sleep., Disp: 30 tablet, Rfl: 0    •  atorvastatin 40 MG Oral Tab, Take 1 tablet (40 mg total) by Negative for easy bruising, bleeding, and lymphadenopathy. Musculoskeletal: Negative for myalgias, arthralgias, muscle weakness. Neurological: Negative for headaches, dizziness, speech problems, gait problems and weakness.     A comprehensive 14 point rev  09/17/2020 11:30 PM    K 4.4 09/17/2020 11:30 PM     09/17/2020 11:30 PM    CO2 26.0 09/17/2020 11:30 PM    ALKPHO 149 (H) 09/17/2020 11:30 PM    AST 11 (L) 09/17/2020 11:30 PM    ALT 10 (L) 09/17/2020 11:30 PM       CT abdomen+pelvis 12/28 40; may equal SLE; moderately positive for Sjogren's, strongly recommended for Rheumatology referral. She has established with Dr. Pradeep Sánchez; only has dry eyes     --current hgb value is stabe at 11.6 g/dL    --The desired Hgb value that would be optimal osteolytic/osteoblastic lesion of skeletal survey, 24 urine protein collection negative for Bence-Muhammad proteins  --Patient CKD is likely contributing to increased kappa and lambda values with normal ratio as this is typically seen with patients with renal

## 2020-10-07 NOTE — TELEPHONE ENCOUNTER
Patient has appt with Dr. Chen Balderrama 10/14/20. Forwarded to Dr. Chen Balderrama as [phone] [address].

## 2020-10-07 NOTE — TELEPHONE ENCOUNTER
I saw this patient once back in February 2020. There was a questionable diagnosis of lupus but she never followed up. I am currently not treating her for anything.   Would recommend to get medical clearance from her PCP and cardiologist

## 2020-10-07 NOTE — TELEPHONE ENCOUNTER
Surgery on 10/27/20, Right Knee Fusion with Dr. Giuliana Omalley @ [facility]    H&P- complete  Labs- HA1C (6.0), HGB (10.7), HCT (34.0), RDW-SD (53.1), RDW (17.4), PLT (103.0), Glucose (148), BUN (45), creatinine (3.05), calc osmo (300), GFR (16), ALT (10), AST (6), total

## 2020-10-07 NOTE — TELEPHONE ENCOUNTER
Dr. Mcnally Pore office requesting letter of rheumatology clearance for right knee fusion surgery 10/27/20. Requesting letter and a recent EKG faxed to [phone].   LOV 2/19/20

## 2020-10-08 NOTE — TELEPHONE ENCOUNTER
Dr. Gerri Diaz-    Please refer to TE from 5/14/2020 as you already wrote a GI clearance letter for pt to undergo RK Fusion. The patient is now scheduled for this surgical procedure on 10/27/2020 (not 6/1/2020) with Dr. Ashley Marroquin (as per letter below).     Please adv

## 2020-10-08 NOTE — TELEPHONE ENCOUNTER
Dr Crow Reilly progress note with notation citing clearance from a hematologic standpoint for knee surgery indicated faxed per Dr Albin Rasmussen request to his office:  Fax 21 [phone]  And to [facility]:  Fax: [phone] confirmed.

## 2020-10-09 NOTE — PROGRESS NOTES
Location [address] [place] [address] 10/6/2020    LLE edema worse after stopping bumex  Creatinine better   no cp or abd pain   she reports mild sob     PHYSICAL EXAM:  Vitals reviewed and stable  GENERAL HEALTH:  no distress, will also ask cardiology to clear her prior to surgery.   Follow orthopedic recommendation regarding weightbearing and plan for surgery Oct 27kumar jaramillo  Lino Odom [phone]-

## 2020-10-09 NOTE — TELEPHONE ENCOUNTER
GI Clearance letter and recent ECG faxed to Dr. Fay Gee at [phone] and , confirmation received 10/9/2020 @ [phone].

## 2020-10-09 NOTE — PROGRESS NOTES
Location [facility]  Type video  Date of service 10/8/2020      swelling better   no cp or sob   no abd pain     PHYSICAL EXAM:  Vitals reviewed and stable NP at bedside  GENERAL HEALTH:  no distress, resting comfortably  edema present   rt L

## 2020-10-09 NOTE — PROGRESS NOTES
Location [facility]  Type inpatient  Date of service 10/1/2020    LLE edema worse after stopping bumex  Creatinine better   no cp or abd pain   she reports mild sob     PHYSICAL EXAM:  Vitals reviewed and stable  GENERAL HEALTH:  no distress, 27the  clint  [place] [phone] -

## 2020-10-12 LAB
BUN SERPL-MCNC: 45.3 MG/DL
CALCIUM SERPL-MCNC: 9.3 MG/DL
CHLORIDE SERPL-SCNC: 109 MMOL/L
CO2 SERPL-SCNC: 24 MMOL/L
CREAT SERPL-MCNC: 2.5 MG/DL
GLUCOSE SERPL-MCNC: 70 MG/DL
POTASSIUM SERPL-SCNC: 4.5 MMOL/L
SODIUM SERPL-SCNC: 138 MMOL/L

## 2020-10-14 NOTE — PROGRESS NOTES
Location [facility]  Type in person  Date of service 10/13/2020     swelling is coming down , transfer better   no cp or sob   tolerating bumex as well  labs reviewed , discussed with pt as well     PHYSICAL EXAM:  Vitals reviewed and stable requested gastroenterology clearance. We will also ask cardiology to clear her prior to surgery.   Follow orthopedic recommendation regarding weightbearing and plan for surgery Oct 27kumra jaramillo  Lino Odom [phone]-

## 2020-10-16 ENCOUNTER — TELEPHONE (OUTPATIENT)
Dept: CARDIOLOGY | Age: 75
End: 2020-10-16

## 2020-10-16 RX ORDER — CARVEDILOL 25 MG/1
25 TABLET ORAL 2 TIMES DAILY WITH MEALS
COMMUNITY
End: 2021-05-12 | Stop reason: SDUPTHER

## 2020-10-16 RX ORDER — LOPERAMIDE HYDROCHLORIDE 2 MG/1
2 CAPSULE ORAL PRN
COMMUNITY

## 2020-10-16 RX ORDER — HYDROMORPHONE HYDROCHLORIDE 2 MG/1
2-4 TABLET ORAL EVERY 4 HOURS PRN
COMMUNITY
Start: 2020-08-03

## 2020-10-16 RX ORDER — FOLIC ACID 1 MG/1
1 TABLET ORAL DAILY
COMMUNITY
Start: 2020-05-27

## 2020-10-16 RX ORDER — PANTOPRAZOLE SODIUM 40 MG/1
40 TABLET, DELAYED RELEASE ORAL DAILY
COMMUNITY
Start: 2020-08-03

## 2020-10-16 RX ORDER — FERROUS SULFATE 325(65) MG
325 TABLET ORAL 3 TIMES DAILY
COMMUNITY
Start: 2020-05-27 | End: 2021-01-08 | Stop reason: SDUPTHER

## 2020-10-16 RX ORDER — SIMETHICONE 80 MG
80 TABLET,CHEWABLE ORAL NIGHTLY
COMMUNITY
End: 2021-05-12

## 2020-10-16 RX ORDER — DOCUSATE SODIUM 100 MG/1
100 CAPSULE, LIQUID FILLED ORAL PRN
COMMUNITY
Start: 2020-08-03

## 2020-10-16 RX ORDER — ERGOCALCIFEROL (VITAMIN D2) 10 MCG
400 TABLET ORAL DAILY
COMMUNITY

## 2020-10-16 RX ORDER — LIDOCAINE 4 G/G
1 PATCH TOPICAL DAILY
COMMUNITY

## 2020-10-16 RX ORDER — ATORVASTATIN CALCIUM 40 MG/1
40 TABLET, FILM COATED ORAL NIGHTLY
COMMUNITY
Start: 2020-06-23

## 2020-10-16 RX ORDER — MULTIVIT WITH MINERALS/LUTEIN
1000 TABLET ORAL DAILY
COMMUNITY

## 2020-10-16 RX ORDER — CLOPIDOGREL BISULFATE 75 MG/1
75 TABLET ORAL DAILY
COMMUNITY
Start: 2020-08-29 | End: 2021-05-12

## 2020-10-16 RX ORDER — POLYETHYLENE GLYCOL 3350 17 G/17G
17 POWDER, FOR SOLUTION ORAL DAILY PRN
COMMUNITY
Start: 2020-08-27 | End: 2021-05-12

## 2020-10-16 RX ORDER — ASPIRIN 81 MG/1
81 TABLET ORAL DAILY
COMMUNITY
Start: 2020-06-23

## 2020-10-16 RX ORDER — ONDANSETRON 4 MG/1
4 TABLET, ORALLY DISINTEGRATING ORAL EVERY 8 HOURS PRN
COMMUNITY
Start: 2020-08-27 | End: 2021-05-12

## 2020-10-16 NOTE — PROGRESS NOTES
Location [facility]  Type in person  Date of service 10/15/2020      verall same   swelling slowly down   con bumex bid   I have reviewed cardiology note     PHYSICAL EXAM:  Vitals reviewed and stable  GENERAL HEALTH:  no distress  EYES:   co rehab   pt was [facility] by [facility], we will contact ortho office and see whether pt needs to be seen and evaluated by them prior to surgery   check labs tuesday   clint [address] [facility] [phone]-

## 2020-10-19 ENCOUNTER — TELEPHONE (OUTPATIENT)
Dept: CARDIOLOGY | Age: 75
End: 2020-10-19

## 2020-10-19 ENCOUNTER — OFFICE VISIT (OUTPATIENT)
Dept: CARDIOLOGY | Age: 75
End: 2020-10-19

## 2020-10-19 VITALS
DIASTOLIC BLOOD PRESSURE: 64 MMHG | OXYGEN SATURATION: 99 % | WEIGHT: 216 LBS | HEART RATE: 68 BPM | SYSTOLIC BLOOD PRESSURE: 124 MMHG | BODY MASS INDEX: 39.75 KG/M2 | HEIGHT: 62 IN

## 2020-10-19 DIAGNOSIS — I25.5 ISCHEMIC CARDIOMYOPATHY: Primary | ICD-10-CM

## 2020-10-19 DIAGNOSIS — I48.0 PAF (PAROXYSMAL ATRIAL FIBRILLATION) (CMD): ICD-10-CM

## 2020-10-19 DIAGNOSIS — I25.84 CORONARY ARTERY DISEASE DUE TO CALCIFIED CORONARY LESION: ICD-10-CM

## 2020-10-19 DIAGNOSIS — I10 ESSENTIAL HYPERTENSION: ICD-10-CM

## 2020-10-19 DIAGNOSIS — I25.10 CORONARY ARTERY DISEASE DUE TO CALCIFIED CORONARY LESION: ICD-10-CM

## 2020-10-19 DIAGNOSIS — E78.2 MIXED HYPERLIPIDEMIA: ICD-10-CM

## 2020-10-19 PROCEDURE — 93000 ELECTROCARDIOGRAM COMPLETE: CPT | Performed by: INTERNAL MEDICINE

## 2020-10-19 PROCEDURE — 99204 OFFICE O/P NEW MOD 45 MIN: CPT | Performed by: INTERNAL MEDICINE

## 2020-10-19 RX ORDER — METOLAZONE 2.5 MG/1
2.5 TABLET ORAL DAILY
COMMUNITY

## 2020-10-19 RX ORDER — MULTIVIT WITH MINERALS/LUTEIN
1000 TABLET ORAL DAILY
COMMUNITY
End: 2021-05-12 | Stop reason: SDUPTHER

## 2020-10-19 RX ORDER — TRAZODONE HYDROCHLORIDE 50 MG/1
50 TABLET ORAL NIGHTLY
COMMUNITY
End: 2021-05-12

## 2020-10-19 RX ORDER — PANTOPRAZOLE SODIUM 40 MG/1
40 TABLET, DELAYED RELEASE ORAL DAILY
COMMUNITY
End: 2021-05-12 | Stop reason: SDUPTHER

## 2020-10-19 RX ORDER — BUMETANIDE 2 MG/1
2 TABLET ORAL 2 TIMES DAILY
COMMUNITY

## 2020-10-19 SDOH — HEALTH STABILITY: MENTAL HEALTH: HOW OFTEN DO YOU HAVE A DRINK CONTAINING ALCOHOL?: NEVER

## 2020-10-19 ASSESSMENT — PATIENT HEALTH QUESTIONNAIRE - PHQ9
2. FEELING DOWN, DEPRESSED OR HOPELESS: NOT AT ALL
1. LITTLE INTEREST OR PLEASURE IN DOING THINGS: NOT AT ALL
SUM OF ALL RESPONSES TO PHQ9 QUESTIONS 1 AND 2: 0
SUM OF ALL RESPONSES TO PHQ9 QUESTIONS 1 AND 2: 0
CLINICAL INTERPRETATION OF PHQ9 SCORE: NO FURTHER SCREENING NEEDED
CLINICAL INTERPRETATION OF PHQ2 SCORE: NO FURTHER SCREENING NEEDED

## 2020-10-21 DIAGNOSIS — I25.5 ISCHEMIC CARDIOMYOPATHY: ICD-10-CM

## 2020-10-21 NOTE — PROGRESS NOTES
[facility]  Type in person  Date of service 10/20/2020    pt seen and examined in AM   She reports that she felt nauseos and had few episodes of vomiting over the weekend   had BM even today   today she reports that she is feeling ok Dyslipidemia on statins    Dilated cardiomyopathy with ejection fraction of 39%-compensated cards following in rehab    Proximal A. fib-rate controlled-continue with aspirin and Plavix for anticoagulation. May need to consider Coumadin or noacs later.

## 2020-10-22 ENCOUNTER — TELEPHONE (OUTPATIENT)
Dept: CARDIOLOGY | Age: 75
End: 2020-10-22

## 2020-10-22 NOTE — H&P
[facility] PRE-OP CLINIC [place]    PRE-OP NOTE    HPI:   I have been consulted by Dr. Zack Perez to see Scarlet Heading 67year old female for a preoperative evaluation and medical clearance.  Flora has a failed right knee with recurrent infection and subluxati g Oral Powd Pack Take 17 g by mouth daily as needed. 0   • Acetaminophen 500 MG Oral Cap Take 1-2 capsules (500-1,000 mg total) by mouth every 6 (six) hours as needed for Pain.  60 capsule 2   • HYDROmorphone HCl 2 MG Oral Tab Take 1-2 tablets (2-4 mg tota • Renal disorder     watching renal counts   • Tubular adenoma of colon 2020    repeat CLN in 2023   • Visual impairment     readers     Past Surgical History:   Procedure Laterality Date   • CATARACT  2017   • CATH BARE METAL STENT (BMS)     • CATH PERC 10/11/1980        Years since quittin.0      Smokeless tobacco: Never Used    Substance and Sexual Activity      Alcohol use: No        Frequency: Never      Drug use: No      Sexual activity: Not on file    Lifestyle      Physical activity        Day yes, Hypothyroid no  ALLERGIES:  Denies allergic response, history of asthma, hives,     EXAM:   /78 (BP Location: Left arm)   Pulse 99   Temp 98.1 °F (36.7 °C) (Temporal)   Resp 16   Ht 62\"   Wt 216 lb (98 kg)   SpO2 99%   BMI 39.51 kg/m²  Estimate LIP 12 (L) 09/17/2020    ESRML 10 10/21/2020    CRP <0.29 10/21/2020    MG 1.9 08/27/2020    PHOS 3.2 08/28/2020    CK 38 08/27/2020    B12 1,087 (H) 06/18/2020       No results found.           ASSESSMENT AND PLAN:   Diane Franco is a 67year old female high risk and is at 11% risk for major cardiac event in the perioperative period. Pt has severe risk to her health if surgery is not done. Therefore her surgical risk is acceptable to proceed.       Patient is medically optimized and has an acceptable risk

## 2020-10-22 NOTE — PROGRESS NOTES
Location [facility]  Date of service 10/20/2020     Patient was not available in the morning. She went to the hospital to get her echocardiogram.  I have interviewed her through video and informed her regarding her echocardiogram report. controlled-continue with aspirin and Plavix for anticoagulation.   May need to consider Coumadin or noacs later.     Pulmonary hypertension-follows up with a cardiologist outpatient     DVT prophylaxis recommendation from hospitalist continue with aspirin P

## 2020-10-23 NOTE — TELEPHONE ENCOUNTER
Thanks Andrew   I hope you are well     please have doc notify me when she's at hospital   have a great weekend

## 2020-10-23 NOTE — TELEPHONE ENCOUNTER
Dr. Governor Reardon, just an FYI- patient is scheduled for right knee fusion with Dr. Joellen Aguilar on 10/27/20 at [facility] AND [facility]. We wanted to make you aware so you can follow her whiles she's inpatient. Thanks!

## 2020-10-23 NOTE — TELEPHONE ENCOUNTER
I am doing well thanks! Just got an update from the surgeons office, Flora's ejection fraction dropped in half from last surgery to 15% now. Waiting on Cardio note.  We will make sure you are updated when she is having surgery,but they are pushing it out a

## 2020-10-26 NOTE — TELEPHONE ENCOUNTER
Spoke to patient, let her know per Dr. Go Alvarez surgery is cancelled for tomorrow 10/27 due to EF being 15%. She was very upset and wants to speak with Dr. Go Alvarez personally. She states \"she would rather die on the table than live like this\".  Also, spoke w

## 2020-10-26 NOTE — TELEPHONE ENCOUNTER
Per [facility] Olvin GREENBERG at Dr. Bulah Fabry office surgery is cancelled until she is stable. They will let us know if/when surgery will be rescheduled.

## 2020-10-29 ENCOUNTER — TELEPHONE (OUTPATIENT)
Dept: CARDIOLOGY | Age: 75
End: 2020-10-29

## 2020-11-01 NOTE — PROGRESS NOTES
Location [facility]  Type telemedicine with video and audio   Date of service October 30, 2020     no complaints except constipation   passing gas   no nausea or vomiting       PHYSICAL EXAM:  Vitals reviewed and stable  GENERAL HEALTH:  no d

## 2020-11-01 NOTE — PROGRESS NOTES
Location [facility]  Type in person  Date of service October 27, 2020    pt upset because her surgery got cancelled   she reports that she can't live like this. I have consoled her , i have informed that we will follow cards recs .  her surger before surgery     Dyslipidemia on statins      Proximal A. fib-rate controlled-continue with aspirin and Plavix for anticoagulation. May need to consider Coumadin or noacs later.     Pulmonary hypertension-follows up with a cardiologist outpatient    DVT

## 2020-11-04 ENCOUNTER — TELEPHONE (OUTPATIENT)
Dept: CARDIOLOGY | Age: 75
End: 2020-11-04

## 2020-11-04 DIAGNOSIS — R93.1 ABNORMAL ECHOCARDIOGRAM: Primary | ICD-10-CM

## 2020-11-06 NOTE — PROGRESS NOTES
Location [facility]  Type in person  Date of service November- 5/2020    continues to have constipation  gets better with meds  no cp or sob  LE swelling is better     I have talked to cards Dr Mandy Kramer at [facility], plan for [address] fib-rate controlled-continue with aspirin and Plavix for anticoagulation. May need to consider Coumadin or noacs later.     Pulmonary hypertension-follows up with a cardiologist outpatient    DVT prophylaxis recommendation from hospitalist continue with as

## 2020-11-09 ENCOUNTER — TELEPHONE (OUTPATIENT)
Dept: CARDIOLOGY | Age: 75
End: 2020-11-09

## 2020-11-10 PROBLEM — I25.5 ISCHEMIC CARDIOMYOPATHY: Status: ACTIVE | Noted: 2020-01-01

## 2020-11-10 NOTE — PLAN OF CARE
Patient is a/ox3, forgetful. RA. Complete care. Cath for tomorrow. 1 episode of vomiting today. Incontinent; purewick in place. Accucheck ACHS. IVF to be started tonight. Will continue to monitor.     Problem: Patient Centered Care  Goal: Patient preference medications to optimize hemodynamic stability  - Monitor arterial and/or venous puncture sites for bleeding and/or hematoma  - Assess quality of pulses, skin color and temperature  - Assess for signs of decreased coronary artery perfusion - ex.  Angina  - E

## 2020-11-11 PROCEDURE — 93460 R&L HRT ART/VENTRICLE ANGIO: CPT | Performed by: INTERNAL MEDICINE

## 2020-11-11 PROCEDURE — 99152 MOD SED SAME PHYS/QHP 5/>YRS: CPT | Performed by: INTERNAL MEDICINE

## 2020-11-11 NOTE — H&P
[facility]    PATIENT'S NAME: Edilberto Whitley   ATTENDING PHYSICIAN: Jennifer Harrison MD   PATIENT ACCOUNT#:   [id number]    LOCATION:  [address] [place] [address] RECORD #:   [id number]       YOB: 1945  ADMISSION DATE:       11/10/2020 total knee explantation with antibiotic spacer in August 2020. Finished a course of IV antibiotics. She had a cystoscopy and lithotripsy, hysterectomy and cataract procedure.   She is supposed to be scheduled for her right knee revision with antibiotic sp intact. ASSESSMENT AND PLAN:    1. Ischemic cardiomyopathy with drop in ejection fraction with possible underlying obstructive coronary artery disease. 2.   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     3.   Right knee antib

## 2020-11-11 NOTE — PROGRESS NOTES
[facility]    Progress Note    Sim Nicely Patient Status:  Inpatient    1945 MRN [id number]   Location [facility] 3W/SW Attending Shen Santillan Day # 1 PCP Debbie Bourgeois MD        Subjective:     Cons counseling pt about care/uti/levaquin          Results:     Lab Results   Component Value Date    WBC 3.6 (L) 11/11/2020    HGB 9.6 (L) 11/11/2020    HCT 30.1 (L) 11/11/2020    .0 (L) 11/11/2020    CREATSERUM 3.56 (H) 11/10/2020    BUN 67 (H) 11/10/

## 2020-11-11 NOTE — CM/SW NOTE
SW self referred pt for d/c planning. SW attempted to meet w/ pt and her son, Yuko Marquez, this AM but pt was nauseous. SW also attempted to meet w/ pt this afternoon, but she was asleep.     SW reviewed pt's Epic chart and contacted pt's dtr, Ga Arredondo for [employer], [facility]

## 2020-11-11 NOTE — PLAN OF CARE
VSS on RA. Bladder scanned overnight >250ml, MD updated recheck in AM. Nausea early in night. Prn pain medication given for R leg pain. Immobilizer in place.  Plan for CATH today    Problem: Patient Centered Care  Goal: Patient preferences are identified an Evaluate effectiveness of vasoactive medications to optimize hemodynamic stability  - Monitor arterial and/or venous puncture sites for bleeding and/or hematoma  - Assess quality of pulses, skin color and temperature  - Assess for signs of decreased corona

## 2020-11-11 NOTE — WOUND PROGRESS NOTE
Wound Care Consult:        Patient seen for wound cosult for DTI to right heel. Patient seen at bedside with family present. Scab noted to right heel and removed. No open areas noted and no DTI. Lotion applied and RN updated.   Encouraged lotion to dry a

## 2020-11-11 NOTE — CONSULTS
[facility]    Report of Consultation    Date of Admission:  11/10/2020  Date of Consult:  11/10/2020   Reason for Consultation:     CKD stage IV - V    History of Present Illness:     Patient is a 67 yrs old male with pmh of DM II, HTN, H 2003    debridement r/t spider bite - brown reclusive, right thigh   • OTHER      removal kidney stone   • OTHER SURGICAL HISTORY      Cardiac stent placed 06/22/20   • PATELLA TENDON REPAIR Right 11/19/2018    Performed by Aquiles Wilson MD at [address] [place] [address] cap 2 mg, 2 mg, Oral, PRN    •  ondansetron (ZOFRAN-ODT) disintegrating tab 4 mg, 4 mg, Oral, Q8H PRN    •  PEG 3350 (MIRALAX) powder packet 17 g, 17 g, Oral, Daily PRN    •  traZODone HCl (DESYREL) tab 50 mg, 50 mg, Oral, Nightly PRN    •  sucralfate (CAR disturbance  Ears, nose, mouth, throat, and face: negative for hearing loss and sore throat  Respiratory: negative for cough, hemoptysis and wheezing  Cardiovascular: negative for chest pain, exertional dyspnea  Gastrointestinal: negative for abdominal shanon 06/22/2020 1.16 0.90 - 1.20 Final     Comment:     Only the INR (not the Protime value) should be utilized for   the monitoring of oral anticoagulant therapy.      Recommended therapeutic ranges for anticoagulant therapy are   as follows:   2.0 - 3.0 All

## 2020-11-11 NOTE — PROCEDURES
Cardiologist: Jarrte Zavaleta MD  Primary Proceduralist: Jarret Zavaleta MD  Procedure Performed: LHC, RHC and COR  Date of Procedure: 11/11/2020   Indication: Severe LV dysfunction, mitral regurgitation    Summary of findings: Severe diffuse coronary artery Specimen sent to: No specimen collected  Estimated blood loss: 10 cc  Closure: Mynx      IV was maintained by RN and moderate conscious sedation of versed and fentanyl was given.   Patient was assessed and monitoring of oxygen, heart rate and blood

## 2020-11-11 NOTE — PROGRESS NOTES
[facility] Progress Note:  Automatic Substitution    The order for levofloxacin 250mg IV meets criteria to convert to ciprofloxacin 400mg iv daily per the automatic substitution policy approved by the P&T committee.   Thank neftali

## 2020-11-12 PROCEDURE — 99232 SBSQ HOSP IP/OBS MODERATE 35: CPT | Performed by: INTERNAL MEDICINE

## 2020-11-12 NOTE — PLAN OF CARE
Patient is a/ox3, forgetful. Complete care. Arguelles to be placed. Cath today, R groin site c/d/I. Accucheck ACHS. Contact prec in place. IVF @ 10 ml/hr. IV antibiotics. Will continue to monitor.     Problem: Patient Centered Care  Goal: Patient preferences ar Progressing  11/11/2020 1830 by Colleen Cain  Outcome: Progressing     Problem: CARDIOVASCULAR - ADULT  Goal: Maintains optimal cardiac output and hemodynamic stability  Description: INTERVENTIONS:  - Monitor vital signs, rhythm, and trends  - Monitor for Consider post-discharge preferences of patient/family/discharge partner  - Complete POLST form as appropriate  - Assess patient's ability to be responsible for managing their own health  - Refer to Case Management Department for coordinating discharge plan

## 2020-11-12 NOTE — PROGRESS NOTES
[facility] Dosing Service  Antibiotic Dosing    Moraima Mills is a 67year old for whom pharmacy is dosing meropenem for treatment of  UTI. Ian Grider Allergies: is allergic to hydrocodone; metronidazole; penicillins; tramadol; aspirin; and codeine.     Katia

## 2020-11-12 NOTE — PLAN OF CARE
Problem: Patient Centered Care  Goal: Patient preferences are identified and integrated in the patient's plan of care  Description: Interventions:  - What would you like us to know as we care for you?  I came from [address] [place] [address] timely, compl hematoma  - Assess quality of pulses, skin color and temperature  - Assess for signs of decreased coronary artery perfusion - ex.  Angina  - Evaluate fluid balance, assess for edema, trend weights  Outcome: Progressing     Problem: GENITOURINARY - ADULT  Go coordinating discharge planning if the patient needs post-hospital services based on physician/LIP order or complex needs related to functional status, cognitive ability or social support system  Outcome: Progressing   Patient alert and oriented, but forge

## 2020-11-12 NOTE — PROGRESS NOTES
[facility]    Progress Note    Maria Isabel Gardner Patient Status:  Inpatient    1945 MRN [id number]   Location [facility] 3W/SW Attending Shen Santillan Day # 1 PCP Layne Lopez MD       Subjective:   Leeanne Bradshaw rashes present, no abnormal bruising noted  Back/Spine: no abnormalities noted  Musculoskeletal: full ROM all extremities good strength  no deformities  Extremities: no edema, cyanosis  Neurological:  Grossly normal    Results:     Laboratory Data:  Lab Re

## 2020-11-12 NOTE — PLAN OF CARE
Alert and oriented, denies pain or shortness of breath. IV fluids discontinued. IV bumex. Arguelles in place with pernell urine. PT/OT to see patient. Bed locked lowest position, call light and personal belongings within reach.      Problem: Patient Centered Care hypotension and signs of decreased cardiac output  - Evaluate effectiveness of vasoactive medications to optimize hemodynamic stability  - Monitor arterial and/or venous puncture sites for bleeding and/or hematoma  - Assess quality of pulses, skin color an

## 2020-11-12 NOTE — PROGRESS NOTES
S: sleepy this am, does have intermittent SOB, though does not ambulate    O:   Blood pressure 124/64, pulse 63, temperature 98 °F (36.7 °C), temperature source Oral, resp. rate 17, weight 196 lb 11.2 oz (89.2 kg), SpO2 98 %.        11/11/20  1706 11/11/20 List:     Hypertension     Kidney disorder     Cardiomyopathy ([facility] [address]     S/P knee replacement     Patellar tendon rupture, right, initial encounter     S/P revision of total knee     Ulcer of heel ([facility] [address]     Anemia     Artificial knee joint present     Carpa mean 38 which is of mercury  PCW 27 with reveals up to 39 mmHg     CO 3.9 L/min by Vadim/ CI 2 L/min/m² by Vadim  SVR 1409 dynes/  dynes      Impression:     ICM, EF 15-20%  Severe diffuse coronary artery disease, Cx 80-90%, RCA diffuse disease, Diag

## 2020-11-12 NOTE — PROGRESS NOTES
[facility]    Progress Note    Scarlet Heading Patient Status:  Inpatient    1945 MRN [id number]   Location [facility] 3W/SW Attending Shen Santillanissa Day # 2 PCP Monster Macias MD        Subjective:     Cons permission her friend Josette Loera by phone about care/uti/meropenem; she said she would share with family          Results:     Lab Results   Component Value Date    WBC 4.5 11/12/2020    HGB 9.4 (L) 11/12/2020    HCT 30.0 (L) 11/12/2020    .0 (L) 11/12/

## 2020-11-13 ENCOUNTER — TELEPHONE (OUTPATIENT)
Dept: CARDIOLOGY | Age: 75
End: 2020-11-13

## 2020-11-13 PROCEDURE — 99232 SBSQ HOSP IP/OBS MODERATE 35: CPT | Performed by: INTERNAL MEDICINE

## 2020-11-13 NOTE — OCCUPATIONAL THERAPY NOTE
OCCUPATIONAL THERAPY EVALUATION - INPATIENT      Room Number: 315/315-A  Evaluation Date: 11/13/2020  Type of Evaluation: Initial       Physician Order: IP Consult to Occupational Therapy  Reason for Therapy: ADL/IADL Dysfunction and Discharge Planning 2017    surgery   • Coronary atherosclerosis    • Diabetes ([facility] [address]     14 yrs   • Esophageal reflux    • High blood pressure    • High cholesterol    • History of blood transfusion 06/2020    @ EM   • History of GI bleed 08/2019   • History of stomach ulcer functional mobility independently.     SUBJECTIVE  'I haven't been able to walk in a long time\"    OCCUPATIONAL THERAPY EXAMINATION     OBJECTIVE  Precautions: Knee immobilizer  Fall Risk: High fall risk    WEIGHT BEARING RESTRICTION  Weight Bearing Restri

## 2020-11-13 NOTE — PROGRESS NOTES
[facility]    Progress Note    Vishal Kidd Patient Status:  Inpatient    1945 MRN [id number]   Location [facility] 3W/SW Attending Shen Santillan Day # 3 PCP Yuli Caicedo MD        Subjective:     Cons and with her permission her son Melissa De La Rosa [phone] about care/uti/meropenem          Results:     Lab Results   Component Value Date    WBC 4.2 11/13/2020    HGB 8.0 (L) 11/13/2020    HCT 25.7 (L) 11/13/2020    .0 (L) 11/13/2020    CREATSERUM 3.77 (

## 2020-11-13 NOTE — PROGRESS NOTES
S: Patient does not ambulate, due to her knee pain. Notes she has never had CP. No SOB    O:   Blood pressure 133/69, pulse 64, temperature 97.5 °F (36.4 °C), temperature source Axillary, resp. rate 18, weight 195 lb 8 oz (88.7 kg), SpO2 96 %.        11/13/ 1,000 mg Oral Once   • famoTIDine  20 mg Oral Once   • Metoclopramide  10 mg Oral Once   • aspirin  81 mg Oral Daily   • atorvastatin  40 mg Oral Nightly   • carvedilol  25 mg Oral BID with meals   • Clopidogrel Bisulfate  75 mg Oral Daily   • ferrous sulf diseased  · Cx is patent and severe diffuse disease proximally, 80 to 90% with a small obtuse marginal branch, supplies 2 OM branches which are patent  · RCA is dominant and severely diseased, diffuse, supplies PDA and PL     Right femoral vein accessed . her acute HFrEF. She cannot have revascularization completed since she would need to be on plavix. Would recommend optimization of HF and then proceeding to surgery. After surgery when safe from bleeding standpoint will need revascularization addressed.  Sh

## 2020-11-13 NOTE — PLAN OF CARE
Problem: Diabetes/Glucose Control  Goal: Glucose maintained within prescribed range  Description: INTERVENTIONS:  - Monitor Blood Glucose as ordered  - Assess for signs and symptoms of hyperglycemia and hypoglycemia  - Administer ordered medications to [facility] risk of injury  - Provide assistive devices as appropriate  - Consider OT/PT consult to assist with strengthening/mobility  - Encourage toileting schedule  Outcome: Progressing     Pt. alert,oriented. IV antibiotics continued. Still on IV bumex.  No complaints

## 2020-11-13 NOTE — PHYSICAL THERAPY NOTE
PHYSICAL THERAPY EVALUATION - INPATIENT     Room Number: 315/315-A  Evaluation Date: 11/13/2020  Type of Evaluation: Initial   Physician Order: PT Eval and Treat    Presenting Problem: ischemic cardiomyopathy s/p cath  Reason for Therapy: Mobility Dysfunc Hood)    PLAN  PT Treatment Plan: Bed mobility; Body mechanics; Patient education;Balance training;Transfer training;Strengthening  Rehab Potential : Fair  Frequency (Obs): 3x/week       PHYSICAL THERAPY MEDICAL/SOCIAL HISTORY   Problem List  Active Pr ULCER BOOT Right 03/2019   • TMJ ARTHROSCOPY DEBRIDEMENT Right 07/2019    heel of the fooot   • TOTAL KNEE REPLACEMENT Right 10/29/2018       HOME SITUATION  Type of Home: Skilled nursing facility   Home Layout: One level  Stairs to Enter : 0  Railing: No Impairment: 86.62%   Standardized Score (AM-PAC Scale): 28.58   CMS Modifier (G-Code): CM    FUNCTIONAL ABILITY STATUS  Gait Assessment   Gait Assistance: Not tested           Stoop/Curb Assistance: Not tested       Bed Mobility: mod A x 2    Transfers: no

## 2020-11-13 NOTE — PROGRESS NOTES
[place] [facility]    Progress Note    Paul Solo Patient Status:  Inpatient    1945 MRN [id number]   Location [facility] 3W/SW Attending Shen Santillan Day # 2 PCP Nic Abraham MD       Subjective:   Delphine Solis no abnormal bruising noted  Back/Spine: no abnormalities noted  Musculoskeletal: full ROM all extremities good strength  no deformities  Extremities: no edema, cyanosis  Neurological:  Grossly normal    Results:     Laboratory Data:  Lab Results   Componen

## 2020-11-13 NOTE — CM/SW NOTE
Per chart review, PT recommends SNF, return to [facility] at time of d/c.    SW met w/ pt in her room. Pt's dtr was also present via speaker phone. SW discussed current recommendations for return to SNF. SW addressed and answered all questions.     Pt an

## 2020-11-14 PROCEDURE — 99232 SBSQ HOSP IP/OBS MODERATE 35: CPT | Performed by: NURSE PRACTITIONER

## 2020-11-14 NOTE — PLAN OF CARE
Problem: Patient Centered Care  Goal: Patient preferences are identified and integrated in the patient's plan of care  Description: Interventions:  - What would you like us to know as we care for you?  I had right knee prosthesis   - Provide timely, compl hematoma  - Assess quality of pulses, skin color and temperature  - Assess for signs of decreased coronary artery perfusion - ex.  Angina  - Evaluate fluid balance, assess for edema, trend weights  Outcome: Progressing     Problem: SAFETY ADULT - FALL  Goal needed  - Follow urinary retention protocol/standard of care  - Consider collaborating with pharmacy to review patient's medication profile  - Implement strategies to promote bladder emptying  Outcome: Progressing     Patient vital signs stable, resting on

## 2020-11-14 NOTE — PROGRESS NOTES
[place] [facility] - [facility]    Progress Note    Paul Solo Patient Status:  Inpatient    1945 MRN [id number]   Location [facility] 3W/SW Attending Shen Santillan Day # 3 PCP Nic Abraham MD       Subjective:   Delphine Solis present, no abnormal bruising noted  Back/Spine: no abnormalities noted  Musculoskeletal: full ROM all extremities good strength  no deformities  Extremities:1+ edema  Neurological:  Grossly normal    Results:     Laboratory Data:  Lab Results   Component

## 2020-11-14 NOTE — PROGRESS NOTES
[place] [facility] - [facility]    Progress Note    Diane Franco Patient Status:  Inpatient    1945 MRN [id number]   Location [facility] 3W/SW Attending Shen Santillan Day # 4 PCP Kristina Lei MD        Subjective:     Cons And \"I leave it ib God's hands. \" She said she would be dnr but is afraid such a decision would hurt her children.  I explained that the next procdure will be very difficult and that she should clearly discuss goals of care with both her children present b

## 2020-11-14 NOTE — PLAN OF CARE
Problem: Patient Centered Care  Goal: Patient preferences are identified and integrated in the patient's plan of care  Description: Interventions:  - What would you like us to know as we care for you?  \"I believe strongly in the power of prayer\"  - Prov hematoma formation. Plan is to diurese-- patient on Bumex drip. Patient is ACHS. Call light within reach, instructed to call for assistance. Will continue to monitor.

## 2020-11-14 NOTE — PROGRESS NOTES
[facility]    Progress Note    Eriberto Hopkins Patient Status:  Inpatient    1945 MRN [id number]   Location [facility] 3W/SW Attending Shen Santillan Day # 4 PCP Kinza Wolfe MD       Subjective:   Wallis Mohs BMI 35.98 kg/m²     Neurologic: Alert and oriented, normal affect. No motor or coordinational deficit. Skin: Warm and dry. Neck: + JVD,   Cardiac: RRR.  S1, S2 normal. Soft murmur  Telemetry -  NSR, SB   50-60s  Lungs: Clear without wheezes, rales, rho 11/14/2020    CL 98 11/14/2020    CO2 31.0 11/14/2020     (H) 11/14/2020    CA 9.3 11/14/2020    ALB 2.9 (L) 11/12/2020    ALKPHO 116 10/21/2020    BILT 0.4 10/21/2020    TP 6.1 (L) 10/21/2020    AST 6 (L) 10/21/2020    ALT 10 (L) 10/21/2020    PTT

## 2020-11-14 NOTE — PROGRESS NOTES
[place] [facility]    Progress Note      Subjective:     Lying comfortably - denies any chest pain or sob      Review of Systems:     Constitutional: negative for fatigue, fevers and weight loss  Eyes: negative for irritation, redness and visual Intravenous, Continuous    •  iron sucrose (VENOFER) 300 mg in sodium chloride 0.9% 250 mL IVPB, 300 mg, Intravenous, Daily    •  vancomycin HCl (VANCOCIN) cap 125 mg, 125 mg, Oral, Daily    •  Meropenem (MERREM) 500 mg in sodium chloride 0.9% 100 mL MBP, PRN    •  HYDROmorphone HCl (DILAUDID) tab 4 mg, 4 mg, Oral, Q4H PRN    •  morphINE sulfate (PF) 2 MG/ML injection 1 mg, 1 mg, Intravenous, Q2H PRN    Or    •  morphINE sulfate (PF) 2 MG/ML injection 2 mg, 2 mg, Intravenous, Q2H PRN    Or    •  morphINE art 3.05 mg/dl in October   - creatinine at 3.77 mg/dl yesterday - > 3.4 mg/dl - cont. to monitor   - on bumex gtt at 0.5 mg/hr - cont. and will supplement with metolazone if need be  - high risk of progressive CKD and ESRD requiring dialysis   - avoid nephrotox

## 2020-11-14 NOTE — PROGRESS NOTES
Cardiology Progress Note      S:  Responded better to bumex gtt  No chest pain no SOB  Feels about the same    O:   Blood pressure 128/66, pulse 58, temperature 98.2 °F (36.8 °C), temperature source Oral, resp.  rate 17, weight 196 lb 11.2 oz (89.2 kg), SpO acetaminophen  1,000 mg Oral Once   • famoTIDine  20 mg Oral Once   • Metoclopramide  10 mg Oral Once   • aspirin  81 mg Oral Daily   • atorvastatin  40 mg Oral Nightly   • carvedilol  25 mg Oral BID with meals   • Clopidogrel Bisulfate  75 mg Oral Daily severe diffuse disease proximally, 80 to 90% with a small obtuse marginal branch, supplies 2 OM branches which are patent  · RCA is dominant and severely diseased, diffuse, supplies PDA and PL     Right femoral vein accessed . 7 FR sheath placed.   Ellyn Sneed ad

## 2020-11-15 PROCEDURE — 99232 SBSQ HOSP IP/OBS MODERATE 35: CPT | Performed by: INTERNAL MEDICINE

## 2020-11-15 NOTE — PROGRESS NOTES
[facility]    Progress Note    Connor Rubalcava Patient Status:  Inpatient    1945 MRN [id number]   Location [facility] 3W/SW Attending Shen Santillan Day # 5 PCP Moraima Luz MD        Subjective:     Cons And \"I leave it in God's hands. \" She said she would be dnr but is afraid such a decision would hurt her children.  I explained that the next procdure will be very difficult and that she should clearly discuss goals of care with both her children present b

## 2020-11-15 NOTE — PLAN OF CARE
Problem: Patient Centered Care  Goal: Patient preferences are identified and integrated in the patient's plan of care  Description: Interventions:  - What would you like us to know as we care for you?   - Provide timely, complete, and accurate informatio pulses, skin color and temperature  - Assess for signs of decreased coronary artery perfusion - ex.  Angina  - Evaluate fluid balance, assess for edema, trend weights  Outcome: Progressing     Problem: SAFETY ADULT - FALL  Goal: Free from fall injury  Descr retention protocol/standard of care  - Consider collaborating with pharmacy to review patient's medication profile  - Implement strategies to promote bladder emptying  Outcome: Progressing    Patient given Dilaudid po for pain on the knees and afforded guanaco

## 2020-11-15 NOTE — PLAN OF CARE
Patient alert and oriented, on room air, reports no pain other than her left knee (chronic) which is relieved with a lidocaine patch. Bumex drip maintained, creatinine improving today; IV abx and IV venofer infused. Patient up with a lift.  Blood sugar bebeto optimal cardiac output and hemodynamic stability  Description: INTERVENTIONS:  - Monitor vital signs, rhythm, and trends  - Monitor for bleeding, hypotension and signs of decreased cardiac output  - Evaluate effectiveness of vasoactive medications to optim services based on physician/LIP order or complex needs related to functional status, cognitive ability or social support system  Outcome: Progressing

## 2020-11-15 NOTE — PROGRESS NOTES
Cardiology Progress Note      S:  Continues to diuresis well on bumex gtt  No chest pain no SOB  Feels about the same    O:   Blood pressure 121/50, pulse 62, temperature 98.1 °F (36.7 °C), temperature source Oral, resp.  rate 16, weight 195 lb 12.8 oz (88. lidocaine-menthol  1 patch Transdermal Daily   • acetaminophen  1,000 mg Oral Once   • famoTIDine  20 mg Oral Once   • Metoclopramide  10 mg Oral Once   • aspirin  81 mg Oral Daily   • atorvastatin  40 mg Oral Nightly   • Clopidogrel Bisulfate  75 mg Oral patent stent in the proximal segment, supplies 2 diagonals which are diffusely diseased  · Cx is patent and severe diffuse disease proximally, 80 to 90% with a small obtuse marginal branch, supplies 2 OM branches which are patent  · RCA is dominant and sev staff    Fanta Rodriguez MD  [facility]

## 2020-11-15 NOTE — PROGRESS NOTES
[facility] - [facility]    Progress Note      Subjective:     Lying comfortably - denies any chest pain or sob      Review of Systems:     Constitutional: negative for fatigue, fevers and weight loss  Eyes: negative for irritation, redness and visual hydrALAzine HCl (APRESOLINE) tab 25 mg, 25 mg, Oral, Q8H SURYA    •  isosorbide dinitrate (ISORDIL) tab 10 mg, 10 mg, Oral, TID (Nitrates)    •  bumetanide (BUMEX) 12.5 mg in sodium chloride 0.9% 100 mL infusion, 0.5 mg/hr, Intravenous, Continuous    •  iron CC    •  Normal Saline Flush 0.9 % injection 3 mL, 3 mL, Intravenous, PRN    •  acetaminophen (TYLENOL EXTRA STRENGTH) tab 1,000 mg, 1,000 mg, Oral, Q8H PRN    •  HYDROmorphone HCl (DILAUDID) tab 4 mg, 4 mg, Oral, Q4H PRN    •  morphINE sulfate (PF) 2 MG/M 1. 7   PHOS 4.2  --  4.1  --   --   --     < > = values in this interval not displayed. Recent Labs   Lab 11/10/20  1911 11/12/20  0739   PHOS 4.2 4.1   ALB 3.1* 2.9*       No results found. Assessment and Plan:      1.  RUEL on CKD stage III: n

## 2020-11-16 PROCEDURE — 99232 SBSQ HOSP IP/OBS MODERATE 35: CPT | Performed by: INTERNAL MEDICINE

## 2020-11-16 NOTE — PLAN OF CARE
Problem: Patient Centered Care  Goal: Patient preferences are identified and integrated in the patient's plan of care  Description: Interventions:  - What would you like us to know as we care for you?   - Provide timely, complete, and accurate informatio pulses, skin color and temperature  - Assess for signs of decreased coronary artery perfusion - ex.  Angina  - Evaluate fluid balance, assess for edema, trend weights  Outcome: Progressing     Problem: SAFETY ADULT - FALL  Goal: Free from fall injury  Descr retention protocol/standard of care  - Consider collaborating with pharmacy to review patient's medication profile  - Implement strategies to promote bladder emptying  Outcome: Progressing   IV bumex drip. Arguelles draining. Up in chair with sling.  Turned and

## 2020-11-16 NOTE — PLAN OF CARE
IV bumex continuous. IV meropenum and venofer given. Repositioned pt throughout the day.      Problem: Patient Centered Care  Goal: Patient preferences are identified and integrated in the patient's plan of care  Description: Interventions:  - What would yo Monitor arterial and/or venous puncture sites for bleeding and/or hematoma  - Assess quality of pulses, skin color and temperature  - Assess for signs of decreased coronary artery perfusion - ex.  Angina  - Evaluate fluid balance, assess for edema, trend we empty bladder  - Monitor intake/output and perform bladder scan as needed  - Follow urinary retention protocol/standard of care  - Consider collaborating with pharmacy to review patient's medication profile  - Implement strategies to promote bladder emptyi

## 2020-11-16 NOTE — PROGRESS NOTES
Cardiology Progress Note      S:  Continues to diuresis well on bumex gtt  No chest pain no SOB    O:   Blood pressure 133/56, pulse 59, temperature 98.1 °F (36.7 °C), temperature source Oral, resp. rate 18, weight 190 lb 6.4 oz (86.4 kg), SpO2 98 %. Intravenous Daily   • vancomycin HCl  125 mg Oral Daily   • meropenem  500 mg Intravenous Q12H   • lidocaine-menthol  1 patch Transdermal Daily   • acetaminophen  1,000 mg Oral Once   • aspirin  81 mg Oral Daily   • atorvastatin  40 mg Oral Nightly   • Ellie disease  · LAD is patent and has patent stent in the proximal segment, supplies 2 diagonals which are diffusely diseased  · Cx is patent and severe diffuse disease proximally, 80 to 90% with a small obtuse marginal branch, supplies 2 OM branches which are no set date yet for surgery. RECC  - nearing euvolemia, possible one more day decongestion  - will discuss with renal re: diuretic adjustment  - Cont DAPT  - need to determine plans/timing for septic knee arthritis.  If plans within the next several days

## 2020-11-16 NOTE — PROGRESS NOTES
[place] [facility] - [facility]    Progress Note    Geoff Kumar Patient Status:  Inpatient    1945 MRN [id number]   Location [facility] 3W/SW Attending Shen Santillan # 6 PCP Madie Brush MD        Subjective:     Cons \"I leave it in God's hands. \" She said she would be dnr but is afraid such a decision would hurt her children.  I explained that the next procdure will be very difficult and that she should clearly discuss goals of care with both her children present befor

## 2020-11-16 NOTE — CM/SW NOTE
Per RN rounds, pt may stay inpatient until knee replacement surgery - plan undetermined at this time. SW sent available clinical updates to [facility] via [facility].     PLAN: [facility], pend med clear      SW/CLEMENTE to remain available for suppor

## 2020-11-16 NOTE — PLAN OF CARE
Problem: Patient Centered Care  Goal: Patient preferences are identified and integrated in the patient's plan of care  Description: Interventions:  - What would you like us to know as we care for you? I live at home alone.   - Provide timely, complete, an hematoma  - Assess quality of pulses, skin color and temperature  - Assess for signs of decreased coronary artery perfusion - ex.  Angina  - Evaluate fluid balance, assess for edema, trend weights  Outcome: Progressing     Problem: SAFETY ADULT - FALL  Goal needed  - Follow urinary retention protocol/standard of care  - Consider collaborating with pharmacy to review patient's medication profile  - Implement strategies to promote bladder emptying  Outcome: Progressing    Patient vital signs stable, resting on

## 2020-11-17 PROCEDURE — 99232 SBSQ HOSP IP/OBS MODERATE 35: CPT | Performed by: INTERNAL MEDICINE

## 2020-11-17 NOTE — PROGRESS NOTES
[facility]    Progress Note    Lori Valente Patient Status:  Inpatient    1945 MRN [id number]   Location [facility] 3W/SW Attending Shen Santillan Day # 7 PCP Candis Patricia MD        Subjective:     Cons \"I leave it in God's hands. \" She said she would be dnr but is afraid such a decision would hurt her children.  I explained that the next procdure will be very difficult and that she should clearly discuss goals of care with both her children present befor

## 2020-11-17 NOTE — PROGRESS NOTES
[facility]    Progress Note    Paul Solo Patient Status:  Inpatient    1945 MRN [id number]   Location [facility] 3W/SW Attending Shen Santillan Day # 6 PCP Nic Abraham MD       Subjective:   Delphine Solis noted  Back/Spine: no abnormalities noted  Musculoskeletal: full ROM all extremities good strength  no deformities  Extremities: no edema, cyanosis  Neurological:  Grossly normal    Results:     Laboratory Data:  Lab Results   Component Value Date    WBC 4

## 2020-11-17 NOTE — CARDIAC REHAB
Cardiac Rehab Phase I    Activity:  Distance   Assistance needed   Patient tolerated activity . Education:  Handouts provided and reviewed: [address] Diet: Healthy Cardiac diet reviewed.     Disease Process: Disease process rev

## 2020-11-17 NOTE — PROGRESS NOTES
Cardiology Progress Note  11/17/2020    S:  Continues to diuresis well on bumex gtt, she was evaluated laying flat in bed with no respiratory distress. No chest pain, SOB.      O:   Blood pressure 139/53, pulse 60, temperature 98.2 °F (36.8 °C), temperatu Subcutaneous Three Times Weekly @ 1600   • cholecalciferol  1,000 Units Oral Daily   • calciTRIOL  0.25 mcg Oral QOD   • Metoprolol Succinate ER  25 mg Oral Nightly   • hydrALAzine HCl  25 mg Oral Q8H [address]   • isosorbide dinitrate  10 mg Oral TID (Nitrates) [facility])      Cath 11/11:  Post procedure findings:  1) Left Ventriculography at 30 degrees ZACARIAS: Not performed, echo available and renal insufficiency  2) Hemodynamics: LVEDP: 22 mmHg  3) Coronaries:  · Left main is patent and free of obstructive disease  · L decision on PCI, CAD management to IC team  - She remains on ASA/plavix - will need to stop plavix 4 days prior to surgery. There is no set date yet for surgery.   Minimize interruption due to LAD 3mm ANGELO PCI 6/22/2020    RECC  - Volume status improved, diu

## 2020-11-17 NOTE — PROGRESS NOTES
[facility]    Progress Note      Subjective:     Lying comfortably - denies any chest pain or sob    Wondering about next step     Review of Systems:     Constitutional: negative for fatigue, fevers and weight loss  Eyes: negative for irr cap/tab 1,000 Units, 1,000 Units, Oral, Daily    •  calciTRIOL (ROCALTROL) cap 0.25 mcg, 0.25 mcg, Oral, QOD    •  Metoprolol Succinate ER (Toprol XL) 24 hr tab 25 mg, 25 mg, Oral, Nightly    •  isosorbide dinitrate (ISORDIL) tab 10 mg, 10 mg, Oral, TID (N HYDROmorphone HCl (DILAUDID) tab 4 mg, 4 mg, Oral, Q4H PRN    •  morphINE sulfate (PF) 2 MG/ML injection 1 mg, 1 mg, Intravenous, Q2H PRN    Or    •  morphINE sulfate (PF) 2 MG/ML injection 2 mg, 2 mg, Intravenous, Q2H PRN    Or    •  morphINE sulfate (PF) 4.2 4.1 2.2*   ALB 3.1* 2.9* 2.4*       No results found. Assessment and Plan:      1.  RUEL on CKD stage III: non oliguric on diuretics   - required short term dialysis at last admit from vancomycin related RUEL   -  creatinine at 2.73 mg/dl in Septem

## 2020-11-17 NOTE — PLAN OF CARE
Alert and oriented. Pain managed with PO Dilaudid. IV Merrem. Bumex gtt infusing. Knee brace in place. Arguelles intact and in place. Bed alarm and ibed in place. Call light in reach. Will continue to monitor.        Problem: Patient Centered Care  Goal: Neel bleeding, hypotension and signs of decreased cardiac output  - Evaluate effectiveness of vasoactive medications to optimize hemodynamic stability  - Monitor arterial and/or venous puncture sites for bleeding and/or hematoma  - Assess quality of pulses, ski post-discharge preferences of patient/family/discharge partner  - Complete POLST form as appropriate  - Assess patient's ability to be responsible for managing their own health  - Refer to Case Management Department for coordinating discharge planning if t

## 2020-11-17 NOTE — PLAN OF CARE
Problem: Patient Centered Care  Goal: Patient preferences are identified and integrated in the patient's plan of care  Description: Interventions:  - What would you like us to know as we care for you?  I need a knee replacement    - Provide timely, comple CARDIOVASCULAR - ADULT  Goal: Maintains optimal cardiac output and hemodynamic stability  Description: INTERVENTIONS:  - Monitor vital signs, rhythm, and trends  - Monitor for bleeding, hypotension and signs of decreased cardiac output  - Evaluate effectiv partner  - Complete POLST form as appropriate  - Assess patient's ability to be responsible for managing their own health  - Refer to Case Management Department for coordinating discharge planning if the patient needs post-hospital services based on physic

## 2020-11-18 PROCEDURE — 99232 SBSQ HOSP IP/OBS MODERATE 35: CPT | Performed by: NURSE PRACTITIONER

## 2020-11-18 NOTE — CM/SW NOTE
SW contacted WANG/Dena and requested clinical updates be sent to [facility] via [employer] Stephani Hemphill. Per RN rounds, still discussing possibility of knee surgery. Pt is remains on Bumex Drip.     PLAN: [facility], pend med clear    SW/CM to remain available fo

## 2020-11-18 NOTE — PLAN OF CARE
Problem: Patient Centered Care  Goal: Patient preferences are identified and integrated in the patient's plan of care  Description: Interventions:  - What would you like us to know as we care for you?  \"I had surgery on my knee\"  - Provide timely, compl hematoma  - Assess quality of pulses, skin color and temperature  - Assess for signs of decreased coronary artery perfusion - ex.  Angina  - Evaluate fluid balance, assess for edema, trend weights  Outcome: Progressing     Problem: GENITOURINARY - ADULT  Go coordinating discharge planning if the patient needs post-hospital services based on physician/LIP order or complex needs related to functional status, cognitive ability or social support system  Outcome: Progressing

## 2020-11-18 NOTE — PROGRESS NOTES
Cardiology Progress Note  11/18/2020    S:  Continues to diuresis well on bumex gtt, she is up to the chair. She is anxious to know the plan with her knee. No chest pain, SOB.      O:   Blood pressure 129/55, pulse 59, temperature 98.3 °F (36.8 °C), tempe Oral Nightly   • isosorbide dinitrate  10 mg Oral TID (Nitrates)   • vancomycin HCl  125 mg Oral Daily   • meropenem  500 mg Intravenous Q12H   • lidocaine-menthol  1 patch Transdermal Daily   • aspirin  81 mg Oral Daily   • atorvastatin  40 mg Oral Nightl patent stent in the proximal segment, supplies 2 diagonals which are diffusely diseased  · Cx is patent and severe diffuse disease proximally, 80 to 90% with a small obtuse marginal branch, supplies 2 OM branches which are patent  · RCA is dominant and sev Given that she is optimized from a HF standpoint, it would be beneficial if they could proceed with surgery while she is inpatient. They have been officially placed on consult. Additionally Plavix has been held, so last dose was 11/17/2020.  She remains on

## 2020-11-18 NOTE — PHYSICAL THERAPY NOTE
PHYSICAL THERAPY TREATMENT NOTE - INPATIENT     Room Number: 655/344-S       Presenting Problem: ischemic cardiomyopathy s/p cath    Problem List  Active Problems:    Ischemic cardiomyopathy    Chronic kidney disease (CKD), stage IV (severe) (HCC)    HFrEF on prior right knee sx . Pt currently admitted for cardiomyopathy and HF with cardiology team following pt from and rehab facility . Per MD plans to stop Bumex drip. Pt denies any SOB this session.       Pt with generalized weakness throughout , righ spirtual care visit when offered   \" My  calls me all the time and we talk         OBJECTIVE  Precautions: Knee immobilizer;Limb alert - right;Cardiac;Bed/chair alarm    WEIGHT BEARING RESTRICTION  Weight Bearing Restriction: R lower extremity set;Bracing education provided    CURRENT GOALS     CURRENT GOALS     Goals to be met by: 11/20/20  Patient Goal Patient's self-stated goal is: to go home   Goal #1 Patient is able to demonstrate supine - sit EOB @ level: minimum assistance      Goal #1

## 2020-11-18 NOTE — PLAN OF CARE
Problem: Patient Centered Care  Goal: Patient preferences are identified and integrated in the patient's plan of care  Description: Interventions:  - What would you like us to know as we care for you?  I live home alone  - Provide timely, complete, and ac RN  Outcome: Progressing     Problem: CARDIOVASCULAR - ADULT  Goal: Maintains optimal cardiac output and hemodynamic stability  Description: INTERVENTIONS:  - Monitor vital signs, rhythm, and trends  - Monitor for bleeding, hypotension and signs of decreas Consider post-discharge preferences of patient/family/discharge partner  - Complete POLST form as appropriate  - Assess patient's ability to be responsible for managing their own health  - Refer to Case Management Department for coordinating discharge plan

## 2020-11-19 PROCEDURE — 99232 SBSQ HOSP IP/OBS MODERATE 35: CPT | Performed by: INTERNAL MEDICINE

## 2020-11-19 NOTE — PROGRESS NOTES
[facility]    Progress Note    Geoff Kumar Patient Status:  Inpatient    1945 MRN [id number]   Location [facility] 3W/SW Attending Roseline Shaw, [address] Day # 8 PCP Madie Brush MD       Subjective:   Geoff Kumar abnormal bruising noted  Back/Spine: no abnormalities noted  Musculoskeletal: full ROM all extremities good strength  no deformities  Extremities: 2+ edema but better  Neurological:  Grossly normal    Results:     Laboratory Data:  Lab Results   Component

## 2020-11-19 NOTE — PROGRESS NOTES
[facility]    Progress Note    Thea Mclean Patient Status:  Inpatient    1945 MRN [id number]   Location [facility] 3W/SW Attending Valerie Willingham, [address] [facility] Day # 8 PCP Stacy Radford MD       Subjective:   Thea Mclean mg, Oral, Q4H PRN    •  Loperamide HCl (IMODIUM) cap 2 mg, 2 mg, Oral, PRN    •  PEG 3350 (MIRALAX) powder packet 17 g, 17 g, Oral, Daily PRN    •  traZODone HCl (DESYREL) tab 50 mg, 50 mg, Oral, Nightly PRN    •  sucralfate (CARAFATE) tab 1 g, 1 g, Oral, B12 1,087 (H) 06/18/2020       No results found.         Results:     CBC:    Lab Results   Component Value Date    WBC 5.6 11/18/2020    WBC 5.8 11/17/2020    WBC 4.9 11/16/2020     Lab Results   Component Value Date    HGB 8.8 (L) 11/18/2020    HGB 8.8

## 2020-11-19 NOTE — OCCUPATIONAL THERAPY NOTE
OCCUPATIONAL THERAPY TREATMENT NOTE - INPATIENT        Room Number: 315/315-A                Problem List  Active Problems:    Ischemic cardiomyopathy    Chronic kidney disease (CKD), stage IV (severe) (HCC)    HFrEF (heart failure with reduced ejection fr Short Form  How much help from another person does the patient currently need…  -   Putting on and taking off regular lower body clothing?: A Lot  -   Bathing (including washing, rinsing, drying)?: A Lot  -   Toileting, which includes using toilet, bedpan

## 2020-11-19 NOTE — PROGRESS NOTES
Spoke with patient on the phone. Informed her that Dr. Brooklyn Galindo in the [address] [place] [address] today [place] tomorrow. He would like to do telehealth with her tomorrow at 1:30pm to discuss surgical risks and plan of care for her knee.      Flora expressed

## 2020-11-19 NOTE — PROGRESS NOTES
[facility]    Progress Note    Smauel Hatch Patient Status:  Inpatient    1945 MRN [id number]   Location [facility] 3W/SW Attending Bogdan Verduzco, [address] [facility] Day # 9 PCP Theodore Camacho MD       Subjective:   Samuel Hatch Nightly    •  folic acid (FOLVITE) tab 1 mg, 1 mg, Oral, Daily    •  HYDROmorphone HCl (DILAUDID) tab 2 mg, 2 mg, Oral, Q4H PRN    •  Loperamide HCl (IMODIUM) cap 2 mg, 2 mg, Oral, PRN    •  PEG 3350 (MIRALAX) powder packet 17 g, 17 g, Oral, Daily PRN    • ESRML 10 10/21/2020    CRP <0.29 10/21/2020    MG 1.8 11/19/2020    PHOS 1.9 (L) 11/19/2020    CK 38 08/27/2020    B12 1,087 (H) 06/18/2020       No results found.         Results:     CBC:    Lab Results   Component Value Date    WBC 5.6 11/18/2020    WBC

## 2020-11-19 NOTE — PROGRESS NOTES
Cardiology Progress Note  11/18/2020    S:  No acute overnight events  No chest pain  No SOB    O:   Blood pressure (P) 119/51, pulse (P) 62, temperature (P) 98.1 °F (36.7 °C), temperature source (P) Oral, resp.  rate (P) 18, weight 188 lb 4.8 oz (85.4 kg), lidocaine-menthol  1 patch Transdermal Daily   • aspirin  81 mg Oral Daily   • atorvastatin  40 mg Oral Nightly   • folic acid  1 mg Oral Daily   • sucralfate  1 g Oral TID AC and HS   • Insulin Aspart Pen  1-7 Units Subcutaneous TID CC           Patient A 90% with a small obtuse marginal branch, supplies 2 OM branches which are patent  · RCA is dominant and severely diseased, diffuse, supplies PDA and PL     Right femoral vein accessed . 7 FR sheath placed.   [place] advanced to RA and pressures recorded in RA

## 2020-11-19 NOTE — PROGRESS NOTES
[facility]    Progress Note    Scarlet Heading Patient Status:  Inpatient    1945 MRN [id number]   Location [facility] 3W/SW Attending Tianna Conley, [address] [facility] Day # 9 PCP Monster Macias MD       Subjective:   Scarlet Heading present, no abnormal bruising noted  Back/Spine: no abnormalities noted  Musculoskeletal: full ROM all extremities good strength  no deformities  Extremities 1-2+ edema chronic  Neurological:  Grossly normal    Results:     Laboratory Data:  Lab Results

## 2020-11-19 NOTE — PLAN OF CARE
Problem: Patient Centered Care  Goal: Patient preferences are identified and integrated in the patient's plan of care  Description: Interventions:  - What would you like us to know as we care for you?   - Provide timely, complete, and accurate informatio pulses, skin color and temperature  - Assess for signs of decreased coronary artery perfusion - ex.  Angina  - Evaluate fluid balance, assess for edema, trend weights  Outcome: Progressing     Problem: SAFETY ADULT - FALL  Goal: Free from fall injury  Descr retention protocol/standard of care  - Consider collaborating with pharmacy to review patient's medication profile  - Implement strategies to promote bladder emptying  Outcome: Progressing    Patient restarted on Bumex po and metolazone by Dr Jeison Douglass.  Pt had

## 2020-11-20 PROCEDURE — 99232 SBSQ HOSP IP/OBS MODERATE 35: CPT | Performed by: NURSE PRACTITIONER

## 2020-11-20 NOTE — CM/SW NOTE
SW sent available clinical updates to [facility] via [employer] for review. Per chart review, plan for Telehealth call w/ Dr. Iwona Tompkins, pt & pt's dtr today at 1:30PM to discuss surgical risks for pt.     PLAN: [facility], pending pt's clinical course

## 2020-11-20 NOTE — PLAN OF CARE
Problem: Patient Centered Care  Goal: Patient preferences are identified and integrated in the patient's plan of care  Description: Interventions:  - What would you like us to know as we care for you?   - Provide timely, complete, and accurate informatio pulses, skin color and temperature  - Assess for signs of decreased coronary artery perfusion - ex.  Angina  - Evaluate fluid balance, assess for edema, trend weights  Outcome: Progressing     Problem: SAFETY ADULT - FALL  Goal: Free from fall injury  Descr retention protocol/standard of care  - Consider collaborating with pharmacy to review patient's medication profile  - Implement strategies to promote bladder emptying  Outcome: Progressing    Patient given a bath before bedtime.  Given Dilaudid for knee shanon

## 2020-11-20 NOTE — PROGRESS NOTES
[place] [facility] - [facility]    Progress Note    Fatuma Santiago Patient Status:  Inpatient    1945 MRN [id number]   Location [facility] 3W/SW Attending Sonya Veliz, [address] [facility] Day # 10 PCP Jones Espinal MD       Subjective:   [address] mg, Oral, Q4H PRN    •  Loperamide HCl (IMODIUM) cap 2 mg, 2 mg, Oral, PRN    •  PEG 3350 (MIRALAX) powder packet 17 g, 17 g, Oral, Daily PRN    •  traZODone HCl (DESYREL) tab 50 mg, 50 mg, Oral, Nightly PRN    •  sucralfate (CARAFATE) tab 1 g, 1 g, Oral, B12 1,087 (H) 06/18/2020       No results found.         Results:     CBC:    Lab Results   Component Value Date    WBC 5.6 11/18/2020    WBC 5.8 11/17/2020    WBC 4.9 11/16/2020     Lab Results   Component Value Date    HGB 8.8 (L) 11/18/2020    HGB 8.8 (L

## 2020-11-20 NOTE — PROGRESS NOTES
[place] [facility] - [facility]    Progress Note    Dheeraj Fearing Patient Status:  Inpatient    1945 MRN [id number]   Location [facility] 3W/SW Attending Nissa Green, [address] [facility] Day # 10 PCP Jennie Wright MD        Subjective:     Liss Rivera decompensated HF standpoint  - Needs revascularization of cirx. PCI unlikely to be done until her surgery is completed and she can be on antiplatelets without interruption.  Minimize interruption due to LAD 3mm ANGELO PCI 6/22/2020   -she is optimized from a H

## 2020-11-21 PROCEDURE — 99232 SBSQ HOSP IP/OBS MODERATE 35: CPT | Performed by: NURSE PRACTITIONER

## 2020-11-21 NOTE — PROGRESS NOTES
[facility]    Progress Note    Rehana Ramsess Patient Status:  Inpatient    1945 MRN [id number]   Location [facility] 3W/SW Attending Maryln Denver, [address] [facility] [address] Day # 10 PCP Darinel Schroeder MD       Subjective:   Eligio Kirk noted  Back/Spine: no abnormalities noted  Musculoskeletal: full ROM all extremities good strength  no deformities  Extremities: 2+ edema chronic  Neurological:  Grossly normal    Results:     Laboratory Data:  Lab Results   Component Value Date    WBC 5.6

## 2020-11-21 NOTE — CM/SW NOTE
Received notice that pt is medically cleared for d/c today. NIRALI contacted Leticia rodriguez/ [facility] - they can accept pt today at [address] [facility] sent requested COVID screening form to [phone] [facility] via fax. NIRALI updated pt's RN and Dr. Dwayne Jeong via [employer] messenger.  Rafat Bee

## 2020-11-21 NOTE — PROGRESS NOTES
Cardiology Progress Note  11/21/2020    S:  No acute overnight events  Patient did meet over the phone with Dr. Salvador Castaneda yesterday, reports he is planning to do her surgery on 12/7.    No chest pain  No SOB    O:   Blood pressure 113/60, pulse 61, temperature dinitrate  10 mg Oral TID (Nitrates)   • vancomycin HCl  125 mg Oral Daily   • lidocaine-menthol  1 patch Transdermal Daily   • aspirin  81 mg Oral Daily   • atorvastatin  40 mg Oral Nightly   • folic acid  1 mg Oral Daily   • sucralfate  1 g Oral TID AC a diffusely diseased  · Cx is patent and severe diffuse disease proximally, 80 to 90% with a small obtuse marginal branch, supplies 2 OM branches which are patent  · RCA is dominant and severely diseased, diffuse, supplies PDA and PL     Right femoral vein a of last PCI    Plan discussed with nursing staff    SERGEI Patel  11/21/2020

## 2020-11-21 NOTE — PLAN OF CARE
PO Dilaudid administered for R knee pain and Trazadone for sleep. R knee immobilizer and crespo in place. Anticipating d/c to [facility] Saturday.      Problem: Patient Centered Care  Goal: Patient preferences are identified and integrated in the patient's p effectiveness of vasoactive medications to optimize hemodynamic stability  - Monitor arterial and/or venous puncture sites for bleeding and/or hematoma  - Assess quality of pulses, skin color and temperature  - Assess for signs of decreased coronary artery retention  Description: INTERVENTIONS:  - Assess patient’s ability to void and empty bladder  - Monitor intake/output and perform bladder scan as needed  - Follow urinary retention protocol/standard of care  - Consider collaborating with pharmacy to review

## 2020-11-21 NOTE — PROGRESS NOTES
[facility]  Nephrology Daily Progress Note    Mal Holloway  [id number]  67year old      HPI:   Mal Holloway is a 67year old female. Feeling OK. Eating. No CP or SOB.        ROS:     Constitutional:  Negative for decreased activity Component Value Date    WBC 5.7 11/21/2020    HGB 8.8 11/21/2020    HCT 27.7 11/21/2020    .0 11/21/2020    CREATSERUM 3.06 11/21/2020    BUN 78 11/21/2020     11/21/2020    K 3.8 11/21/2020    CL 93 11/21/2020    CO2 37.0 11/21/2020    GLU (LIPITOR) tab 40 mg, 40 mg, Oral, Nightly  •  folic acid (FOLVITE) tab 1 mg, 1 mg, Oral, Daily  •  HYDROmorphone HCl (DILAUDID) tab 2 mg, 2 mg, Oral, Q4H PRN  •  Loperamide HCl (IMODIUM) cap 2 mg, 2 mg, Oral, PRN  •  PEG 3350 (MIRALAX) powder packet 17 g, 11/21/2020 5513  Gross per 24 hour   Intake 1130 ml   Output 2100 ml   Net -970 ml          ASSESSMENT/PLAN:   Assessment   Patient Active Problem List:     Hypertension     Kidney disorder     Cardiomyopathy ([facility] [address]     S/P knee replacement     Patellar ten

## 2020-11-21 NOTE — PLAN OF CARE
Patient cleared for DC Neph/cards cleared pt. IV  & tele removed, report called to Dasha, discharge instructions provided. Arguelles removed per protocol, pt voided post removal 150cc, Dasha updated. Med list faxed per RN.    Problem: Patient [address] decreased cardiac output  - Evaluate effectiveness of vasoactive medications to optimize hemodynamic stability  - Monitor arterial and/or venous puncture sites for bleeding and/or hematoma  - Assess quality of pulses, skin color and temperature  - Assess f ADULT  Goal: Absence of urinary retention  Description: INTERVENTIONS:  - Assess patient’s ability to void and empty bladder  - Monitor intake/output and perform bladder scan as needed  - Follow urinary retention protocol/standard of care  - Consider colla

## 2020-11-21 NOTE — DISCHARGE SUMMARY
[place] [facility] - [facility]    Discharge Summary    Dheeraj Fearing Patient Status:  Inpatient    1945 MRN [id number]   [facility] 3W/SW Attending Nissa Green, [address] [facility] Day # 11 PCP Jennie Wright MD     Date of Admission:  ([facility] [address]     RUEL (acute kidney injury) ([address]     Ischemic cardiomyopathy     Chronic kidney disease (CKD), stage IV (severe) (HCC)     HFrEF (heart failure with reduced ejection fraction) ([facility] [address]     Secondary hyperparathyroidism ([address]      Reason for Admission Mod-Severe TR   Severely elevated LVEDP and moderately reduced cardiac index. Secondary PHTN, with low DPG, low PVR       - Per Cardiology - pushing for spacer removal while in house.  Dr Fay Gee consulted for possible surgery - Cannot be done until after T diseased, diffuse, supplies PDA and PL     Right femoral vein accessed . 7 FR sheath placed.   [place] advanced to RA and pressures recorded in RA, RV, PA and wedge positions under fluoroscopic and hemodynamic guidance.       RA 14 mmHg  RV 58/14 mmHg  PA 61/ Tabs  Commonly known as: ZAROXOLYN  Start taking on: November 23, 2020  What changed: when to take this      Take 1 tablet (2.5 mg total) by mouth 3 (three) times a week.    Quantity:    Refills: 0        CONTINUE taking these medications      Instructions breakfast.   Refills: 0     ondansetron 4 MG Tbdp  Commonly known as: ZOFRAN-ODT      Take 1 tablet (4 mg total) by mouth every 8 (eight) hours as needed.    Refills: 0     PEG 3350 17 g Pack  Commonly known as: MIRALAX      Take 17 g by mouth daily as need

## 2020-11-23 NOTE — PROGRESS NOTES
1st attempt SCC/HF apt request    [facility]  [address]  [phone]  [address]    Patient d/c to [place]. Will schedule once patient is d/c from from there.

## 2020-12-05 PROBLEM — D64.9 SEVERE ANEMIA: Status: ACTIVE | Noted: 2020-01-01

## 2020-12-05 PROBLEM — K92.2 GASTROINTESTINAL HEMORRHAGE: Status: ACTIVE | Noted: 2020-01-01

## 2020-12-05 PROBLEM — K92.2 GASTROINTESTINAL HEMORRHAGE, UNSPECIFIED GASTROINTESTINAL HEMORRHAGE TYPE: Status: ACTIVE | Noted: 2020-01-01

## 2020-12-05 PROBLEM — N17.9 AKI (ACUTE KIDNEY INJURY) (HCC): Status: ACTIVE | Noted: 2020-01-01

## 2020-12-05 NOTE — ED INITIAL ASSESSMENT (HPI)
Pt presents to ED via EMS for N/V x2. Pt has not taken any of her meds or ate anything today. Pt is a type 2 diabetic and has not been taking insulin. Blood sugar for medics was 243. Pt A&Ox3.

## 2020-12-05 NOTE — ANESTHESIA POSTPROCEDURE EVALUATION
Patient: Liv Valerio    Procedure Summary     Date: 12/05/20 Room / Location: [facility] ENDOSCOPY 01 / [facility] ENDOSCOPY    Anesthesia Start: 5430 Anesthesia Stop: 2584    Procedure: ESOPHAGOGASTRODUODENOSCOPY (EGD) with epi injection, heater probe and dura clip

## 2020-12-05 NOTE — PROGRESS NOTES
ADMISSION NOTE    67year old female with h/o infected TKA s/p antibiotic spacer placement with plan for redo on 12/7 presents from nursuing facility with weakness poor po intake and hypotension.   Patient also reports emesis of dark foul smelling liquid fo

## 2020-12-05 NOTE — PLAN OF CARE
Double RN skin check done prior to transfer off Unit. Skin check performed by this RN and Sergio RN.      Wounds are as follows: redness on sacrum, clean/dry/intact; scab on right knee; immobilizer on r knee s/p r knee revision with abx spacer    Will remain a

## 2020-12-05 NOTE — PROGRESS NOTES
3rd unit of PRBC was finished and well tolerated by patient with no blood reaction. Blood was drawn for CBC and the BMP for the morning order after the blood was finished and sent to lab. . Will endorsed patient to morning shift.

## 2020-12-05 NOTE — OPERATIVE REPORT
[facility] Endoscopy Report      Preoperative Diagnosis:  - upper GI bleed      Postoperative Diagnosis:  - duodenal AVM actively bleeding/endoscopically treated  - hiatal hernia      Procedure:   Esophagogastroduodenoscopy       Surgeon: bleeding/endoscopically treated  - hiatal hernia    Recommendations:  - Post procedure instructions given  - Follow hgb and monitor for rebleeding  - Clear liquid diet tonight  - Advance diet in AM if hgb stable  - Continue on PPI daily           Valery Feliciano

## 2020-12-05 NOTE — PLAN OF CARE
Pt was disimpacted, large amount of black stool; emesis x1  Drowsy/lethargic; oriented x4 when fully awake  S/p EGD, hgb 7.9-->7.6  VSS    Problem: Patient Centered Care  Goal: Patient preferences are identified and integrated in the patient's plan of care weights  Outcome: Progressing  Goal: Absence of cardiac arrhythmias or at baseline  Description: INTERVENTIONS:  - Continuous cardiac monitoring, monitor vital signs, obtain 12 lead EKG if indicated  - Evaluate effectiveness of antiarrhythmic and heart rat over-consumption  - Identify factors contributing to increased intake, treat as appropriate  - Monitor I&O, WT and lab values  - Obtain nutritional consult as needed  - Evaluate psychosocial factors contributing to over-consumption  Outcome: Progressing Assess and document skin integrity  - Monitor for areas of redness and/or skin breakdown  - Initiate interventions, skin care algorithm/standards of care as needed  Outcome: Progressing     Problem: HEMATOLOGIC - ADULT  Goal: Maintains hematologic stabilit

## 2020-12-05 NOTE — H&P
History & Physical Examination    Patient Name: Brigida Stoddard  MRN: [id number]  CSN: [id number]  YOB: 1945    Diagnosis: upper GI bleed  Hematemesis     Procedure discussed with the patient's son Yogi Stewart as well as the patient.   Risks and herminio Oral Tab EC, Take 1 tablet (81 mg total) by mouth daily. , Disp: 30 tablet, Rfl: 11, 12/4/2020 at Unknown time    •  Ferrous Sulfate 325 (65 Fe) MG Oral Tab, Take 1 tablet (325 mg total) by mouth 3 (three) times daily. , Disp: 90 tablet, Rfl: 1    •  folic a Rfl: 0, Unknown at Unknown time    •  Loperamide HCl (IMODIUM A-D) 2 MG Oral Cap, Take 2 mg by mouth as needed for Diarrhea., Disp: , Rfl: , Unknown at Unknown time        •  glucose (DEX4) oral liquid 15 g, 15 g, Oral, Q15 Min PRN    Or    •  Glucose-Katia 2017    surgery   • Coronary atherosclerosis    • Diabetes ([facility] [address]     14 yrs   • Esophageal reflux    • High blood pressure    • High cholesterol    • History of blood transfusion 06/2020    @ EM   • History of GI bleed 08/2019   • History of stomach ulcer 8.00        Pack years: 4        Types: Cigarettes        Quit date: 10/11/1980        Years since quittin.1      Smokeless tobacco: Never Used    Alcohol use: No      Frequency: Never      SYSTEM Check if Review is Normal Check if Physical Exam is No

## 2020-12-05 NOTE — ED NOTES
Per Dr. Toscano Other ok to start blood at a rapid rate of 400ml/hr due to pt bp dropping to 66/39 after a liter of fluids and the pt becoming more unresponsive to this RN.

## 2020-12-05 NOTE — CONSULTS
[facility]    Report of Consultation    Date of Admission:  12/4/2020  Date of Consult:  12/5/2020   Reason for Consultation:     CKD IV-V     History of Present Illness:     Patient is a 67 yrs old male with pmh of DM II, HTN, HL, right kn reclusive, right thigh   • OTHER      removal kidney stone   • OTHER SURGICAL HISTORY      Cardiac stent placed 06/22/20   • PATELLA TENDON REPAIR Right 11/19/2018    Performed by Arslan Maurer MD at [facility] OR   • PRESSURE ULCER BOOT Right 03/2019   • T Subcutaneous, Q6H [address]    •  0.9% NaCl infusion, , Intravenous, Once      •  [START ON 12/7/2020] clonidine/epinephrine/ropivacaine/ketorolac (CERTS) pain cocktail, , Intra-articular, Once (Intra-Op)        Allergies    Hydrocodone             HIVES, NAUSEA Encounters:  12/05/20 : 166 lb 3.6 oz (75.4 kg)  12/04/20 : 186 lb (84.4 kg)  11/21/20 : 186 lb 4.8 oz (84.5 kg)  10/21/20 : 216 lb (98 kg)  10/01/20 : 214 lb (97.1 kg)      General appearance: alert, appears stated age and cooperative  Head: Normocephalic disproportionate secondary to GIB  - hold diuretics   - high risk of progressive CKD and ESRD requiring dialysis   - avoid nephrotoxins   - I/so and daily weight   - check serum phos and mag  - check serum albumin - start on daily nepro when orally allowed

## 2020-12-05 NOTE — ANESTHESIA PREPROCEDURE EVALUATION
Anesthesia PreOp Note    HPI:     Zara Toney is a 67year old female who presents for preoperative consultation requested by: Eloina Carreon MD    Date of Surgery: 12/4/2020 - 12/5/2020    Procedure(s):  ESOPHAGOGASTRODUODENOSCOPY (EGD)  Indication present         Date Noted: 03/07/2019      Congestive heart failure [facility])         Date Noted: 03/07/2019      History of urinary stone         Date Noted: 03/07/2019      Muscle weakness         Date Noted: 03/07/2019      Patellar tendon rupture, right, by Chan Gutierrez DPM at [facility] OR   • [address]   • INJ TENDON/LIGAMENT/CYST Right 01/2019   • KNEE TOTAL REPLACEMENT Right 10/29/2018    Performed by Keenan Olivas MD at [facility] OR   • [address] Right 8/3/2020    Performed by Rfl: 0    •  PEG 3350 17 g Oral Powd Pack, Take 17 g by mouth daily as needed. , Disp: , Rfl: 0    •  Ascorbic Acid (VITAMIN C) 1000 MG Oral Tab, Take 1,000 mg by mouth daily. , Disp: , Rfl:     •  atorvastatin 40 MG Oral Tab, Take 1 tablet (40 mg total) by every 8 (eight) hours as needed. , Disp: , Rfl: 0    •  Acetaminophen 500 MG Oral Cap, Take 1-2 capsules (500-1,000 mg total) by mouth every 6 (six) hours as needed for Pain., Disp: 60 capsule, Rfl: 2, Unknown at Unknown time    •  traZODone HCl 50 MG Oral clonidine/epinephrine/ropivacaine/ketorolac (CERTS) pain cocktail, , Intra-articular, Once (Intra-Op), Arslan Maurer MD    No current [facility]-ordered outpatient medications on file.         Hydrocodone             HIVES, NAUSEA AND VOMITING    Comment:Tolerat Minutes per session: Not on file      Stress: Not on file    Relationships      Social connections        Talks on phone: Not on file        Gets together: Not on file        Attends [employer] service: Not on file        Active member of club or organizati Anesthesia Evaluation     Patient summary reviewed and Nursing notes reviewed    No history of anesthetic complications   Airway   Comment: + pt sleepy, unable to comply with physical exam  Dental      Pulmonary - normal exam     ROS comment: + form

## 2020-12-05 NOTE — H&P
[facility]    PATIENT'S NAME: Apple Kranthi   ATTENDING PHYSICIAN: Loco Uriostegui MD   PATIENT ACCOUNT#:   [de-identified]    LOCATION:  [address] [place] [address] #:   [id number]       YOB: 1945  ADMISSION DATE: record, the patient has had anemia in the past and underwent EGD and colonoscopy in 2019. At that time, she was found to have colonic polyps, hiatal hernia, colonic diverticulosis, and internal hemorrhoids. She received IV fluids.   A central line was jossy placement in August as well as a course of IV antibiotics; cystoscopy; lithotripsy; hysterectomy; cataract surgery the patient is scheduled for right knee revision with antibiotic spacer removal and reimplantation of prosthesis on December 6.      Lorna Gao Illness. PHYSICAL EXAMINATION:    VITAL SIGNS:  When the patient arrived in the PCU, her blood pressure was 105/57, temperature 96.3, pulse 73, respiratory rate 18, O2 saturation 100% on room air. HEENT:  Normocephalic, atraumatic.   Some temporal was is intravascularly volume depleted. We will hold on diuretics at this point. Source of bleeding most likely gastrointestinal tract.   Gastroenterology has been consulted through the emergency room and will evaluate and scope once she is stabilized after b Yvette Ambrosio MD

## 2020-12-05 NOTE — CONSULTS
[facility]  Report of Consultation    Scarlet Heading Patient Status:  Inpatient    1945 MRN [id number]   Location [facility] 2W/SW Attending Olivia Fitch MD   Hosp Day # 0 PCP Monster Macias MD     Reason for Consultation:  - vo • EXTREMITY LOWER IRRIGATION & DEBRIDEMENT Right 6/25/2019    Performed by Ellyn Francis DPM at [address] TENDON/LIGAMENT/CYST Right 01/2019   • KNEE TOTAL REPLACEMENT Right 10/29/2018    Performed by Floridalma Cazares liquid 15 g, 15 g, Oral, Q15 Min PRN **OR** Glucose-Vitamin C (DEX-4) chewable tab 4 tablet, 4 tablet, Oral, Q15 Min PRN **OR** dextrose 50 % injection 50 mL, 50 mL, Intravenous, Q15 Min PRN **OR** glucose (DEX4) oral liquid 30 g, 30 g, Oral, Q15 Min PRN * initial encounter     S/P revision of total knee     Ulcer of heel (HCC)     Anemia     Artificial knee joint present     Carpal tunnel syndrome     Cataract     Congestive heart failure ([facility] [address]     History of urinary stone     Hyperlipidemia     Iron defici AM

## 2020-12-05 NOTE — ED NOTES
Pt becoming hard to arouse. Dr. Jigar Duncan aware. Blood consent signed by pt son Chavez Mccullough. Awaiting blood from blood bank.

## 2020-12-05 NOTE — PLAN OF CARE
Problem: Patient Centered Care  Goal: Patient preferences are identified and integrated in the patient's plan of care  Description: Interventions:  - What would you like us to know as we care for you?   - Provide timely, complete, and accurate informatio monitoring, monitor vital signs, obtain 12 lead EKG if indicated  - Evaluate effectiveness of antiarrhythmic and heart rate control medications as ordered  - Initiate emergency measures for life threatening arrhythmias  - Monitor electrolytes and administe nutritional consult as needed  - Evaluate psychosocial factors contributing to over-consumption  Outcome: Progressing     Problem: METABOLIC/FLUID AND ELECTROLYTES - ADULT  Goal: Glucose maintained within prescribed range  Description: INTERVENTIONS:  - Mo algorithm/standards of care as needed  Outcome: Progressing     Problem: HEMATOLOGIC - ADULT  Goal: Maintains hematologic stability  Description: INTERVENTIONS  - Assess for signs and symptoms of bleeding or hemorrhage  - Monitor labs and vital signs for t

## 2020-12-05 NOTE — PROGRESS NOTES
Per request of Primary RN, have started a unit of blood for Ms. Jones after confirming her identity, scanning the unit and changing the tubing. Remaining with pt for initial 15 minutes for safety. Pt c/o being \"cold\" and \"having a pain in my rectum\".

## 2020-12-05 NOTE — ED PROVIDER NOTES
Patient Seen in: [facility] AND [facility] Emergency Department    History   Patient presents with:  Nausea/Vomiting/Diarrhea  Poor Feed Anorexia  Constipation      HPI    Patient presents to the ED with her son for symptoms of poor appetite over the last week w • OTHER      removal kidney stone   • OTHER SURGICAL HISTORY      Cardiac stent placed 06/22/20   • PATELLA TENDON REPAIR Right 11/19/2018    Performed by Rosemary Zurita MD at [address] [place] [address]   • PRESSURE ULCER BOOT Right 03/2019   • TMJ ARTHROSCOPY DEBRIDEM infection transmission during my interaction with the patient were taken. The patient was already wearing a droplet mask on my arrival to the room.  Personal protective equipment including droplet mask, eye protection, and gloves were worn throughout the du HCT 14.0 (*)     RDW-SD 56.6 (*)     RDW 18.1 (*)     Monocyte Absolute 1.03 (*)     All other components within normal limits   RAPID SARS-COV-2 BY PCR - Normal   CBC WITH DIFFERENTIAL WITH PLATELET    Narrative:      The following orders were created ABDOMEN    COMPARISON: Correlation CT abdomen pelvis 9/18/2020    IMPRESSION:    The bowel gas pattern is unremarkable. Moderate stool ball in the rectum. Splenic arterial calcifications noted stump iliac and femoral vascular calcification noted.   Calc reviewed those reports. Complicating Factors: The patient already has does not have any pertinent problems on file. to contribute to the complexity of this ED evaluation.     ED Course: Patient presents to the ED for decreased appetite for the past week department: Stable    Disposition and Plan     Clinical Impression:  Gastrointestinal hemorrhage, unspecified gastrointestinal hemorrhage type  (primary encounter diagnosis)  Severe anemia  RUEL (acute kidney injury) ([facility] [address]    Disposition:  Admit    Follow-u

## 2020-12-05 NOTE — ED NOTES
Pt able to speak in full sentences and communicate better with this RN. Pt is easy to arouse. Pt states she is feeling better after the first blood transfusion.

## 2020-12-06 NOTE — PROGRESS NOTES
[facility]    Progress Note    Connor Rubalcava Patient Status:  Inpatient    1945 MRN [id number]   Location [facility] 4W/SW/SE Attending Joelle La MD   Hosp Day # 1 PCP Moraima Luz MD       Subjective:   No acute Inpatient Meds:      glucose **OR** Glucose-Vitamin C **OR** dextrose **OR** glucose **OR** Glucose-Vitamin C, Atropine Sulfate, nitroGLYCERIN, ondansetron HCl, Metoclopramide HCl    Results:     Recent Labs   Lab 12/04/20  2210 12/05/20  0709 12/05/20  07 Dictated by (CST): Dinora Alejandro MD on 12/05/2020 at 7:33 AM     Finalized by (CST): Dinora Alejandro MD on 12/05/2020 at 7:35 AM          Xr Chest Ap Portable  (cpt=71045)    Result Date: 12/5/2020  CONCLUSION:  1. No acute findings.     D place  -Ortho on consult  -Plan for OR on 12/7    Quality:  · PT/OT:  Ordered  · DVT Prophylaxis:  SCDs, bleeding risk  · CODE status: Full   · Dispo: per clinical course      Greater than 35 minutes spent, >50% spent counseling re: treatment plan and work

## 2020-12-06 NOTE — PROGRESS NOTES
Attempted eval, chart reviewed. Pt w/ h/o RT knee spacer placement 8.20, KI at all times, admitted w/ melena w/ Hgb 4.4 s/p blood transfusion and EGD 12.5, Pt just transferred out of ICU, plan is for RTKA 12.7.  Pt currently on bedrest w/ no WB status order

## 2020-12-06 NOTE — PLAN OF CARE
Problem: Patient Centered Care  Goal: Patient preferences are identified and integrated in the patient's plan of care  Description: Interventions:  - What would you like us to know as we care for you?  Im suppose to have surgery on my knee Monday  - Provi INTERVENTIONS:  - Continuous cardiac monitoring, monitor vital signs, obtain 12 lead EKG if indicated  - Evaluate effectiveness of antiarrhythmic and heart rate control medications as ordered  - Initiate emergency measures for life threatening arrhythmias Monitor I&O, WT and lab values  - Obtain nutritional consult as needed  - Evaluate psychosocial factors contributing to over-consumption  Outcome: Progressing     Problem: METABOLIC/FLUID AND ELECTROLYTES - ADULT  Goal: Glucose maintained within prescribed Initiate interventions, skin care algorithm/standards of care as needed  Outcome: Progressing     Problem: HEMATOLOGIC - ADULT  Goal: Maintains hematologic stability  Description: INTERVENTIONS  - Assess for signs and symptoms of bleeding or hemorrhage  -

## 2020-12-06 NOTE — OCCUPATIONAL THERAPY NOTE
OT orders generated from Functional Mobility Screen. Spoke with nurse. Pt currently on bedrest, NPO in anticipation of RT knee surgery tomorrow. Not is not appropriate for OT eval at this time.  Will discharge current order and await new post op orders as a

## 2020-12-06 NOTE — PLAN OF CARE
Patient's midnight Hgb 6.9, MD notified and 1 unit of PRBCs given. Patient converted to afib, HR rate in the 80s. MD notified, no new orders at this time. No bleeding noted overnight. No other complaints from patient.  Tolerating clear liquids, no nausea/vo to optimize hemodynamic stability  - Monitor arterial and/or venous puncture sites for bleeding and/or hematoma  - Assess quality of pulses, skin color and temperature  - Assess for signs of decreased coronary artery perfusion - ex.  Angina  - Evaluate flui values  - Obtain nutritional consult as needed  - Optimize oral hygiene and moisture  - Encourage food from home; allow for food preferences  - Enhance eating environment  Outcome: Progressing  Goal: Achieves appropriate nutritional intake (bariatric)  Ld intake as appropriate  - Instruct patient on fluid and nutrition restrictions as appropriate  Outcome: Progressing     Problem: SKIN/TISSUE INTEGRITY - ADULT  Goal: Skin integrity remains intact  Description: INTERVENTIONS  - Assess and document risk facto

## 2020-12-06 NOTE — PROGRESS NOTES
[facility]    Progress Note      Subjective:     Patient lying comfortably - ill appearing     Denies any sob or chest pain       Review of Systems:     Constitutional: weakness and tired  Eyes: negative for irritation, redness and visual mg, Sublingual, Q5 Min PRN    •  0.9% NaCl infusion, , Intravenous, Continuous    •  ondansetron HCl (ZOFRAN) injection 4 mg, 4 mg, Intravenous, Q6H PRN    •  Metoclopramide HCl (REGLAN) injection 5 mg, 5 mg, Intravenous, Q8H PRN    •  Pantoprazole Sodium 2.0 - 3.0 All indications except for mechanical prosthetic   cardiac valves. 2.5 - 3.5 Mechanical prosthetic cardiac valves. No results for input(s): BNP in the last 168 hours.   Recent Labs   Lab 12/05/20  0709   MG 2.7*   PHOS 3.1     Lab Resu on 12/05/2020 at 21:21 by Cherylene Allegra Assessment and Plan:      1.  RUEL on CKD stage III: non oliguric on diuretics   - required short term dialysis in past for vancomycin related RUEL   -  creatinine at 2.73 mg/dl in September with an eGFR 16 ml/mi

## 2020-12-06 NOTE — PROGRESS NOTES
[facility]     Gastroenterology Progress Note    [facility] Patient Status:  Inpatient    1945 MRN [id number]   Location [facility] 4W/SW/SE Attending Keven Hartman MD   Hosp Day # 1 PCP Lowell Garcia MD 12/06/20  0441   RBC 1.58* 2.68*  --   --   --  2.76*   HGB 4.4* 7.9*   < > 7.3* 6.9* 8.1*   HCT 14.0* 23.3*   < > 21.0* 20.6* 24.2*   MCV 88.6 86.9  --   --   --  87.7   MCH 27.8 29.5  --   --   --  29.3   MCHC 31.4 33.9  --   --   --  33.5   RDW 18.1* 15 12-lead    Result Date: 12/5/2020  ECG Report  Interpretation  -------------------------- Sinus Rhythm -First degree A-V block -Left bundle branch block.  ABNORMAL When compared with ECG of 08/09/2020 16:45:48 No significant changes have occurred Electronic

## 2020-12-07 NOTE — PROGRESS NOTES
[facility]  Nephrology Daily Progress Note    Diane Franco  [id number]  67year old      HPI:   Diane Franco is a 67year old female. Overall feeling okay. No chest pain or shortness of breath.   Ortho apparently has decided to postp cyanosis, or clubbing  Neurological: exam appropriate for age reflexes and motor skills appropriate for age    Labs:  Lab Results   Component Value Date    WBC 9.3 12/07/2020    HGB 7.9 12/07/2020    HCT 24.1 12/07/2020    .0 12/07/2020    CREATSERU MD on 12/05/2020 at 7:44 AM              Medications:    Current Facility-Administered Medications:   •  Meropenem (MERREM) 500 mg in sodium chloride 0.9% 100 mL MBP, 500 mg, Intravenous, Q12H  •  Potassium Chloride ER (K-DUR M20) CR tab 20 mEq, 20 mEq, Or hour   Intake 1024 ml   Output 950 ml   Net 74 ml          ASSESSMENT/PLAN:   Assessment   Patient Active Problem List:     Hypertension     Kidney disorder     Cardiomyopathy ([facility] [address]     S/P knee replacement     Patellar tendon rupture, right, initial encou

## 2020-12-07 NOTE — PHYSICAL THERAPY NOTE
PHYSICAL THERAPY EVALUATION - INPATIENT     Room Number: 455/455-A  Evaluation Date: 12/7/2020  Type of Evaluation: Initial   Physician Order: PT Eval and Treat    Presenting Problem: severe anemia, infected TKA, CHF, GI hemorrage  Reason for Therapy:  Mob Degree of Impairment: 86.62% has been calculated based on documentation in the [facility] '6 clicks' Inpatient Basic Mobility Short Form. Research supports that patients with this level of impairment may benefit from LTAC.  Recommending return to long term care was sent from the skilled facility to the emergency room today because of several days of nausea, vomiting, and weakness. The patient also was noted to be constipated and had received enemas at the skilled nursing facility.   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   She refused the Watchman device and has known anemia ESOPHAGOGASTRODUODENOSCOPY (EGD) N/A 6/12/2020    Performed by Ilda Blank MD at [address] [facility] Right 6/25/2019    Performed by Florentin Gamez DPM at [facility]   • INJ TENDON/L Turning over in bed (including adjusting bedclothes, sheets and blankets)?: A Lot   -   Sitting down on and standing up from a chair with arms (e.g., wheelchair, bedside commode, etc.): Unable   -   Moving from lying on back to sitting on the side of the b

## 2020-12-07 NOTE — CONSULTS
[facility]    Report of Consultation    Erinlg Espinalzaira Patient Status:  Inpatient    1945 MRN [id number]   Location [facility] 4W/SW/SE Attending Moishe Nageotte, MD   Hosp Day # 2 PCP Viri Mueller MD     Date of Admissio 6/12/2020    Performed by Shubham La MD at [address] [facility] Right 6/25/2019    Performed by Gabriele Cheung DPM at [facility] OR   • [address]   • INJ TENDON/LIGAMENT/CYST Right 01/2019   • KNEE T (DEX4) oral liquid 30 g, 30 g, Oral, Q15 Min PRN    Or    •  Glucose-Vitamin C (DEX-4) chewable tab 8 tablet, 8 tablet, Oral, Q15 Min PRN    •  Atropine Sulfate 0.1 MG/ML injection 0.5 mg, 0.5 mg, Intravenous, PRN    •  nitroGLYCERIN (NITROSTAT) SL tab 0.4 Take 1,000 mg by mouth daily. •  atorvastatin 40 MG Oral Tab, Take 1 tablet (40 mg total) by mouth nightly. •  aspirin 81 MG Oral Tab EC, Take 1 tablet (81 mg total) by mouth daily.     •  Ferrous Sulfate 325 (65 Fe) MG Oral Tab, Take 1 tablet (325 mg HIVES    Comment:Other reaction(s): Hives/Urticaria  Tramadol                NAUSEA AND VOMITING, HIVES    Comment:Other reaction(s): emesis  Aspirin                 NAUSEA AND VOMITING, RASH    Comment:Can tolerate low dose but not regular strength d/t is at significant risk of complications due to multiple comorbidies in addition to her anemia. Patient will be discharged and will schedule right knee fusion as an outpatient once medically optimized. Goal will be surgery in 2 weeks following discharge.  Pa

## 2020-12-07 NOTE — PLAN OF CARE
Pt is oriented x4, but continues to be very sleepy for most of the day. More alert and awake in evening. Pt was NPO pending any surgery, but surgery was cancelled. Dr. Khoi House aware. Ortho consulted. Pt now on GI soft diet, but minimal appetite.   Pt requ interventions  Outcome: Progressing     Problem: CARDIOVASCULAR - ADULT  Goal: Maintains optimal cardiac output and hemodynamic stability  Description: INTERVENTIONS:  - Monitor vital signs, rhythm, and trends  - Monitor for bleeding, hypotension and signs pharmacy to review patient's medication profile  Outcome: Progressing  Goal: Maintains adequate nutritional intake (undernourished)  Description: INTERVENTIONS:  - Monitor percentage of each meal consumed  - Identify factors contributing to decreased intak and symptoms of volume excess or deficit  - Monitor intake, output and patient weight  - Monitor urine specific gravity, serum osmolarity and serum sodium as indicated or ordered  - Monitor response to interventions for patient's volume status, including l nursing facility    Interventions:  - continue to monitor hemoglobin and vs monitoring  - See additional Care Plan goals for specific interventions  Outcome: Progressing     Problem: Patient/Family Goals  Goal: Patient/Family Long Term Goal  Description: P ADULT  Goal: Minimal or absence of nausea and vomiting  Description: INTERVENTIONS:  - Maintain adequate hydration with IV or PO as ordered and tolerated  - Nasogastric tube to low intermittent suction as ordered  - Evaluate effectiveness of ordered antiem Problem: GASTROINTESTINAL - ADULT  Goal: Achieves appropriate nutritional intake (bariatric)  Description: INTERVENTIONS:  - Monitor for over-consumption  - Identify factors contributing to increased intake, treat as appropriate  - Monitor I&O, WT and la fluid and nutrition restrictions as appropriate  Outcome: Progressing     Problem: SKIN/TISSUE INTEGRITY - ADULT  Goal: Skin integrity remains intact  Description: INTERVENTIONS  - Assess and document risk factors for pressure ulcer development  - Assess a

## 2020-12-07 NOTE — PLAN OF CARE
Monitoring patient's hemoglobin q8, stable. No signs of bleeding noted overnight. No other complaints from patient. NPO at midnight. R knee immobilizer in place. Incontinent of urine, purewick in place. Plan for possible right knee surgery today.  Will cont Assess quality of pulses, skin color and temperature  - Assess for signs of decreased coronary artery perfusion - ex.  Angina  - Evaluate fluid balance, assess for edema, trend weights  Outcome: Progressing  Goal: Absence of cardiac arrhythmias or at baseli for food preferences  - Enhance eating environment  Outcome: Progressing  Goal: Achieves appropriate nutritional intake (bariatric)  Description: INTERVENTIONS:  - Monitor for over-consumption  - Identify factors contributing to increased intake, treat as Problem: SKIN/TISSUE INTEGRITY - ADULT  Goal: Skin integrity remains intact  Description: INTERVENTIONS  - Assess and document risk factors for pressure ulcer development  - Assess and document skin integrity  - Monitor for areas of redness and/or skin b

## 2020-12-07 NOTE — PHYSICAL THERAPY NOTE
Chart reviewed pt and spoke with RN, Abhishek Arreola. Pt is still on bedrest, awaiting ortho consult for potential TKA revision today and weight bearing status.  Will re-attempt later today if pt's activity orders are updated, weight bearing status is clarified an

## 2020-12-07 NOTE — CM/SW NOTE
12/7: Patient is from [facility] ([phone]). Per [facility] liaison, patient has transitioned to long term care & NH applied for PA. Updates sent via [employer], will return once stable.     Addendum 12/8/2020:    Patient discussed likely discharg

## 2020-12-07 NOTE — PROGRESS NOTES
[facility]    Progress Note    Zula Abo Patient Status:  Inpatient    1945 MRN [id number]   Location [facility] 4W/SW/SE Attending Jani Osei MD   Hosp Day # 2 PCP Frances Ovalles MD       Subjective:   No acute Pen  1-5 Units Subcutaneous TID CC       Current PRN Inpatient Meds:      glucose **OR** Glucose-Vitamin C **OR** dextrose **OR** glucose **OR** Glucose-Vitamin C, Atropine Sulfate, nitroGLYCERIN, ondansetron HCl, Metoclopramide HCl    Results:     Recent Result Value Ref Range    Urine Culture >100,000 CFU/ML Escherichia coli  ESBL Pos (A) N/A       Susceptibility    Escherichia coli  ESBL Pos -  (no method available)     Amikacin <=2 Sensitive      Cefazolin >=64 Resistant      Cefepime >=64 Resistant further recs  - Cont to monitor     RUEL on CKD  III   -Holding diuretics  - Avoiding Nephrotoxic agents  - Cont to monitor  -Nephrology consulted, appreciate further recs    HFrEF/CAD / ischemic CMP/severe MR/moderate TR  - Last known EF 15%  -Medications

## 2020-12-07 NOTE — PROGRESS NOTES
[facility]     Gastroenterology Progress Note    Erinlg Espinalzaira Patient Status:  Inpatient    1945 MRN [id number]   Location [facility] 4W/SW/SE Attending Moishe Nageotte, MD   Hosp Day # 2 PCP Viri Mueller MD > 24.2* 24.7* 24.6* 24.1*   MCV 88.6 86.9  --  87.7 88.8 90.1 89.6   MCH 27.8 29.5  --  29.3 29.5 30.0 29.4   MCHC 31.4 33.9  --  33.5 33.2 33.3 32.8   RDW 18.1* 15.3*  --  15.7* 16.2* 16.6* 16.8*   NEPRELIM 5.52 10.42*  --  7.31  --   --   --    WBC 8.2 1 -------------------------- Sinus Rhythm -First degree A-V block -Left bundle branch block. ABNORMAL When compared with ECG of 08/09/2020 16:45:48 No significant changes have occurred Electronically signed on 12/05/2020 at 21:21 by Be Strickland

## 2020-12-08 PROCEDURE — 99222 1ST HOSP IP/OBS MODERATE 55: CPT | Performed by: INTERNAL MEDICINE

## 2020-12-08 NOTE — PLAN OF CARE
Pt is alert and oriented x4. Q2 turns. R knee immobilizer in place. On GI soft diet, minimal appetite. Encouraged PO intake often. Pt does like supplements. Mepilex on sacrum for protection. Purewick in place.  Pt had hard, black stool, and c/o abd and rec routine/schedule  - Consider collaborating with pharmacy to review patient's medication profile  Outcome: Progressing     Problem: GASTROINTESTINAL - ADULT  Goal: Maintains adequate nutritional intake (undernourished)  Description: INTERVENTIONS:  - Monito ADULT  Goal: Maintains optimal cardiac output and hemodynamic stability  Description: INTERVENTIONS:  - Monitor vital signs, rhythm, and trends  - Monitor for bleeding, hypotension and signs of decreased cardiac output  - Evaluate effectiveness of vasoacti ADULT  Goal: Glucose maintained within prescribed range  Description: INTERVENTIONS:  - Monitor Blood Glucose as ordered  - Assess for signs and symptoms of hyperglycemia and hypoglycemia  - Administer ordered medications to maintain glucose within target stability  Description: INTERVENTIONS  - Assess for signs and symptoms of bleeding or hemorrhage  - Monitor labs and vital signs for trends  - Administer supportive blood products/factors, fluids and medications as ordered and appropriate  - Administer sup Patient/Family Short Term Goal  Description: Patient's Short Term Goal: to keep hemoglobin between 7-8 with no signs of bleeding    Interventions:   - Monitor hemoglobin and VS and replaced as ordered  -give medication as ordered  -monitor bowel movement needed  Outcome: Adequate for Discharge     Problem: HEMATOLOGIC - ADULT  Goal: Maintains hematologic stability  Description: INTERVENTIONS  - Assess for signs and symptoms of bleeding or hemorrhage  - Monitor labs and vital signs for trends  - Administer

## 2020-12-08 NOTE — PLAN OF CARE
Patient stable. Patient with right leg immobilizer, turning side to side and using bedpan-- purewick is in place draining clear yellow urine. Patient attempted to use bedpan earlier in the evening, had a small amount of loose stool.  Approximately midnight,

## 2020-12-08 NOTE — CONSULTS
[facility]    Cardiology Consultation  [facility]    Moni Sykes Patient Status:  Inpatient    1945 MRN [id number]   Location [facility] 4W/SW/SE Attending Brett Stewart, [facility] Day # 3 PCP Sonam Florentino Surgical History  Past Surgical History:   Procedure Laterality Date   • CATARACT  2017   • CATH BARE METAL STENT (BMS)     • CATH PERCUTANEOUS  TRANSLUMINAL CORONARY ANGIOPLASTY     • COLONOSCOPY N/A 6/12/2020    Performed by Thaddeus Betancourt MD at [facility] Medications:    •  diphenhydrAMINE (BENADRYL) cap/tab 25 mg, 25 mg, Oral, Q6H PRN    •  Meropenem (MERREM) 500 mg in sodium chloride 0.9% 100 mL MBP, 500 mg, Intravenous, Q12H    •  docusate sodium (COLACE) cap 100 mg, 100 mg, Oral, BID PRN    •  glucose ( daily.    •  bumetanide 1 MG Oral Tab, Take 1 mg by mouth 2 (two) times daily. •  Nutritional Supplements (GLUCERNA SHAKE OR), Take 1 each by mouth 3 (three) times daily as needed.     •  sucralfate 1 g Oral Tab, Take 1 g by mouth 4 (four) times daily be MG Oral Cap, Take 1-2 capsules (500-1,000 mg total) by mouth every 6 (six) hours as needed for Pain. •  traZODone HCl 50 MG Oral Tab, Take 1 tablet (50 mg total) by mouth nightly as needed for Sleep.     •  [DISCONTINUED] Loperamide HCl (IMODIUM A-D) 2 M hours):      Scheduled Meds:   • meropenem  500 mg Intravenous Q12H   • pantoprazole (PROTONIX) IV push  40 mg Intravenous Q12H   • sodium chloride   Intravenous Once   • epoetin lashae-epbx  5,000 Units Subcutaneous Three Times Weekly @ 1600   • Insulin Asp 06/18/2020         Recent Labs   Lab 12/06/20  0440 12/07/20  0648 12/08/20  0435   * 99 136*  136*   * 131* 122*  122*   CREATSERUM 3.29* 2.96* 2.80*  2.80*   GFRAA 15* 17* 18*  18*   GFRNAA 13* 15* 16*  16*   CA 9.9 10.1 9.6  9.6    1 with Dr. Breana Heaton as outpatient prior to considering rescheduling of knee surgery. Discussed with GI. Thank you for allowing me to participate in the care of your patient.     Tootie Quesada  12/8/2020

## 2020-12-08 NOTE — PROGRESS NOTES
[facility]     Gastroenterology Progress Note    Henrene Click Patient Status:  Inpatient    1945 MRN [id number]   Location [facility] 4W/SW/SE Attending Matthieu Rock MD   Hosp Day # 3 PCP MD Chelsie Summers re-bleeding with resuming these medications. Would recommend repeat Hgb at 1500 and consultation with cardiology to consider alternatives versus if anti-coagulation needed at this point.     Recommend:  -PPI BID  -repeat CBC this afternoon   -consider d/w c

## 2020-12-08 NOTE — PROGRESS NOTES
[facility]    Progress Note    Lori Valente Patient Status:  Inpatient    1945 MRN [id number]   Location [facility] 4W/SW/SE Attending Jorge Calixto MD   Hosp Day # 3 PCP Candis Patricia MD           Attending Addendum +1 bilateral lower extremity edema appreciated  NEUROLOGICAL: Generalized weakness, no new focal deficit.     SKIN:  Warm and well perfused  PSYCHIATRIC: Normal mood    Current Scheduled Inpatient Meds:     • meropenem  500 mg Intravenous Q12H   • pantopraz 02/19/2020    INR 1.20 12/04/2020    TSH 1.630 06/18/2020    PUNEET 21 (L) 12/20/2019    LIP 12 (L) 09/17/2020    ESRML 10 10/21/2020    CRP <0.29 10/21/2020    MG 2.5 12/08/2020    PHOS 3.1 12/08/2020    CK 38 08/27/2020    B12 1,087 (H) 06/18/2020    LDL 60 Successful placement of left subclavian catheter into the SVC. 2. Findings were described by [facility].     Dictated by (CST): Lulu Alejandro MD on 12/05/2020 at 7:42 AM     Finalized by (CST): Lulu Alejandro MD on 12/05/2020 at 7:4

## 2020-12-08 NOTE — PROGRESS NOTES
[facility]  Nephrology Daily Progress Note    Ansley Thibodeaux  [id number]  67year old      HPI:   Ansley Thibodeaux is a 67year old female. Had good BM. Feels better. VSS. Eating fair but drinking fluids well. No CP or SOB.        ROS: HCT 24.5 12/08/2020    .0 12/08/2020    CREATSERUM 2.80 12/08/2020    CREATSERUM 2.80 12/08/2020     12/08/2020     12/08/2020     12/08/2020     12/08/2020    K 3.7 12/08/2020    K 3.7 12/08/2020     12/08/2020    CL Intravenous, PRN  •  nitroGLYCERIN (NITROSTAT) SL tab 0.4 mg, 0.4 mg, Sublingual, Q5 Min PRN  •  ondansetron HCl (ZOFRAN) injection 4 mg, 4 mg, Intravenous, Q6H PRN  •  Metoclopramide HCl (REGLAN) injection 5 mg, 5 mg, Intravenous, Q8H PRN  •  Pantoprazole (paroxysmal atrial fibrillation) (HCC)     Polyp of colon     Nonrheumatic mitral valve regurgitation     Pulmonary hypertension (HCC)     Hiatal hernia with GERD and esophagitis     Failed total knee arthroplasty, sequela     Type 2 diabetes mellitus with

## 2020-12-08 NOTE — DIETARY NOTE
ADULT NUTRITION INITIAL ASSESSMENT    Pt is at moderate nutrition risk. Pt does not meet malnutrition criteria.       RECOMMENDATIONS TO MD:  See Nutrition Intervention     NUTRITION DIAGNOSIS/PROBLEM:  Inadequate oral intake related to decreased ability t Meals Eaten (%): refused meal at 12/06/20 1221    12/06/20 1656  0 %    Percent Meals Eaten (%): refused at 12/06/20 1656    12/06/20 1734  0 %    Percent Meals Eaten (%): refused at 12/06/20 1734             Intake Meeting Needs: No, but supplements to ma FINDINGS:    - Body Fat/Muscle Mass: mild depletion body fat orbital region per nutrition focused physical exam.     - Fluid Accumulation: lower extremity edema per visual exam    - Skin Integrity: RN documentation reviewed.     NUTRITION INTERVENTION/PRESC

## 2020-12-09 PROBLEM — K92.2 GIB (GASTROINTESTINAL BLEEDING): Status: ACTIVE | Noted: 2020-01-01

## 2020-12-09 PROCEDURE — 99232 SBSQ HOSP IP/OBS MODERATE 35: CPT | Performed by: NURSE PRACTITIONER

## 2020-12-09 NOTE — PROGRESS NOTES
[facility]  Nephrology Daily Progress Note    Mingo Valdivia  [id number]  67year old      HPI:   Mingo Valdivia is a 67year old female. Still with some dark-colored stools but hemoglobin remained stable. Otherwise just feels weak.   Ap age    Labs:  Lab Results   Component Value Date    WBC 8.4 12/09/2020    HGB 8.1 12/09/2020    HCT 25.0 12/09/2020    .0 12/09/2020    CREATSERUM 2.56 12/09/2020     12/09/2020     12/09/2020    K 3.6 12/09/2020     12/09/2020 tablet, 4 tablet, Oral, Q15 Min PRN **OR** dextrose 50 % injection 50 mL, 50 mL, Intravenous, Q15 Min PRN **OR** glucose (DEX4) oral liquid 30 g, 30 g, Oral, Q15 Min PRN **OR** Glucose-Vitamin C (DEX-4) chewable tab 8 tablet, 8 tablet, Oral, Q15 Min PRN  • tunnel syndrome     Cataract     Congestive heart failure (HCC)     History of urinary stone     Hyperlipidemia     Iron deficiency anemia     Morbid obesity (HCC)     Muscle weakness     Neuropathy     Osteopenia     Pain in joint     Rotator cuff syndrom

## 2020-12-09 NOTE — PROGRESS NOTES
[facility]     Gastroenterology Progress Note    Andre Cowart Patient Status:  Inpatient    1945 MRN [id number]   Location [facility] 4W/SW/SE Attending Anatoliy Hayden MD   Hosp Day # 4 PCP MD Floridalma Paez outpatient clinic  -bowel regimen for constipation - patient states she cannot tolerate MiraLAX  -follow up outpatient in GI clinic after discharge  -d/c bentyl - no abd pain today, would not recommend outpatient unless abd cramping resumes; would need to

## 2020-12-09 NOTE — PLAN OF CARE
Patient observed to be less drowsy and more conversive in the the evening. Patient had received hydromorphone during the day and now reports her pain has 'subsided'. Patient tolerating small amount of diet-- no nausea.    2035- Notified by tele that patient

## 2020-12-09 NOTE — DISCHARGE SUMMARY
[facility]    Discharge Summary    Lissa Bridges Patient Status:  Inpatient    1945 MRN [id number]   Location [facility] 4W/SW/SE Attending Partha Brower MD   Hosp Day # 4 PCP Audree Mohs, MD     Date of Admission: 1 knee replacement (HCC)     RUEL (acute kidney injury) ([facility]     Ischemic cardiomyopathy     CKD (chronic kidney disease), stage IV (HCC)     HFrEF (heart failure with reduced ejection fraction) ([facility]     Secondary hyperparathyroidism ([facility]     Gastrointesti hypertension. The patient is apparently scheduled to have the antibiotic spacer removed and a new prosthesis placed on December 6.   She was sent from the skilled facility to the emergency room today because of several days of nausea, vomiting, and weaknes recs  - Cont to monitor  - aspirin and plavix were restarted on 12/8  - hgb stable, plan for outpatient cbc on 12/15, results to Dr. Jailyn Daniel      RUEL on CKD  III   -Holding diuretics  - Avoiding Nephrotoxic agents  -Nephrology consulted, appreciate further r Discharge Medications      START taking these medications      Instructions Prescription details   docusate sodium 100 MG Caps  Commonly known as: COLACE      Take 100 mg by mouth 2 (two) times daily as needed for constipation.    Quantity:    Refills: 0 1 tablet (75 mg total) by mouth daily. Quantity: 90 tablet  Refills: 0     Ferrous Sulfate 325 (65 Fe) MG Tabs      Take 1 tablet (325 mg total) by mouth 3 (three) times daily.    Quantity: 90 tablet  Refills: 1     Fluticasone Propionate 50 MCG/ACT Susp Take 1,000 mg by mouth daily. Refills: 0     Vitamin D (Cholecalciferol) 10 MCG (400 UNIT) Tabs      Take 400 Units by mouth daily.    Refills: 0        STOP taking these medications    hydrALAzine HCl 50 MG Tabs  Commonly known as: APRESOLINE        Imod

## 2020-12-09 NOTE — TELEPHONE ENCOUNTER
INPATIENT ORDERS:  - please assist with outpatient f/u within one week with Dr. Roxy Nath or Tito Tienrey  - patient is high risk for GI bleed (please see last progress note from hospitalization)  - I recommend monitoring her Hgb carefully - discussed with cardiol

## 2020-12-09 NOTE — PROGRESS NOTES
[facility]    Progress Note    Meli Finger Patient Status:  Inpatient    1945 MRN [id number]   Location [facility] 4W/SW/SE Attending Richard Kramer MD   Hosp Day # 4 PCP Roxana Dumont MD        Subjective:     Con and metolazone held for low BP, metoprolol dose decreased to 12.5mg daily. At discharge: resumed bumex every other day, metolazone remains held, cont low-dose metoprolol. F/u w/ HF APN in 1 week, Dr. Beny Cade in 4 weeks.            Results:     Lab Result

## 2020-12-09 NOTE — PLAN OF CARE
Problem: Patient Centered Care  Goal: Patient preferences are identified and integrated in the patient's plan of care  Description: Interventions:  - What would you like us to know as we care for you?  Im suppose to have surgery on my knee Monday  - Provi INTERVENTIONS:  - Continuous cardiac monitoring, monitor vital signs, obtain 12 lead EKG if indicated  - Evaluate effectiveness of antiarrhythmic and heart rate control medications as ordered  - Initiate emergency measures for life threatening arrhythmias I&O, WT and lab values  - Obtain nutritional consult as needed  - Evaluate psychosocial factors contributing to over-consumption  Outcome: Completed     Problem: METABOLIC/FLUID AND ELECTROLYTES - ADULT  Goal: Glucose maintained within prescribed range  [place] interventions, skin care algorithm/standards of care as needed  Outcome: Completed     Problem: HEMATOLOGIC - ADULT  Goal: Maintains hematologic stability  Description: INTERVENTIONS  - Assess for signs and symptoms of bleeding or hemorrhage  - Monitor lab

## 2020-12-10 NOTE — TELEPHONE ENCOUNTER
Phone Room-    Please schedule 1 wk f/u with ROSAURA RICCI. She has RES24 spots on 12/16/2020. Per [place] RN, please add the pt accordingly and ISIDRO BALLARD RN, thank you.

## 2020-12-17 NOTE — PROGRESS NOTES
65-year-old female with a past medical history of infected right knee prosthesis planning redo surgery, cardiomyopathy with very low ejection fraction, coronary artery disease, CKD was transferred from rehab for GI bleed.   [facility].       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

## 2020-12-25 NOTE — PROGRESS NOTES
Location [facility]  Type in person  Date of service December 23, 2020    Overall stable ,  She is frustrated with the delay in surgery   I have explained that she needs another clearance from cardiology .    BS are good, BP is good   Edema is

## 2021-01-01 ENCOUNTER — TELEPHONE (OUTPATIENT)
Dept: HEMATOLOGY/ONCOLOGY | Facility: HOSPITAL | Age: 76
End: 2021-01-01

## 2021-01-01 ENCOUNTER — EXTERNAL FACILITY (OUTPATIENT)
Dept: INTERNAL MEDICINE CLINIC | Facility: CLINIC | Age: 76
End: 2021-01-01

## 2021-01-01 ENCOUNTER — APPOINTMENT (OUTPATIENT)
Dept: PICC SERVICES | Facility: HOSPITAL | Age: 76
DRG: 369 | End: 2021-01-01
Attending: HOSPITALIST
Payer: MEDICARE

## 2021-01-01 ENCOUNTER — TELEPHONE (OUTPATIENT)
Dept: INTERNAL MEDICINE CLINIC | Facility: CLINIC | Age: 76
End: 2021-01-01

## 2021-01-01 ENCOUNTER — MED REC SCAN ONLY (OUTPATIENT)
Dept: INTERNAL MEDICINE CLINIC | Facility: CLINIC | Age: 76
End: 2021-01-01

## 2021-01-01 ENCOUNTER — APPOINTMENT (OUTPATIENT)
Dept: PICC SERVICES | Facility: HOSPITAL | Age: 76
DRG: 291 | End: 2021-01-01
Attending: HOSPITALIST
Payer: MEDICARE

## 2021-01-01 ENCOUNTER — APPOINTMENT (OUTPATIENT)
Dept: GENERAL RADIOLOGY | Facility: HOSPITAL | Age: 76
DRG: 291 | End: 2021-01-01
Attending: EMERGENCY MEDICINE
Payer: MEDICARE

## 2021-01-01 ENCOUNTER — APPOINTMENT (OUTPATIENT)
Dept: GENERAL RADIOLOGY | Facility: HOSPITAL | Age: 76
DRG: 488 | End: 2021-01-01
Attending: PHYSICIAN ASSISTANT
Payer: MEDICARE

## 2021-01-01 ENCOUNTER — HOSPITAL ENCOUNTER (INPATIENT)
Facility: HOSPITAL | Age: 76
LOS: 8 days | Discharge: NURSING, REHAB OR LONG-TERM CARE | DRG: 291 | End: 2021-01-01
Attending: EMERGENCY MEDICINE | Admitting: HOSPITALIST
Payer: MEDICARE

## 2021-01-01 ENCOUNTER — APPOINTMENT (OUTPATIENT)
Dept: GENERAL RADIOLOGY | Facility: HOSPITAL | Age: 76
DRG: 488 | End: 2021-01-01
Attending: ORTHOPAEDIC SURGERY
Payer: MEDICARE

## 2021-01-01 ENCOUNTER — ANESTHESIA EVENT (OUTPATIENT)
Dept: ENDOSCOPY | Facility: HOSPITAL | Age: 76
DRG: 369 | End: 2021-01-01
Payer: MEDICARE

## 2021-01-01 ENCOUNTER — APPOINTMENT (OUTPATIENT)
Dept: GENERAL RADIOLOGY | Facility: HOSPITAL | Age: 76
DRG: 377 | End: 2021-01-01
Attending: HOSPITALIST
Payer: MEDICARE

## 2021-01-01 ENCOUNTER — ANESTHESIA EVENT (OUTPATIENT)
Dept: SURGERY | Facility: HOSPITAL | Age: 76
DRG: 488 | End: 2021-01-01
Payer: MEDICARE

## 2021-01-01 ENCOUNTER — TELEPHONE (OUTPATIENT)
Dept: FAMILY MEDICINE CLINIC | Facility: CLINIC | Age: 76
End: 2021-01-01

## 2021-01-01 ENCOUNTER — APPOINTMENT (OUTPATIENT)
Dept: GENERAL RADIOLOGY | Facility: HOSPITAL | Age: 76
DRG: 291 | End: 2021-01-01
Attending: HOSPITALIST
Payer: MEDICARE

## 2021-01-01 ENCOUNTER — ANESTHESIA (OUTPATIENT)
Dept: ENDOSCOPY | Facility: HOSPITAL | Age: 76
DRG: 369 | End: 2021-01-01
Payer: MEDICARE

## 2021-01-01 ENCOUNTER — APPOINTMENT (OUTPATIENT)
Dept: PICC SERVICES | Facility: HOSPITAL | Age: 76
DRG: 377 | End: 2021-01-01
Attending: HOSPITALIST
Payer: MEDICARE

## 2021-01-01 ENCOUNTER — ANESTHESIA EVENT (OUTPATIENT)
Dept: ENDOSCOPY | Facility: HOSPITAL | Age: 76
DRG: 377 | End: 2021-01-01
Payer: MEDICARE

## 2021-01-01 ENCOUNTER — APPOINTMENT (OUTPATIENT)
Dept: INTERVENTIONAL RADIOLOGY/VASCULAR | Facility: HOSPITAL | Age: 76
DRG: 291 | End: 2021-01-01
Attending: INTERNAL MEDICINE
Payer: MEDICARE

## 2021-01-01 ENCOUNTER — MED REC SCAN ONLY (OUTPATIENT)
Dept: GASTROENTEROLOGY | Facility: CLINIC | Age: 76
End: 2021-01-01

## 2021-01-01 ENCOUNTER — HOSPITAL ENCOUNTER (INPATIENT)
Facility: HOSPITAL | Age: 76
LOS: 3 days | Discharge: NURSING, REHAB OR LONG-TERM CARE | DRG: 488 | End: 2021-01-01
Attending: HOSPITALIST | Admitting: HOSPITALIST
Payer: MEDICARE

## 2021-01-01 ENCOUNTER — APPOINTMENT (OUTPATIENT)
Dept: ULTRASOUND IMAGING | Facility: HOSPITAL | Age: 76
DRG: 377 | End: 2021-01-01
Attending: NURSE PRACTITIONER
Payer: MEDICARE

## 2021-01-01 ENCOUNTER — OFFICE VISIT (OUTPATIENT)
Dept: HEMATOLOGY/ONCOLOGY | Facility: HOSPITAL | Age: 76
End: 2021-01-01
Attending: INTERNAL MEDICINE
Payer: MEDICARE

## 2021-01-01 ENCOUNTER — HOSPITAL ENCOUNTER (INPATIENT)
Facility: HOSPITAL | Age: 76
LOS: 3 days | Discharge: NURSING, REHAB OR LONG-TERM CARE | DRG: 369 | End: 2021-01-01
Attending: EMERGENCY MEDICINE | Admitting: HOSPITALIST
Payer: MEDICARE

## 2021-01-01 ENCOUNTER — ANESTHESIA (OUTPATIENT)
Dept: ENDOSCOPY | Facility: HOSPITAL | Age: 76
DRG: 377 | End: 2021-01-01
Payer: MEDICARE

## 2021-01-01 ENCOUNTER — APPOINTMENT (OUTPATIENT)
Dept: CV DIAGNOSTICS | Facility: HOSPITAL | Age: 76
DRG: 291 | End: 2021-01-01
Attending: HOSPITALIST
Payer: MEDICARE

## 2021-01-01 ENCOUNTER — TELEMEDICINE (OUTPATIENT)
Dept: INTERNAL MEDICINE CLINIC | Facility: CLINIC | Age: 76
End: 2021-01-01

## 2021-01-01 ENCOUNTER — APPOINTMENT (OUTPATIENT)
Dept: GENERAL RADIOLOGY | Facility: HOSPITAL | Age: 76
DRG: 377 | End: 2021-01-01
Attending: EMERGENCY MEDICINE
Payer: MEDICARE

## 2021-01-01 ENCOUNTER — HOSPITAL ENCOUNTER (INPATIENT)
Facility: HOSPITAL | Age: 76
LOS: 5 days | DRG: 377 | End: 2021-01-01
Attending: EMERGENCY MEDICINE | Admitting: HOSPITALIST
Payer: MEDICARE

## 2021-01-01 ENCOUNTER — APPOINTMENT (OUTPATIENT)
Dept: GENERAL RADIOLOGY | Facility: HOSPITAL | Age: 76
DRG: 488 | End: 2021-01-01
Attending: NURSE PRACTITIONER
Payer: MEDICARE

## 2021-01-01 ENCOUNTER — APPOINTMENT (OUTPATIENT)
Dept: PICC SERVICES | Facility: HOSPITAL | Age: 76
DRG: 488 | End: 2021-01-01
Attending: HOSPITALIST
Payer: MEDICARE

## 2021-01-01 ENCOUNTER — APPOINTMENT (OUTPATIENT)
Dept: CT IMAGING | Facility: HOSPITAL | Age: 76
DRG: 377 | End: 2021-01-01
Attending: HOSPITALIST
Payer: MEDICARE

## 2021-01-01 ENCOUNTER — ANESTHESIA (OUTPATIENT)
Dept: SURGERY | Facility: HOSPITAL | Age: 76
DRG: 488 | End: 2021-01-01
Payer: MEDICARE

## 2021-01-01 ENCOUNTER — TELEPHONE (OUTPATIENT)
Dept: NEPHROLOGY | Facility: CLINIC | Age: 76
End: 2021-01-01

## 2021-01-01 ENCOUNTER — HOSPITAL ENCOUNTER (EMERGENCY)
Facility: HOSPITAL | Age: 76
Discharge: HOME | End: 2021-01-01
Attending: EMERGENCY MEDICINE
Payer: MEDICARE

## 2021-01-01 VITALS
SYSTOLIC BLOOD PRESSURE: 137 MMHG | DIASTOLIC BLOOD PRESSURE: 61 MMHG | HEART RATE: 66 BPM | OXYGEN SATURATION: 100 % | RESPIRATION RATE: 18 BRPM | TEMPERATURE: 99 F

## 2021-01-01 VITALS
HEIGHT: 62 IN | RESPIRATION RATE: 10 BRPM | SYSTOLIC BLOOD PRESSURE: 49 MMHG | HEART RATE: 57 BPM | BODY MASS INDEX: 32.57 KG/M2 | WEIGHT: 177 LBS | DIASTOLIC BLOOD PRESSURE: 39 MMHG | OXYGEN SATURATION: 99 % | TEMPERATURE: 99 F

## 2021-01-01 VITALS
HEART RATE: 65 BPM | HEIGHT: 62 IN | SYSTOLIC BLOOD PRESSURE: 129 MMHG | TEMPERATURE: 98 F | RESPIRATION RATE: 19 BRPM | OXYGEN SATURATION: 99 % | DIASTOLIC BLOOD PRESSURE: 67 MMHG | BODY MASS INDEX: 38.83 KG/M2 | WEIGHT: 211 LBS

## 2021-01-01 VITALS
HEIGHT: 62 IN | OXYGEN SATURATION: 99 % | BODY MASS INDEX: 34.29 KG/M2 | RESPIRATION RATE: 16 BRPM | DIASTOLIC BLOOD PRESSURE: 63 MMHG | HEART RATE: 68 BPM | SYSTOLIC BLOOD PRESSURE: 132 MMHG | TEMPERATURE: 98 F | WEIGHT: 186.31 LBS

## 2021-01-01 VITALS
RESPIRATION RATE: 18 BRPM | BODY MASS INDEX: 32 KG/M2 | DIASTOLIC BLOOD PRESSURE: 65 MMHG | OXYGEN SATURATION: 100 % | HEART RATE: 65 BPM | TEMPERATURE: 99 F | WEIGHT: 176 LBS | SYSTOLIC BLOOD PRESSURE: 133 MMHG

## 2021-01-01 VITALS
DIASTOLIC BLOOD PRESSURE: 47 MMHG | HEART RATE: 79 BPM | TEMPERATURE: 98 F | OXYGEN SATURATION: 98 % | SYSTOLIC BLOOD PRESSURE: 97 MMHG | WEIGHT: 242.5 LBS | BODY MASS INDEX: 44 KG/M2 | RESPIRATION RATE: 18 BRPM

## 2021-01-01 DIAGNOSIS — E11.65 TYPE 2 DIABETES MELLITUS WITH HYPERGLYCEMIA, WITH LONG-TERM CURRENT USE OF INSULIN (HCC): ICD-10-CM

## 2021-01-01 DIAGNOSIS — N17.9 ACUTE KIDNEY INJURY (HCC): ICD-10-CM

## 2021-01-01 DIAGNOSIS — E78.5 HYPERLIPIDEMIA ASSOCIATED WITH TYPE 2 DIABETES MELLITUS (HCC): ICD-10-CM

## 2021-01-01 DIAGNOSIS — N18.32 STAGE 3B CHRONIC KIDNEY DISEASE (HCC): ICD-10-CM

## 2021-01-01 DIAGNOSIS — D50.0 IRON DEFICIENCY ANEMIA DUE TO CHRONIC BLOOD LOSS: ICD-10-CM

## 2021-01-01 DIAGNOSIS — D50.9 IRON DEFICIENCY ANEMIA, UNSPECIFIED IRON DEFICIENCY ANEMIA TYPE: ICD-10-CM

## 2021-01-01 DIAGNOSIS — I25.5 ISCHEMIC CARDIOMYOPATHY: ICD-10-CM

## 2021-01-01 DIAGNOSIS — R60.0 LOWER EXTREMITY EDEMA: ICD-10-CM

## 2021-01-01 DIAGNOSIS — T84.59XS INFECTION OF PROSTHETIC KNEE JOINT, SEQUELA: ICD-10-CM

## 2021-01-01 DIAGNOSIS — Z87.19 HISTORY OF GI BLEED: ICD-10-CM

## 2021-01-01 DIAGNOSIS — R91.1 PULMONARY NODULE: ICD-10-CM

## 2021-01-01 DIAGNOSIS — N17.9 AKI (ACUTE KIDNEY INJURY) (HCC): ICD-10-CM

## 2021-01-01 DIAGNOSIS — N30.00 ACUTE CYSTITIS WITHOUT HEMATURIA: ICD-10-CM

## 2021-01-01 DIAGNOSIS — I10 ESSENTIAL HYPERTENSION: ICD-10-CM

## 2021-01-01 DIAGNOSIS — D63.1 ANEMIA DUE TO CHRONIC KIDNEY DISEASE, UNSPECIFIED CKD STAGE: ICD-10-CM

## 2021-01-01 DIAGNOSIS — I25.10 CORONARY ARTERY DISEASE DUE TO CALCIFIED CORONARY LESION: ICD-10-CM

## 2021-01-01 DIAGNOSIS — I42.0 DILATED CARDIOMYOPATHY (HCC): ICD-10-CM

## 2021-01-01 DIAGNOSIS — E11.65 TYPE 2 DIABETES MELLITUS WITH HYPERGLYCEMIA, WITHOUT LONG-TERM CURRENT USE OF INSULIN (HCC): ICD-10-CM

## 2021-01-01 DIAGNOSIS — D50.0 IRON DEFICIENCY ANEMIA DUE TO CHRONIC BLOOD LOSS: Primary | ICD-10-CM

## 2021-01-01 DIAGNOSIS — K92.2 ACUTE UPPER GI BLEED: Primary | ICD-10-CM

## 2021-01-01 DIAGNOSIS — Z86.19 HISTORY OF REMOVAL OF JOINT PROSTHESIS OF RIGHT KNEE DUE TO INFECTION: ICD-10-CM

## 2021-01-01 DIAGNOSIS — R19.7 DIARRHEA, UNSPECIFIED TYPE: ICD-10-CM

## 2021-01-01 DIAGNOSIS — I25.84 CORONARY ARTERY DISEASE DUE TO CALCIFIED CORONARY LESION: ICD-10-CM

## 2021-01-01 DIAGNOSIS — E11.9 TYPE 2 DIABETES MELLITUS WITHOUT COMPLICATION, WITH LONG-TERM CURRENT USE OF INSULIN (HCC): ICD-10-CM

## 2021-01-01 DIAGNOSIS — Z96.659 INFECTION OF PROSTHETIC KNEE JOINT, SUBSEQUENT ENCOUNTER: ICD-10-CM

## 2021-01-01 DIAGNOSIS — Z79.4 TYPE 2 DIABETES MELLITUS WITH HYPERGLYCEMIA, WITH LONG-TERM CURRENT USE OF INSULIN (HCC): ICD-10-CM

## 2021-01-01 DIAGNOSIS — E11.69 HYPERLIPIDEMIA ASSOCIATED WITH TYPE 2 DIABETES MELLITUS (HCC): ICD-10-CM

## 2021-01-01 DIAGNOSIS — Z96.659 INFECTION OF PROSTHETIC KNEE JOINT, SEQUELA: ICD-10-CM

## 2021-01-01 DIAGNOSIS — R60.0 BILATERAL LOWER EXTREMITY EDEMA: ICD-10-CM

## 2021-01-01 DIAGNOSIS — N18.9 ANEMIA DUE TO CHRONIC KIDNEY DISEASE, UNSPECIFIED CKD STAGE: ICD-10-CM

## 2021-01-01 DIAGNOSIS — D64.9 ANEMIA, UNSPECIFIED TYPE: Primary | ICD-10-CM

## 2021-01-01 DIAGNOSIS — Z23 NEED FOR VACCINATION: ICD-10-CM

## 2021-01-01 DIAGNOSIS — T84.59XD INFECTION OF PROSTHETIC KNEE JOINT, SUBSEQUENT ENCOUNTER: ICD-10-CM

## 2021-01-01 DIAGNOSIS — N17.9 ACUTE RENAL FAILURE, UNSPECIFIED ACUTE RENAL FAILURE TYPE (HCC): ICD-10-CM

## 2021-01-01 DIAGNOSIS — R53.81 PHYSICAL DECONDITIONING: ICD-10-CM

## 2021-01-01 DIAGNOSIS — N18.4 CKD (CHRONIC KIDNEY DISEASE), STAGE IV (HCC): ICD-10-CM

## 2021-01-01 DIAGNOSIS — I50.20 HFREF (HEART FAILURE WITH REDUCED EJECTION FRACTION) (HCC): ICD-10-CM

## 2021-01-01 DIAGNOSIS — D64.9 NORMOCYTIC ANEMIA: Primary | ICD-10-CM

## 2021-01-01 DIAGNOSIS — Z99.2 ESRD (END STAGE RENAL DISEASE) ON DIALYSIS (HCC): ICD-10-CM

## 2021-01-01 DIAGNOSIS — E11.65 TYPE 2 DIABETES MELLITUS WITH HYPERGLYCEMIA, WITHOUT LONG-TERM CURRENT USE OF INSULIN (HCC): Primary | ICD-10-CM

## 2021-01-01 DIAGNOSIS — D69.6 THROMBOCYTOPENIA (HCC): Primary | ICD-10-CM

## 2021-01-01 DIAGNOSIS — K92.2 GASTROINTESTINAL HEMORRHAGE, UNSPECIFIED GASTROINTESTINAL HEMORRHAGE TYPE: ICD-10-CM

## 2021-01-01 DIAGNOSIS — R76.8 ANA POSITIVE: ICD-10-CM

## 2021-01-01 DIAGNOSIS — N18.2 STAGE 2 CHRONIC KIDNEY DISEASE: ICD-10-CM

## 2021-01-01 DIAGNOSIS — Z79.4 TYPE 2 DIABETES MELLITUS WITHOUT COMPLICATION, WITH LONG-TERM CURRENT USE OF INSULIN (HCC): ICD-10-CM

## 2021-01-01 DIAGNOSIS — I25.10 LAD STENOSIS: ICD-10-CM

## 2021-01-01 DIAGNOSIS — I48.0 PAF (PAROXYSMAL ATRIAL FIBRILLATION) (HCC): ICD-10-CM

## 2021-01-01 DIAGNOSIS — K92.0 COFFEE GROUND EMESIS: Primary | ICD-10-CM

## 2021-01-01 DIAGNOSIS — D69.6 THROMBOCYTOPENIA (HCC): ICD-10-CM

## 2021-01-01 DIAGNOSIS — Z87.74 HISTORY OF ARTERIOVENOUS MALFORMATION (AVM): ICD-10-CM

## 2021-01-01 DIAGNOSIS — Z98.890 HISTORY OF REMOVAL OF JOINT PROSTHESIS OF RIGHT KNEE DUE TO INFECTION: ICD-10-CM

## 2021-01-01 DIAGNOSIS — R11.2 NAUSEA AND VOMITING, INTRACTABILITY OF VOMITING NOT SPECIFIED, UNSPECIFIED VOMITING TYPE: ICD-10-CM

## 2021-01-01 DIAGNOSIS — N18.6 ESRD (END STAGE RENAL DISEASE) ON DIALYSIS (HCC): ICD-10-CM

## 2021-01-01 DIAGNOSIS — I50.22 CHRONIC SYSTOLIC CONGESTIVE HEART FAILURE (HCC): ICD-10-CM

## 2021-01-01 DIAGNOSIS — D64.9 ANEMIA, UNSPECIFIED TYPE: ICD-10-CM

## 2021-01-01 DIAGNOSIS — K59.00 CONSTIPATION, UNSPECIFIED CONSTIPATION TYPE: ICD-10-CM

## 2021-01-01 DIAGNOSIS — K92.2 GASTROINTESTINAL HEMORRHAGE, UNSPECIFIED GASTROINTESTINAL HEMORRHAGE TYPE: Primary | ICD-10-CM

## 2021-01-01 LAB
ALBUMIN SERPL-MCNC: 2.2 G/DL (ref 3.4–5)
ALBUMIN SERPL-MCNC: 2.2 G/DL (ref 3.4–5)
ALBUMIN SERPL-MCNC: 3 G/DL (ref 3.4–5)
ALBUMIN SERPL-MCNC: 3.1 G/DL (ref 3.4–5)
ALBUMIN/GLOB SERPL: 0.6 {RATIO} (ref 1–2)
ALBUMIN/GLOB SERPL: 0.9 {RATIO} (ref 1–2)
ALP LIVER SERPL-CCNC: 103 U/L
ALP LIVER SERPL-CCNC: 144 U/L
ALT SERPL-CCNC: 23 U/L
ALT SERPL-CCNC: 9 U/L
AMMONIA PLAS-MCNC: 25 UMOL/L (ref 11–32)
ANION GAP SERPL CALC-SCNC: 10 MMOL/L (ref 0–18)
ANION GAP SERPL CALC-SCNC: 11 MMOL/L (ref 0–18)
ANION GAP SERPL CALC-SCNC: 4 MMOL/L (ref 0–18)
ANION GAP SERPL CALC-SCNC: 6 MMOL/L (ref 0–18)
ANION GAP SERPL CALC-SCNC: 7 MMOL/L (ref 0–18)
ANION GAP SERPL CALC-SCNC: 8 MMOL/L (ref 0–18)
ANION GAP SERPL CALC-SCNC: 9 MMOL/L (ref 0–18)
ANTIBODY SCREEN: NEGATIVE
AST SERPL-CCNC: 8 U/L (ref 15–37)
AST SERPL-CCNC: 9 U/L (ref 15–37)
BASOPHILS # BLD AUTO: 0.01 X10(3) UL (ref 0–0.2)
BASOPHILS # BLD AUTO: 0.02 X10(3) UL (ref 0–0.2)
BASOPHILS # BLD AUTO: 0.03 X10(3) UL (ref 0–0.2)
BASOPHILS # BLD AUTO: 0.04 X10(3) UL (ref 0–0.2)
BASOPHILS # BLD AUTO: 0.05 X10(3) UL (ref 0–0.2)
BASOPHILS NFR BLD AUTO: 0.1 %
BASOPHILS NFR BLD AUTO: 0.2 %
BASOPHILS NFR BLD AUTO: 0.4 %
BILIRUB SERPL-MCNC: 0.4 MG/DL (ref 0.1–2)
BILIRUB SERPL-MCNC: 0.4 MG/DL (ref 0.1–2)
BILIRUB UR QL: NEGATIVE
BLOOD TYPE BARCODE: 5100
BUN BLD-MCNC: 30 MG/DL (ref 7–18)
BUN BLD-MCNC: 31 MG/DL (ref 7–18)
BUN BLD-MCNC: 32 MG/DL (ref 7–18)
BUN BLD-MCNC: 38 MG/DL (ref 7–18)
BUN BLD-MCNC: 40 MG/DL (ref 7–18)
BUN BLD-MCNC: 41 MG/DL (ref 7–18)
BUN BLD-MCNC: 43 MG/DL (ref 7–18)
BUN BLD-MCNC: 45 MG/DL (ref 7–18)
BUN BLD-MCNC: 45 MG/DL (ref 7–18)
BUN BLD-MCNC: 46 MG/DL (ref 7–18)
BUN/CREAT SERPL: 13.4 (ref 10–20)
BUN/CREAT SERPL: 14.1 (ref 10–20)
BUN/CREAT SERPL: 14.2 (ref 10–20)
BUN/CREAT SERPL: 14.3 (ref 10–20)
BUN/CREAT SERPL: 14.7 (ref 10–20)
BUN/CREAT SERPL: 20.6 (ref 10–20)
BUN/CREAT SERPL: 21.2 (ref 10–20)
BUN/CREAT SERPL: 21.6 (ref 10–20)
BUN/CREAT SERPL: 22.2 (ref 10–20)
BUN/CREAT SERPL: 22.8 (ref 10–20)
BUN/CREAT SERPL: 23.8 (ref 10–20)
BUN/CREAT SERPL: 25.7 (ref 10–20)
BUN/CREAT SERPL: 25.7 (ref 10–20)
C DIFF TOX B STL QL: NEGATIVE
C DIFF TOX B STL QL: POSITIVE
CALCIUM BLD-MCNC: 10.5 MG/DL (ref 8.5–10.1)
CALCIUM BLD-MCNC: 8.1 MG/DL (ref 8.5–10.1)
CALCIUM BLD-MCNC: 8.3 MG/DL (ref 8.5–10.1)
CALCIUM BLD-MCNC: 8.6 MG/DL (ref 8.5–10.1)
CALCIUM BLD-MCNC: 8.6 MG/DL (ref 8.5–10.1)
CALCIUM BLD-MCNC: 9.1 MG/DL (ref 8.5–10.1)
CALCIUM BLD-MCNC: 9.2 MG/DL (ref 8.5–10.1)
CALCIUM BLD-MCNC: 9.2 MG/DL (ref 8.5–10.1)
CALCIUM BLD-MCNC: 9.3 MG/DL (ref 8.5–10.1)
CALCIUM BLD-MCNC: 9.4 MG/DL (ref 8.5–10.1)
CALCIUM BLD-MCNC: 9.5 MG/DL (ref 8.5–10.1)
CALCIUM BLD-MCNC: 9.9 MG/DL (ref 8.5–10.1)
CALCIUM BLD-MCNC: 9.9 MG/DL (ref 8.5–10.1)
CHLORIDE SERPL-SCNC: 102 MMOL/L (ref 98–112)
CHLORIDE SERPL-SCNC: 103 MMOL/L (ref 98–112)
CHLORIDE SERPL-SCNC: 104 MMOL/L (ref 98–112)
CHLORIDE SERPL-SCNC: 104 MMOL/L (ref 98–112)
CHLORIDE SERPL-SCNC: 105 MMOL/L (ref 98–112)
CHLORIDE SERPL-SCNC: 108 MMOL/L (ref 98–112)
CHLORIDE SERPL-SCNC: 109 MMOL/L (ref 98–112)
CHLORIDE SERPL-SCNC: 110 MMOL/L (ref 98–112)
CHLORIDE SERPL-SCNC: 111 MMOL/L (ref 98–112)
CHLORIDE SERPL-SCNC: 112 MMOL/L (ref 98–112)
CHLORIDE SERPL-SCNC: 113 MMOL/L (ref 98–112)
CHOLEST SMN-MCNC: 127 MG/DL (ref ?–200)
CO2 SERPL-SCNC: 17 MMOL/L (ref 21–32)
CO2 SERPL-SCNC: 20 MMOL/L (ref 21–32)
CO2 SERPL-SCNC: 20 MMOL/L (ref 21–32)
CO2 SERPL-SCNC: 23 MMOL/L (ref 21–32)
CO2 SERPL-SCNC: 24 MMOL/L (ref 21–32)
CO2 SERPL-SCNC: 24 MMOL/L (ref 21–32)
CO2 SERPL-SCNC: 25 MMOL/L (ref 21–32)
CO2 SERPL-SCNC: 25 MMOL/L (ref 21–32)
CO2 SERPL-SCNC: 29 MMOL/L (ref 21–32)
COLOR UR: YELLOW
CREAT BLD-MCNC: 1.67 MG/DL
CREAT BLD-MCNC: 1.72 MG/DL
CREAT BLD-MCNC: 1.75 MG/DL
CREAT BLD-MCNC: 1.79 MG/DL
CREAT BLD-MCNC: 2.03 MG/DL
CREAT BLD-MCNC: 2.04 MG/DL
CREAT BLD-MCNC: 2.07 MG/DL
CREAT BLD-MCNC: 2.13 MG/DL
CREAT BLD-MCNC: 2.23 MG/DL
CREAT BLD-MCNC: 2.25 MG/DL
CREAT BLD-MCNC: 2.31 MG/DL
CREAT BLD-MCNC: 2.8 MG/DL
CREAT BLD-MCNC: 3.2 MG/DL
DEPRECATED RDW RBC AUTO: 53.1 FL (ref 35.1–46.3)
DEPRECATED RDW RBC AUTO: 53.7 FL (ref 35.1–46.3)
DEPRECATED RDW RBC AUTO: 56.5 FL (ref 35.1–46.3)
DEPRECATED RDW RBC AUTO: 61.2 FL (ref 35.1–46.3)
DEPRECATED RDW RBC AUTO: 62.5 FL (ref 35.1–46.3)
DEPRECATED RDW RBC AUTO: 63.7 FL (ref 35.1–46.3)
DEPRECATED RDW RBC AUTO: 63.9 FL (ref 35.1–46.3)
DEPRECATED RDW RBC AUTO: 64.8 FL (ref 35.1–46.3)
DEPRECATED RDW RBC AUTO: 65.1 FL (ref 35.1–46.3)
DEPRECATED RDW RBC AUTO: 70.1 FL (ref 35.1–46.3)
DEPRECATED RDW RBC AUTO: 73.6 FL (ref 35.1–46.3)
DEPRECATED RDW RBC AUTO: 74.5 FL (ref 35.1–46.3)
DEPRECATED RDW RBC AUTO: 75.7 FL (ref 35.1–46.3)
DEPRECATED RDW RBC AUTO: 76.5 FL (ref 35.1–46.3)
DEPRECATED RDW RBC AUTO: 77.2 FL (ref 35.1–46.3)
DEPRECATED RDW RBC AUTO: 78.1 FL (ref 35.1–46.3)
EOSINOPHIL # BLD AUTO: 0 X10(3) UL (ref 0–0.7)
EOSINOPHIL # BLD AUTO: 0.01 X10(3) UL (ref 0–0.7)
EOSINOPHIL # BLD AUTO: 0.01 X10(3) UL (ref 0–0.7)
EOSINOPHIL # BLD AUTO: 0.08 X10(3) UL (ref 0–0.7)
EOSINOPHIL # BLD AUTO: 0.08 X10(3) UL (ref 0–0.7)
EOSINOPHIL # BLD AUTO: 0.1 X10(3) UL (ref 0–0.7)
EOSINOPHIL # BLD AUTO: 0.12 X10(3) UL (ref 0–0.7)
EOSINOPHIL # BLD AUTO: 0.12 X10(3) UL (ref 0–0.7)
EOSINOPHIL # BLD AUTO: 0.2 X10(3) UL (ref 0–0.7)
EOSINOPHIL # BLD AUTO: 0.21 X10(3) UL (ref 0–0.7)
EOSINOPHIL # BLD AUTO: 0.22 X10(3) UL (ref 0–0.7)
EOSINOPHIL # BLD AUTO: 0.46 X10(3) UL (ref 0–0.7)
EOSINOPHIL # BLD AUTO: 0.94 X10(3) UL (ref 0–0.7)
EOSINOPHIL NFR BLD AUTO: 0 %
EOSINOPHIL NFR BLD AUTO: 0.2 %
EOSINOPHIL NFR BLD AUTO: 0.2 %
EOSINOPHIL NFR BLD AUTO: 0.6 %
EOSINOPHIL NFR BLD AUTO: 1.1 %
EOSINOPHIL NFR BLD AUTO: 1.1 %
EOSINOPHIL NFR BLD AUTO: 1.2 %
EOSINOPHIL NFR BLD AUTO: 1.5 %
EOSINOPHIL NFR BLD AUTO: 1.6 %
EOSINOPHIL NFR BLD AUTO: 15.6 %
EOSINOPHIL NFR BLD AUTO: 2 %
EOSINOPHIL NFR BLD AUTO: 2.4 %
EOSINOPHIL NFR BLD AUTO: 6.6 %
ERYTHROCYTE [DISTWIDTH] IN BLOOD BY AUTOMATED COUNT: 16.8 % (ref 11–15)
ERYTHROCYTE [DISTWIDTH] IN BLOOD BY AUTOMATED COUNT: 16.9 % (ref 11–15)
ERYTHROCYTE [DISTWIDTH] IN BLOOD BY AUTOMATED COUNT: 17.5 % (ref 11–15)
ERYTHROCYTE [DISTWIDTH] IN BLOOD BY AUTOMATED COUNT: 17.8 % (ref 11–15)
ERYTHROCYTE [DISTWIDTH] IN BLOOD BY AUTOMATED COUNT: 20 % (ref 11–15)
ERYTHROCYTE [DISTWIDTH] IN BLOOD BY AUTOMATED COUNT: 20.1 % (ref 11–15)
ERYTHROCYTE [DISTWIDTH] IN BLOOD BY AUTOMATED COUNT: 20.6 % (ref 11–15)
ERYTHROCYTE [DISTWIDTH] IN BLOOD BY AUTOMATED COUNT: 20.7 % (ref 11–15)
ERYTHROCYTE [DISTWIDTH] IN BLOOD BY AUTOMATED COUNT: 21 % (ref 11–15)
ERYTHROCYTE [DISTWIDTH] IN BLOOD BY AUTOMATED COUNT: 21.9 % (ref 11–15)
ERYTHROCYTE [DISTWIDTH] IN BLOOD BY AUTOMATED COUNT: 22.3 % (ref 11–15)
ERYTHROCYTE [DISTWIDTH] IN BLOOD BY AUTOMATED COUNT: 22.4 % (ref 11–15)
ERYTHROCYTE [DISTWIDTH] IN BLOOD BY AUTOMATED COUNT: 22.5 % (ref 11–15)
ERYTHROCYTE [DISTWIDTH] IN BLOOD BY AUTOMATED COUNT: 22.5 % (ref 11–15)
ERYTHROCYTE [DISTWIDTH] IN BLOOD BY AUTOMATED COUNT: 22.8 % (ref 11–15)
ERYTHROCYTE [DISTWIDTH] IN BLOOD BY AUTOMATED COUNT: 24.9 % (ref 11–15)
EST. AVERAGE GLUCOSE BLD GHB EST-MCNC: 103 MG/DL (ref 68–126)
GLOBULIN PLAS-MCNC: 3.3 G/DL (ref 2.8–4.4)
GLOBULIN PLAS-MCNC: 3.7 G/DL (ref 2.8–4.4)
GLUCOSE BLD-MCNC: 112 MG/DL (ref 70–99)
GLUCOSE BLD-MCNC: 117 MG/DL (ref 70–99)
GLUCOSE BLD-MCNC: 128 MG/DL (ref 70–99)
GLUCOSE BLD-MCNC: 135 MG/DL (ref 70–99)
GLUCOSE BLD-MCNC: 139 MG/DL (ref 70–99)
GLUCOSE BLD-MCNC: 143 MG/DL (ref 70–99)
GLUCOSE BLD-MCNC: 143 MG/DL (ref 70–99)
GLUCOSE BLD-MCNC: 144 MG/DL (ref 70–99)
GLUCOSE BLD-MCNC: 152 MG/DL (ref 70–99)
GLUCOSE BLD-MCNC: 160 MG/DL (ref 70–99)
GLUCOSE BLD-MCNC: 245 MG/DL (ref 70–99)
GLUCOSE BLD-MCNC: 85 MG/DL (ref 70–99)
GLUCOSE BLD-MCNC: 99 MG/DL (ref 70–99)
GLUCOSE BLDC GLUCOMTR-MCNC: 107 MG/DL (ref 70–99)
GLUCOSE BLDC GLUCOMTR-MCNC: 114 MG/DL (ref 70–99)
GLUCOSE BLDC GLUCOMTR-MCNC: 119 MG/DL (ref 70–99)
GLUCOSE BLDC GLUCOMTR-MCNC: 119 MG/DL (ref 70–99)
GLUCOSE BLDC GLUCOMTR-MCNC: 125 MG/DL (ref 70–99)
GLUCOSE BLDC GLUCOMTR-MCNC: 129 MG/DL (ref 70–99)
GLUCOSE BLDC GLUCOMTR-MCNC: 133 MG/DL (ref 70–99)
GLUCOSE BLDC GLUCOMTR-MCNC: 134 MG/DL (ref 70–99)
GLUCOSE BLDC GLUCOMTR-MCNC: 136 MG/DL (ref 70–99)
GLUCOSE BLDC GLUCOMTR-MCNC: 137 MG/DL (ref 70–99)
GLUCOSE BLDC GLUCOMTR-MCNC: 138 MG/DL (ref 70–99)
GLUCOSE BLDC GLUCOMTR-MCNC: 138 MG/DL (ref 70–99)
GLUCOSE BLDC GLUCOMTR-MCNC: 139 MG/DL (ref 70–99)
GLUCOSE BLDC GLUCOMTR-MCNC: 142 MG/DL (ref 70–99)
GLUCOSE BLDC GLUCOMTR-MCNC: 145 MG/DL (ref 70–99)
GLUCOSE BLDC GLUCOMTR-MCNC: 146 MG/DL (ref 70–99)
GLUCOSE BLDC GLUCOMTR-MCNC: 150 MG/DL (ref 70–99)
GLUCOSE BLDC GLUCOMTR-MCNC: 151 MG/DL (ref 70–99)
GLUCOSE BLDC GLUCOMTR-MCNC: 152 MG/DL (ref 70–99)
GLUCOSE BLDC GLUCOMTR-MCNC: 154 MG/DL (ref 70–99)
GLUCOSE BLDC GLUCOMTR-MCNC: 154 MG/DL (ref 70–99)
GLUCOSE BLDC GLUCOMTR-MCNC: 159 MG/DL (ref 70–99)
GLUCOSE BLDC GLUCOMTR-MCNC: 164 MG/DL (ref 70–99)
GLUCOSE BLDC GLUCOMTR-MCNC: 165 MG/DL (ref 70–99)
GLUCOSE BLDC GLUCOMTR-MCNC: 165 MG/DL (ref 70–99)
GLUCOSE BLDC GLUCOMTR-MCNC: 171 MG/DL (ref 70–99)
GLUCOSE BLDC GLUCOMTR-MCNC: 178 MG/DL (ref 70–99)
GLUCOSE BLDC GLUCOMTR-MCNC: 178 MG/DL (ref 70–99)
GLUCOSE BLDC GLUCOMTR-MCNC: 193 MG/DL (ref 70–99)
GLUCOSE BLDC GLUCOMTR-MCNC: 204 MG/DL (ref 70–99)
GLUCOSE BLDC GLUCOMTR-MCNC: 205 MG/DL (ref 70–99)
GLUCOSE BLDC GLUCOMTR-MCNC: 214 MG/DL (ref 70–99)
GLUCOSE BLDC GLUCOMTR-MCNC: 243 MG/DL (ref 70–99)
GLUCOSE BLDC GLUCOMTR-MCNC: 284 MG/DL (ref 70–99)
GLUCOSE BLDC GLUCOMTR-MCNC: 95 MG/DL (ref 70–99)
GLUCOSE BLDC GLUCOMTR-MCNC: 96 MG/DL (ref 70–99)
GLUCOSE UR-MCNC: NEGATIVE MG/DL
HAV IGM SER QL: 1.5 MG/DL (ref 1.6–2.6)
HAV IGM SER QL: 1.8 MG/DL (ref 1.6–2.6)
HAV IGM SER QL: 1.9 MG/DL (ref 1.6–2.6)
HBA1C MFR BLD HPLC: 5.2 % (ref ?–5.7)
HBV SURFACE AB SER QL: NONREACTIVE
HBV SURFACE AB SERPL IA-ACNC: <3.1 MIU/ML
HCT VFR BLD AUTO: 21.9 %
HCT VFR BLD AUTO: 22.5 %
HCT VFR BLD AUTO: 22.7 %
HCT VFR BLD AUTO: 23 %
HCT VFR BLD AUTO: 23.2 %
HCT VFR BLD AUTO: 24.4 %
HCT VFR BLD AUTO: 25.1 %
HCT VFR BLD AUTO: 25.7 %
HCT VFR BLD AUTO: 25.8 %
HCT VFR BLD AUTO: 26.2 %
HCT VFR BLD AUTO: 27.5 %
HCT VFR BLD AUTO: 28.8 %
HCT VFR BLD AUTO: 31.2 %
HCT VFR BLD AUTO: 31.8 %
HCT VFR BLD AUTO: 33.7 %
HCT VFR BLD AUTO: 34.4 %
HCT VFR BLD AUTO: 35.2 %
HCT VFR BLD AUTO: 37.1 %
HCT VFR BLD AUTO: 38.5 %
HDLC SERPL-MCNC: 62 MG/DL (ref 40–59)
HGB BLD-MCNC: 10.4 G/DL
HGB BLD-MCNC: 10.4 G/DL
HGB BLD-MCNC: 11 G/DL
HGB BLD-MCNC: 11.3 G/DL
HGB BLD-MCNC: 11.6 G/DL
HGB BLD-MCNC: 7 G/DL
HGB BLD-MCNC: 7.1 G/DL
HGB BLD-MCNC: 7.1 G/DL
HGB BLD-MCNC: 7.2 G/DL
HGB BLD-MCNC: 7.2 G/DL
HGB BLD-MCNC: 7.4 G/DL
HGB BLD-MCNC: 7.8 G/DL
HGB BLD-MCNC: 7.8 G/DL
HGB BLD-MCNC: 8.1 G/DL
HGB BLD-MCNC: 8.3 G/DL
HGB BLD-MCNC: 8.3 G/DL
HGB BLD-MCNC: 8.6 G/DL
HGB BLD-MCNC: 9.5 G/DL
HGB BLD-MCNC: 9.7 G/DL
HYALINE CASTS #/AREA URNS AUTO: 2 /LPF
IMM GRANULOCYTES # BLD AUTO: 0.02 X10(3) UL (ref 0–1)
IMM GRANULOCYTES # BLD AUTO: 0.03 X10(3) UL (ref 0–1)
IMM GRANULOCYTES # BLD AUTO: 0.03 X10(3) UL (ref 0–1)
IMM GRANULOCYTES # BLD AUTO: 0.05 X10(3) UL (ref 0–1)
IMM GRANULOCYTES # BLD AUTO: 0.07 X10(3) UL (ref 0–1)
IMM GRANULOCYTES # BLD AUTO: 0.09 X10(3) UL (ref 0–1)
IMM GRANULOCYTES # BLD AUTO: 0.1 X10(3) UL (ref 0–1)
IMM GRANULOCYTES # BLD AUTO: 0.45 X10(3) UL (ref 0–1)
IMM GRANULOCYTES NFR BLD: 0.3 %
IMM GRANULOCYTES NFR BLD: 0.4 %
IMM GRANULOCYTES NFR BLD: 0.5 %
IMM GRANULOCYTES NFR BLD: 0.6 %
IMM GRANULOCYTES NFR BLD: 0.7 %
IMM GRANULOCYTES NFR BLD: 0.7 %
IMM GRANULOCYTES NFR BLD: 1.3 %
IRON SATURATION: 37 %
IRON SERPL-MCNC: 57 UG/DL
KETONES UR-MCNC: NEGATIVE MG/DL
LACTATE SERPL-SCNC: 3.7 MMOL/L (ref 0.4–2)
LACTATE SERPL-SCNC: 4.8 MMOL/L (ref 0.4–2)
LDLC SERPL CALC-MCNC: 47 MG/DL (ref ?–100)
LYMPHOCYTES # BLD AUTO: 0.45 X10(3) UL (ref 1–4)
LYMPHOCYTES # BLD AUTO: 0.47 X10(3) UL (ref 1–4)
LYMPHOCYTES # BLD AUTO: 0.67 X10(3) UL (ref 1–4)
LYMPHOCYTES # BLD AUTO: 0.68 X10(3) UL (ref 1–4)
LYMPHOCYTES # BLD AUTO: 0.72 X10(3) UL (ref 1–4)
LYMPHOCYTES # BLD AUTO: 0.79 X10(3) UL (ref 1–4)
LYMPHOCYTES # BLD AUTO: 0.82 X10(3) UL (ref 1–4)
LYMPHOCYTES # BLD AUTO: 0.92 X10(3) UL (ref 1–4)
LYMPHOCYTES # BLD AUTO: 1.04 X10(3) UL (ref 1–4)
LYMPHOCYTES # BLD AUTO: 1.11 X10(3) UL (ref 1–4)
LYMPHOCYTES # BLD AUTO: 1.21 X10(3) UL (ref 1–4)
LYMPHOCYTES # BLD AUTO: 1.28 X10(3) UL (ref 1–4)
LYMPHOCYTES # BLD AUTO: 1.4 X10(3) UL (ref 1–4)
LYMPHOCYTES # BLD AUTO: 1.52 X10(3) UL (ref 1–4)
LYMPHOCYTES # BLD AUTO: 2.21 X10(3) UL (ref 1–4)
LYMPHOCYTES NFR BLD AUTO: 10.3 %
LYMPHOCYTES NFR BLD AUTO: 10.8 %
LYMPHOCYTES NFR BLD AUTO: 11.8 %
LYMPHOCYTES NFR BLD AUTO: 12.3 %
LYMPHOCYTES NFR BLD AUTO: 12.8 %
LYMPHOCYTES NFR BLD AUTO: 13 %
LYMPHOCYTES NFR BLD AUTO: 14.8 %
LYMPHOCYTES NFR BLD AUTO: 16 %
LYMPHOCYTES NFR BLD AUTO: 16.2 %
LYMPHOCYTES NFR BLD AUTO: 21.2 %
LYMPHOCYTES NFR BLD AUTO: 3.4 %
LYMPHOCYTES NFR BLD AUTO: 7.2 %
LYMPHOCYTES NFR BLD AUTO: 9 %
M PROTEIN MFR SERPL ELPH: 5.9 G/DL (ref 6.4–8.2)
M PROTEIN MFR SERPL ELPH: 6.3 G/DL (ref 6.4–8.2)
MCH RBC QN AUTO: 26.6 PG (ref 26–34)
MCH RBC QN AUTO: 26.7 PG (ref 26–34)
MCH RBC QN AUTO: 26.9 PG (ref 26–34)
MCH RBC QN AUTO: 27 PG (ref 26–34)
MCH RBC QN AUTO: 27.1 PG (ref 26–34)
MCH RBC QN AUTO: 27.2 PG (ref 26–34)
MCH RBC QN AUTO: 27.4 PG (ref 26–34)
MCH RBC QN AUTO: 27.9 PG (ref 26–34)
MCH RBC QN AUTO: 28.2 PG (ref 26–34)
MCH RBC QN AUTO: 28.3 PG (ref 26–34)
MCH RBC QN AUTO: 28.4 PG (ref 26–34)
MCH RBC QN AUTO: 28.5 PG (ref 26–34)
MCH RBC QN AUTO: 29 PG (ref 26–34)
MCH RBC QN AUTO: 29.5 PG (ref 26–34)
MCHC RBC AUTO-ENTMCNC: 28.8 G/DL (ref 31–37)
MCHC RBC AUTO-ENTMCNC: 30.1 G/DL (ref 31–37)
MCHC RBC AUTO-ENTMCNC: 30.2 G/DL (ref 31–37)
MCHC RBC AUTO-ENTMCNC: 30.4 G/DL (ref 31–37)
MCHC RBC AUTO-ENTMCNC: 30.4 G/DL (ref 31–37)
MCHC RBC AUTO-ENTMCNC: 30.5 G/DL (ref 31–37)
MCHC RBC AUTO-ENTMCNC: 30.5 G/DL (ref 31–37)
MCHC RBC AUTO-ENTMCNC: 30.9 G/DL (ref 31–37)
MCHC RBC AUTO-ENTMCNC: 31 G/DL (ref 31–37)
MCHC RBC AUTO-ENTMCNC: 31.1 G/DL (ref 31–37)
MCHC RBC AUTO-ENTMCNC: 31.1 G/DL (ref 31–37)
MCHC RBC AUTO-ENTMCNC: 31.3 G/DL (ref 31–37)
MCHC RBC AUTO-ENTMCNC: 31.3 G/DL (ref 31–37)
MCHC RBC AUTO-ENTMCNC: 31.4 G/DL (ref 31–37)
MCHC RBC AUTO-ENTMCNC: 31.7 G/DL (ref 31–37)
MCHC RBC AUTO-ENTMCNC: 32.4 G/DL (ref 31–37)
MCV RBC AUTO: 87.3 FL
MCV RBC AUTO: 87.4 FL
MCV RBC AUTO: 87.8 FL
MCV RBC AUTO: 88.1 FL
MCV RBC AUTO: 88.5 FL
MCV RBC AUTO: 88.6 FL
MCV RBC AUTO: 89.1 FL
MCV RBC AUTO: 89.4 FL
MCV RBC AUTO: 89.7 FL
MCV RBC AUTO: 90.2 FL
MCV RBC AUTO: 90.8 FL
MCV RBC AUTO: 91.5 FL
MCV RBC AUTO: 93.5 FL
MCV RBC AUTO: 93.8 FL
MCV RBC AUTO: 94.2 FL
MCV RBC AUTO: 96.6 FL
MONOCYTES # BLD AUTO: 0.28 X10(3) UL (ref 0.1–1)
MONOCYTES # BLD AUTO: 0.35 X10(3) UL (ref 0.1–1)
MONOCYTES # BLD AUTO: 0.63 X10(3) UL (ref 0.1–1)
MONOCYTES # BLD AUTO: 0.67 X10(3) UL (ref 0.1–1)
MONOCYTES # BLD AUTO: 0.79 X10(3) UL (ref 0.1–1)
MONOCYTES # BLD AUTO: 0.83 X10(3) UL (ref 0.1–1)
MONOCYTES # BLD AUTO: 0.86 X10(3) UL (ref 0.1–1)
MONOCYTES # BLD AUTO: 1.01 X10(3) UL (ref 0.1–1)
MONOCYTES # BLD AUTO: 1.03 X10(3) UL (ref 0.1–1)
MONOCYTES # BLD AUTO: 1.03 X10(3) UL (ref 0.1–1)
MONOCYTES # BLD AUTO: 1.1 X10(3) UL (ref 0.1–1)
MONOCYTES # BLD AUTO: 1.28 X10(3) UL (ref 0.1–1)
MONOCYTES # BLD AUTO: 1.39 X10(3) UL (ref 0.1–1)
MONOCYTES # BLD AUTO: 1.55 X10(3) UL (ref 0.1–1)
MONOCYTES # BLD AUTO: 1.78 X10(3) UL (ref 0.1–1)
MONOCYTES NFR BLD AUTO: 10 %
MONOCYTES NFR BLD AUTO: 10.2 %
MONOCYTES NFR BLD AUTO: 11 %
MONOCYTES NFR BLD AUTO: 11.1 %
MONOCYTES NFR BLD AUTO: 11.3 %
MONOCYTES NFR BLD AUTO: 13.5 %
MONOCYTES NFR BLD AUTO: 13.6 %
MONOCYTES NFR BLD AUTO: 13.8 %
MONOCYTES NFR BLD AUTO: 15.6 %
MONOCYTES NFR BLD AUTO: 16.2 %
MONOCYTES NFR BLD AUTO: 5 %
MONOCYTES NFR BLD AUTO: 5.4 %
MONOCYTES NFR BLD AUTO: 5.6 %
MONOCYTES NFR BLD AUTO: 8.1 %
MONOCYTES NFR BLD AUTO: 9.1 %
MRSA DNA SPEC QL NAA+PROBE: POSITIVE
NEUTROPHILS # BLD AUTO: 10.58 X10 (3) UL (ref 1.5–7.7)
NEUTROPHILS # BLD AUTO: 10.58 X10(3) UL (ref 1.5–7.7)
NEUTROPHILS # BLD AUTO: 3.11 X10 (3) UL (ref 1.5–7.7)
NEUTROPHILS # BLD AUTO: 3.11 X10(3) UL (ref 1.5–7.7)
NEUTROPHILS # BLD AUTO: 3.26 X10 (3) UL (ref 1.5–7.7)
NEUTROPHILS # BLD AUTO: 3.26 X10(3) UL (ref 1.5–7.7)
NEUTROPHILS # BLD AUTO: 3.95 X10 (3) UL (ref 1.5–7.7)
NEUTROPHILS # BLD AUTO: 3.95 X10(3) UL (ref 1.5–7.7)
NEUTROPHILS # BLD AUTO: 32.1 X10 (3) UL (ref 1.5–7.7)
NEUTROPHILS # BLD AUTO: 32.1 X10(3) UL (ref 1.5–7.7)
NEUTROPHILS # BLD AUTO: 4.24 X10 (3) UL (ref 1.5–7.7)
NEUTROPHILS # BLD AUTO: 4.24 X10(3) UL (ref 1.5–7.7)
NEUTROPHILS # BLD AUTO: 4.48 X10 (3) UL (ref 1.5–7.7)
NEUTROPHILS # BLD AUTO: 4.48 X10(3) UL (ref 1.5–7.7)
NEUTROPHILS # BLD AUTO: 5.02 X10 (3) UL (ref 1.5–7.7)
NEUTROPHILS # BLD AUTO: 5.02 X10(3) UL (ref 1.5–7.7)
NEUTROPHILS # BLD AUTO: 5.66 X10 (3) UL (ref 1.5–7.7)
NEUTROPHILS # BLD AUTO: 5.66 X10(3) UL (ref 1.5–7.7)
NEUTROPHILS # BLD AUTO: 6.69 X10 (3) UL (ref 1.5–7.7)
NEUTROPHILS # BLD AUTO: 6.69 X10(3) UL (ref 1.5–7.7)
NEUTROPHILS # BLD AUTO: 7.07 X10 (3) UL (ref 1.5–7.7)
NEUTROPHILS # BLD AUTO: 7.07 X10(3) UL (ref 1.5–7.7)
NEUTROPHILS # BLD AUTO: 7.46 X10 (3) UL (ref 1.5–7.7)
NEUTROPHILS # BLD AUTO: 7.46 X10(3) UL (ref 1.5–7.7)
NEUTROPHILS # BLD AUTO: 7.9 X10 (3) UL (ref 1.5–7.7)
NEUTROPHILS # BLD AUTO: 7.9 X10(3) UL (ref 1.5–7.7)
NEUTROPHILS # BLD AUTO: 8.11 X10 (3) UL (ref 1.5–7.7)
NEUTROPHILS # BLD AUTO: 8.11 X10(3) UL (ref 1.5–7.7)
NEUTROPHILS # BLD AUTO: 9.67 X10 (3) UL (ref 1.5–7.7)
NEUTROPHILS # BLD AUTO: 9.67 X10(3) UL (ref 1.5–7.7)
NEUTROPHILS NFR BLD AUTO: 51.6 %
NEUTROPHILS NFR BLD AUTO: 66.8 %
NEUTROPHILS NFR BLD AUTO: 70.3 %
NEUTROPHILS NFR BLD AUTO: 71.1 %
NEUTROPHILS NFR BLD AUTO: 71.3 %
NEUTROPHILS NFR BLD AUTO: 72.1 %
NEUTROPHILS NFR BLD AUTO: 72.7 %
NEUTROPHILS NFR BLD AUTO: 72.8 %
NEUTROPHILS NFR BLD AUTO: 75 %
NEUTROPHILS NFR BLD AUTO: 76.2 %
NEUTROPHILS NFR BLD AUTO: 77.8 %
NEUTROPHILS NFR BLD AUTO: 83.3 %
NEUTROPHILS NFR BLD AUTO: 84.8 %
NEUTROPHILS NFR BLD AUTO: 86.9 %
NEUTROPHILS NFR BLD AUTO: 90.2 %
NITRITE UR QL STRIP.AUTO: NEGATIVE
NONHDLC SERPL-MCNC: 65 MG/DL (ref ?–130)
NT-PROBNP SERPL-MCNC: ABNORMAL PG/ML (ref ?–450)
OSMOLALITY SERPL CALC.SUM OF ELEC: 287 MOSM/KG (ref 275–295)
OSMOLALITY SERPL CALC.SUM OF ELEC: 291 MOSM/KG (ref 275–295)
OSMOLALITY SERPL CALC.SUM OF ELEC: 295 MOSM/KG (ref 275–295)
OSMOLALITY SERPL CALC.SUM OF ELEC: 295 MOSM/KG (ref 275–295)
OSMOLALITY SERPL CALC.SUM OF ELEC: 297 MOSM/KG (ref 275–295)
OSMOLALITY SERPL CALC.SUM OF ELEC: 298 MOSM/KG (ref 275–295)
OSMOLALITY SERPL CALC.SUM OF ELEC: 298 MOSM/KG (ref 275–295)
OSMOLALITY SERPL CALC.SUM OF ELEC: 302 MOSM/KG (ref 275–295)
OSMOLALITY SERPL CALC.SUM OF ELEC: 303 MOSM/KG (ref 275–295)
OSMOLALITY SERPL CALC.SUM OF ELEC: 304 MOSM/KG (ref 275–295)
OSMOLALITY SERPL CALC.SUM OF ELEC: 305 MOSM/KG (ref 275–295)
OSMOLALITY SERPL CALC.SUM OF ELEC: 305 MOSM/KG (ref 275–295)
OSMOLALITY SERPL CALC.SUM OF ELEC: 306 MOSM/KG (ref 275–295)
PH UR: 5 [PH] (ref 5–8)
PHOSPHATE SERPL-MCNC: 3.3 MG/DL (ref 2.5–4.9)
PHOSPHATE SERPL-MCNC: 4.6 MG/DL (ref 2.5–4.9)
PLATELET # BLD AUTO: 100 10(3)UL (ref 150–450)
PLATELET # BLD AUTO: 117 10(3)UL (ref 150–450)
PLATELET # BLD AUTO: 117 10(3)UL (ref 150–450)
PLATELET # BLD AUTO: 127 10(3)UL (ref 150–450)
PLATELET # BLD AUTO: 129 10(3)UL (ref 150–450)
PLATELET # BLD AUTO: 140 10(3)UL (ref 150–450)
PLATELET # BLD AUTO: 146 10(3)UL (ref 150–450)
PLATELET # BLD AUTO: 149 10(3)UL (ref 150–450)
PLATELET # BLD AUTO: 153 10(3)UL (ref 150–450)
PLATELET # BLD AUTO: 156 10(3)UL (ref 150–450)
PLATELET # BLD AUTO: 163 10(3)UL (ref 150–450)
PLATELET # BLD AUTO: 165 10(3)UL (ref 150–450)
PLATELET # BLD AUTO: 189 10(3)UL (ref 150–450)
PLATELET # BLD AUTO: 193 10(3)UL (ref 150–450)
PLATELET # BLD AUTO: 220 10(3)UL (ref 150–450)
PLATELET # BLD AUTO: 307 10(3)UL (ref 150–450)
PLATELET MORPHOLOGY: NORMAL
PLATELET MORPHOLOGY: NORMAL
POTASSIUM SERPL-SCNC: 4 MMOL/L (ref 3.5–5.1)
POTASSIUM SERPL-SCNC: 4.1 MMOL/L (ref 3.5–5.1)
POTASSIUM SERPL-SCNC: 4.2 MMOL/L (ref 3.5–5.1)
POTASSIUM SERPL-SCNC: 4.4 MMOL/L (ref 3.5–5.1)
POTASSIUM SERPL-SCNC: 4.4 MMOL/L (ref 3.5–5.1)
POTASSIUM SERPL-SCNC: 4.5 MMOL/L (ref 3.5–5.1)
POTASSIUM SERPL-SCNC: 4.5 MMOL/L (ref 3.5–5.1)
POTASSIUM SERPL-SCNC: 4.7 MMOL/L (ref 3.5–5.1)
POTASSIUM SERPL-SCNC: 4.8 MMOL/L (ref 3.5–5.1)
POTASSIUM SERPL-SCNC: 4.9 MMOL/L (ref 3.5–5.1)
POTASSIUM SERPL-SCNC: 5.4 MMOL/L (ref 3.5–5.1)
PROT UR-MCNC: 30 MG/DL
PTH-INTACT SERPL-MCNC: 381.3 PG/ML (ref 18.5–88)
RBC # BLD AUTO: 2.46 X10(6)UL
RBC # BLD AUTO: 2.48 X10(6)UL
RBC # BLD AUTO: 2.51 X10(6)UL
RBC # BLD AUTO: 2.55 X10(6)UL
RBC # BLD AUTO: 2.74 X10(6)UL
RBC # BLD AUTO: 2.84 X10(6)UL
RBC # BLD AUTO: 2.85 X10(6)UL
RBC # BLD AUTO: 2.92 X10(6)UL
RBC # BLD AUTO: 2.92 X10(6)UL
RBC # BLD AUTO: 2.98 X10(6)UL
RBC # BLD AUTO: 3.52 X10(6)UL
RBC # BLD AUTO: 3.64 X10(6)UL
RBC # BLD AUTO: 3.84 X10(6)UL
RBC # BLD AUTO: 3.86 X10(6)UL
RBC # BLD AUTO: 4.15 X10(6)UL
RBC # BLD AUTO: 4.35 X10(6)UL
RBC #/AREA URNS AUTO: 18 /HPF
RH BLOOD TYPE: POSITIVE
SARS-COV-2 RNA RESP QL NAA+PROBE: NOT DETECTED
SODIUM SERPL-SCNC: 134 MMOL/L (ref 136–145)
SODIUM SERPL-SCNC: 135 MMOL/L (ref 136–145)
SODIUM SERPL-SCNC: 135 MMOL/L (ref 136–145)
SODIUM SERPL-SCNC: 137 MMOL/L (ref 136–145)
SODIUM SERPL-SCNC: 137 MMOL/L (ref 136–145)
SODIUM SERPL-SCNC: 138 MMOL/L (ref 136–145)
SODIUM SERPL-SCNC: 139 MMOL/L (ref 136–145)
SODIUM SERPL-SCNC: 140 MMOL/L (ref 136–145)
SODIUM SERPL-SCNC: 140 MMOL/L (ref 136–145)
SODIUM SERPL-SCNC: 141 MMOL/L (ref 136–145)
SP GR UR STRIP: 1.01 (ref 1–1.03)
TOTAL IRON BINDING CAPACITY: 155 UG/DL (ref 240–450)
TRANSFERRIN SERPL-MCNC: 104 MG/DL (ref 200–360)
TRIGL SERPL-MCNC: 92 MG/DL (ref 30–149)
TROPONIN I SERPL-MCNC: 0.17 NG/ML (ref ?–0.04)
TROPONIN I SERPL-MCNC: 0.18 NG/ML (ref ?–0.04)
TROPONIN I SERPL-MCNC: 0.2 NG/ML (ref ?–0.04)
UROBILINOGEN UR STRIP-ACNC: <2
VLDLC SERPL CALC-MCNC: 18 MG/DL (ref 0–30)
WBC # BLD AUTO: 10.1 X10(3) UL (ref 4–11)
WBC # BLD AUTO: 10.3 X10(3) UL (ref 4–11)
WBC # BLD AUTO: 10.8 X10(3) UL (ref 4–11)
WBC # BLD AUTO: 10.9 X10(3) UL (ref 4–11)
WBC # BLD AUTO: 12.7 X10(3) UL (ref 4–11)
WBC # BLD AUTO: 13.8 X10(3) UL (ref 4–11)
WBC # BLD AUTO: 35.6 X10(3) UL (ref 4–11)
WBC # BLD AUTO: 4.9 X10(3) UL (ref 4–11)
WBC # BLD AUTO: 5 X10(3) UL (ref 4–11)
WBC # BLD AUTO: 5.5 X10(3) UL (ref 4–11)
WBC # BLD AUTO: 6 X10(3) UL (ref 4–11)
WBC # BLD AUTO: 6.2 X10(3) UL (ref 4–11)
WBC # BLD AUTO: 6.5 X10(3) UL (ref 4–11)
WBC # BLD AUTO: 7 X10(3) UL (ref 4–11)
WBC # BLD AUTO: 9.3 X10(3) UL (ref 4–11)
WBC # BLD AUTO: 9.4 X10(3) UL (ref 4–11)
WBC #/AREA URNS AUTO: 128 /HPF

## 2021-01-01 PROCEDURE — 93306 TTE W/DOPPLER COMPLETE: CPT | Performed by: HOSPITALIST

## 2021-01-01 PROCEDURE — 99223 1ST HOSP IP/OBS HIGH 75: CPT | Performed by: PHYSICIAN ASSISTANT

## 2021-01-01 PROCEDURE — 99233 SBSQ HOSP IP/OBS HIGH 50: CPT | Performed by: INTERNAL MEDICINE

## 2021-01-01 PROCEDURE — 99233 SBSQ HOSP IP/OBS HIGH 50: CPT | Performed by: HOSPITALIST

## 2021-01-01 PROCEDURE — 99309 SBSQ NF CARE MODERATE MDM 30: CPT | Performed by: INTERNAL MEDICINE

## 2021-01-01 PROCEDURE — 99497 ADVNCD CARE PLAN 30 MIN: CPT | Performed by: HOSPITALIST

## 2021-01-01 PROCEDURE — 99233 SBSQ HOSP IP/OBS HIGH 50: CPT | Performed by: PHYSICIAN ASSISTANT

## 2021-01-01 PROCEDURE — 99214 OFFICE O/P EST MOD 30 MIN: CPT | Performed by: INTERNAL MEDICINE

## 2021-01-01 PROCEDURE — 70450 CT HEAD/BRAIN W/O DYE: CPT | Performed by: HOSPITALIST

## 2021-01-01 PROCEDURE — 86901 BLOOD TYPING SEROLOGIC RH(D): CPT | Performed by: EMERGENCY MEDICINE

## 2021-01-01 PROCEDURE — 0DJ08ZZ INSPECTION OF UPPER INTESTINAL TRACT, VIA NATURAL OR ARTIFICIAL OPENING ENDOSCOPIC: ICD-10-PCS | Performed by: INTERNAL MEDICINE

## 2021-01-01 PROCEDURE — 99223 1ST HOSP IP/OBS HIGH 75: CPT | Performed by: HOSPITALIST

## 2021-01-01 PROCEDURE — 1111F DSCHRG MED/CURRENT MED MERGE: CPT | Performed by: INTERNAL MEDICINE

## 2021-01-01 PROCEDURE — 71045 X-RAY EXAM CHEST 1 VIEW: CPT | Performed by: HOSPITALIST

## 2021-01-01 PROCEDURE — 99307 SBSQ NF CARE SF MDM 10: CPT | Performed by: INTERNAL MEDICINE

## 2021-01-01 PROCEDURE — 99496 TRANSJ CARE MGMT HIGH F2F 7D: CPT | Performed by: INTERNAL MEDICINE

## 2021-01-01 PROCEDURE — 99308 SBSQ NF CARE LOW MDM 20: CPT | Performed by: INTERNAL MEDICINE

## 2021-01-01 PROCEDURE — 99232 SBSQ HOSP IP/OBS MODERATE 35: CPT | Performed by: ORTHOPAEDIC SURGERY

## 2021-01-01 PROCEDURE — 73650 X-RAY EXAM OF HEEL: CPT | Performed by: EMERGENCY MEDICINE

## 2021-01-01 PROCEDURE — 99223 1ST HOSP IP/OBS HIGH 75: CPT | Performed by: INTERNAL MEDICINE

## 2021-01-01 PROCEDURE — 0SGC04Z FUSION OF RIGHT KNEE JOINT WITH INTERNAL FIXATION DEVICE, OPEN APPROACH: ICD-10-PCS | Performed by: ORTHOPAEDIC SURGERY

## 2021-01-01 PROCEDURE — 99221 1ST HOSP IP/OBS SF/LOW 40: CPT | Performed by: ORTHOPAEDIC SURGERY

## 2021-01-01 PROCEDURE — 02H633Z INSERTION OF INFUSION DEVICE INTO RIGHT ATRIUM, PERCUTANEOUS APPROACH: ICD-10-PCS | Performed by: RADIOLOGY

## 2021-01-01 PROCEDURE — 82962 GLUCOSE BLOOD TEST: CPT

## 2021-01-01 PROCEDURE — 80048 BASIC METABOLIC PNL TOTAL CA: CPT | Performed by: EMERGENCY MEDICINE

## 2021-01-01 PROCEDURE — 5A1D70Z PERFORMANCE OF URINARY FILTRATION, INTERMITTENT, LESS THAN 6 HOURS PER DAY: ICD-10-PCS | Performed by: INTERNAL MEDICINE

## 2021-01-01 PROCEDURE — 93970 EXTREMITY STUDY: CPT | Performed by: NURSE PRACTITIONER

## 2021-01-01 PROCEDURE — 43239 EGD BIOPSY SINGLE/MULTIPLE: CPT | Performed by: INTERNAL MEDICINE

## 2021-01-01 PROCEDURE — 99221 1ST HOSP IP/OBS SF/LOW 40: CPT | Performed by: PODIATRIST

## 2021-01-01 PROCEDURE — 99232 SBSQ HOSP IP/OBS MODERATE 35: CPT | Performed by: PHYSICIAN ASSISTANT

## 2021-01-01 PROCEDURE — 73560 X-RAY EXAM OF KNEE 1 OR 2: CPT | Performed by: PHYSICIAN ASSISTANT

## 2021-01-01 PROCEDURE — 86850 RBC ANTIBODY SCREEN: CPT | Performed by: EMERGENCY MEDICINE

## 2021-01-01 PROCEDURE — 0SPC08Z REMOVAL OF SPACER FROM RIGHT KNEE JOINT, OPEN APPROACH: ICD-10-PCS | Performed by: ORTHOPAEDIC SURGERY

## 2021-01-01 PROCEDURE — 99222 1ST HOSP IP/OBS MODERATE 55: CPT | Performed by: INTERNAL MEDICINE

## 2021-01-01 PROCEDURE — 86900 BLOOD TYPING SEROLOGIC ABO: CPT | Performed by: EMERGENCY MEDICINE

## 2021-01-01 PROCEDURE — 0JH60XZ INSERTION OF TUNNELED VASCULAR ACCESS DEVICE INTO CHEST SUBCUTANEOUS TISSUE AND FASCIA, OPEN APPROACH: ICD-10-PCS | Performed by: RADIOLOGY

## 2021-01-01 PROCEDURE — 99232 SBSQ HOSP IP/OBS MODERATE 35: CPT | Performed by: INTERNAL MEDICINE

## 2021-01-01 PROCEDURE — 43235 EGD DIAGNOSTIC BRUSH WASH: CPT | Performed by: INTERNAL MEDICINE

## 2021-01-01 PROCEDURE — 71045 X-RAY EXAM CHEST 1 VIEW: CPT | Performed by: EMERGENCY MEDICINE

## 2021-01-01 PROCEDURE — 85025 COMPLETE CBC W/AUTO DIFF WBC: CPT | Performed by: EMERGENCY MEDICINE

## 2021-01-01 PROCEDURE — 73560 X-RAY EXAM OF KNEE 1 OR 2: CPT | Performed by: ORTHOPAEDIC SURGERY

## 2021-01-01 PROCEDURE — 71045 X-RAY EXAM CHEST 1 VIEW: CPT | Performed by: NURSE PRACTITIONER

## 2021-01-01 PROCEDURE — 36415 COLL VENOUS BLD VENIPUNCTURE: CPT

## 2021-01-01 PROCEDURE — 0DB98ZX EXCISION OF DUODENUM, VIA NATURAL OR ARTIFICIAL OPENING ENDOSCOPIC, DIAGNOSTIC: ICD-10-PCS | Performed by: INTERNAL MEDICINE

## 2021-01-01 PROCEDURE — 30233N1 TRANSFUSION OF NONAUTOLOGOUS RED BLOOD CELLS INTO PERIPHERAL VEIN, PERCUTANEOUS APPROACH: ICD-10-PCS | Performed by: EMERGENCY MEDICINE

## 2021-01-01 PROCEDURE — 0DB58ZX EXCISION OF ESOPHAGUS, VIA NATURAL OR ARTIFICIAL OPENING ENDOSCOPIC, DIAGNOSTIC: ICD-10-PCS | Performed by: INTERNAL MEDICINE

## 2021-01-01 PROCEDURE — 99306 1ST NF CARE HIGH MDM 50: CPT | Performed by: INTERNAL MEDICINE

## 2021-01-01 PROCEDURE — 99239 HOSP IP/OBS DSCHRG MGMT >30: CPT | Performed by: HOSPITALIST

## 2021-01-01 PROCEDURE — 99291 CRITICAL CARE FIRST HOUR: CPT | Performed by: HOSPITALIST

## 2021-01-01 PROCEDURE — 99283 EMERGENCY DEPT VISIT LOW MDM: CPT

## 2021-01-01 DEVICE — IMPLANTABLE DEVICE: Type: IMPLANTABLE DEVICE | Site: KNEE | Status: FUNCTIONAL

## 2021-01-01 DEVICE — CEM BN NLTX STRL REUSE MED VSC: Type: IMPLANTABLE DEVICE | Site: KNEE | Status: FUNCTIONAL

## 2021-01-01 RX ORDER — HYDROCODONE BITARTRATE AND ACETAMINOPHEN 5; 325 MG/1; MG/1
1 TABLET ORAL AS NEEDED
Status: DISCONTINUED | OUTPATIENT
Start: 2021-01-01 | End: 2021-01-01 | Stop reason: HOSPADM

## 2021-01-01 RX ORDER — MIDAZOLAM HYDROCHLORIDE 1 MG/ML
INJECTION INTRAMUSCULAR; INTRAVENOUS
Status: COMPLETED
Start: 2021-01-01 | End: 2021-01-01

## 2021-01-01 RX ORDER — PROCHLORPERAZINE EDISYLATE 5 MG/ML
5 INJECTION INTRAMUSCULAR; INTRAVENOUS ONCE AS NEEDED
Status: DISCONTINUED | OUTPATIENT
Start: 2021-01-01 | End: 2021-01-01 | Stop reason: HOSPADM

## 2021-01-01 RX ORDER — MIDODRINE HYDROCHLORIDE 5 MG/1
2.5 TABLET ORAL 3 TIMES DAILY
Status: DISCONTINUED | OUTPATIENT
Start: 2021-01-01 | End: 2021-01-01

## 2021-01-01 RX ORDER — CYCLOBENZAPRINE HCL 5 MG
5 TABLET ORAL EVERY 8 HOURS PRN
Status: DISCONTINUED | OUTPATIENT
Start: 2021-01-01 | End: 2021-01-01

## 2021-01-01 RX ORDER — FAMOTIDINE 20 MG/1
20 TABLET ORAL 2 TIMES DAILY
Status: DISCONTINUED | OUTPATIENT
Start: 2021-01-01 | End: 2021-01-01

## 2021-01-01 RX ORDER — SENNOSIDES 8.6 MG
8.6 TABLET ORAL 2 TIMES DAILY
Status: DISCONTINUED | OUTPATIENT
Start: 2021-01-01 | End: 2021-01-01

## 2021-01-01 RX ORDER — ASPIRIN 81 MG/1
81 TABLET ORAL DAILY
Status: DISCONTINUED | OUTPATIENT
Start: 2021-01-01 | End: 2021-01-01

## 2021-01-01 RX ORDER — SODIUM CHLORIDE, SODIUM LACTATE, POTASSIUM CHLORIDE, CALCIUM CHLORIDE 600; 310; 30; 20 MG/100ML; MG/100ML; MG/100ML; MG/100ML
INJECTION, SOLUTION INTRAVENOUS CONTINUOUS
Status: DISCONTINUED | OUTPATIENT
Start: 2021-01-01 | End: 2021-01-01

## 2021-01-01 RX ORDER — HYDROMORPHONE HYDROCHLORIDE 4 MG/1
2 TABLET ORAL EVERY 4 HOURS PRN
Status: DISCONTINUED | OUTPATIENT
Start: 2021-01-01 | End: 2021-01-01

## 2021-01-01 RX ORDER — BLOOD-GLUCOSE METER
1 KIT MISCELLANEOUS 2 TIMES DAILY
Qty: 1 KIT | Refills: 0 | Status: SHIPPED | OUTPATIENT
Start: 2021-01-01

## 2021-01-01 RX ORDER — FLUCONAZOLE 100 MG/1
400 TABLET ORAL ONCE
Status: COMPLETED | OUTPATIENT
Start: 2021-01-01 | End: 2021-01-01

## 2021-01-01 RX ORDER — FOLIC ACID 1 MG/1
1 TABLET ORAL DAILY
Status: DISCONTINUED | OUTPATIENT
Start: 2021-01-01 | End: 2021-01-01

## 2021-01-01 RX ORDER — HEPARIN SODIUM (PORCINE) LOCK FLUSH IV SOLN 100 UNIT/ML 100 UNIT/ML
1.5 SOLUTION INTRAVENOUS ONCE
Status: DISCONTINUED | OUTPATIENT
Start: 2021-01-01 | End: 2021-01-01

## 2021-01-01 RX ORDER — ONDANSETRON 4 MG/1
4 TABLET, FILM COATED ORAL EVERY 8 HOURS PRN
Qty: 30 TABLET | Refills: 0 | Status: ON HOLD | OUTPATIENT
Start: 2021-01-01 | End: 2021-01-01

## 2021-01-01 RX ORDER — SODIUM CHLORIDE 9 MG/ML
INJECTION, SOLUTION INTRAVENOUS CONTINUOUS
Status: DISCONTINUED | OUTPATIENT
Start: 2021-01-01 | End: 2021-01-01

## 2021-01-01 RX ORDER — ONDANSETRON 4 MG/1
4 TABLET, ORALLY DISINTEGRATING ORAL EVERY 8 HOURS PRN
Status: DISCONTINUED | OUTPATIENT
Start: 2021-01-01 | End: 2021-01-01

## 2021-01-01 RX ORDER — METOPROLOL SUCCINATE 25 MG/1
25 TABLET, EXTENDED RELEASE ORAL EVERY EVENING
Qty: 30 TABLET | Refills: 0 | Status: SHIPPED | OUTPATIENT
Start: 2021-01-01

## 2021-01-01 RX ORDER — MORPHINE SULFATE 2 MG/ML
2 INJECTION, SOLUTION INTRAMUSCULAR; INTRAVENOUS ONCE
Status: COMPLETED | OUTPATIENT
Start: 2021-01-01 | End: 2021-01-01

## 2021-01-01 RX ORDER — ACETAMINOPHEN 325 MG/1
650 TABLET ORAL EVERY 6 HOURS PRN
Status: DISCONTINUED | OUTPATIENT
Start: 2021-01-01 | End: 2021-01-01

## 2021-01-01 RX ORDER — ENOXAPARIN SODIUM 100 MG/ML
40 INJECTION SUBCUTANEOUS DAILY
Status: DISCONTINUED | OUTPATIENT
Start: 2021-01-01 | End: 2021-01-01

## 2021-01-01 RX ORDER — HEPARIN SODIUM 1000 [USP'U]/ML
INJECTION, SOLUTION INTRAVENOUS; SUBCUTANEOUS
Status: COMPLETED
Start: 2021-01-01 | End: 2021-01-01

## 2021-01-01 RX ORDER — ATORVASTATIN CALCIUM 40 MG/1
40 TABLET, FILM COATED ORAL NIGHTLY
Status: DISCONTINUED | OUTPATIENT
Start: 2021-01-01 | End: 2021-01-01

## 2021-01-01 RX ORDER — DIPHENHYDRAMINE HCL 25 MG
25 CAPSULE ORAL NIGHTLY PRN
Status: DISCONTINUED | OUTPATIENT
Start: 2021-01-01 | End: 2021-01-01

## 2021-01-01 RX ORDER — METOCLOPRAMIDE HYDROCHLORIDE 5 MG/ML
5 INJECTION INTRAMUSCULAR; INTRAVENOUS EVERY 8 HOURS PRN
Status: DISCONTINUED | OUTPATIENT
Start: 2021-01-01 | End: 2021-01-01

## 2021-01-01 RX ORDER — CLOPIDOGREL BISULFATE 75 MG/1
75 TABLET ORAL DAILY
Status: DISCONTINUED | OUTPATIENT
Start: 2021-01-01 | End: 2021-01-01

## 2021-01-01 RX ORDER — LANCETS
1 EACH MISCELLANEOUS 2 TIMES DAILY
Qty: 100 EACH | Refills: 2 | Status: SHIPPED | OUTPATIENT
Start: 2021-01-01 | End: 2021-01-01 | Stop reason: ALTCHOICE

## 2021-01-01 RX ORDER — ALBUMIN (HUMAN) 12.5 G/50ML
12.5 SOLUTION INTRAVENOUS ONCE
Status: DISCONTINUED | OUTPATIENT
Start: 2021-01-01 | End: 2021-01-01

## 2021-01-01 RX ORDER — ACETAMINOPHEN 500 MG
1000 TABLET ORAL ONCE
Status: COMPLETED | OUTPATIENT
Start: 2021-01-01 | End: 2021-01-01

## 2021-01-01 RX ORDER — FLUTICASONE PROPIONATE 50 MCG
1 SPRAY, SUSPENSION (ML) NASAL
Status: DISCONTINUED | OUTPATIENT
Start: 2021-01-01 | End: 2021-01-01

## 2021-01-01 RX ORDER — CALCITRIOL 0.25 UG/1
0.25 CAPSULE, LIQUID FILLED ORAL EVERY OTHER DAY
Status: DISCONTINUED | OUTPATIENT
Start: 2021-01-01 | End: 2021-01-01

## 2021-01-01 RX ORDER — ONDANSETRON 2 MG/ML
4 INJECTION INTRAMUSCULAR; INTRAVENOUS EVERY 6 HOURS PRN
Status: DISCONTINUED | OUTPATIENT
Start: 2021-01-01 | End: 2021-01-01

## 2021-01-01 RX ORDER — ALBUMIN (HUMAN) 12.5 G/50ML
100 SOLUTION INTRAVENOUS AS NEEDED
Status: DISCONTINUED | OUTPATIENT
Start: 2021-01-01 | End: 2021-01-01

## 2021-01-01 RX ORDER — HYDROMORPHONE HYDROCHLORIDE 1 MG/ML
1.2 INJECTION, SOLUTION INTRAMUSCULAR; INTRAVENOUS; SUBCUTANEOUS EVERY 2 HOUR PRN
Status: DISCONTINUED | OUTPATIENT
Start: 2021-01-01 | End: 2021-01-01

## 2021-01-01 RX ORDER — BUMETANIDE 0.25 MG/ML
2 INJECTION, SOLUTION INTRAMUSCULAR; INTRAVENOUS
Status: DISCONTINUED | OUTPATIENT
Start: 2021-01-01 | End: 2021-01-01 | Stop reason: ALTCHOICE

## 2021-01-01 RX ORDER — BUMETANIDE 1 MG/1
2 TABLET ORAL
Status: DISCONTINUED | OUTPATIENT
Start: 2021-01-01 | End: 2021-01-01

## 2021-01-01 RX ORDER — DEXTROSE MONOHYDRATE 25 G/50ML
50 INJECTION, SOLUTION INTRAVENOUS
Status: DISCONTINUED | OUTPATIENT
Start: 2021-01-01 | End: 2021-01-01 | Stop reason: HOSPADM

## 2021-01-01 RX ORDER — BLOOD-GLUCOSE METER
1 KIT MISCELLANEOUS 2 TIMES DAILY
Qty: 1 KIT | Refills: 0 | Status: SHIPPED | OUTPATIENT
Start: 2021-01-01 | End: 2021-01-01 | Stop reason: ALTCHOICE

## 2021-01-01 RX ORDER — PROCHLORPERAZINE EDISYLATE 5 MG/ML
10 INJECTION INTRAMUSCULAR; INTRAVENOUS EVERY 6 HOURS PRN
Status: DISCONTINUED | OUTPATIENT
Start: 2021-01-01 | End: 2021-01-01

## 2021-01-01 RX ORDER — LACTULOSE 10 G/15ML
20 SOLUTION ORAL DAILY PRN
Status: DISCONTINUED | OUTPATIENT
Start: 2021-01-01 | End: 2021-01-01

## 2021-01-01 RX ORDER — ALPRAZOLAM 0.25 MG/1
0.25 TABLET ORAL NIGHTLY PRN
Status: DISCONTINUED | OUTPATIENT
Start: 2021-01-01 | End: 2021-01-01

## 2021-01-01 RX ORDER — GABAPENTIN 100 MG/1
100 CAPSULE ORAL 3 TIMES DAILY
Status: DISCONTINUED | OUTPATIENT
Start: 2021-01-01 | End: 2021-01-01

## 2021-01-01 RX ORDER — DOCUSATE SODIUM 100 MG/1
100 CAPSULE, LIQUID FILLED ORAL 2 TIMES DAILY
Qty: 60 CAPSULE | Refills: 2 | Status: SHIPPED | OUTPATIENT
Start: 2021-01-01 | End: 2021-01-01

## 2021-01-01 RX ORDER — DOCUSATE SODIUM 100 MG/1
100 CAPSULE, LIQUID FILLED ORAL 2 TIMES DAILY
Status: DISCONTINUED | OUTPATIENT
Start: 2021-01-01 | End: 2021-01-01

## 2021-01-01 RX ORDER — DIPHENHYDRAMINE HCL 25 MG
25 CAPSULE ORAL EVERY 6 HOURS PRN
Status: DISCONTINUED | OUTPATIENT
Start: 2021-01-01 | End: 2021-01-01

## 2021-01-01 RX ORDER — HYDROMORPHONE HYDROCHLORIDE 2 MG/1
TABLET ORAL EVERY 4 HOURS PRN
Qty: 60 TABLET | Refills: 0 | Status: SHIPPED | OUTPATIENT
Start: 2021-01-01 | End: 2021-01-01

## 2021-01-01 RX ORDER — DOXEPIN HYDROCHLORIDE 50 MG/1
1 CAPSULE ORAL DAILY
Status: DISCONTINUED | OUTPATIENT
Start: 2021-01-01 | End: 2021-01-01

## 2021-01-01 RX ORDER — HYDROMORPHONE HYDROCHLORIDE 1 MG/ML
0.6 INJECTION, SOLUTION INTRAMUSCULAR; INTRAVENOUS; SUBCUTANEOUS EVERY 5 MIN PRN
Status: DISCONTINUED | OUTPATIENT
Start: 2021-01-01 | End: 2021-01-01 | Stop reason: HOSPADM

## 2021-01-01 RX ORDER — HYDROMORPHONE HYDROCHLORIDE 2 MG/1
2 TABLET ORAL EVERY 4 HOURS PRN
Qty: 60 TABLET | Refills: 0 | Status: SHIPPED | OUTPATIENT
Start: 2021-01-01 | End: 2021-01-01

## 2021-01-01 RX ORDER — BUMETANIDE 1 MG/1
1 TABLET ORAL EVERY OTHER DAY
Status: DISCONTINUED | OUTPATIENT
Start: 2021-01-01 | End: 2021-01-01

## 2021-01-01 RX ORDER — LIDOCAINE HYDROCHLORIDE 10 MG/ML
INJECTION, SOLUTION EPIDURAL; INFILTRATION; INTRACAUDAL; PERINEURAL AS NEEDED
Status: DISCONTINUED | OUTPATIENT
Start: 2021-01-01 | End: 2021-01-01 | Stop reason: SURG

## 2021-01-01 RX ORDER — ACETAMINOPHEN 325 MG/1
650 TABLET ORAL EVERY 6 HOURS SCHEDULED
Status: DISCONTINUED | OUTPATIENT
Start: 2021-01-01 | End: 2021-01-01

## 2021-01-01 RX ORDER — VANCOMYCIN 2 GRAM/500 ML IN 0.9 % SODIUM CHLORIDE INTRAVENOUS
25 ONCE
Status: COMPLETED | OUTPATIENT
Start: 2021-01-01 | End: 2021-01-01

## 2021-01-01 RX ORDER — METOPROLOL TARTRATE 5 MG/5ML
5 INJECTION INTRAVENOUS EVERY 6 HOURS
Status: DISCONTINUED | OUTPATIENT
Start: 2021-01-01 | End: 2021-01-01

## 2021-01-01 RX ORDER — CEFAZOLIN SODIUM/WATER 2 G/20 ML
SYRINGE (ML) INTRAVENOUS
Status: DISCONTINUED
Start: 2021-01-01 | End: 2021-01-01 | Stop reason: WASHOUT

## 2021-01-01 RX ORDER — CALCIUM CITRATE/VITAMIN D3 200MG-6.25
TABLET ORAL
Qty: 300 STRIP | Refills: 3 | Status: SHIPPED | OUTPATIENT
Start: 2021-01-01 | End: 2021-01-01

## 2021-01-01 RX ORDER — DEXTROSE MONOHYDRATE 25 G/50ML
50 INJECTION, SOLUTION INTRAVENOUS
Status: DISCONTINUED | OUTPATIENT
Start: 2021-01-01 | End: 2021-01-01

## 2021-01-01 RX ORDER — FAMOTIDINE 20 MG/1
20 TABLET ORAL ONCE
Status: DISCONTINUED | OUTPATIENT
Start: 2021-01-01 | End: 2021-01-01

## 2021-01-01 RX ORDER — ACETAMINOPHEN 500 MG
1000 TABLET ORAL ONCE
Status: DISCONTINUED | OUTPATIENT
Start: 2021-01-01 | End: 2021-01-01

## 2021-01-01 RX ORDER — METOCLOPRAMIDE 10 MG/1
10 TABLET ORAL ONCE
Status: DISCONTINUED | OUTPATIENT
Start: 2021-01-01 | End: 2021-01-01

## 2021-01-01 RX ORDER — HYDROMORPHONE HYDROCHLORIDE 2 MG/1
TABLET ORAL EVERY 4 HOURS PRN
Status: DISCONTINUED | OUTPATIENT
Start: 2021-01-01 | End: 2021-01-01

## 2021-01-01 RX ORDER — ONDANSETRON 4 MG/1
4 TABLET, ORALLY DISINTEGRATING ORAL EVERY 4 HOURS PRN
Status: DISCONTINUED | OUTPATIENT
Start: 2021-01-01 | End: 2021-01-01

## 2021-01-01 RX ORDER — GABAPENTIN 100 MG/1
100 CAPSULE ORAL 3 TIMES DAILY
COMMUNITY

## 2021-01-01 RX ORDER — HYDROMORPHONE HYDROCHLORIDE 1 MG/ML
0.2 INJECTION, SOLUTION INTRAMUSCULAR; INTRAVENOUS; SUBCUTANEOUS EVERY 5 MIN PRN
Status: DISCONTINUED | OUTPATIENT
Start: 2021-01-01 | End: 2021-01-01 | Stop reason: HOSPADM

## 2021-01-01 RX ORDER — FAMOTIDINE 10 MG/ML
20 INJECTION, SOLUTION INTRAVENOUS DAILY
Status: DISCONTINUED | OUTPATIENT
Start: 2021-01-01 | End: 2021-01-01

## 2021-01-01 RX ORDER — BUMETANIDE 0.25 MG/ML
2 INJECTION, SOLUTION INTRAMUSCULAR; INTRAVENOUS
Status: DISCONTINUED | OUTPATIENT
Start: 2021-01-01 | End: 2021-01-01

## 2021-01-01 RX ORDER — ACETAMINOPHEN 500 MG
TABLET ORAL EVERY 6 HOURS PRN
Qty: 90 TABLET | Refills: 0 | Status: SHIPPED | OUTPATIENT
Start: 2021-01-01 | End: 2021-01-01

## 2021-01-01 RX ORDER — METOPROLOL TARTRATE 5 MG/5ML
INJECTION INTRAVENOUS
Status: COMPLETED
Start: 2021-01-01 | End: 2021-01-01

## 2021-01-01 RX ORDER — NALOXONE HYDROCHLORIDE 0.4 MG/ML
80 INJECTION, SOLUTION INTRAMUSCULAR; INTRAVENOUS; SUBCUTANEOUS AS NEEDED
Status: DISCONTINUED | OUTPATIENT
Start: 2021-01-01 | End: 2021-01-01 | Stop reason: HOSPADM

## 2021-01-01 RX ORDER — HYDROMORPHONE HYDROCHLORIDE 2 MG/1
2 TABLET ORAL EVERY 4 HOURS PRN
Status: DISCONTINUED | OUTPATIENT
Start: 2021-01-01 | End: 2021-01-01

## 2021-01-01 RX ORDER — GLUCOSAM/CHON-MSM1/C/MANG/BOSW 500-416.6
TABLET ORAL
Qty: 3 BOX | Refills: 3 | Status: SHIPPED | OUTPATIENT
Start: 2021-01-01 | End: 2021-01-01

## 2021-01-01 RX ORDER — HYDROMORPHONE HYDROCHLORIDE 1 MG/ML
1 SOLUTION ORAL EVERY 6 HOURS PRN
COMMUNITY

## 2021-01-01 RX ORDER — SENNOSIDES 8.6 MG
8.6 TABLET ORAL 2 TIMES DAILY
Refills: 0 | Status: SHIPPED | COMMUNITY
Start: 2021-01-01

## 2021-01-01 RX ORDER — HYDROMORPHONE HYDROCHLORIDE 1 MG/ML
0.8 INJECTION, SOLUTION INTRAMUSCULAR; INTRAVENOUS; SUBCUTANEOUS EVERY 2 HOUR PRN
Status: DISCONTINUED | OUTPATIENT
Start: 2021-01-01 | End: 2021-01-01

## 2021-01-01 RX ORDER — PANTOPRAZOLE SODIUM 40 MG/1
40 TABLET, DELAYED RELEASE ORAL
Qty: 30 TABLET | Refills: 0 | Status: SHIPPED | OUTPATIENT
Start: 2021-01-01 | End: 2021-01-01

## 2021-01-01 RX ORDER — CLINDAMYCIN PHOSPHATE 900 MG/50ML
900 INJECTION INTRAVENOUS
Status: ACTIVE | OUTPATIENT
Start: 2021-01-01 | End: 2021-01-01

## 2021-01-01 RX ORDER — FLUTICASONE PROPIONATE 50 MCG
1 SPRAY, SUSPENSION (ML) NASAL DAILY
Status: DISCONTINUED | OUTPATIENT
Start: 2021-01-01 | End: 2021-01-01

## 2021-01-01 RX ORDER — TRANEXAMIC ACID 10 MG/ML
1000 INJECTION, SOLUTION INTRAVENOUS ONCE
Status: COMPLETED | OUTPATIENT
Start: 2021-01-01 | End: 2021-01-01

## 2021-01-01 RX ORDER — MORPHINE SULFATE 10 MG/ML
6 INJECTION, SOLUTION INTRAMUSCULAR; INTRAVENOUS EVERY 10 MIN PRN
Status: DISCONTINUED | OUTPATIENT
Start: 2021-01-01 | End: 2021-01-01 | Stop reason: HOSPADM

## 2021-01-01 RX ORDER — HEPARIN SODIUM 1000 [USP'U]/ML
1.5 INJECTION, SOLUTION INTRAVENOUS; SUBCUTANEOUS ONCE
Status: DISCONTINUED | OUTPATIENT
Start: 2021-01-01 | End: 2021-01-01

## 2021-01-01 RX ORDER — HEPARIN SODIUM 1000 [USP'U]/ML
2000 INJECTION, SOLUTION INTRAVENOUS; SUBCUTANEOUS
Status: DISCONTINUED | OUTPATIENT
Start: 2021-01-01 | End: 2021-01-01

## 2021-01-01 RX ORDER — POLYETHYLENE GLYCOL 3350 17 G/17G
17 POWDER, FOR SOLUTION ORAL DAILY PRN
Status: DISCONTINUED | OUTPATIENT
Start: 2021-01-01 | End: 2021-01-01

## 2021-01-01 RX ORDER — DIPHENHYDRAMINE HCL 25 MG
25 CAPSULE ORAL EVERY 4 HOURS PRN
Status: DISCONTINUED | OUTPATIENT
Start: 2021-01-01 | End: 2021-01-01

## 2021-01-01 RX ORDER — METOCLOPRAMIDE HYDROCHLORIDE 5 MG/ML
10 INJECTION INTRAMUSCULAR; INTRAVENOUS ONCE
Status: COMPLETED | OUTPATIENT
Start: 2021-01-01 | End: 2021-01-01

## 2021-01-01 RX ORDER — SEVELAMER CARBONATE 800 MG/1
1600 TABLET, FILM COATED ORAL
Status: DISCONTINUED | OUTPATIENT
Start: 2021-01-01 | End: 2021-01-01

## 2021-01-01 RX ORDER — MORPHINE SULFATE 2 MG/ML
INJECTION, SOLUTION INTRAMUSCULAR; INTRAVENOUS
Status: DISPENSED
Start: 2021-01-01 | End: 2021-01-01

## 2021-01-01 RX ORDER — BLOOD-GLUCOSE METER
1 EACH MISCELLANEOUS 2 TIMES DAILY
Qty: 1 DEVICE | Refills: 0 | Status: SHIPPED | OUTPATIENT
Start: 2021-01-01 | End: 2021-01-01

## 2021-01-01 RX ORDER — SODIUM CHLORIDE 9 MG/ML
INJECTION, SOLUTION INTRAVENOUS CONTINUOUS PRN
Status: DISCONTINUED | OUTPATIENT
Start: 2021-01-01 | End: 2021-01-01 | Stop reason: SURG

## 2021-01-01 RX ORDER — FAMOTIDINE 20 MG/1
20 TABLET ORAL ONCE
Status: COMPLETED | OUTPATIENT
Start: 2021-01-01 | End: 2021-01-01

## 2021-01-01 RX ORDER — METOCLOPRAMIDE HYDROCHLORIDE 5 MG/ML
5 INJECTION INTRAMUSCULAR; INTRAVENOUS
Status: DISCONTINUED | OUTPATIENT
Start: 2021-01-01 | End: 2021-01-01

## 2021-01-01 RX ORDER — LANCETS 33 GAUGE
1 EACH MISCELLANEOUS 2 TIMES DAILY
Qty: 100 EACH | Refills: 2 | Status: SHIPPED | OUTPATIENT
Start: 2021-01-01 | End: 2021-01-01 | Stop reason: ALTCHOICE

## 2021-01-01 RX ORDER — SEVELAMER CARBONATE 800 MG/1
1600 TABLET, FILM COATED ORAL
Qty: 60 TABLET | Refills: 0 | Status: SHIPPED | OUTPATIENT
Start: 2021-01-01

## 2021-01-01 RX ORDER — SODIUM PHOSPHATE, DIBASIC AND SODIUM PHOSPHATE, MONOBASIC 7; 19 G/133ML; G/133ML
1 ENEMA RECTAL ONCE AS NEEDED
Status: DISCONTINUED | OUTPATIENT
Start: 2021-01-01 | End: 2021-01-01

## 2021-01-01 RX ORDER — DIPHENHYDRAMINE HYDROCHLORIDE 50 MG/ML
25 INJECTION INTRAMUSCULAR; INTRAVENOUS EVERY 4 HOURS PRN
Status: DISCONTINUED | OUTPATIENT
Start: 2021-01-01 | End: 2021-01-01

## 2021-01-01 RX ORDER — MIDODRINE HYDROCHLORIDE 5 MG/1
2.5 TABLET ORAL ONCE
Status: COMPLETED | OUTPATIENT
Start: 2021-01-01 | End: 2021-01-01

## 2021-01-01 RX ORDER — BISACODYL 10 MG
10 SUPPOSITORY, RECTAL RECTAL
Status: DISCONTINUED | OUTPATIENT
Start: 2021-01-01 | End: 2021-01-01

## 2021-01-01 RX ORDER — CLINDAMYCIN PHOSPHATE 900 MG/50ML
INJECTION INTRAVENOUS
Status: COMPLETED
Start: 2021-01-01 | End: 2021-01-01

## 2021-01-01 RX ORDER — PANTOPRAZOLE SODIUM 40 MG/1
40 TABLET, DELAYED RELEASE ORAL
Qty: 30 TABLET | Refills: 0 | Status: SHIPPED | OUTPATIENT
Start: 2021-01-01

## 2021-01-01 RX ORDER — METOCLOPRAMIDE 10 MG/1
5 TABLET ORAL EVERY 8 HOURS PRN
Status: DISCONTINUED | OUTPATIENT
Start: 2021-01-01 | End: 2021-01-01 | Stop reason: ALTCHOICE

## 2021-01-01 RX ORDER — LACTULOSE 10 G/15ML
20 SOLUTION ORAL DAILY PRN
COMMUNITY

## 2021-01-01 RX ORDER — BUMETANIDE 0.25 MG/ML
1 INJECTION, SOLUTION INTRAMUSCULAR; INTRAVENOUS ONCE
Status: COMPLETED | OUTPATIENT
Start: 2021-01-01 | End: 2021-01-01

## 2021-01-01 RX ORDER — BLOOD-GLUCOSE METER
1 EACH MISCELLANEOUS 2 TIMES DAILY
Qty: 1 KIT | Refills: 0 | Status: SHIPPED | OUTPATIENT
Start: 2021-01-01 | End: 2021-01-01 | Stop reason: ALTCHOICE

## 2021-01-01 RX ORDER — FAMOTIDINE 20 MG/1
20 TABLET ORAL DAILY
Status: DISCONTINUED | OUTPATIENT
Start: 2021-01-01 | End: 2021-01-01

## 2021-01-01 RX ORDER — SODIUM CHLORIDE, SODIUM LACTATE, POTASSIUM CHLORIDE, CALCIUM CHLORIDE 600; 310; 30; 20 MG/100ML; MG/100ML; MG/100ML; MG/100ML
INJECTION, SOLUTION INTRAVENOUS CONTINUOUS
Status: DISCONTINUED | OUTPATIENT
Start: 2021-01-01 | End: 2021-01-01 | Stop reason: HOSPADM

## 2021-01-01 RX ORDER — CLINDAMYCIN PHOSPHATE 150 MG/ML
INJECTION, SOLUTION INTRAVENOUS AS NEEDED
Status: DISCONTINUED | OUTPATIENT
Start: 2021-01-01 | End: 2021-01-01 | Stop reason: SURG

## 2021-01-01 RX ORDER — MORPHINE SULFATE 2 MG/ML
2 INJECTION, SOLUTION INTRAMUSCULAR; INTRAVENOUS EVERY 4 HOURS PRN
Status: DISCONTINUED | OUTPATIENT
Start: 2021-01-01 | End: 2021-01-01

## 2021-01-01 RX ORDER — CLOPIDOGREL BISULFATE 75 MG/1
75 TABLET ORAL DAILY
Refills: 0 | Status: ON HOLD | COMMUNITY
Start: 2021-01-01 | End: 2021-01-01

## 2021-01-01 RX ORDER — ONDANSETRON 2 MG/ML
4 INJECTION INTRAMUSCULAR; INTRAVENOUS EVERY 4 HOURS PRN
Status: DISCONTINUED | OUTPATIENT
Start: 2021-01-01 | End: 2021-01-01

## 2021-01-01 RX ORDER — HALOPERIDOL 5 MG/ML
0.25 INJECTION INTRAMUSCULAR ONCE AS NEEDED
Status: DISCONTINUED | OUTPATIENT
Start: 2021-01-01 | End: 2021-01-01 | Stop reason: HOSPADM

## 2021-01-01 RX ORDER — MAGNESIUM OXIDE 400 MG (241.3 MG MAGNESIUM) TABLET
400 TABLET ONCE
Status: COMPLETED | OUTPATIENT
Start: 2021-01-01 | End: 2021-01-01

## 2021-01-01 RX ORDER — ONDANSETRON 4 MG/1
4 TABLET, ORALLY DISINTEGRATING ORAL EVERY 4 HOURS PRN
Refills: 0 | Status: SHIPPED | COMMUNITY
Start: 2021-01-01 | End: 2021-01-01

## 2021-01-01 RX ORDER — METOCLOPRAMIDE HYDROCHLORIDE 5 MG/ML
5 INJECTION INTRAMUSCULAR; INTRAVENOUS EVERY 8 HOURS PRN
Status: DISCONTINUED | OUTPATIENT
Start: 2021-01-01 | End: 2021-01-01 | Stop reason: ALTCHOICE

## 2021-01-01 RX ORDER — BLOOD SUGAR DIAGNOSTIC
1 STRIP MISCELLANEOUS 2 TIMES DAILY
Qty: 200 STRIP | Refills: 2 | Status: SHIPPED | OUTPATIENT
Start: 2021-01-01

## 2021-01-01 RX ORDER — VANCOMYCIN HYDROCHLORIDE
15 ONCE
Status: COMPLETED | OUTPATIENT
Start: 2021-01-01 | End: 2021-01-01

## 2021-01-01 RX ORDER — HYDROMORPHONE HYDROCHLORIDE 1 MG/ML
0.4 INJECTION, SOLUTION INTRAMUSCULAR; INTRAVENOUS; SUBCUTANEOUS EVERY 5 MIN PRN
Status: DISCONTINUED | OUTPATIENT
Start: 2021-01-01 | End: 2021-01-01 | Stop reason: HOSPADM

## 2021-01-01 RX ORDER — ONDANSETRON 4 MG/1
4 TABLET, ORALLY DISINTEGRATING ORAL EVERY 4 HOURS PRN
Qty: 90 TABLET | Refills: 0 | Status: SHIPPED | OUTPATIENT
Start: 2021-01-01

## 2021-01-01 RX ORDER — MELATONIN
325 3 TIMES DAILY
Status: DISCONTINUED | OUTPATIENT
Start: 2021-01-01 | End: 2021-01-01

## 2021-01-01 RX ORDER — MORPHINE SULFATE 4 MG/ML
2 INJECTION, SOLUTION INTRAMUSCULAR; INTRAVENOUS EVERY 10 MIN PRN
Status: DISCONTINUED | OUTPATIENT
Start: 2021-01-01 | End: 2021-01-01 | Stop reason: HOSPADM

## 2021-01-01 RX ORDER — DOCUSATE SODIUM 100 MG/1
100 CAPSULE, LIQUID FILLED ORAL 2 TIMES DAILY PRN
Status: DISCONTINUED | OUTPATIENT
Start: 2021-01-01 | End: 2021-01-01

## 2021-01-01 RX ORDER — GLYCOPYRROLATE 0.2 MG/ML
INJECTION, SOLUTION INTRAMUSCULAR; INTRAVENOUS AS NEEDED
Status: DISCONTINUED | OUTPATIENT
Start: 2021-01-01 | End: 2021-01-01 | Stop reason: SURG

## 2021-01-01 RX ORDER — MIDAZOLAM HYDROCHLORIDE 1 MG/ML
INJECTION INTRAMUSCULAR; INTRAVENOUS AS NEEDED
Status: DISCONTINUED | OUTPATIENT
Start: 2021-01-01 | End: 2021-01-01 | Stop reason: SURG

## 2021-01-01 RX ORDER — VANCOMYCIN HYDROCHLORIDE 125 MG/1
125 CAPSULE ORAL DAILY
Qty: 9 CAPSULE | Refills: 0 | Status: SHIPPED | OUTPATIENT
Start: 2021-01-01 | End: 2021-01-01

## 2021-01-01 RX ORDER — MAGNESIUM OXIDE 400 MG (241.3 MG MAGNESIUM) TABLET
800 TABLET ONCE
Status: COMPLETED | OUTPATIENT
Start: 2021-01-01 | End: 2021-01-01

## 2021-01-01 RX ORDER — VANCOMYCIN HYDROCHLORIDE 1 G/20ML
INJECTION, POWDER, LYOPHILIZED, FOR SOLUTION INTRAVENOUS AS NEEDED
Status: DISCONTINUED | OUTPATIENT
Start: 2021-01-01 | End: 2021-01-01 | Stop reason: HOSPADM

## 2021-01-01 RX ORDER — METOPROLOL SUCCINATE 25 MG/1
25 TABLET, EXTENDED RELEASE ORAL EVERY EVENING
Status: DISCONTINUED | OUTPATIENT
Start: 2021-01-01 | End: 2021-01-01

## 2021-01-01 RX ORDER — MAGNESIUM HYDROXIDE 1200 MG/15ML
LIQUID ORAL CONTINUOUS PRN
Status: COMPLETED | OUTPATIENT
Start: 2021-01-01 | End: 2021-01-01

## 2021-01-01 RX ORDER — HEPARIN SODIUM 5000 [USP'U]/ML
5000 INJECTION, SOLUTION INTRAVENOUS; SUBCUTANEOUS EVERY 12 HOURS SCHEDULED
Status: DISCONTINUED | OUTPATIENT
Start: 2021-01-01 | End: 2021-01-01

## 2021-01-01 RX ORDER — VANCOMYCIN HYDROCHLORIDE
15 ONCE
Status: DISCONTINUED | OUTPATIENT
Start: 2021-01-01 | End: 2021-01-01

## 2021-01-01 RX ORDER — HYDROCODONE BITARTRATE AND ACETAMINOPHEN 5; 325 MG/1; MG/1
2 TABLET ORAL AS NEEDED
Status: DISCONTINUED | OUTPATIENT
Start: 2021-01-01 | End: 2021-01-01 | Stop reason: HOSPADM

## 2021-01-01 RX ORDER — DIPHENHYDRAMINE HYDROCHLORIDE 50 MG/ML
12.5 INJECTION INTRAMUSCULAR; INTRAVENOUS EVERY 4 HOURS PRN
Status: DISCONTINUED | OUTPATIENT
Start: 2021-01-01 | End: 2021-01-01

## 2021-01-01 RX ORDER — DOCUSATE SODIUM 100 MG/1
100 CAPSULE, LIQUID FILLED ORAL 2 TIMES DAILY
Qty: 60 CAPSULE | Refills: 2 | Status: SHIPPED | OUTPATIENT
Start: 2021-01-01

## 2021-01-01 RX ORDER — MELATONIN
325
Status: DISCONTINUED | OUTPATIENT
Start: 2021-01-01 | End: 2021-01-01

## 2021-01-01 RX ORDER — ACETAMINOPHEN 500 MG
TABLET ORAL EVERY 6 HOURS PRN
Qty: 90 TABLET | Refills: 0 | Status: SHIPPED | OUTPATIENT
Start: 2021-01-01

## 2021-01-01 RX ORDER — LOPERAMIDE HYDROCHLORIDE 2 MG/1
2 CAPSULE ORAL 4 TIMES DAILY PRN
COMMUNITY

## 2021-01-01 RX ORDER — SEVELAMER CARBONATE 800 MG/1
800 TABLET, FILM COATED ORAL
Status: DISCONTINUED | OUTPATIENT
Start: 2021-01-01 | End: 2021-01-01

## 2021-01-01 RX ORDER — ONDANSETRON 2 MG/ML
4 INJECTION INTRAMUSCULAR; INTRAVENOUS ONCE AS NEEDED
Status: COMPLETED | OUTPATIENT
Start: 2021-01-01 | End: 2021-01-01

## 2021-01-01 RX ORDER — ONDANSETRON 2 MG/ML
INJECTION INTRAMUSCULAR; INTRAVENOUS AS NEEDED
Status: DISCONTINUED | OUTPATIENT
Start: 2021-01-01 | End: 2021-01-01 | Stop reason: SURG

## 2021-01-01 RX ORDER — DIPHENHYDRAMINE HYDROCHLORIDE 50 MG/ML
25 INJECTION INTRAMUSCULAR; INTRAVENOUS ONCE AS NEEDED
Status: ACTIVE | OUTPATIENT
Start: 2021-01-01 | End: 2021-01-01

## 2021-01-01 RX ORDER — HYDROMORPHONE HYDROCHLORIDE 2 MG/1
2 TABLET ORAL EVERY 4 HOURS PRN
Qty: 15 TABLET | Refills: 0 | Status: ON HOLD | OUTPATIENT
Start: 2021-01-01 | End: 2021-01-01

## 2021-01-01 RX ORDER — HEPARIN SODIUM 1000 [USP'U]/ML
INJECTION, SOLUTION INTRAVENOUS; SUBCUTANEOUS
Status: DISPENSED
Start: 2021-01-01 | End: 2021-01-01

## 2021-01-01 RX ORDER — MAGNESIUM OXIDE 400 MG (241.3 MG MAGNESIUM) TABLET
400 TABLET ONCE
Status: DISCONTINUED | OUTPATIENT
Start: 2021-01-01 | End: 2021-01-01

## 2021-01-01 RX ORDER — LOPERAMIDE HYDROCHLORIDE 2 MG/1
2 CAPSULE ORAL 4 TIMES DAILY PRN
Status: DISCONTINUED | OUTPATIENT
Start: 2021-01-01 | End: 2021-01-01

## 2021-01-01 RX ORDER — MORPHINE SULFATE 4 MG/ML
4 INJECTION, SOLUTION INTRAMUSCULAR; INTRAVENOUS EVERY 10 MIN PRN
Status: DISCONTINUED | OUTPATIENT
Start: 2021-01-01 | End: 2021-01-01 | Stop reason: HOSPADM

## 2021-01-01 RX ORDER — CALCITRIOL 0.25 UG/1
0.25 CAPSULE, LIQUID FILLED ORAL DAILY
Status: DISCONTINUED | OUTPATIENT
Start: 2021-01-01 | End: 2021-01-01

## 2021-01-01 RX ORDER — ACETAMINOPHEN 325 MG/1
650 TABLET ORAL EVERY 6 HOURS PRN
Status: DISCONTINUED | OUTPATIENT
Start: 2021-01-01 | End: 2021-01-01 | Stop reason: DRUGHIGH

## 2021-01-01 RX ORDER — LANCETS 33 GAUGE
1 EACH MISCELLANEOUS 2 TIMES DAILY
Qty: 200 EACH | Refills: 2 | Status: SHIPPED | OUTPATIENT
Start: 2021-01-01 | End: 2022-05-13

## 2021-01-01 RX ORDER — MIDODRINE HYDROCHLORIDE 5 MG/1
5 TABLET ORAL 3 TIMES DAILY
Status: DISCONTINUED | OUTPATIENT
Start: 2021-01-01 | End: 2021-01-01

## 2021-01-01 RX ORDER — METOCLOPRAMIDE 10 MG/1
10 TABLET ORAL ONCE
Status: COMPLETED | OUTPATIENT
Start: 2021-01-01 | End: 2021-01-01

## 2021-01-01 RX ORDER — BLOOD SUGAR DIAGNOSTIC
STRIP MISCELLANEOUS
Qty: 100 STRIP | Refills: 2 | Status: SHIPPED | OUTPATIENT
Start: 2021-01-01 | End: 2021-01-01 | Stop reason: ALTCHOICE

## 2021-01-01 RX ORDER — VANCOMYCIN HYDROCHLORIDE 125 MG/1
125 CAPSULE ORAL EVERY 6 HOURS
Status: DISCONTINUED | OUTPATIENT
Start: 2021-01-01 | End: 2021-01-01

## 2021-01-01 RX ORDER — LIDOCAINE HYDROCHLORIDE 20 MG/ML
INJECTION, SOLUTION EPIDURAL; INFILTRATION; INTRACAUDAL; PERINEURAL
Status: COMPLETED
Start: 2021-01-01 | End: 2021-01-01

## 2021-01-01 RX ORDER — ONDANSETRON 2 MG/ML
4 INJECTION INTRAMUSCULAR; INTRAVENOUS ONCE
Status: COMPLETED | OUTPATIENT
Start: 2021-01-01 | End: 2021-01-01

## 2021-01-01 RX ORDER — METOPROLOL TARTRATE 5 MG/5ML
5 INJECTION INTRAVENOUS ONCE
Status: DISCONTINUED | OUTPATIENT
Start: 2021-01-01 | End: 2021-01-01

## 2021-01-01 RX ORDER — METOPROLOL TARTRATE 5 MG/5ML
2.5 INJECTION INTRAVENOUS ONCE
Status: DISCONTINUED | OUTPATIENT
Start: 2021-01-01 | End: 2021-01-01 | Stop reason: HOSPADM

## 2021-01-01 RX ORDER — METOPROLOL TARTRATE 5 MG/5ML
5 INJECTION INTRAVENOUS ONCE
Status: COMPLETED | OUTPATIENT
Start: 2021-01-01 | End: 2021-01-01

## 2021-01-01 RX ORDER — HYDROMORPHONE HYDROCHLORIDE 1 MG/ML
0.4 INJECTION, SOLUTION INTRAMUSCULAR; INTRAVENOUS; SUBCUTANEOUS EVERY 2 HOUR PRN
Status: DISCONTINUED | OUTPATIENT
Start: 2021-01-01 | End: 2021-01-01

## 2021-01-01 RX ORDER — ONDANSETRON 2 MG/ML
4 INJECTION INTRAMUSCULAR; INTRAVENOUS EVERY 6 HOURS
Status: DISCONTINUED | OUTPATIENT
Start: 2021-01-01 | End: 2021-01-01

## 2021-01-01 RX ORDER — PANTOPRAZOLE SODIUM 40 MG/1
40 TABLET, DELAYED RELEASE ORAL
Status: DISCONTINUED | OUTPATIENT
Start: 2021-01-01 | End: 2021-01-01

## 2021-01-01 RX ORDER — ENOXAPARIN SODIUM 100 MG/ML
40 INJECTION SUBCUTANEOUS DAILY
Refills: 0 | Status: ON HOLD | COMMUNITY
Start: 2021-01-01 | End: 2021-01-01

## 2021-01-01 RX ORDER — FLUCONAZOLE 100 MG/1
200 TABLET ORAL DAILY
Status: DISCONTINUED | OUTPATIENT
Start: 2021-01-01 | End: 2021-01-01

## 2021-01-01 RX ORDER — MORPHINE SULFATE 2 MG/ML
2 INJECTION, SOLUTION INTRAMUSCULAR; INTRAVENOUS EVERY 2 HOUR PRN
Status: DISCONTINUED | OUTPATIENT
Start: 2021-01-01 | End: 2021-01-01

## 2021-01-01 RX ORDER — BUMETANIDE 2 MG/1
2 TABLET ORAL 2 TIMES DAILY
COMMUNITY

## 2021-01-01 RX ORDER — FAMOTIDINE 10 MG/ML
20 INJECTION, SOLUTION INTRAVENOUS 2 TIMES DAILY
Status: DISCONTINUED | OUTPATIENT
Start: 2021-01-01 | End: 2021-01-01

## 2021-01-01 RX ORDER — HYDROMORPHONE HYDROCHLORIDE 2 MG/1
2 TABLET ORAL EVERY 4 HOURS PRN
Qty: 60 TABLET | Refills: 0 | Status: ON HOLD | OUTPATIENT
Start: 2021-01-01 | End: 2021-01-01

## 2021-01-01 RX ORDER — MIDODRINE HYDROCHLORIDE 5 MG/1
5 TABLET ORAL 3 TIMES DAILY
Qty: 30 TABLET | Refills: 0 | Status: SHIPPED | OUTPATIENT
Start: 2021-01-01

## 2021-01-01 RX ORDER — SODIUM CHLORIDE 0.9 % (FLUSH) 0.9 %
10 SYRINGE (ML) INJECTION AS NEEDED
Status: DISCONTINUED | OUTPATIENT
Start: 2021-01-01 | End: 2021-01-01

## 2021-01-01 RX ORDER — BUMETANIDE 1 MG/1
0.5 TABLET ORAL DAILY
Status: DISCONTINUED | OUTPATIENT
Start: 2021-01-01 | End: 2021-01-01

## 2021-01-01 RX ORDER — BLOOD SUGAR DIAGNOSTIC
1 STRIP MISCELLANEOUS 2 TIMES DAILY
Qty: 100 STRIP | Refills: 2 | Status: SHIPPED | OUTPATIENT
Start: 2021-01-01 | End: 2021-01-01 | Stop reason: ALTCHOICE

## 2021-01-01 RX ORDER — VANCOMYCIN HYDROCHLORIDE 125 MG/1
125 CAPSULE ORAL DAILY
Status: DISCONTINUED | OUTPATIENT
Start: 2021-01-01 | End: 2021-01-01

## 2021-01-01 RX ORDER — BUPIVACAINE HYDROCHLORIDE 7.5 MG/ML
INJECTION, SOLUTION INTRASPINAL AS NEEDED
Status: DISCONTINUED | OUTPATIENT
Start: 2021-01-01 | End: 2021-01-01 | Stop reason: SURG

## 2021-01-01 RX ORDER — TRIAMCINOLONE ACETONIDE 1 MG/ML
LOTION TOPICAL 3 TIMES DAILY
Status: ON HOLD | COMMUNITY
End: 2021-01-01

## 2021-01-01 RX ORDER — TRANEXAMIC ACID 10 MG/ML
INJECTION, SOLUTION INTRAVENOUS AS NEEDED
Status: DISCONTINUED | OUTPATIENT
Start: 2021-01-01 | End: 2021-01-01 | Stop reason: SURG

## 2021-01-01 RX ORDER — DOBUTAMINE HYDROCHLORIDE 200 MG/100ML
5 INJECTION INTRAVENOUS CONTINUOUS
Status: DISCONTINUED | OUTPATIENT
Start: 2021-01-01 | End: 2021-01-01

## 2021-01-01 RX ORDER — MULTIVITAMIN
1 TABLET ORAL DAILY
COMMUNITY

## 2021-01-01 RX ORDER — NITROGLYCERIN 0.4 MG/1
0.4 TABLET SUBLINGUAL EVERY 5 MIN PRN
Status: DISCONTINUED | OUTPATIENT
Start: 2021-01-01 | End: 2021-01-01

## 2021-01-01 RX ORDER — SODIUM CHLORIDE, SODIUM LACTATE, POTASSIUM CHLORIDE, CALCIUM CHLORIDE 600; 310; 30; 20 MG/100ML; MG/100ML; MG/100ML; MG/100ML
INJECTION, SOLUTION INTRAVENOUS CONTINUOUS PRN
Status: DISCONTINUED | OUTPATIENT
Start: 2021-01-01 | End: 2021-01-01 | Stop reason: SURG

## 2021-01-01 RX ORDER — POLYETHYLENE GLYCOL 3350 17 G/17G
17 POWDER, FOR SOLUTION ORAL DAILY
Status: DISCONTINUED | OUTPATIENT
Start: 2021-01-01 | End: 2021-01-01

## 2021-01-01 RX ADMIN — MIDAZOLAM HYDROCHLORIDE 0.5 MG: 1 INJECTION INTRAMUSCULAR; INTRAVENOUS at 10:24:00

## 2021-01-01 RX ADMIN — SODIUM CHLORIDE, SODIUM LACTATE, POTASSIUM CHLORIDE, CALCIUM CHLORIDE: 600; 310; 30; 20 INJECTION, SOLUTION INTRAVENOUS at 13:42:00

## 2021-01-01 RX ADMIN — TRANEXAMIC ACID 1000 MG: 10 INJECTION, SOLUTION INTRAVENOUS at 10:50:00

## 2021-01-01 RX ADMIN — LIDOCAINE HYDROCHLORIDE 50 MG: 10 INJECTION, SOLUTION EPIDURAL; INFILTRATION; INTRACAUDAL; PERINEURAL at 15:07:00

## 2021-01-01 RX ADMIN — SODIUM CHLORIDE: 9 INJECTION, SOLUTION INTRAVENOUS at 10:12:00

## 2021-01-01 RX ADMIN — SODIUM CHLORIDE: 9 INJECTION, SOLUTION INTRAVENOUS at 10:29:00

## 2021-01-01 RX ADMIN — CLINDAMYCIN PHOSPHATE 900 MG: 150 INJECTION, SOLUTION INTRAVENOUS at 11:20:00

## 2021-01-01 RX ADMIN — MIDAZOLAM HYDROCHLORIDE 0.5 MG: 1 INJECTION INTRAMUSCULAR; INTRAVENOUS at 10:34:00

## 2021-01-01 RX ADMIN — MIDAZOLAM HYDROCHLORIDE 0.5 MG: 1 INJECTION INTRAMUSCULAR; INTRAVENOUS at 10:43:00

## 2021-01-01 RX ADMIN — ONDANSETRON 4 MG: 2 INJECTION INTRAMUSCULAR; INTRAVENOUS at 10:43:00

## 2021-01-01 RX ADMIN — LIDOCAINE HYDROCHLORIDE 10 MG: 10 INJECTION, SOLUTION EPIDURAL; INFILTRATION; INTRACAUDAL; PERINEURAL at 10:37:00

## 2021-01-01 RX ADMIN — MIDAZOLAM HYDROCHLORIDE 0.5 MG: 1 INJECTION INTRAMUSCULAR; INTRAVENOUS at 10:57:00

## 2021-01-01 RX ADMIN — BUPIVACAINE HYDROCHLORIDE 1.6 ML: 7.5 INJECTION, SOLUTION INTRASPINAL at 10:37:00

## 2021-01-01 RX ADMIN — SODIUM CHLORIDE: 9 INJECTION, SOLUTION INTRAVENOUS at 13:33:00

## 2021-01-01 RX ADMIN — SODIUM CHLORIDE, SODIUM LACTATE, POTASSIUM CHLORIDE, CALCIUM CHLORIDE: 600; 310; 30; 20 INJECTION, SOLUTION INTRAVENOUS at 13:30:00

## 2021-01-01 RX ADMIN — GLYCOPYRROLATE 0.2 MG: 0.2 INJECTION, SOLUTION INTRAMUSCULAR; INTRAVENOUS at 11:42:00

## 2021-01-01 RX ADMIN — SODIUM CHLORIDE: 9 INJECTION, SOLUTION INTRAVENOUS at 15:24:00

## 2021-01-07 NOTE — PROGRESS NOTES
Location [facility]  Type in person  Date of service January 30, 2021     progressing ok  no complaints - just frustrated with delay   We have made appt for cardiology   no cp or sob at rest   swelling same   no bleeding reported      Physica

## 2021-01-08 ENCOUNTER — OFFICE VISIT (OUTPATIENT)
Dept: CARDIOLOGY | Age: 76
End: 2021-01-08

## 2021-01-08 ENCOUNTER — TELEPHONE (OUTPATIENT)
Dept: CARDIOLOGY | Age: 76
End: 2021-01-08

## 2021-01-08 VITALS
HEART RATE: 74 BPM | SYSTOLIC BLOOD PRESSURE: 146 MMHG | DIASTOLIC BLOOD PRESSURE: 78 MMHG | WEIGHT: 178 LBS | BODY MASS INDEX: 32.76 KG/M2 | HEIGHT: 62 IN

## 2021-01-08 DIAGNOSIS — I25.10 CORONARY ARTERY DISEASE DUE TO CALCIFIED CORONARY LESION: ICD-10-CM

## 2021-01-08 DIAGNOSIS — I10 ESSENTIAL HYPERTENSION: ICD-10-CM

## 2021-01-08 DIAGNOSIS — I25.5 ISCHEMIC CARDIOMYOPATHY: Primary | ICD-10-CM

## 2021-01-08 DIAGNOSIS — I25.84 CORONARY ARTERY DISEASE DUE TO CALCIFIED CORONARY LESION: ICD-10-CM

## 2021-01-08 DIAGNOSIS — I34.0 NONRHEUMATIC MITRAL (VALVE) INSUFFICIENCY: ICD-10-CM

## 2021-01-08 PROCEDURE — 99213 OFFICE O/P EST LOW 20 MIN: CPT | Performed by: NURSE PRACTITIONER

## 2021-01-08 RX ORDER — FERROUS SULFATE 325(65) MG
TABLET ORAL EVERY 12 HOURS
COMMUNITY

## 2021-01-08 SDOH — HEALTH STABILITY: PHYSICAL HEALTH: ON AVERAGE, HOW MANY DAYS PER WEEK DO YOU ENGAGE IN MODERATE TO STRENUOUS EXERCISE (LIKE A BRISK WALK)?: 0 DAYS

## 2021-01-08 SDOH — HEALTH STABILITY: PHYSICAL HEALTH: ON AVERAGE, HOW MANY MINUTES DO YOU ENGAGE IN EXERCISE AT THIS LEVEL?: 0 MIN

## 2021-01-08 SDOH — HEALTH STABILITY: MENTAL HEALTH: HOW OFTEN DO YOU HAVE A DRINK CONTAINING ALCOHOL?: NEVER

## 2021-01-08 ASSESSMENT — ENCOUNTER SYMPTOMS
LIGHT-HEADEDNESS: 0
HEMOPTYSIS: 0
NIGHT SWEATS: 0
WHEEZING: 0
SLEEP DISTURBANCES DUE TO BREATHING: 0
FEVER: 0
WEIGHT GAIN: 0
SHORTNESS OF BREATH: 0
DIZZINESS: 0
WEIGHT LOSS: 0
DIAPHORESIS: 0
ORTHOPNEA: 0
BLOATING: 0
SYNCOPE: 0
COUGH: 0

## 2021-01-08 ASSESSMENT — PATIENT HEALTH QUESTIONNAIRE - PHQ9
1. LITTLE INTEREST OR PLEASURE IN DOING THINGS: NOT AT ALL
CLINICAL INTERPRETATION OF PHQ9 SCORE: NO FURTHER SCREENING NEEDED
SUM OF ALL RESPONSES TO PHQ9 QUESTIONS 1 AND 2: 0
CLINICAL INTERPRETATION OF PHQ2 SCORE: NO FURTHER SCREENING NEEDED
SUM OF ALL RESPONSES TO PHQ9 QUESTIONS 1 AND 2: 0
2. FEELING DOWN, DEPRESSED OR HOPELESS: NOT AT ALL

## 2021-01-10 NOTE — PROGRESS NOTES
[facility]  Type in person  Date of service 1/10/2021    pretty stable   I have reviewed cards pre op clearance , discussed with pt. she is aware that she is HIGH risk for surgery .   I have talked to pt in front of NP  regarding her finance a

## 2021-01-17 NOTE — PROGRESS NOTES
Patient [facility]  Type audio video with nurse practitioner at bedside  Date of service January 14, 2021     cont to be stable   no cp or sob   no bleeding reported   She hasn't  received any  message from Ortho yet .      Physical exam  Vit

## 2021-01-23 NOTE — CM/SW NOTE
Patient has been scheduled to be directed admitted on Monday 1/25/21 at 10:00am for Cardiac clearance for Right Knee infusion by Dr. Iwona Tompkins on 1/26/21.     [facility] called [address] at [phone] to inform them that patient needs to be at [facility]

## 2021-01-25 PROCEDURE — 99222 1ST HOSP IP/OBS MODERATE 55: CPT | Performed by: INTERNAL MEDICINE

## 2021-01-25 NOTE — H&P
[facility]    PATIENT'S NAME: Caleb Dash   ATTENDING PHYSICIAN: Xander Ramos MD   PATIENT ACCOUNT#:   [id number]    LOCATION:  [address] [place] [address] RECORD #:   [id number]       YOB: 1945  ADMISSION DATE:       01/25/20 history of paroxysmal atrial fibrillation taken off of anticoagulation secondary to recurrent gastrointestinal bleed. Last upper endoscopy showed AV malformations in the duodenum treated endoscopically.   She had right knee infection requiring antibiotic s murmur. ABDOMEN:  Soft, nondistended, no tenderness. Positive bowel sounds. EXTREMITIES:  Right knee immobilizer in extensor position. Bilateral legs negative for edema. NEUROLOGIC:  Motor and sensory intact. Cranial nerves II-XII are intact.     ASSE

## 2021-01-25 NOTE — CM/SW NOTE
SW received MDO for DC planning. Per chart review, pt is from [facility] and received first COVID dose vaccine there. Pt is scheduled for surgery with Dr. Joellen Aguilar 1/26 then plans to return to [facility].  Second dose scheduled at 1/28/21 at [facility]

## 2021-01-25 NOTE — PROGRESS NOTES
Location [facility]  Type inperson  Date of service June 24, 2021     she has no complaints   no cp or sob, no palpitation   LE swelling same   no abd pain   no bleeding or tarry stools reported     Looking forward to surgery     she is aware systemic AC sec from GI bleed      PLAN  Plan for redo surgery   HIGH risk   Plavix on hold   cont ASA as per cards   needs cards and nephro consult as IP    I have discussed with pts daughter Isela Lopez thru phone  today
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
## 2021-01-25 NOTE — CM/SW NOTE
Patient [facility] the [facility] dose of the [facility] is due for the second [facility] 1/28/21 [facility]

## 2021-01-25 NOTE — PROGRESS NOTES
Pre-op visit:  Pt is resting reasonably comfortably but having some anxiety prior to knee surgery following day. Engaged in active listening, non-anxious presence, and prayer.    01/25/21 0594   Clinical Encounter Type   Visited With Patient   Routine Visi

## 2021-01-26 NOTE — ANESTHESIA PROCEDURE NOTES
Peripheral IV  Date/Time: 1/26/2021 10:29 AM  Inserted by: Jocelyn Dalton CRNA    Placement  Needle size: 18 G  Laterality: left  Location: forearm  Site prep: alcohol  Technique: anatomical landmarks  Attempts: 1

## 2021-01-26 NOTE — PROGRESS NOTES
[facility] Pharmacy Note:  Renal Dose Adjustment    Geoff Kumar has been prescribed famotidine (PEPCID) 20 mg IV or PO every 12 hours. Estimated Creatinine Clearance: 22 mL/min (A) (based on SCr of 1.72 mg/dL (H)).     Calculated creatinine clearance is < 5

## 2021-01-26 NOTE — PROGRESS NOTES
[facility] Pharmacy Note:  Renal Adjustment for meropenem [facility])    Chad Kaiser is a 67year old patient who has been prescribed meropenem (MERREM) 500 mg every 8 hrs.   CrCl is estimated creatinine clearance is 22 mL/min (A) (based on SCr of 1.72 mg/dL (H))

## 2021-01-26 NOTE — ANESTHESIA POSTPROCEDURE EVALUATION
Patient: Hernán Saldana    Procedure Summary     Date: 01/26/21 Room / Location: [facility] OR 04 / [facility] OR    Anesthesia Start: 3588 Anesthesia Stop:     Procedure: KNEE FUSION (Right Knee) Diagnosis: (infection / inflammatory reaction due to right int

## 2021-01-26 NOTE — ANESTHESIA PREPROCEDURE EVALUATION
Anesthesia PreOp Note    HPI:     Maria Isabel Gardner is a 67year old female who presents for preoperative consultation requested by: Rosemary Zurita MD    Date of Surgery: 1/25/2021 - 1/26/2021    Procedure(s):  KNEE FUSION  Indication: infection / Madan Nipper Polyp of colon      PAF (paroxysmal atrial fibrillation) ([facility]         Date Noted: 05/20/2020      Cataract         Date Noted: 12/11/2019      Pain in joint         Date Noted: 10/20/2019      Ulcer of heel ([facility]         Date Noted: 05/16/2019      Iron d TRANSLUMINAL CORONARY ANGIOPLASTY     • COLONOSCOPY     • COLONOSCOPY N/A 6/12/2020    Performed by Jagruti Savage MD at [address]     • ESOPHAGOGASTRODUODENOSCOPY (EGD) N/A 12/5/2020    Performed by Bonny Kenney Take 1 tablet (40 mg total) by mouth daily. , Disp:  , Rfl: 0, 1/25/2021 at Unknown time    •  calciTRIOL 0.25 MCG Oral Cap, Take 1 capsule (0.25 mcg total) by mouth every other day., Disp:  , Rfl: 0, 1/25/2021 at Unknown time    •  Insulin Aspart Pen 100 U Dispersible, Take 1 tablet (4 mg total) by mouth every 8 (eight) hours as needed. , Disp: , Rfl: 0, Unknown at Unknown time    •  Acetaminophen 500 MG Oral Cap, Take 1-2 capsules (500-1,000 mg total) by mouth every 6 (six) hours as needed for Pain., Disp: 6 YAIR Larkin    •  PEG 3350 (MIRALAX) powder packet 17 g, 17 g, Oral, Daily PRN, YARI Lombardi    •  Prochlorperazine Edisylate (COMPAZINE) injection 10 mg, 10 mg, Intravenous, Q6H PRN, YARI Lombardi    •  vancomycin IVPB premix 1.25g in 0.9% mg, 40 mg, Oral, Nightly, Stacia Scott MD    •  [START ON 1/27/2021] calciTRIOL (ROCALTROL) cap 0.25 mcg, 0.25 mcg, Oral, QOD, Ileana Mccann MD    •  docusate sodium (COLACE) cap 100 mg, 100 mg, Oral, BID PRN, Stacia Scott MD    •  ferrous sulfa NAUSEA AND VOMITING    Family History   Problem Relation Age of Onset   • Cancer Father         prostate   • Diabetes Mother    • Anemia Mother    • Diabetes Sister    • Anemia Sister    • Other (Other) Sister         cva   • Diabetes Brother now . Mother of 2 adult children, 4 grandchildren. She is retired from nursing. Patient lives alone in [facility]      Available pre-op labs reviewed.   Lab Results   Component Value Date    WBC 7.0 01/26/2021    RBC 3.84 01/26/2021    HGB 10.4 (L) anesthetic management. All of the patient's questions were answered to the best of my ability. The patient desires the anesthetic management as planned.   Shahriar Bonilla  1/26/2021 8:46 AM

## 2021-01-26 NOTE — PLAN OF CARE
Problem: Patient Centered Care  Goal: Patient preferences are identified and integrated in the patient's plan of care  Description: Interventions:  - What would you like us to know as we care for you?  Patient came from [address] [place] [address] Administer ordered medications to maintain glucose within target range  - Assess barriers to adequate nutritional intake and initiate nutrition consult as needed  - Instruct patient on self management of diabetes  1/26/2021 1440 by Lizz Busch RN Progressing     Problem: HEMATOLOGIC - ADULT  Goal: Free from bleeding injury  Description: (Example usage: patient with low platelets)  INTERVENTIONS:  - Avoid intramuscular injections, enemas and rectal medication administration  - Ensure safe mobilizati Administer analgesics based on type and severity of pain and evaluate response  - Implement non-pharmacological measures as appropriate and evaluate response  - Consider cultural and social influences on pain and pain management  - Manage/alleviate anxiety assist at discharge as needed  - Consider post-discharge preferences of patient/family/discharge partner  - Complete POLST form as appropriate  - Assess patient's ability to be responsible for managing their own health  - Refer to Case Management Ian Moralez

## 2021-01-26 NOTE — ANESTHESIA PROCEDURE NOTES
Peripheral IV  Date/Time: 1/26/2021 10:31 AM  Inserted by: Adelina Spurling, MD    Placement  Needle size: 16 G  Laterality: right  Location: wrist  Site prep: alcohol  Technique: anatomical landmarks  Attempts: 2

## 2021-01-26 NOTE — PLAN OF CARE
Problem: Patient Centered Care  Goal: Patient preferences are identified and integrated in the patient's plan of care  Description: Interventions:  - What would you like us to know as we care for you?  Patient came from [address] [place] [address] needed  - Instruct patient on self management of diabetes  Outcome: Progressing     Problem: METABOLIC/FLUID AND ELECTROLYTES - ADULT  Goal: Glucose maintained within prescribed range  Description: INTERVENTIONS:  - Monitor Blood Glucose as ordered  - Sole ordered and appropriate  Outcome: Progressing  Goal: Free from bleeding injury  Description: (Example usage: patient with low platelets)  INTERVENTIONS:  - Avoid intramuscular injections, enemas and rectal medication administration  - Ensure safe mobilizat cultural and social influences on pain and pain management  - Manage/alleviate anxiety  - Utilize distraction and/or relaxation techniques  - Monitor for opioid side effects  - Notify MD/LIP if interventions unsuccessful or patient reports new pain  - Anti patient needs post-hospital services based on physician/LIP order or complex needs related to functional status, cognitive ability or social support system  Outcome: Progressing    Patient is alert and oriented, aware to call for help as needed.   Patient i

## 2021-01-26 NOTE — ANESTHESIA PROCEDURE NOTES
Spinal Block  Performed by: Nils Rios CRNA  Authorized by: Joi Avilez MD       General Information and Staff    Start Time:   End Time: 1/26/2021 10:37 AM  Anesthesiologist:  Joi Avilez MD  CRNA:  MATT Noel

## 2021-01-26 NOTE — INTERVAL H&P NOTE
Pre-op Diagnosis: infection / inflammatory reaction due to right internal joint prosthesis    The above referenced H&P was reviewed by YARI Ellis on 1/26/2021, the patient was examined and no significant changes have occurred in the patient's condi

## 2021-01-27 PROCEDURE — 99232 SBSQ HOSP IP/OBS MODERATE 35: CPT | Performed by: NURSE PRACTITIONER

## 2021-01-27 NOTE — CM/SW NOTE
W followed up on DC planning. NIRALI spoke with RN who states pt may not be ready for DC kevin, 1/28.  NIRALI received message from post acute care manager José Nash who states pt is scheduled for second covid vaccine dose at [facility] on 1/28 and would need

## 2021-01-27 NOTE — PHYSICAL THERAPY NOTE
PHYSICAL THERAPY KNEE EVALUATION - INPATIENT       Room Number: 407/407-A  Evaluation Date: 1/27/2021  Type of Evaluation: Initial  Physician Order: PT Eval and Treat    Presenting Problem: s/p R knee fusion (NO OP NOTE AS OF THIS WRITING) patient aware sh from the hospital at a rehab facility. The patient's Approx Degree of Impairment: 86.62% has been calculated based on documentation in the [facility] '6 clicks' Inpatient Basic Mobility Short Form.   Research supports that patients with this level of impairment Performed by Rodolfo Sahu MD at [facility] Right 6/25/2019    Performed by Kinjal Easley DPM at [facility]   • INJ TENDON/LIGAMENT/CYST Right 01/2019   • KNEE TOTAL REPLACEM within functional limits LLE WNL    BALANCE  Static Sitting: Fair +  Dynamic Sitting: Fair  Static Standing: Not tested  Dynamic Standing: Not tested                                                                       AM-PAC '6-Clicks' INPATIENT SHORT FO performs home exercise program for ROM/strengthening per the instructions provided in preparation for discharge.    Goal #6  Current Status

## 2021-01-27 NOTE — PLAN OF CARE
VSS, no acute events overnight. Pt c/o nausea and vomiting intermittently, zofran q4h prn. 2-4mg Dilaudid PO q4h prn for pain with IV dilaudid for breakthrough. Pt resting in bed. Call light in reach.  Plan to continue to monitor, administer medications prn range  Description: INTERVENTIONS:  - Monitor Blood Glucose as ordered  - Assess for signs and symptoms of hyperglycemia and hypoglycemia  - Administer ordered medications to maintain glucose within target range  - Assess barriers to adequate nutritional i hematologic stability  Description: INTERVENTIONS  - Assess for signs and symptoms of bleeding or hemorrhage  - Monitor labs and vital signs for trends  - Administer supportive blood products/factors, fluids and medications as ordered and appropriate  - Ad goal  Description: INTERVENTIONS:  - Encourage pt to monitor pain and request assistance  - Assess pain using appropriate pain scale  - Administer analgesics based on type and severity of pain and evaluate response  - Implement non-pharmacological measures to assist at discharge as needed  - Consider post-discharge preferences of patient/family/discharge partner  - Complete POLST form as appropriate  - Assess patient's ability to be responsible for managing their own health  - Refer to Case Management Depart

## 2021-01-27 NOTE — PLAN OF CARE
Problem: Patient Centered Care  Goal: Patient preferences are identified and integrated in the patient's plan of care  Description: Interventions:  - What would you like us to know as we care for you?  Patient came from [address] [place] [address] needed  - Instruct patient on self management of diabetes  Outcome: Progressing     Problem: METABOLIC/FLUID AND ELECTROLYTES - ADULT  Goal: Glucose maintained within prescribed range  Description: INTERVENTIONS:  - Monitor Blood Glucose as ordered  - Sole ordered and appropriate  Outcome: Progressing  Goal: Free from bleeding injury  Description: (Example usage: patient with low platelets)  INTERVENTIONS:  - Avoid intramuscular injections, enemas and rectal medication administration  - Ensure safe mobilizat cultural and social influences on pain and pain management  - Manage/alleviate anxiety  - Utilize distraction and/or relaxation techniques  - Monitor for opioid side effects  - Notify MD/LIP if interventions unsuccessful or patient reports new pain  - Anti patient needs post-hospital services based on physician/LIP order or complex needs related to functional status, cognitive ability or social support system  Outcome: Progressing       Patient A&Ox4.  At 0730 AM, patient had 1 episode of nausea and vomiting,

## 2021-01-27 NOTE — PROGRESS NOTES
[facility]    Progress Note    Andressa Fernandez Patient Status:  Inpatient    1945 MRN [id number]   Location [facility] 4W/SW/SE Attending Bakari Russell MD   Hosp Day # 1 PCP Pan Lopez MD       Subjective:     Pt notes r Rick Pryor MD on 1/26/2021 at 1:03 PM          Xr Chest Ap Portable  (cpt=71045)    Result Date: 1/25/2021  CONCLUSION:  1. Cardiomegaly. Pulmonary venous distention. 2. Mild perihilar and lower lobe pulmonary congestion. Small left-sided effusion. Full    >35 minutes spent    Vinicius Valentino MD  1/26/2021

## 2021-01-27 NOTE — PROGRESS NOTES
1/27/2021  Admission date 1/25/2021      S: Patient doing well. Denies any numbness/tingling, F/C/S, N/V/D, SOB, Chest Pain, Abdominal Pain. Pain well-controlled. Had low UO overnight per RN, 150ml for 8 hour shift. Arguelles in place, IV fluids running.  Frakni (H) 0.10 - 1.00 x10(3) uL    Eosinophil Absolute 0.20 0.00 - 0.70 x10(3) uL    Basophil Absolute 0.04 0.00 - 0.20 x10(3) uL    Immature Granulocyte Absolute 0.05 0.00 - 1.00 x10(3) uL    Neutrophil % 72.7 %    Lymphocyte % 10.8 %    Monocyte % 13.6 %    Eo

## 2021-01-27 NOTE — PROGRESS NOTES
[facility]  Progress Note     Ansley Thibodeaux  : 1945    Status: Inpatient  Day #: 2    Attending: Janeth Hodge MD  PCP: Isatu Copeland MD      Assessment and Plan     MRSA infected prosthetic knee joint s/p removal of right knee hardwar 1202  Gross per 24 hour   Intake 1330 ml   Output 601 ml   Net 729 ml         Recent Labs   Lab 01/25/21  1210 01/26/21  0544 01/27/21  0739   WBC  --  7.0 10.3   HGB 11.0* 10.4* 8.3*   HCT 35.2 33.7* 28.8*   PLT  --  146.0* 129.0*   RBC  --  3.84 2.98* Pen  1-7 Units Subcutaneous TID CC   • enoxaparin  40 mg Subcutaneous Daily   • atorvastatin  40 mg Oral Nightly   • [MAR Hold] calciTRIOL  0.25 mcg Oral QOD   • ferrous sulfate  325 mg Oral TID CC   • folic acid  1 mg Oral Daily   • Metoprolol Succinate E

## 2021-01-27 NOTE — DIETARY NOTE
ADULT NUTRITION INITIAL ASSESSMENT    Pt is at moderate nutrition risk. Pt does not meet malnutrition criteria.       RECOMMENDATIONS TO MD:  See Nutrition Intervention     ADMITTING DIAGNOSIS:   Infected Right Knee, S/P revision of total knee,  Infected p new setting or provider: to be determined    ANTHROPOMETRICS:  HT: 157.5 cm (5' 2\")  WT: 84.5 kg (186 lb 4.8 oz)   BMI: Body mass index is 34.07 kg/m². BMI CLASSIFICATION: 30-34.9 kg/m2 - obesity class I  IBW: 110 lbs        169% IBW  Usual Body Wt: 166. 1/25/21(L)  Recent Labs     01/25/21  1210 01/27/21  0645   GLU 99 128*   BUN 41* 45*   CREATSERUM 1.72* 1.75*   CA 9.9 9.2    137   K 4.1 4.5    112   CO2 24.0 17.0*   OSMOCALC 302* 297*     NUTRITION RELATED PHYSICAL FINDINGS:  - Body Fat/M

## 2021-01-27 NOTE — PROGRESS NOTES
[facility]    Cardiology Progress Note    Andressa Fernandez Patient Status:  Inpatient    1945 MRN [id number]   Location [facility] 4W/SW/SE Attending Brenden Montesinos MD   [facility] Day # 2 PCP Pan Lopez MD     2021    Assessment 190 lb 6.4 oz (86.4 kg)  11/15/20 0409 : 195 lb 12.8 oz (88.8 kg)  11/14/20 0614 : 196 lb 11.2 oz (89.2 kg)  11/13/20 0617 : 195 lb 8 oz (88.7 kg)  11/12/20 0651 : 196 lb 11.2 oz (89.2 kg)  11/11/20 0615 : 196 lb 6.4 oz (89.1 kg)      Tele: NSR, 2 shorts r Intravenous, Q2H PRN    •  mupirocin (BACTROBAN) 2% nasal ointment OINT 1 Application, 1 Application, Each Nare, BID    •  famoTIDine (PEPCID) tab 20 mg, 20 mg, Oral, Daily    Or    •  famoTIDine (PEPCID) injection 20 mg, 20 mg, Intravenous, Daily    •  va tablet, 8 tablet, Oral, Q15 Min PRN    •  Insulin Aspart Pen (NOVOLOG) 100 UNIT/ML flexpen 1-7 Units, 1-7 Units, Subcutaneous, TID CC    •  Enoxaparin Sodium (LOVENOX) 40 MG/0.4ML injection 40 mg, 40 mg, Subcutaneous, Daily    •  atorvastatin (LIPITOR) tab

## 2021-01-27 NOTE — CONSULTS
[place] [facility] ID CONSULT NOTE    Sushant Mckeon Patient Status:  Inpatient    1945 MRN [id number]   Location [facility] 4W/SW/SE Attending David Honeycutt MD   Hosp Day # 2 PCP Viri Mueller MD       Reason for Consult [facility]   • History of GI bleed 08/2019   • History of stomach ulcers    • Osteoarthritis    • Renal disorder     watching renal counts   • Visual impairment     readers     Past Surgical History:   Procedure Laterality Date   • CATARACT  2017   • CATH BARE ME reports that she does not drink alcohol or use drugs.     Allergies:    Hydrocodone             HIVES, NAUSEA AND VOMITING    Comment:Tolerates morphine, hydromorphone and fentanyl  Metronidazole           HIVES, NAUSEA AND VOMITING  Penicillins MG/ML injection 0.4 mg, 0.4 mg, Intravenous, Q2H PRN **OR** HYDROmorphone HCl (DILAUDID) 1 MG/ML injection 0.8 mg, 0.8 mg, Intravenous, Q2H PRN **OR** HYDROmorphone HCl (DILAUDID) 1 MG/ML injection 1.2 mg, 1.2 mg, Intravenous, Q2H PRN  •  acetaminophen (TY breath, cough or sputum. GASTROINTESTINAL:  No anorexia, nausea, vomiting or diarrhea. No abdominal pain or blood. GENITOURINARY:  No Burning on urination.    NEUROLOGICAL:  No headache, dizziness, syncope, paralysis, ataxia, numbness or tingling in the e gastritis with later nuclear stress test abnormal s/p LHC and PCI of mid-LAD with ANGELO so surgery was postponed. Discharged to rehab with IV vancomycin which has still been on.  Now presented to [facility] on 8/1 to preoperatively transition anticoagulation prior t will make further recommendations based on his progress.     [facility]  [phone]  1/27/2021

## 2021-01-28 PROCEDURE — 99232 SBSQ HOSP IP/OBS MODERATE 35: CPT | Performed by: NURSE PRACTITIONER

## 2021-01-28 NOTE — PROGRESS NOTES
[facility]    Progress Note    Maria Isabel Gardner Patient Status:  Inpatient    1945 MRN [id number]   Location [facility] 4W/SW/SE Attending Dario De León MD   Hosp Day # 3 PCP Layne Lopez MD        Subjective:     Respiratory: 103 01/25/2021    BILT 0.4 01/25/2021    TP 6.3 (L) 01/25/2021    AST 9 (L) 01/25/2021    ALT 23 01/25/2021    PTT 28.1 02/19/2020    INR 1.20 12/04/2020    TSH 1.630 06/18/2020    PUNEET 21 (L) 12/20/2019    LIP 12 (L) 09/17/2020    ESRML 10 10/21/2020    CR

## 2021-01-28 NOTE — PROGRESS NOTES
Pt had brief run of A-Fib of about 8 seconds, this nurse paged Dr. Helena Canas and spoke with on-call Dr. Luna Dietz who stated no further orders at this time.  Will continue to monitor pt for safety

## 2021-01-28 NOTE — PLAN OF CARE
Problem: Patient Centered Care  Goal: Patient preferences are identified and integrated in the patient's plan of care  Description: Interventions:  - What would you like us to know as we care for you?  Patient came from [address] [place] [address] needed  - Instruct patient on self management of diabetes  Outcome: Progressing     Problem: METABOLIC/FLUID AND ELECTROLYTES - ADULT  Goal: Glucose maintained within prescribed range  Description: INTERVENTIONS:  - Monitor Blood Glucose as ordered  - Sole ordered and appropriate  Outcome: Progressing  Goal: Free from bleeding injury  Description: (Example usage: patient with low platelets)  INTERVENTIONS:  - Avoid intramuscular injections, enemas and rectal medication administration  - Ensure safe mobilizat cultural and social influences on pain and pain management  - Manage/alleviate anxiety  - Utilize distraction and/or relaxation techniques  - Monitor for opioid side effects  - Notify MD/LIP if interventions unsuccessful or patient reports new pain  - Anti patient needs post-hospital services based on physician/LIP order or complex needs related to functional status, cognitive ability or social support system  Outcome: Progressing   Pt is a&ox4. Pt is on RA, Pt is tolerating a general diet.  Pt is voiding via

## 2021-01-28 NOTE — PROGRESS NOTES
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

## 2021-01-28 NOTE — PHYSICAL THERAPY NOTE
PHYSICAL THERAPY TREATMENT NOTE - INPATIENT     Room Number: 407/407-A       Presenting Problem: s/p R knee fusion (NO OP NOTE AS OF THIS WRITING) patient aware she is not allowed to flex the knee at this time     Problem List  Active Problems:    S/P revi extension brace)    WEIGHT BEARING RESTRICTION  Weight Bearing Restriction: R lower extremity        R Lower Extremity: Weight Bearing as Tolerated       PAIN ASSESSMENT   Rating: Unable to rate  Location: R LE  Management Techniques: Activity promotion; Elias Zelaya Goal #1   Current Status Max A 1-2   Goal #2 Patient is able to demonstrate transfers Sit to/from Stand at assistance level: mod A x 1 with rolling walker   Attempted x3 times and was not able to perform.     Goal #2  Current Status    Goal #3 Patient is

## 2021-01-28 NOTE — PROGRESS NOTES
INFECTIOUS DISEASE PROGRESS NOTE  [facility] ID PROGRESS NOTE    Ansley Thibodeaux Patient Status:  Inpatient    1945 MRN [id number]   Location [facility] 4W/SW/SE Attending Jeremy Redding MD   Hosp Day # 3 PCP Madeline Luu regurgitation     Pulmonary hypertension (HCC)     Hiatal hernia with GERD and esophagitis     Failed total knee arthroplasty, sequela     Type 2 diabetes mellitus with hyperglycemia (HCC)     Coronary artery disease due to calcified coronary lesion     In s/p R knee fusion 1/26. Patient denies any dysuria prior to arrival. Has been at rehab. On arrival, afebrile, wbc 128 wbcs, cx with E. Coli, started on meropenem. Received perioperative vancomycin. Plans to return to rehab.  ID consulted.      # ESBL E. Col

## 2021-01-28 NOTE — PLAN OF CARE
Pt is ao x 4, pt getting scanned for urine retention, pt sleeping on room air, will continue to monitor pt for safety    Problem: Diabetes/Glucose Control  Goal: Glucose maintained within prescribed range  Description: INTERVENTIONS:  - Monitor Blood Gluco available)  - Encourage oral intake as appropriate  - Instruct patient on fluid and nutrition restrictions as appropriate  Outcome: Progressing     Problem: HEMATOLOGIC - ADULT  Goal: Maintains hematologic stability  Description: INTERVENTIONS  - Assess fo ADULT  Goal: Absence of fever/infection during anticipated neutropenic period  Description: INTERVENTIONS  - Monitor WBC  - Administer growth factors as ordered  - Implement neutropenic guidelines  Outcome: Progressing     Problem: [facility]

## 2021-02-01 NOTE — OPERATIVE REPORT
[facility]    PATIENT'S NAME: Adebayo Rogel   ATTENDING PHYSICIAN: Stephane Meng MD   OPERATING PHYSICIAN: Stephane Meng MD   PATIENT ACCOUNT#:   [id number]    LOCATION:  [address] [place] [address] #:   [id number]       DATE OF BIRTH: TECHNIQUE:  The patient had previously been admitted to [facility].  She was brought to the operating room suite on January 26, 2021. The above-stated procedure was confirmed. She received 900 mg of clindamycin for antibiotic prophylaxis. around the stem. Once this had polymerized, the knee was brought into full extension. The locking bridge spacer was applied, and the locking bolts were tightened and fully secured.   An additional antibiotic doses of the similar cocktail was now made to f

## 2021-02-01 NOTE — DISCHARGE SUMMARY
Ortho Discharge Summary     Patient ID:  Hernán Saldana  [id number]  67year old  9/8/1945      Admit Date: 1/25/2021    Discharge Date and Time: 1/28/2021  1:35 PM    Attending Physician: Dr Anabel Villalta    Reason for admission: right knee prosthesis infecti Tyler Henry,    Your BG numbers look great except that I see you are having quite a few Low’s. Decrease Tresiba by 2 units. You are at 24 units of Tresiba right now, so decrease to 22 units. If you do not see an improvement decrease by another 2 units. Let me know if you continue to have any Low numbers 80 mg/dL or less.

## 2021-02-05 NOTE — PROGRESS NOTES
Location [facility]  Type in person  Date of service February 3rd 2021    Patient seen and examined  She reports that she is doing well. Denies any chest pain.      Physical exam  Vital signs reviewed and stable  GENERAL HEALTH:  no distress

## 2021-02-05 NOTE — ED NOTES
Orders for admission, patient is aware of plan and ready to go upstairs. Any questions, please call ED RN Michaelle Santos  at extension [phone].    Type of COVID test sent: Rapid  COVID Suspicion level: Low    Titratable drug(s) infusing:Blood  Rate:not started yet

## 2021-02-05 NOTE — ED NOTES
No answer at [facility].  Message left to notify RN station that patient  To be dc'd and transferred back to facility via [facility] em[facility]

## 2021-02-05 NOTE — PROGRESS NOTES
Location [facility]  Type tele med with audio and video   Date of service Jan 31st 2021    coming back from [facility] after surgery   she is glad that she is back in rehab   no cp or sob      Physical exam  Vital signs reviewed and stable  GEN

## 2021-02-05 NOTE — ED INITIAL ASSESSMENT (HPI)
From [facility] for hemoglobin of 6.8. Patient reports chronically low hbg. Denies blood in stool. Denies weakness or dizziness.

## 2021-02-05 NOTE — ED PROVIDER NOTES
Patient Seen in: [facility]      History   Patient presents with:  Abnormal Result    Stated Complaint: low hemoglobin    HPI/Subjective:   HPI    Patient is a 50-year-old female that was discharged from the hospital about 1 w • KNEE TOTAL REPLACEMENT Right 10/29/2018    Performed by Rubie Klinefelter, MD at [facility] [place]   • [address] Right 8/3/2020    Performed by Rubie Klinefelter, MD at [facility] [place]   • OTHER  2003    debridement r/t spider bite - brown reclusive, right thi Pulmonary/Chest: Effort normal and breath sounds normal. No respiratory distress. Abdominal: Soft. Bowel sounds are normal. Exhibits no distension and no mass. There is no tenderness. There is no rebound and no guarding.    Musculoskeletal: Right knee i -----------         ------                     [facility] (BLOOD GVQU)[[id number]]                               Final result               ANTIBODY MELLISA[[id number]]                                  Final result                 Please view result

## 2021-02-10 NOTE — PROGRESS NOTES
Location [facility]  Type in person  Date of service February 7th 2021    overall progressing well  can stand up   no cp or sob   no bleeding reported      Physical exam  Vital signs reviewed and stable  GENERAL HEALTH:  no distress  EYES:

## 2021-02-12 NOTE — PROGRESS NOTES
Location [facility]  Type in person  Date of service February 12th 2021    saw ortho  progressing slowly   no cp or sob   no fever      Physical exam  Vital signs reviewed and stable  GENERAL HEALTH:  no distress  EYES:   conjunctiva normal

## 2021-02-14 NOTE — PROGRESS NOTES
Location [facility]  Type in person  Date of service February 14 th 2021    cont to progress , slowly   can stand up for 20 second, not ready for steps yet  no cp or sob   no bleeding reported   eating ok       Physical exam  Vital signs revi

## 2021-02-28 NOTE — PROGRESS NOTES
Location [facility]  Type audio and video  Date of service February 28th 2021    I have conducted a telemedicine visit. She was resting in the bed comfortably.   She reports that she is able to stand up for 45 seconds now and able to do few s

## 2021-02-28 NOTE — PROGRESS NOTES
Location [facility]  Type audio and video  Date of service February 21 2021    getting better   no  complaints   no bleeding          Physical exam  Vital signs reviewed and stable  No distress. Resting comfortably. Alert orient x3.   Though

## 2021-02-28 NOTE — PROGRESS NOTES
Location [facility]  Type in person  Date of service February 25th 2021    Patient seen and examined. No bleeding reported. She reports that she is getting stronger. Did few steps.   Able to stand up for more than 30 seconds.         Physic

## 2021-02-28 NOTE — PROGRESS NOTES
Location [facility]  Type in person  Date of service February 17th 2021    getting better , happy with progress   no bleeding   no cp or sob       Physical exam  Vital signs reviewed and stable  GENERAL HEALTH:  no distress  EYES:   conjuncti

## 2021-03-07 NOTE — PROGRESS NOTES
Location [facility]  Type in person  Date of service March 3rd 2021    Progressing well  No complaints - happy with progress     Physical exam  Vital signs reviewed and stable  GENERAL HEALTH:  no distress  EYES:   conjunctiva pale  HENT: no

## 2021-03-07 NOTE — PROGRESS NOTES
Location [facility]  Type in person  Date of service March 7th 2021    reports that she has nausea from last night , 1 episode of vomiting   BM present   no fever or chills     Physical exam  Vital signs reviewed and stable  GENERAL HEALTH:

## 2021-03-08 PROBLEM — Z87.74 HISTORY OF ARTERIOVENOUS MALFORMATION (AVM): Status: ACTIVE | Noted: 2021-01-01

## 2021-03-08 PROBLEM — K92.0 COFFEE GROUND EMESIS: Status: ACTIVE | Noted: 2021-01-01

## 2021-03-08 NOTE — ED NOTES
Orders for admission, patient is aware of plan and ready to go upstairs. Any questions, please call ED ANAHI lacy  at extension [phone].    Type of COVID test sent: rapid  COVID Suspicion level: Low        LOC at time of transport: ax4

## 2021-03-08 NOTE — CONSULTS
[facility]   Gastroenterology Consultation Note    Connor Rubalcava  Patient Status:    Emergency  Date of Admission:         3/8/2021, Hospital day #0  Attending:   Darryn Farfan MD  PCP:     Moraima Luz MD    Reason for [facility] Resendiz  - hiatal hernia, colon polyps, diverticulosis and IH    Social Hx:  - No tobacco use/No ETOH  - Denies illicit drug use  - Lives at nursing facility      History:  Past Medical History:   Diagnosis Date   • Back problem    • Calculus of kidney    • the fooot   • TOTAL KNEE REPLACEMENT Right 10/29/2018     Family History   Problem Relation Age of Onset   • Cancer Father         prostate   • Diabetes Mother    • Anemia Mother    • Diabetes Sister    • Anemia Sister    • Other (Other) Sister         cva negative for new loss of consciousness ++ dizziness   BEHAVIOR/PSYCH:  negative for psychotic behavior    Physical Exam:    Blood pressure 154/80, pulse 85, temperature 98.8 °F (37.1 °C), temperature source Temporal, resp.  rate 24, weight 173 lb (78.5 kg), emesis  #nausea/vomiting  #anemia    Suspect hemoconcentration/dehydration - expect Hgb to drop. Will re-order CBC today to assess. This patient is at high risk for AVMs given her anti-coagulation use and kidney disease.  Her EGD prior to anti-coagulation w Reviewed with Sully Morejon.

## 2021-03-08 NOTE — H&P
[facility]    PATIENT'S NAME: Edilberto Gutierrez   ATTENDING PHYSICIAN: Breanna Guerra MD   PATIENT ACCOUNT#:   [de-identified]    LOCATION:  [place] [address]  MEDICAL RECORD #:   [id number]       YOB: 1945  ADMISSION DATE:       03/ arthroplasty, prosthesis infection followed by antibiotic spacer and eventually knee fusion, history of cystoscopy and lithotripsy, hysterectomy, and cataract procedure.     MEDICATIONS:  Please see medication reconciliation list.    ALLERGIES:  Listed hydr acute on chronic component. 3.   History of coronary artery disease and ischemic cardiomyopathy. 4.   Diabetes mellitus type 2. PLAN:  The patient will be admitted to general medical floor with remote telemetry.   She seems to be intravascular volume-d

## 2021-03-08 NOTE — ED INITIAL ASSESSMENT (HPI)
Patient from [address] [place] [address], presents for 3 days of vomiting.   coffee brown in color. Denies any pain.     Hx of GI bleed

## 2021-03-08 NOTE — VASCULAR ACCESS
Vascular Access Consult Note  3/8/2021     Reviewed H&P and current condition. Past Medical History:  2017: Cataract      Comment:  surgery  08/2019: History of GI bleed  06/2020:  History of blood transfusion      Comment:  @ [facility]  No date: Back problem  N completed. Recommendation:  Discussed with patient's RN Funmilayo/Inez. Patient has adequate vascular access at this time. Please contact us if patient's vascular access need changes.     Esthela Bhatia, BSN RN, VA-BC

## 2021-03-09 PROCEDURE — 99222 1ST HOSP IP/OBS MODERATE 55: CPT | Performed by: INTERNAL MEDICINE

## 2021-03-09 NOTE — ANESTHESIA POSTPROCEDURE EVALUATION
Patient: Thea Mclean    Procedure Summary     Date: 03/09/21 Room / Location: [facility] ENDOSCOPY 05 / [facility] ENDOSCOPY    Anesthesia Start: 1642 Anesthesia Stop: 6699    Procedure: ESOPHAGOGASTRODUODENOSCOPY (EGD) (N/A ) Diagnosis: (hiatal hernia)    Surgeons:

## 2021-03-09 NOTE — ANESTHESIA PREPROCEDURE EVALUATION
Anesthesia PreOp Note    HPI:     Rehana Borden is a 67year old female who presents for preoperative consultation requested by: Ryan Jeffrey MD    Date of Surgery: 3/8/2021 - 3/9/2021    Procedure(s):  ESOPHAGOGASTRODUODENOSCOPY (EGD)  Blossom arthroplasty, sequela         Date Noted: 06/18/2020      Type 2 diabetes mellitus with hyperglycemia [facility])         Date Noted: 06/18/2020      Polyp of colon      PAF [facility]oxysmal [facility]) [facility]         Date Noted: 05/20/2020      Cataract readers       Past Surgical History:   Procedure Laterality Date   • CATARACT  2017   • CATH BARE METAL STENT (BMS)     • CATH PERCUTANEOUS  TRANSLUMINAL CORONARY ANGIOPLASTY     • COLONOSCOPY     • COLONOSCOPY N/A 6/12/2020    Performed by Rhonda Castillo Take 1 tablet (8.6 mg total) by mouth 2 (two) times daily. , Disp:  , Rfl: 0, Past Week at Unknown time  Clopidogrel Bisulfate 75 MG Oral Tab, Take 1 tablet (75 mg total) by mouth daily. , Disp:  , Rfl: 0, Past Week at Unknown time  Enoxaparin Sodium 40 MG/0 External Patch, Place 1 patch onto the skin daily. , Disp: 30 patch, Rfl: 0, Past Week at Unknown time  Ascorbic Acid (VITAMIN C) 1000 MG Oral Tab, Take 1,000 mg by mouth daily. , Disp: , Rfl: , Past Week at Unknown time  atorvastatin 40 MG Oral Tab, Take 1 Last Rate: 75 mL/hr at 03/08/21 1710, New Bag at 03/08/21 1710  acetaminophen (TYLENOL) tab 650 mg, 650 mg, Oral, Q6H PRN, Caden Remy MD  ondansetron HCl (ZOFRAN) injection 4 mg, 4 mg, Intravenous, Q6H PRN, Caden Remy MD, 4 mg at 03/09/21 6783 NAUSEA AND VOMITING, HIVES    Comment:Other reaction(s): emesis  Aspirin                 NAUSEA AND VOMITING, RASH    Comment:Can tolerate low dose but not regular strength d/t             GI problemsStomach ulcers  Codeine                 NAUSEA AND VOM Communication with Friends and Family:       Frequency of Social Gatherings with Friends and Family:       Attends Zoroastrianism Services:       Active Member of Clubs or Organizations:       Attends Club or Organization Meetings:       Marital Status:   Intim diabetes mellitus type 2 well controlled,   Abdominal   (+) obese,                Anesthesia Plan:   ASA:  4  Plan:   MAC  Informed Consent Plan and Risks Discussed With:  Patient      I have informed Kamilah [place] and/or legal guardian or family member

## 2021-03-09 NOTE — PROGRESS NOTES
[facility]    Progress Note    Morris Specking Patient Status:  Inpatient    1945 MRN [id number]   Location [facility] 5SW/SE Attending Alex Jaime MD   Hosp Day # 1 PCP Olga Oates MD       Subjective:     Pt notes epi unstable cardiac issues. Hx of CAD with ischemic CM. LAD drug-eluting stent in June 2020  EF 15%  - hold home aspirin and plavix for now  - cardiology to consult  - cont metoprolol  - hold bumex    Hx of paroxysmal a-fib.   - cards to consult  - not on

## 2021-03-09 NOTE — PHYSICAL THERAPY NOTE
Chart reviewed, attempted to see pt for PT eval. Pt transferring off the floor to EGD and not available. Will f/u tomorrow as appropriate, schedule permitting.     Areli Whitlock, DPT  Physical Therapy  [facility] #[id number]

## 2021-03-09 NOTE — OPERATIVE REPORT
EGD PROCEDURE REPORT    DATE OF PROCEDURE:  3/9/2021    PCP: Monster Macias MD     PREOPERATIVE DIAGNOSIS:  Recurring vomiting, hematemesis, acute and chronic blood loss anemia,      POSTOPERATIVE DIAGNOSIS:  See impression.      SURGEON:  Roxana Ronquillo

## 2021-03-09 NOTE — SPIRITUAL CARE NOTE
Pt was in good spirit, she has great support from her children, and strong connection with Bebo. She shared some difficulties that she has been through for her health problems, but remains hopeful and peaceful.   provided reassurance and p

## 2021-03-09 NOTE — ED PROVIDER NOTES
Patient Seen in: [facility] 5sw/se      History   Patient presents with:  Nausea/Vomiting/Diarrhea    Stated Complaint: vomiting    HPI/Subjective:   HPI    74-year-old female with history of AVM presents for evaluation of coffee-ground emesis.   P 2003    debridement r/t spider bite - brown reclusive, right thigh   • OTHER      removal kidney stone   • OTHER SURGICAL HISTORY      Cardiac stent placed 06/22/20   • PATELLA TENDON REPAIR Right 11/19/2018    Performed by Rubie Klinefelter, MD at [address] [place] [address] There is no guarding or rebound. Musculoskeletal:         General: Normal range of motion. Cervical back: Normal range of motion and neck supple. Skin:     General: Skin is warm and dry. Findings: No rash.    Neurological:      General: No foc Final result                 Please view results for these tests on the individual orders. CBC WITH DIFFERENTIAL WITH PLATELET    Narrative: The following orders were created for panel order CBC WITH DIFFERENTIAL WITH PLATELET.   Procedure

## 2021-03-09 NOTE — PLAN OF CARE
Problem: Patient Centered Care  Goal: Patient preferences are identified and integrated in the patient's plan of care  Description: Interventions:  - What would you like us to know as we care for you?  I have pain in my stomach   - PRN pain medication   -

## 2021-03-09 NOTE — CONSULTS
[facility]    Cardiology Consultation  [facility]    Liv Valerio Patient Status:  Inpatient    1945 MRN [id number]   Location [facility] 5SW/SE Attending Bernardo Jaquez, [facility] Day # 1 PCP Clifford Magallon COLONOSCOPY     • COLONOSCOPY N/A 6/12/2020    Performed by Tasha Finnegan MD at [address] [place] [address]     • ESOPHAGOGASTRODUODENOSCOPY (EGD) N/A 12/5/2020    Performed by Richard Frausto MD at [facility] ENDOSCOPY   • [address] [place] [address] Subcutaneous, 4 times per day  Pantoprazole Sodium (PROTONIX) 40 mg in Sodium Chloride (PF) 0.9 % 10 mL IV push, 40 mg, Intravenous, Daily  0.9% NaCl infusion, , Intravenous, Continuous  acetaminophen (TYLENOL) tab 650 mg, 650 mg, Oral, Q6H PRN  Melissa Siegel mouth daily. Enoxaparin Sodium 40 MG/0.4ML Subcutaneous Solution, Inject 0.4 mL (40 mg total) into the skin daily. Ondansetron HCl (ZOFRAN) 4 mg tablet, Take 1 tablet (4 mg total) by mouth every 8 (eight) hours as needed for Nausea.   acetaminophen 500 MG Nasal Liquid, 4 mg by Nasal route as needed. If patient remains unresponsive, repeat dose in other nostril 2-5 minutes after first dose. Nutritional Supplements (GLUCERNA SHAKE OR), Take 1 each by mouth 3 (three) times daily as needed.   ondansetron 4 MG O (Not Admitted) 03/08/21 0700 - 03/09/21 0659 03/09/21 0700 - 03/10/21 0659       Intake    P.O.  --  395  --    P.O. -- 395 --    I.V.  --  225  675    Volume (mL) (0.9% NaCl infusion) -- 225 675    Total Intake -- 620 675       Output    Urine  --  150  - 1.630 06/18/2020    PUNEET 21 (L) 12/20/2019    LIP 12 (L) 09/17/2020    ESRML 10 10/21/2020    CRP <0.29 10/21/2020    MG 2.5 12/08/2020    PHOS 3.1 12/08/2020    CK 38 08/27/2020    B12 1,087 (H) 06/18/2020         Recent Labs   Lab 03/08/21  1255 03/09/21

## 2021-03-10 LAB
CHOLEST SERPL-MCNC: 127 MG/DL
HDLC SERPL-MCNC: 62 MG/DL
LDLC SERPL CALC-MCNC: 47 MG/DL
NONHDLC SERPL-MCNC: 65 MG/DL
TRIGL SERPL-MCNC: 92 MG/DL
VLDLC SERPL CALC-MCNC: 18 MG/DL

## 2021-03-10 PROCEDURE — 99232 SBSQ HOSP IP/OBS MODERATE 35: CPT | Performed by: INTERNAL MEDICINE

## 2021-03-10 NOTE — PHYSICAL THERAPY NOTE
Chart reviewed, orders received. Pt received in bed, reporting continued nausea which has not improved since admission. Pt stating, \"I'm not doing anything today. \" Refusing all mobility today despite encouragement d/t nausea.  Pt transferred from rehab wh

## 2021-03-10 NOTE — PROGRESS NOTES
[facility]     Gastroenterology Progress Note    Mal Holloway Patient Status:  Inpatient    1945 MRN [id number]   Location [facility] 5SW/SE Attending Christa Sanchez MD   Hosp Day # 2 PCP Alex Pinto MD     Subjective esophagus, stomach or duodenum. Discussed with Dr. Libra Barlow regarding plans to stop Plavix and only use ASA. It I possible her nausea/vomiting was worsened by medication-effect. Her Hgb is stable without reported GI blood loss.  Recommend symptomatic treatme

## 2021-03-10 NOTE — OCCUPATIONAL THERAPY NOTE
Spoke with nurse who approved pt participation in session. Upon educating pt on role of therapy/role of session, pt adamantly refusing to participate in therapy today. Despite attempts to educate and encourage, pt states, \"I am not doing anything today\".

## 2021-03-10 NOTE — TELEPHONE ENCOUNTER
Returned call to Tasha Cotton- Dr. Patricia Negron is not on consult this admission and has not seen her. We suggest she keeps her appointment for 4/1/21 with Dr. Patricia Negron. Rosie stated understanding.

## 2021-03-10 NOTE — TELEPHONE ENCOUNTER
[facility] ([place] [place] is calling to say this patient is in [facility] and she was wandering if Dr. Florentin Kenyon is going to visit her in the Hospital and will Dr. Florentin Kenyon see the patient on 4/1/21 also.    Please call Rosie

## 2021-03-10 NOTE — PLAN OF CARE
Pt still with nausea and poor appetite. PRN antiemetics given. VSS.       Problem: Patient Centered Care  Goal: Patient preferences are identified and integrated in the patient's plan of care  Description: Interventions:  - What would you like us to know as injury  Description: INTERVENTIONS:  - Assess pt frequently for physical needs  - Identify cognitive and physical deficits and behaviors that affect risk of falls.   - [place] fall precautions as indicated by assessment.  - Educate pt/family on patient sa

## 2021-03-10 NOTE — PROGRESS NOTES
[facility]P - [facility]    Cardiology Progress Note    Thea Mclean Patient Status:  Inpatient    1945 MRN [id number]   [facility] 5SW/SE Attending Allie Herring MD   [facility] Day # 2 PCP Stacy Radford MD     3/8/2021    Assessment: No JVD, carotids 2+, no bruits. Cardiac: Regular rate and rhythm. S1, S2 normal. + ejection murmur, pericardial rub, S3.  Lungs: Clear without wheezes, rales, rhonchi or dullness. Normal excursions and effort. Abdomen: Soft, non-tender.    Extremities: W Q8H PRN  glucose (DEX4) oral liquid 15 g, 15 g, Oral, Q15 Min PRN   Or  Glucose-Vitamin C (DEX-4) chewable tab 4 tablet, 4 tablet, Oral, Q15 Min PRN   Or  dextrose 50 % injection 50 mL, 50 mL, Intravenous, Q15 Min PRN   Or  glucose (DEX4) oral liquid 30 g,

## 2021-03-10 NOTE — PLAN OF CARE
Problem: Patient Centered Care  Goal: Patient preferences are identified and integrated in the patient's plan of care  Description: Interventions:  - What would you like us to know as we care for you?  I have pain in my stomach   - PRN pain medication   - nutritional intake and initiate nutrition consult as needed  - Instruct patient on self management of diabetes  3/9/2021 1900 by Derwin Councilman, RN  Outcome: Progressing  3/9/2021 1755 by Derwin Councilman, RN  Outcome: Progressing     Problem: SAFETY ADULT - FAL patient on self management of diabetes  3/9/2021 1900 by Stephanie Joseph, RN  Outcome: Progressing  3/9/2021 1755 by Stephanie Joseph, RN  Outcome: Progressing  Goal: Electrolytes maintained within normal limits  Description: INTERVENTIONS:  - Monitor labs and rh

## 2021-03-10 NOTE — PROGRESS NOTES
[facility]  Progress Note     Taina Joshi  : 1945    Status: Inpatient  Day #: 2    Attending: Edwin Reyes MD  PCP: Sushma Martinez MD      Assessment and Plan     Intractable nausea and vomiting with coffee-ground emesis  Hx of blee nonfocal  Psychiatric:  Normal affect, calm and appropriate    Intake/Output Summary (Last 24 hours) at 3/10/2021 0938  Last data filed at 3/10/2021 0630  Gross per 24 hour   Intake 1862.5 ml   Output 500 ml   Net 1362.5 ml         Recent Labs   Lab 03/08/

## 2021-03-10 NOTE — PLAN OF CARE
Problem: Patient Centered Care  Goal: Patient preferences are identified and integrated in the patient's plan of care  Description: Interventions:  - What would you like us to know as we care for you?  I have pain in my stomach   - PRN pain medication   - cognitive and physical deficits and behaviors that affect risk of falls.   - [place] fall precautions as indicated by assessment.  - Educate pt/family on patient safety including physical limitations  - Instruct pt to call for assistance with activity bas ADULT  Goal: Skin integrity remains intact  Description: INTERVENTIONS  - Assess and document risk factors for pressure ulcer development  - Assess and document skin integrity  - Monitor for areas of redness and/or skin breakdown  - Initiate interventions,

## 2021-03-10 NOTE — CM/SW NOTE
SW initiated self referral for discharge planning. Per chart review, patient is from [address] [place] [address]. NIRALI sent updates via Aidin to [facility]. NIRALI met with patient at bedside to discuss discharge planning.  Patient reports th

## 2021-03-10 NOTE — PROGRESS NOTES
[facility]  Cardiology Progress Note    Sushant Mckeon Patient Status:  Inpatient    1945 MRN [id number]   Location [facility] 5SW/SE Attending David Honeycutt MD   Hosp Day # 2 PCP Viri Mueller MD       Subjective: Significant nausea, de TROP in the last 168 hours.     Allergies:     Hydrocodone             HIVES, NAUSEA AND VOMITING    Comment:Tolerates morphine, hydromorphone and fentanyl  Metronidazole           HIVES, NAUSEA AND VOMITING  Penicillins             HIVES    Comment:Other r mg, 1.2 mg, Intravenous, Q2H PRN          Assessment:  Probably upper GI bleed  History of CAD with severe ischemic cardiomyopathy  Recent coronary stenting of Plavix now.   Continue aspirin  History of atrial fibrillation not a candidate for anticoagulatio

## 2021-03-11 LAB
ANION GAP SERPL CALC-SCNC: 8 MMOL/L
BUN SERPL-MCNC: 43 MG/DL
BUN/CREAT SERPL: 21.2
CALCIUM SERPL-MCNC: 9.3 MG/DL
CHLORIDE SERPL-SCNC: 113 MMOL/L
CO2 SERPL-SCNC: 20 MMOL/L
CREAT SERPL-MCNC: 2.03 MG/DL
ERYTHROCYTE [DISTWIDTH] IN BLOOD BY AUTOMATED COUNT: 62.5 %
ERYTHROCYTE [DISTWIDTH] IN BLOOD: 20 %
GLUCOSE SERPL-MCNC: 143 MG/DL
HCT VFR BLD CALC: 31.2 %
HGB BLD-MCNC: 9.54 G/DL
MCH RBC QN AUTO: 27 PG
MCHC RBC AUTO-ENTMCNC: 30.4 G/DL
MCV RBC AUTO: 88.6 FL
PLATELET # BLD: 100 K/MCL
POTASSIUM SERPL-SCNC: 4.2 MMOL/L
RBC # BLD: 3.52 10*6/UL
SODIUM SERPL-SCNC: 141 MMOL/L
WBC # BLD: 4.9 K/MCL

## 2021-03-11 PROCEDURE — 99232 SBSQ HOSP IP/OBS MODERATE 35: CPT | Performed by: NURSE PRACTITIONER

## 2021-03-11 NOTE — PROGRESS NOTES
[facility]     Gastroenterology Progress Note    Moraima Mills Patient Status:  Inpatient    1945 MRN [id number]   Location [facility] 5SW/SE Attending Chidi Christensen MD   Hosp Day # 3 PCP Robson Irvin MD     Subjective stomach or duodenum. Discussed with Dr. Emil Carreon regarding plans to stop Plavix and only use ASA. Possible her nausea/vomiting was worsened by medication-effect. Her Hgb is stable without reported GI blood loss.      >>3/11: Hgb stable, no GI blood loss note

## 2021-03-11 NOTE — DISCHARGE SUMMARY
[facility]  Discharge Summary     Zara Toney  : 1945    Status: Inpatient  Day #: 3    Attending: Ric Ward MD  PCP: Aimee Reed MD     Date of Admission:  3/8/2021  Date of Discharge:  3/11/2021     Hospital Discharge Diagnos stent in June 2020  -back on asa 81mg  -cardiology on consult  -cont metoprolol  -bumex po     Paroxysmal a-fib  -cards to consult  -not on full anticoag due to hx of GIB  -cont metoprolol     Elevated troponin  -unclear significance.  H/o CKD and troponins this      Take 1 tablet (4 mg total) by mouth every 4 (four) hours as needed.    Refills: 0        CONTINUE taking these medications      Instructions Prescription details   acetaminophen 500 MG Tabs  Commonly known as: TYLENOL EXTRA STRENGTH      Take 1-2 tablet  Refills: 0     Insulin Aspart Pen 100 UNIT/ML Sopn  Commonly known as: NOVOLOG      Inject 1-7 Units into the skin 3 (three) times daily with meals.  Sliding scale   Quantity:    Refills: 0     Insulin NPH (Human) (Isophane) 100 UNIT/ML Susp      In as:  Shaniqua Arauz              Where to Get Your Medications      Please  your prescriptions at the location directed by your doctor or nurse    Bring a paper prescription for each of these medications  · HYDROmorphone HCl 2 MG Pomonas           [facility]

## 2021-03-11 NOTE — PLAN OF CARE
Problem: Patient Centered Care  Goal: Patient preferences are identified and integrated in the patient's plan of care  Description: Interventions:  - What would you like us to know as we care for you?  I have pain in my stomach   - PRN pain medication   - cognitive and physical deficits and behaviors that affect risk of falls.   - [place] fall precautions as indicated by assessment.  - Educate pt/family on patient safety including physical limitations  - Instruct pt to call for assistance with activity bas Skin integrity remains intact  Description: INTERVENTIONS  - Assess and document risk factors for pressure ulcer development  - Assess and document skin integrity  - Monitor for areas of redness and/or skin breakdown  - Initiate interventions, skin care al

## 2021-03-11 NOTE — PROGRESS NOTES
Discharge RN Summary: Patient has discharge order in. Patient to discharge back to LTC. IV to be removed by ANAHI Madrid prior to dc.       Scripts sent with pt: hydromorophone  Electronically sent prescriptions: none

## 2021-03-11 NOTE — PROGRESS NOTES
[facility]    Cardiology Progress Note    Maria Isabel Gardner Patient Status:  Inpatient    1945 MRN [id number]   [facility] 5SW/SE Attending Dario De León MD   [facility] Day # 3 PCP Layne Lopez MD     3/11/2021    Assessment: and effort. Abdomen: Soft, non-tender. Extremities: Without clubbing, cyanosis. NP edema BLE. Neurologic: Alert and oriented, withdrawn. Skin: Warm and dry.      Laboratory/Data:    Labs:    Lab Results   Component Value Date    TROP 0.171 03/10/2021 (PROTONIX) 40 mg in Sodium Chloride (PF) 0.9 % 10 mL IV push, 40 mg, Intravenous, Daily  acetaminophen (TYLENOL) tab 650 mg, 650 mg, Oral, Q6H PRN  Metoclopramide HCl (REGLAN) injection 5 mg, 5 mg, Intravenous, Q8H PRN  glucose (DEX4) oral liquid 15 g, 15

## 2021-03-11 NOTE — PLAN OF CARE
Minimal nausea on this shift. Tolerated dinner. Repositioning frequently. Vitals stable a this time. Will continue to monitor.      Problem: Patient Centered Care  Goal: Patient preferences are identified and integrated in the patient's plan of care  Nena SAFETY ADULT - FALL  Goal: Free from fall injury  Description: INTERVENTIONS:  - Assess pt frequently for physical needs  - Identify cognitive and physical deficits and behaviors that affect risk of falls.   - [place] fall precautions as indicated by asse Instruct patient on fluid and nutrition restrictions as appropriate  Outcome: Progressing     Problem: SKIN/TISSUE INTEGRITY - ADULT  Goal: Skin integrity remains intact  Description: INTERVENTIONS  - Assess and document risk factors for pressure ulcer dev

## 2021-03-15 NOTE — PROGRESS NOTES
Location [facility] rehab  Type inpatient  Date of service 3/14/2021  Readmission from [facility]    Patient was admitted to [facility] for coffee-ground emesis patient underwent endoscopy that showed mild bleeding from esophagus.   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      HTN controlled        Dyslipidemia on statins      Par Afib

## 2021-03-29 NOTE — PROGRESS NOTES
Location [address] rehab  Type inpatient  Date of service 3/25/2021      Now taking stool softeners everyother day, no more diarrhea and constipation   Labs showed jump in creatinine   encouraged to increase po intake     Physical exam  Vital signs review

## 2021-03-29 NOTE — PROGRESS NOTES
Location [facility] rehab  Type inpatient  Date of service 3/21/2021     Diarrhea resolved after stopping stool softeners but she developed constipation   also reported nausea   tolerating diet , passing gas         Physical exam  Vital signs reviewed and

## 2021-03-29 NOTE — PROGRESS NOTES
Location [facility] rehab  Type inpatient  Date of service 3/18/2021      reports that she has diarrhea - she thinks thats from stool softners   no abd pain   no blood in stool  semi soft stool, no fever or chills    Physical exam  Vital signs reviewed and

## 2021-04-01 NOTE — PROGRESS NOTES
Hematology Progress Note    Patient Name: Geoff Kumar   YOB: 1945   Medical Record Number: [id number]   CSN: [id number]   Consulting Physician: Josette David MD  Referring Physician(s):  Rik Blizzard Co  Date of Visit: 4/3/2021     Chief comp rehab facility and presented on a stretcher for follow up. She denies systemic signs of illness. Denies blood loss from any orifice.       She establish care with Dr. Carlie Queen in gastroenterology in 6/2020 who completed EGD+ colonoscopy with no malignancy n MAIN OR   • KNEE TOTAL REPLACEMENT Right 10/29/2018    Performed by Samy Kirkpatrick MD at [address] [place] [address] MAIN OR   • [address] Right 8/3/2020    Performed by Samy Kirkpatrick MD at [address] [place] [address] MAIN OR   • OTHER  2003    debridement r/t spider bite - brown reclusive, r Insecurity:       Worried About [address] [employer] in the Last Year:       Ran Out of Food in the Last Year:   Transportation Needs:       Lack of Transportation (Medical):       Lack of Transportation (Non-Medical):   Physical Activity:       Days of Exe UNIT/ML Injection Solution, Inject 10,000 Units into the skin once a week. monday, Disp: , Rfl:   lactulose 10 GM/15ML Oral Solution, Take 20 g by mouth daily as needed. , Disp: , Rfl:   Senna 8.6 MG Oral Tab, Take 1 tablet (8.6 mg total) by mouth 2 (two) t total) by mouth nightly., Disp: 30 tablet, Rfl: 2  aspirin 81 MG Oral Tab EC, Take 1 tablet (81 mg total) by mouth daily. , Disp: 30 tablet, Rfl: 11  Ferrous Sulfate 325 (65 Fe) MG Oral Tab, Take 1 tablet (325 mg total) by mouth 3 (three) times daily. , Disp Neck is supple. Lymphatics: There is no palpable lymphadenopathy throughout in the cervical, supraclavicular, axillary, or inguinal regions. Chest: Clear to auscultation. No wheezes or rales. Heart: Regular rate and rhythm.  S1S2 normal.  Abdomen: Soft, this being multifactorial anemia likely secondary to chronic disease in the setting of her hypertension, diabetes, CKD &CHF  --Patient has a history of GI bleed, will recheck iron studies, K77, folic acid levels, rule out hemolysis, check for autoimmune he that was rec for 6 month follow up  --I will make Dr. Carolyn Reeder aware of this rec for repeat CT chest as it is due now, so hopefully she can order it for Flora        3.) Increased total globulin level      --I planned to complete MGUS workup for possible plasma

## 2021-04-11 NOTE — PROGRESS NOTES
Location [address] rehab  Type inpatient  Date of service 4/8/2021     Happy with the progress in PT. Able to stand up for around 20 seconds. Able to take few steps with walker. Lower extremity edema is still hindering her activity.   No nausea vomiting days  elevate LE   monitor for signs of DVT  repeat labs

## 2021-04-11 NOTE — PROGRESS NOTES
[facility]  [facility]  Date of service 3/31/2021      doing ok, has lower extremity swelling   participating with PT     Physical exam  Vital signs reviewed and stable  GENERAL HEALTH:  no distress  EYES:   conjunctiva pale  HENT: normo

## 2021-04-21 NOTE — PROGRESS NOTES
Location [address] [facility] rehab  Type inpatient  Date of service 4/18/2021     Overall patient reports that she is progressing well with physical therapy. She is able to do some transfer out of the bed. She is able to stand up for around 30 seconds.   She does Duodenal AVM - cont PPI      HTN controlled        Dyslipidemia on statins      Par Afib  rate controlled      PLAN  Continue with Bumex 2 mg twice daily-monitor creatinine closely  Discharge plans in progress

## 2021-04-21 NOTE — PROGRESS NOTES
Location [address] [facility] rehab  Type inpatient  Date of service 4/11/2021    Physical exam    Vital signs reviewed and stable  GENERAL HEALTH:  no distress  EYES:   conjunctiva pale  HENT: normocephalic.   NECK: supple  RESPIRATORY:  no rales  no rhonchi   CARDI

## 2021-04-21 NOTE — PROGRESS NOTES
Location [facility] rehab  Type inpatient  Date of service 4/15/2021    Patient seen and examined in the rehab. She reports that she is doing okay. Her weight is still high. Her Bumex has been increased.   Kidney function is noted to be in the higher richard

## 2021-04-30 NOTE — PROGRESS NOTES
Location [address] rehab  Type inpatient  Date of service 4/25/2021     Looking forward to going home   swelling is down   happy with progress   no cp or sob   no abd pain ,v,n  BM regular   swelling present in LE    Physical exam  Vital signs reviewed an

## 2021-04-30 NOTE — PROGRESS NOTES
Location [facility] rehab  Type inpatient  Date of service 4/28/2021     She is progressing well. She is planning to go home this Saturday after an extensive long rehab stay. Her hemoglobin has been stable. It is 10.7 now.   Her creatinine has been stabl labs that include CBC and BMP in 1 week.   She need to follow-up with her PCP in 1 week  She need to follow-up with her nephrologist in 3 to 4 weeks  She need to follow-up with her cardiologist  She need to follow-up with her orthopedic's  I have answered a

## 2021-05-03 NOTE — PROGRESS NOTES
HPI:    Neena Morley is a 67year old female here today for hospital follow up. Please note that the following visit was completed using two-way, real-time interactive audio and/or video communication.  This has been done in good livan to provide con services. Medication Reconciliation:  I am aware of an inpatient discharge within the last 30 days. The discharge medication list has been reconciled with the patient's current medication list and reviewed by me.   See medication list for additions of n osteoblastic lesions. Bence-Muhammad protein was negative    Creatinine in the  upward trend since on diuretics. 2.03 on 3/11/2021 with GFR of 27    Since discharge to home, she had mostly bedbound. .  There was a problem with getting home health service due breakfast.    Metoprolol Succinate ER 25 MG Oral Tablet 24 Hr, Take 0.5 tablets (12.5 mg total) by mouth Daily Beta Blocker. calciTRIOL 0.25 MCG Oral Cap, Take 1 capsule (0.25 mcg total) by mouth every other day.   Insulin Aspart Pen 100 UNIT/ML Subcutaneo (Right, 01/2019); pressure ulcer boot (Right, 03/2019); tmj arthroscopy debridement (Right, 07/2019); colonoscopy (N/A, 6/12/2020); other surgical history; cath percutaneous  transluminal coronary angioplasty; cath bare metal stent (bms); and colonoscopy. are intact, motor and sensory are grossly intact    ASSESSMENT/ PLAN:   Diagnoses and all orders for this visit:    Acute upper GI bleed  Mild bleeding from esophagus, likely effect of medication  Pantoprazole 40 mg daily  Plavix discontinued indefinitely Significant Complications, Morbidity, and/or Mortality: severe    Overall Risk:   severe    Patient seen within 7 days from date of discharge.     F/u in 2 mos      Theodore Camacho MD, 5/3/2021

## 2021-05-04 RX ORDER — BLOOD-GLUCOSE METER
1 EACH MISCELLANEOUS
COMMUNITY
Start: 2021-05-04 | End: 2022-05-04

## 2021-05-04 RX ORDER — GLUCOSAM/CHON-MSM1/C/MANG/BOSW 500-416.6
TABLET ORAL
COMMUNITY
Start: 2021-05-04

## 2021-05-04 RX ORDER — TRIAMCINOLONE ACETONIDE 1 MG/ML
LOTION TOPICAL 3 TIMES DAILY
COMMUNITY

## 2021-05-04 RX ORDER — SENNOSIDES A AND B 8.6 MG/1
8.6 TABLET, FILM COATED ORAL 2 TIMES DAILY
COMMUNITY
Start: 2021-01-28 | End: 2021-05-12

## 2021-05-04 RX ORDER — LACTULOSE 10 G/15ML
20 SOLUTION ORAL PRN
COMMUNITY
End: 2021-05-12

## 2021-05-04 RX ORDER — MULTIVITAMIN
1 TABLET ORAL DAILY
COMMUNITY

## 2021-05-04 NOTE — PATIENT INSTRUCTIONS
Begin tracking daily weights. Call with weight gain of 3 lbs overnight or concerning symptoms. 32-64 oz fluid restriction   Less than 2000 mg sodium/salt diet.  Common high sodium foods include frozen dinners, soups (not homemade), some cereal, vegetable

## 2021-05-04 NOTE — TELEPHONE ENCOUNTER
----- Message from Kedar Cook MD sent at 5/4/2021 11:20 AM CDT -----  Regarding: mail referal  Please mail referral for chest CT to her.

## 2021-05-05 NOTE — TELEPHONE ENCOUNTER
Patient needs all these resent to pharmacy under a different or generic brand as insurance in not covering what was sent in yesterday. For the following:Accu-check Monitor, lancets & test strip. Please advise.

## 2021-05-05 NOTE — TELEPHONE ENCOUNTER
Was sent [employer] on 5/4. Received call on 5/5 that it is not covered and to send [employer]uc[employer]k. See 5/5 encounter.

## 2021-05-10 NOTE — TELEPHONE ENCOUNTER
Daughter called stating that the meter and strips, pharmacy have not received it   If possible to re sent it today since she has not checked her sugar for the past week

## 2021-05-11 NOTE — TELEPHONE ENCOUNTER
Patient needs a new RX for One-Touch verio Guide for monitor,strips and lancets Ginny Gomes). Patient needs this asap, she hasn't check her blood sugar for a week now.

## 2021-05-12 ENCOUNTER — OFFICE VISIT (OUTPATIENT)
Dept: CARDIOLOGY | Age: 76
End: 2021-05-12

## 2021-05-12 VITALS
BODY MASS INDEX: 37.17 KG/M2 | WEIGHT: 202 LBS | OXYGEN SATURATION: 99 % | HEART RATE: 77 BPM | DIASTOLIC BLOOD PRESSURE: 62 MMHG | HEIGHT: 62 IN | SYSTOLIC BLOOD PRESSURE: 126 MMHG

## 2021-05-12 DIAGNOSIS — I25.5 ISCHEMIC CARDIOMYOPATHY: Primary | ICD-10-CM

## 2021-05-12 DIAGNOSIS — I10 ESSENTIAL HYPERTENSION: ICD-10-CM

## 2021-05-12 DIAGNOSIS — E78.2 MIXED HYPERLIPIDEMIA: ICD-10-CM

## 2021-05-12 DIAGNOSIS — I48.0 PAF (PAROXYSMAL ATRIAL FIBRILLATION) (CMD): ICD-10-CM

## 2021-05-12 PROCEDURE — 3074F SYST BP LT 130 MM HG: CPT | Performed by: INTERNAL MEDICINE

## 2021-05-12 PROCEDURE — 99214 OFFICE O/P EST MOD 30 MIN: CPT | Performed by: INTERNAL MEDICINE

## 2021-05-12 PROCEDURE — 3078F DIAST BP <80 MM HG: CPT | Performed by: INTERNAL MEDICINE

## 2021-05-12 RX ORDER — METOPROLOL SUCCINATE 25 MG/1
12.5 TABLET, EXTENDED RELEASE ORAL DAILY
COMMUNITY
Start: 2021-04-29

## 2021-05-12 ASSESSMENT — PATIENT HEALTH QUESTIONNAIRE - PHQ9
SUM OF ALL RESPONSES TO PHQ9 QUESTIONS 1 AND 2: 0
2. FEELING DOWN, DEPRESSED OR HOPELESS: NOT AT ALL
CLINICAL INTERPRETATION OF PHQ2 SCORE: NO FURTHER SCREENING NEEDED
SUM OF ALL RESPONSES TO PHQ9 QUESTIONS 1 AND 2: 0
1. LITTLE INTEREST OR PLEASURE IN DOING THINGS: NOT AT ALL
CLINICAL INTERPRETATION OF PHQ9 SCORE: NO FURTHER SCREENING NEEDED

## 2021-05-13 NOTE — TELEPHONE ENCOUNTER
Patient daughter is calling because everything was refilled yesterday except for the blood glucose monitor. Please advise.

## 2021-05-13 NOTE — TELEPHONE ENCOUNTER
Teressa Foil 2 days ago   MG     Patient needs a new RX for One-Touch verio Guide for monitor,strips and lancets Susan Santillan).      Patient needs this asap, she hasn't check her blood sugar for a week now.

## 2021-05-17 NOTE — TELEPHONE ENCOUNTER
Daughter called stating she still missing the meter, pharmacy only gave her the strips and lancets. Please advise.

## 2021-05-17 NOTE — TELEPHONE ENCOUNTER
Spoke to Prasad at pharmacy. Per pharmacy, the Verio IQ is no longer being manufactured. If we can send Verio Reflect and it will still be compatible with the strips and lancets dispensed to patient. Radha Naranjo (daughter) informed. Verbalized understanding.

## 2021-05-20 NOTE — TELEPHONE ENCOUNTER
Called and left vm for patient to call office back to discuss insurance issues. Patient has Karl [employer] 187 (replacement plan) - a plan [facility] is not contracted with. Valid on 5/1/2021.     Referrals provided to her cannot be approved as pt sh

## 2021-05-20 NOTE — TELEPHONE ENCOUNTER
RE: [employer] please  Received: Today  ANAHI Amaya,     I am so sorry, but the following is the response I am getting with I attempt authorization.      PT has [employer] - not contracted with [facility]

## 2021-05-25 ENCOUNTER — TELEPHONE (OUTPATIENT)
Dept: CARDIOLOGY | Age: 76
End: 2021-05-25

## 2021-06-01 PROBLEM — D69.6 THROMBOCYTOPENIA (HCC): Status: ACTIVE | Noted: 2021-01-01

## 2021-06-01 PROBLEM — N17.9 ACUTE KIDNEY INJURY (HCC): Status: ACTIVE | Noted: 2021-01-01

## 2021-06-01 PROCEDURE — 99222 1ST HOSP IP/OBS MODERATE 55: CPT | Performed by: INTERNAL MEDICINE

## 2021-06-01 NOTE — CONSULTS
[facility]OSP - [facility]    Cardiology Consultation  [facility]    Alexandra Haq Patient Status:  Emergency    1945 MRN [id number]   Location [address] Attending Dereck Barcelona, MD Celestina Schaumann debridement r/t spider bite - brown reclusive, right thigh   • OTHER      removal kidney stone   • OTHER SURGICAL HISTORY      Cardiac stent placed 06/22/20   • PRESSURE ULCER BOOT Right 03/2019   • TMJ ARTHROSCOPY DEBRIDEMENT Right 07/2019    heel of t 170 lb (77.1 kg)       Intake/Output:   Last 3 shifts:     Vent Settings:      Hemodynamic parameters (last 24 hours):      Scheduled Meds:     Continuous Infusions:     Physical Exam:  General: Alert and oriented x 3. No apparent distress.   Breathing fair the last 168 hours. Imaging:  XR CHEST AP PORTABLE  (CPT=71045)    Result Date: 6/1/2021  CONCLUSION:   Cardiomegaly without significant vascular congestion. Mild hazy bibasilar opacity which may reflect mild bibasilar atelectasis.   Pneumonia not entir

## 2021-06-01 NOTE — PROGRESS NOTES
Vascular Access Consult Note  6/1/2021     Reviewed H&P and current condition. Past Medical History:  2017: Cataract      Comment:  surgery  08/2019: History of GI bleed  06/2020:  History of blood transfusion      Comment:  @ [facility]  No date: Back problem  N Sodium (PROTONIX) EC tab 40 mg, 40 mg, Oral, QAM AC  ondansetron (ZOFRAN-ODT) disintegrating tab 4 mg, 4 mg, Oral, Q4H PRN  Senna (SENOKOT) tab 8.6 mg, 8.6 mg, Oral, BID  glucose (DEX4) oral liquid 15 g, 15 g, Oral, Q15 Min PRN   Or  Glucose-Vitamin C (DEX

## 2021-06-01 NOTE — ED PROVIDER NOTES
Patient Seen in: [facility] Emergency Department      History   No chief complaint on file.     Stated Complaint: chf, bilat lower extremity swelling    HPI/Subjective:   HPI    Patient is a 76-year-old female who arrives from home with her son for Years: 8.00        Pack years: 4        Types: Cigarettes        Quit date: 10/11/1980        Years since quittin.6      Smokeless tobacco: Never Used    Vaping Use      Vaping Use: Never used    Alcohol use: No    Drug use:  No             Review other components within normal limits   POCT GLUCOSE - Abnormal; Notable for the following components:    POC Glucose  100 (*)     All other components within normal limits   CBC W/ DIFFERENTIAL - Abnormal; Notable for the following components:    HGB 11. 9 time in obtaining history, performing a physical exam, bedside monitoring of interventions, collecting and interpreting tests and discussion with consultants but not including time spent performing procedures.      PHYSICIAN NOTE:  Patient and her son notif

## 2021-06-01 NOTE — ED QUICK NOTES
Patient assisted into ER cart via lift equipment, RN and ERT, tolerated well. Son at bedside, states patient has been home from rehab at [facility] since May 1st and feels she may have gained 100 lb in water weight over the past month.

## 2021-06-01 NOTE — ED QUICK NOTES
Orders for admission, patient is aware of plan and ready to go upstairs. Any questions, please call ED RN [phone]le  at extension [phone].    Type of COVID test sent: Rapid-negative  COVID Suspicion level: Low    LOC at time of transport:  Alert    Other pertin

## 2021-06-01 NOTE — H&P
[facility]    PATIENT'S NAME: Kurtis Alvarez   ATTENDING PHYSICIAN: Lisa Tena MD   PATIENT ACCOUNT#:   [id number]    LOCATION:  [place] [address]  MEDICAL RECORD #:   [id number]       YOB: 1945  ADMISSION DATE:       06 requiring antibiotic spacer and then explantation and knee fusion, history of cystoscopy and lithotripsy, hysterectomy, cataract procedure.     MEDICATIONS:  Please see medication reconciliation list.    ALLERGIES:  Hydrocodone, metoclopramide, penicillin, with relatively low blood pressure. 3.   History of atrial fibrillation. 4.   Anemia of chronic kidney disease. PLAN:  The patient will be admitted to telemetry floor. We will start her on IV Bumex 2 mg q.12 h.   Obtain cardiology and nephrology consu

## 2021-06-01 NOTE — CONSULTS
[facility]    Report of Consultation    Date of Admission:  6/1/2021  Date of Consult:  6/1/2021   Reason for Consultation:     RUEL     History of Present Illness:     Patient is a 67 yrs old male with pmh of DM II, HTN, HL, right knee pros Family History  Family History   Problem Relation Age of Onset   • Cancer Father         prostate   • Diabetes Mother    • Anemia Mother    • Diabetes Sister    • Anemia Sister    • Other (Other) Sister         cva   • Diabetes Brother    • DVT/VTE P AC  ondansetron (ZOFRAN-ODT) disintegrating tab 4 mg, 4 mg, Oral, Q4H PRN  Senna (SENOKOT) tab 8.6 mg, 8.6 mg, Oral, BID  glucose (DEX4) oral liquid 15 g, 15 g, Oral, Q15 Min PRN   Or  Glucose-Vitamin C (DEX-4) chewable tab 4 tablet, 4 tablet, Oral, Q15 Mi °C) (06/01 1633)  Do Not Use - Resp Rate: --  SpO2: 100 % (06/01 1633)  Temp:  [98 °F (36.7 °C)-98.1 °F (36.7 °C)] 98.1 °F (36.7 °C)  Pulse:  [] 96  Resp:  [14-19] 14  BP: ()/(41-81) 103/68  SpO2: 100 %     Intake/Output Summary (Last 24 hours) SPECGRAVITY 1.016 06/01/2021    GLUUR Negative 06/01/2021    BILUR Negative 06/01/2021    KETUR Negative 06/01/2021    BLOODURINE Negative 06/01/2021    PHURINE 5.0 06/01/2021    PROUR 100  06/01/2021    UROBILINOGEN <2.0 06/01/2021    NITRITE Negative 06/

## 2021-06-02 PROCEDURE — 99232 SBSQ HOSP IP/OBS MODERATE 35: CPT | Performed by: NURSE PRACTITIONER

## 2021-06-02 NOTE — PROGRESS NOTES
[facility]  Nephrology Daily Progress Note    Diane Franco  [id number]  67year old      HPI:   Diane Franco is a 67year old female. Comfortable at rest.  No SOB on RA. Does have fatigue and anorexia.        ROS:     Constitutional: Component Value Date    WBC 5.8 06/02/2021    HGB 12.1 06/02/2021    HCT 38.2 06/02/2021    PLT 75.0 06/02/2021    CREATSERUM 8.15 06/02/2021     06/02/2021     06/02/2021    K  06/02/2021      Comment:      Test not reported due to [facility] mg, 81 mg, Oral, Daily  •  atorvastatin (LIPITOR) tab 40 mg, 40 mg, Oral, Nightly  •  calciTRIOL (ROCALTROL) cap 0.25 mcg, 0.25 mcg, Oral, QOD  •  diphenhydrAMINE (BENADRYL) cap/tab 25 mg, 25 mg, Oral, Nightly PRN  •  docusate sodium (COLACE) cap 100 mg, 1 1200  Last data filed at 6/1/2021 1800  Gross per 24 hour   Intake 10 ml   Output 90 ml   Net -80 ml          ASSESSMENT/PLAN:   Assessment   Patient Active Problem List:     Hypertension     Kidney disorder     Cardiomyopathy ([facility] [address]     S/P knee replacemen improvement will need dialysis. Pros and cons reviewed with pt and although reluctant would be agreeable to proceed.              6/2/2021  Scotty Packer MD

## 2021-06-02 NOTE — PAYOR COMM NOTE
--------------  ADMISSION REVIEW     Payor: [id number] #:  [id number]  Authorization Number: [id number]    Admit date: 6/1/21  Admit time:  1:30 PM     Admitting Physician: Lindsey Matos MD  Attending Physician:  Clement Burleson MD;  Location: [facility] ENDOSCOPY   • COLONOSCOPY     • CYSTOSCOPY,INSERT URETERAL STENT     • HYSTERECTOMY  1981   • INJ TENDON/LIGAMENT/CYST Right 01/2019   • OTHER  2003    debridement r/t spider bite - brown reclusive, right thigh   • OTHER      removal kid (*)     GFR, -American 5 (*)     All other components within normal limits   PRO BETA NATRIURETIC PEPTIDE - Abnormal; Notable for the following components:    Pro-Beta Natriuretic Peptide 101,250 (*)     All other components within normal limits   P on weekly dialysis. Discussed with Dr. Sierra Hercules. Requests cardiology/nephrology consultations. D/w JOYA Vasquez. D/w DR Melia Wilks. Admission disposition: 6/1/2021 12:28 PM    Disposition and Plan     Clinical Impression:   Thrombocytopenia ([address] drug allergies. REVIEW OF SYSTEMS:  Progressive shortness of breath. The patient is immobile because of multiple surgeries to her right knee and morbid obesity. She had minimal urine production for the last 2 days.   Her urine output has been in declin — 105 19 99 % —   06/01/21 1100 100/41 — 101 16 99 % —   06/01/21 1004 98/65 98 °F (36.7 °C) 104 18 100 % 170 lb (77.1 kg)     XR CHEST AP PORTABLE  (CPT=71045)  Result Date: 6/1/2021  CONCLUSION:   Cardiomegaly without significant vascular congestion.   Mi Discussed with Nursing and Dr. Nancy Holder MD  6/1/2021 6/2/2021:  Cardiology Progress Note  Temp: 97.3 °F (36.3 °C)  Pulse: 101  Resp: 20  BP: 99/48    Tele: SR  bpm    6/1/2021 1800      Gross per 24 hour   Intake 10 ml   Output 90 4* 4*   CA 9.7 9.7   ALB 3.2*  --     134*   K 4.6  --     106   CO2 20.0* 16.0*     UO not being recorded but seems marginal.  BUN/Creat stable 112/8. 15. On Bumex drip and dobutamine just started. If no improvement will need dialysis.   Pro 6/1/2021 2006 Given 5,000 Units Subcutaneous (Left Upper Abdomen) Lola Degroot, RN      metoprolol succinate (Toprol XL) partial tablet 12.5 mg     Date Action Dose Route User    6/2/2021 0945 Given 12.5 mg Oral Araseli Cade      Pantoprazole Sodium (PRO

## 2021-06-02 NOTE — PLAN OF CARE
AO3, wheelchair bound, crespo in place, bumex dip per MD and echo results pending. Will need possible dialysis evaluation  Denies pain or so sob a this time.      Problem: CARDIOVASCULAR - ADULT  Goal: Maintains optimal cardiac output and hemodynamic stabil

## 2021-06-02 NOTE — CM/SW NOTE
SW met with patient in room and introduced self and role. Patient was friendly and responsive. Patient asked SW if he can come back later as she wanted to continue resting. SW and patient agreed to meet later today.     SW to see patient later today for ass

## 2021-06-02 NOTE — PROGRESS NOTES
[facility] HOSP - [facility]    Cardiology Progress Note    Deandra Manning Patient Status:  Inpatient    1945 MRN [id number]   Location [facility]5W Attending Esperanza Lujan MD   Hosp Day # 1 PCP Marrion Rubinstein, MD     2021    Assessment: oz (95.8 kg)  03/08/21 1236 : 173 lb (78.5 kg)  02/04/21 1842 : 176 lb (79.8 kg)      Tele: SR  bpm    Physical Exam:     General: Alert and oriented x 3, fatigued, uncomfortable. HEENT: Normocephalic, anicteric sclera, neck supple.   Neck: + JVD, ca Compared to the previous study these findings represent indicate   worsening.      Labs:         Recent Labs   Lab 06/01/21  1043   WBC 5.3   HGB 11.9*   MCV 78.6*   PLT 72.0*       Recent Labs   Lab 06/01/21  1043      K 4.6      CO2 20.0* Q4H PRN  lactulose (CHRONULAC) 10 GM/15ML solution 20 g, 20 g, Oral, Daily PRN  Loperamide HCl (IMODIUM) cap 2 mg, 2 mg, Oral, QID PRN  metoprolol succinate (Toprol XL) partial tablet 12.5 mg, 12.5 mg, Oral, Daily Beta Blocker  Pantoprazole Sodium (PROTONI

## 2021-06-02 NOTE — PROGRESS NOTES
[facility] HOSP - [facility]    Progress Note    Beatrice Sims Patient Status:  Inpatient    1945 MRN [id number]   Location [facility]5W Attending Bre Calvillo MD   Hosp Day # 1 PCP Karen Boss MD     CC: SOB LE edema   Subjective:   Es calciTRIOL  0.25 mcg Oral QOD   • docusate sodium  100 mg Oral BID   • ferrous sulfate  325 mg Oral TID   • folic acid  1 mg Oral Daily   • Metoprolol Succinate ER  12.5 mg Oral Daily Beta Blocker   • Pantoprazole Sodium  40 mg Oral QAM AC   • Senna  8.6 m dobutamine drip   - monitor Uop, daily weights, I/O's. - Based on EF would need ICD - EP to eval.   - hold metoprolol while on dobutamine.   - no ACEI due to renal insuff. BP wont tolerate hydralazine nitrate combo.      RUEL on CKD  NAGMA  - Baseline crea

## 2021-06-03 PROCEDURE — 99232 SBSQ HOSP IP/OBS MODERATE 35: CPT | Performed by: NURSE PRACTITIONER

## 2021-06-03 NOTE — PROGRESS NOTES
[facility]    Progress Note      Subjective:     Patient is lying comfortably - denies any sob or cp     Remain on dobutamine and bumex gtt       Review of Systems:     Constitutional: weakness and fatigue   Eyes: negative for irritation, (TYLENOL) tab 650 mg, 650 mg, Oral, Q6H PRN  ondansetron HCl (ZOFRAN) injection 4 mg, 4 mg, Intravenous, Q6H PRN  Metoclopramide HCl (REGLAN) injection 5 mg, 5 mg, Intravenous, Q8H PRN  Atropine Sulfate 0.1 MG/ML injection 0.5 mg, 0.5 mg, Intravenous, PRN Recent Labs   Lab 06/01/21  1043 06/02/21  0930 06/02/21 2023 06/03/21  0525   GLU 96 83  --  96   * 112*  --  110*   CREATSERUM 8.13* 8.15*  --  8.42*   GFRAA 5* 5*  --  5*   GFRNAA 4* 4*  --  4*   CA 9.7 9.7  --  9.6   ALB 3.2*  --   --  2.6* -------------------------- Undetermined Tachycardia - frequent multiform ectopic ventricular beats Diffuse low voltage.  -Intraventricular conduction defect -consider left ventricular hypertrophy.  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

## 2021-06-03 NOTE — PROCEDURES
[facility]  Procedure Note    Liv Valerio Patient Status:  Inpatient    1945 MRN [id number]   Location [address] [facility] Attending Tesfaye Waddell MD   Hosp Day # 2 PCP Vashti Mann MD     Procedure:  Tunneled central venous c

## 2021-06-03 NOTE — PLAN OF CARE
Problem: Patient Centered Care  Goal: Patient preferences are identified and integrated in the patient's plan of care  Description: Interventions:  - What would you like us to know as we care for you?  I am wheelchair bound  - Provide timely, complete, an effectiveness of antiarrhythmic and heart rate control medications as ordered  - Initiate emergency measures for life threatening arrhythmias  - Monitor electrolytes and administer replacement therapy as ordered  Outcome: Progressing  Tunneled catheter jossy

## 2021-06-03 NOTE — PROGRESS NOTES
[facility]    Progress Note    Greenlee Stalling Patient Status:  Inpatient    1945 MRN [id number]   Location [facility]5W Attending Jarrell Peabody, MD   Hosp Day # 2 PCP Lew Lazaro MD     CC: SOB LE edema   Subjective:   Es mg Oral BID   • Insulin Aspart Pen  1-7 Units Subcutaneous TID CC       Current PRN Inpatient Meds:      acetaminophen, ondansetron HCl, Metoclopramide HCl, Atropine Sulfate, nitroGLYCERIN, Normal Saline Flush, diphenhydrAMINE HCl, HYDROmorphone HCl, lactu dialysis. - monitor Uop, daily weights, I/O's. - Based on EF would need ICD - EP to eval.   - hold metoprolol while on dobutamine.   - no ACEI due to renal insuff. BP wont tolerate hydralazine nitrate combo.      RUEL on CKD  NAGMA  - Baseline creat 2 ea

## 2021-06-03 NOTE — CM/SW NOTE
SW met with patient at bedside, introduced self and role. Patient daughter, Emeterio Young was on speaker phone and assisted with assessment.  Patient is currently living with daughter, Macarena Zamudio in a home (address is not correct on face sheet) David Kohli

## 2021-06-03 NOTE — PLAN OF CARE
AO3, wheelchair bound, EF of 10-15%, still on dobutamine and bumex drips at this time. Vitals stable. BM overnight as well as some nosebleeding issue, will need to possibly hold dose of heparin in am.  Denies pain or sob at this time.      Problem: CARDIO

## 2021-06-03 NOTE — PROGRESS NOTES
[facility] HOSP - [facility]    Cardiology Progress Note    Sushant Mckeon Patient Status:  Inpatient    1945 MRN [id number]   Location [facility]5W Attending Riley Hammond MD   Hosp Day # 2 PCP Viri Mueller MD     6/3/2021    Assessment: kg)  03/09/21 1425 : 211 lb (95.7 kg)  03/09/21 0427 : 211 lb 4.8 oz (95.8 kg)  03/08/21 1236 : 173 lb (78.5 kg)  02/04/21 1842 : 176 lb (79.8 kg)      Tele: SR  bpm    Physical Exam:     General: Alert and oriented x 3, fatigued, uncomfortable.   SIMÓN Pericardium, extracardiac: There was a left pleural effusion. Compared to the previous study these findings represent indicate   worsening.      Labs:    Lab Results   Component Value Date    B12 1,768 06/02/2021       Recent Labs   Lab 06/01/21  1043 06/ mg, Sublingual, Q5 Min PRN  Normal Saline Flush 0.9 % injection 10 mL, 10 mL, Intravenous, PRN  aspirin EC tab 81 mg, 81 mg, Oral, Daily  atorvastatin (LIPITOR) tab 40 mg, 40 mg, Oral, Nightly  calciTRIOL (ROCALTROL) cap 0.25 mcg, 0.25 mcg, Oral, QOD  diph

## 2021-06-04 PROCEDURE — 99232 SBSQ HOSP IP/OBS MODERATE 35: CPT | Performed by: NURSE PRACTITIONER

## 2021-06-04 NOTE — PROGRESS NOTES
Pt's heart rate increased suddenly.   No family present, no opportunity to interact with pt.     06/04/21 1118   Clinical Encounter Type   Visited With Patient not available

## 2021-06-04 NOTE — PROGRESS NOTES
· [facility]      Subjective:  Rapid response called for AFib with RVR, vomitting. Dobutamine was stopped, nausea resolved. Rates now controlled.   Comfortable supine no pain dyspnea or palpitations    Objective:  BP 96/63 (BP Location: Right Bumex drip. Dobutamine added yesterday. Hypotension limited GDMT  · AFib paroxysmal.  This morning AFib with RVR resolved after Dobutamine stopped.  [facility] [place] with prior GI bleed, declined [facility] in past  · RUEL on CKD had tunneled catheter placed with p

## 2021-06-04 NOTE — PHYSICAL THERAPY NOTE
Attempted to contact pt before routing to provider     Please advise should pt stop mirtazapine (REMERON) 7.5 MG tablet Take 1 tablet by mouth nightly   Therapy orders received, chart reviewed. Patient off the floor at HD, will follow up as status permits.      Josselin Baig, PT

## 2021-06-04 NOTE — OCCUPATIONAL THERAPY NOTE
OCCUPATIONAL THERAPY EVALUATION - INPATIENT     Room Number: 651/151-A  Evaluation Date: 6/4/2021  Type of Evaluation: Initial  Presenting Problem:  (RUEL )    Physician Order: IP Consult to Occupational Therapy  Reason for Therapy: ADL/IADL Dysfunction and requires assist for ADLs. She reports patient had been able to stand with PT for 45 seconds two weeks ago.  Daughter reports difficulty caring for patient lately and is hopeful she can go to rehab to improve strength and ability to participate in self-care of blood transfusion 06/2020    @ [facility]   • History of GI bleed 08/2019   • History of stomach ulcers    • Osteoarthritis    • Renal disorder     watching renal counts   • Visual impairment     readers       Past Surgical History  Past Surgical History:   Pr functional limits     COORDINATION  Gross Motor: WFL   Fine Motor: WFL          ACTIVITIES OF DAILY LIVING ASSESSMENT  AM-PAC ‘6-Clicks’ Inpatient Daily Activity Short Form  How much help from another person does the patient currently need…  -   Putting on

## 2021-06-04 NOTE — CM/SW NOTE
SW called and spoke with daughter, [facility]. Emailed SNF choice list upon request. [facility] states that she will review list and then notify sw with choice. Plan: SNF pending family choice and med clear.  List of SNF with onsite HD given in the case that

## 2021-06-04 NOTE — PROGRESS NOTES
[facility]    Progress Note      Subjective:     Remain on dobutamine and bumex gtt     UO poorly documented but low     S/p tunneled HD catheter placement and awaiting HD     Review of Systems:     Constitutional: weakness and fatigue (Dosing Weight), Intravenous, Continuous  acetaminophen (TYLENOL) tab 650 mg, 650 mg, Oral, Q6H PRN  ondansetron HCl (ZOFRAN) injection 4 mg, 4 mg, Intravenous, Q6H PRN  Metoclopramide HCl (REGLAN) injection 5 mg, 5 mg, Intravenous, Q8H PRN  Atropine Sulfa 3. 91   WBC 5.8 5.4 5.1   PLT 75.0* 62.0* 77.0*     Recent Labs   Lab 06/01/21  1043 06/01/21  1043 06/02/21  0930 06/02/21 2023 06/03/21  0525 06/04/21  0552   GLU 96   < > 83  --  96 146*   *   < > 112*  --  110* 114*   CREATSERUM 8.13*   < > 8.15 jugular vein approach. The catheter is ready for use. Dictated by (CST): Vinnie Ramsey MD on 6/03/2021 at 5:55 PM     Finalized by (CST): Vinnie Ramsey MD on 6/03/2021 at 5:57 PM                Assessment and Plan:      1.  RUEL on CKD stage III

## 2021-06-04 NOTE — PLAN OF CARE
Pt alert. Tunneled placement looks good. On the bumex and dobutamine gtts. Slept through the night. Bed alarm in place. Call light within reach.   Problem: Patient Centered Care  Goal: Patient preferences are identified and integrated in the patient's plan at baseline  Description: INTERVENTIONS:  - Continuous cardiac monitoring, monitor vital signs, obtain 12 lead EKG if indicated  - Evaluate effectiveness of antiarrhythmic and heart rate control medications as ordered  - Initiate emergency measures for lif

## 2021-06-04 NOTE — PROGRESS NOTES
[facility]    Progress Note    Connor Rubalcava Patient Status:  Inpatient    1945 MRN [id number]   Location [facility]5W Attending Joelle La MD   Hosp Day # 3 PCP Moraima Luz MD     CC: SOB, LE edema     Subjective sodium  100 mg Oral BID   • ferrous sulfate  325 mg Oral TID   • folic acid  1 mg Oral Daily   • Pantoprazole Sodium  40 mg Oral QAM AC   • Senna  8.6 mg Oral BID   • Insulin Aspart Pen  1-7 Units Subcutaneous TID CC       Current PRN Inpatient Meds: (H) 06/01/2021    JACOB 304.8 06/04/2021       A1c:  Lab Results   Component Value Date    A1C 7.0 (H) 06/01/2021    A1C 5.2 01/25/2021    A1C 6.0 (H) 10/21/2020       Culture:  No results found for this visit on 06/01/21.     Imaging/EKG:   XR CHEST AP GRACIELA BP wont tolerate hydralazine nitrate combo.      RUEL on CKD  NAGMA  - Baseline creat 2 earlier this yon  - nephro following.   - Likely ATN from decreased renal perfusion from low EF.   -Further dialysis per nephrology  - cont bumex drip though not making

## 2021-06-04 NOTE — SIGNIFICANT EVENT
RRT    *See RRT Documentation Record*    Reason the RRT was called:  Tachycardia with nausea and vomitting -- HR up in 170s  Assessment of patient leading up to RRT: Vital signs and blood sugar  Interventions/Testing: Refer to RRT document  Patient Outcome/

## 2021-06-04 NOTE — PLAN OF CARE
First dialysis took place today-- 500mL taken off per HD RN. RTT called this AM for tachycardia-- refer to note. Dobutamine and Bumex gtt discontinued. Plan for discharge to SNF pending referrals.     Problem: Patient Centered Care  Goal: Patient preference weights  Outcome: Progressing  Goal: Absence of cardiac arrhythmias or at baseline  Description: INTERVENTIONS:  - Continuous cardiac monitoring, monitor vital signs, obtain 12 lead EKG if indicated  - Evaluate effectiveness of antiarrhythmic and heart rat

## 2021-06-05 PROCEDURE — 99232 SBSQ HOSP IP/OBS MODERATE 35: CPT | Performed by: NURSE PRACTITIONER

## 2021-06-05 NOTE — PROGRESS NOTES
[facility]    [facility]      Subjective:     Off dobutamine gtt for tachycardia . Off bumex gtt     Unable to do UF yesterday for hypotension     BP in 80s range - feels sob.  Not on oxygen    Appetite is good    Mild oozing around HD (PROAMATINE) tab 2.5 mg, 2.5 mg, Oral, TID  Midodrine HCl (PROAMATINE) tab 2.5 mg, 2.5 mg, Oral, Once  sevelamer (RENVELA) tab 800 mg, 1,600 mg, Oral, TID CC  calciTRIOL (ROCALTROL) cap 0.25 mcg, 0.25 mcg, Oral, Daily  Albumin Human (ALBUMINAR) 25 % leandro tab 8 tablet, 8 tablet, Oral, Q15 Min PRN  Insulin Aspart Pen (NOVOLOG) 100 UNIT/ML flexpen 1-7 Units, 1-7 Units, Subcutaneous, TID CC         Results:     Recent Labs   Lab 06/02/21  0930 06/02/21  0930 06/03/21  0525 06/04/21  0552 06/05/21  0528   RBC 4 2.7*       IR TUNNELED CV CATH INSERTION EXCHANGE CHECK    Result Date: 6/3/2021  CONCLUSION:  1. Successful insertion of tunneled hemodialysis catheter via right internal jugular vein approach. The catheter is ready for use.      Dictated by (CST): Sommer ramon      2. HFrEF/CAD / ischemic CMP/severe MR/moderate TR and pulmonary HTN  - Echo with worsening EF 10-15% with diffuse hypokinesia   - off of dobutamine and bumex gtt   - cardiology on board    Discussed with Nursing and Dr. Bari Hathaway

## 2021-06-05 NOTE — PROGRESS NOTES
[facility] HOSP - [facility]    Progress Note    Beatrice Sims Patient Status:  Inpatient    1945 MRN [id number]   Location [facility]5W Attending Marquis Joseph MD   Hosp Day # 4 PCP Karen Boss MD     CC: SOB, LE edema     Subjective Intravenous Once   • aspirin  81 mg Oral Daily   • atorvastatin  40 mg Oral Nightly   • docusate sodium  100 mg Oral BID   • ferrous sulfate  325 mg Oral TID   • folic acid  1 mg Oral Daily   • Pantoprazole Sodium  40 mg Oral QAM AC   • Senna  8.6 mg Oral ESRML 10 10/21/2020    CRP <0.29 10/21/2020    MG 2.2 06/04/2021    PHOS 6.8 (H) 06/05/2021    TROP 0.171 (HH) 03/10/2021    CK 38 08/27/2020    B12 1,768 (H) 06/02/2021    LDL 47 03/10/2021    TRIG 92 03/10/2021    A1C 7.0 (H) 06/01/2021    JACOB 304.8 06/0 11:54 by Teresa Villagran MD      Lab Results   Component Value Date    JACOB 304.8 06/04/2021    JACOB 70.5 06/18/2020    JACOB 75.9 05/26/2020     12/20/2019    CRP <0.29 10/21/2020    CRP 0.49 (H) 08/27/2020    CRP 0.98 (H) 08/18/2020       Assessment an

## 2021-06-05 NOTE — PLAN OF CARE
Problem: Patient Centered Care  Goal: Patient preferences are identified and integrated in the patient's plan of care  Description: Interventions:  - What would you like us to know as we care for you?  I am wheelchair bound  - Provide timely, complete, an effectiveness of antiarrhythmic and heart rate control medications as ordered  - Initiate emergency measures for life threatening arrhythmias  - Monitor electrolytes and administer replacement therapy as ordered  Outcome: Progressing     A+Ox4, 2L oxygen f

## 2021-06-05 NOTE — PROGRESS NOTES
· [facility]      Subjective:  Seen on dialysis no pain dyspnea or dizziness    Objective:  /65 (BP Location: Right arm)   Pulse 84   Temp 98 °F (36.7 °C) (Oral)   Resp 18   Wt 241 lb 4.8 oz (109.5 kg)   SpO2 98%   BMI 44.13 kg/m²    Tel 80-90% circumflex, RCA dominant with severe diffuse disease.   Off Plavix with GI bleed, on ASA 81 mg daily    Plan:   · Metoprolol if BP will tolerate, new on Midodrine  · Plans for EP eval prior to DC as outlined    Guanako Bailey, NP

## 2021-06-05 NOTE — PLAN OF CARE
Problem: Patient Centered Care  Goal: Patient preferences are identified and integrated in the patient's plan of care  Description: Interventions:  - What would you like us to know as we care for you?  I am wheelchair bound  - Provide timely, complete, an effectiveness of antiarrhythmic and heart rate control medications as ordered  - Initiate emergency measures for life threatening arrhythmias  - Monitor electrolytes and administer replacement therapy as ordered  Outcome: Progressing   Patient alert and or

## 2021-06-05 NOTE — PHYSICAL THERAPY NOTE
Attempted to see patient for physical therapy evaluation. Patient just left for HD, will attempt to see patient tomorrow for physical therapy evaluation. RN aware and agreeable.

## 2021-06-06 PROCEDURE — 99232 SBSQ HOSP IP/OBS MODERATE 35: CPT | Performed by: INTERNAL MEDICINE

## 2021-06-06 NOTE — PLAN OF CARE
Pt SBP still in 80-90's, asymptomatic. O2 2L NC prn for comfort. R HD permacath with pressure dressing. ROB PICC patent. PRN xanax given for anxiety/sleep. Accucheck ACHS.    Problem: Patient Centered Care  Goal: Patient preferences are identified and integ Absence of cardiac arrhythmias or at baseline  Description: INTERVENTIONS:  - Continuous cardiac monitoring, monitor vital signs, obtain 12 lead EKG if indicated  - Evaluate effectiveness of antiarrhythmic and heart rate control medications as ordered  - I

## 2021-06-06 NOTE — PROGRESS NOTES
[facility]    Progress Note      Subjective:     S/p HD yesterday - state was ok.  S/p 1.5 liters UF     State breathing is better but still significant edema     Review of Systems:     Constitutional: weakness and fatigue   Eyes: negative (RETACRIT) 3000 UNIT/ML injection SOLN 3,000 Units, 3,000 Units, Intravenous, Once per day on Tue Thu Sat  iron sucrose (VENOFER) IV Push 200 mg, 200 mg, Intravenous, Once  sevelamer (RENVELA) tab 800 mg, 1,600 mg, Oral, TID CC  calciTRIOL (ROCALTROL) cap C (DEX-4) chewable tab 8 tablet, 8 tablet, Oral, Q15 Min PRN  Insulin Aspart Pen (NOVOLOG) 100 UNIT/ML flexpen 1-7 Units, 1-7 Units, Subcutaneous, TID CC         Results:     Recent Labs   Lab 06/02/21  0930 06/02/21  0930 06/03/21  0525 06/04/21  0552 06/ 6.8*   ALB 3.2* 2.6* 2.7*       No results found. Assessment and Plan:      1.  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

## 2021-06-06 NOTE — PROGRESS NOTES
[facility]    Progress Note    [facility] Patient Status:  Inpatient    1945 MRN [id number]   Location [facility]5W Attending Kojo Amador MD   Hosp Day # 5 PCP Lillian Leong MD     CC: SOB, LE edema     Subjective Units Intravenous Once per day on Tue Thu Sat   • iron sucrose  200 mg Intravenous Once   • Sevelamer Carbonate  1,600 mg Oral TID CC   • calciTRIOL  0.25 mcg Oral Daily   • Albumin Human  12.5 g Intravenous Once   • Heparin Sodium Lock Flush  1.5 mL Intra < > = values in this interval not displayed.      Lab Results   Component Value Date    PTT 28.1 02/19/2020    INR 1.20 12/04/2020    TSH 1.630 06/18/2020    PUNEET 21 (L) 12/20/2019    LIP 12 (L) 09/17/2020    ESRML 10 10/21/2020    CRP <0.29 10/21/2020 anterior-lateral infarct Old -Superior axis -may be secondary to infarct.  - Nonspecific T-abnormality. ABNORMAL When compared with ECG of 06/01/2021 21:14:51 No changes.  Electronically signed on 06/04/2021 at 11:54 by Teresa Villagran MD      Lab Results >50% spent coordinating care with providers and counseling re: treatment plan and workup    Charo Portillo MD            This note was prepared using [employer] voice recognition dictation software. As a result errors may occur.  When identified the

## 2021-06-06 NOTE — PHYSICAL THERAPY NOTE
Chart reviewed, spoke with ANAHI Chakraborty. Pt is stable to be seen. Pt with unilateral LUE swelling, discussed possibility of DVT with cardiology NP and ANAHI Chakraborty. Pt has LUE PICC line as well.  RN to f/u with Dr. Serjio Little, will reattempt eval later as able and m

## 2021-06-06 NOTE — PROGRESS NOTES
· [facility]      Subjective:  No pain or dyspnea     Objective:  BP 96/61 (BP Location: Right arm)   Pulse 84   Temp 97.3 °F (36.3 °C) (Oral)   Resp 20   Wt 242 lb 8 oz (110 kg)   SpO2 100%   BMI 44.35 kg/m²    Telemetry AFib 80s  I/O  -1009 2020 patent PCI to LAD, 80-90% circumflex, RCA dominant with severe diffuse disease. Off Plavix with GI bleed, on ASA 81 mg daily  · LUE edema noted by PT. She does have LUE PICC.   Unchanged from this AM per RN - she will review with Dr. Justin Hernández

## 2021-06-07 PROCEDURE — 99222 1ST HOSP IP/OBS MODERATE 55: CPT | Performed by: INTERNAL MEDICINE

## 2021-06-07 NOTE — PLAN OF CARE
Problem: Patient Centered Care  Goal: Patient preferences are identified and integrated in the patient's plan of care  Description: Interventions:  - What would you like us to know as we care for you?  I am wheelchair bound  - Provide timely, complete, an effectiveness of antiarrhythmic and heart rate control medications as ordered  - Initiate emergency measures for life threatening arrhythmias  - Monitor electrolytes and administer replacement therapy as ordered  Outcome: Progressing     A+Ox4, room air, d

## 2021-06-07 NOTE — CM/SW NOTE
Consult for outpatient HD. Plan is for patient to go to SNF with onsite HD, SW to follow up with family on SNF choice. 11am  SW notified by daughter, Willie Cordoba (Emergency Contact) [phone]. Patient/family choice for [facility] is The [address] [place] [address].

## 2021-06-07 NOTE — DIETARY NOTE
Nutrition Re-screen    Pt seen by RD d/t LOS. Pt not at nutrition risk. Pt with good appetite and PO intake >75%. No n/v reported per pt/chart review. +edema, +bumex, +dialysis. Pressure injury documented 6/2 on R Heel, skin intact at this time.  Faustino yates

## 2021-06-07 NOTE — PLAN OF CARE
Vitals stable on room air. Denies pain. Very little urine output. Plan for HD in AM. IV Bumex given. No further bleeding at dialysis cath site.    Problem: Patient Centered Care  Goal: Patient preferences are identified and integrated in the patient's plan at baseline  Description: INTERVENTIONS:  - Continuous cardiac monitoring, monitor vital signs, obtain 12 lead EKG if indicated  - Evaluate effectiveness of antiarrhythmic and heart rate control medications as ordered  - Initiate emergency measures for lif

## 2021-06-07 NOTE — PROGRESS NOTES
[facility]    Progress Note    Zula Abo Patient Status:  Inpatient    1945 MRN [id number]   Location [facility]5W Attending Jeromy Doshi MD   Hosp Day # 6 PCP Frances Ovalles MD     CC: SOB edema   Subjective:   Flora • Albumin Human  12.5 g Intravenous Once   • Heparin Sodium Lock Flush  1.5 mL Intravenous Once   • aspirin  81 mg Oral Daily   • atorvastatin  40 mg Oral Nightly   • docusate sodium  100 mg Oral BID   • folic acid  1 mg Oral Daily   • Pantoprazole Sodiu TP 6.2*  --   --   --   --   --   --     < > = values in this interval not displayed. Lab Results   Component Value Date    INR 1.20 12/04/2020    INR 1.16 06/22/2020    INR 1.18 02/19/2020       Culture:  No results found for this visit on 06/01/21.

## 2021-06-07 NOTE — PHYSICAL THERAPY NOTE
PHYSICAL THERAPY EVALUATION - INPATIENT     Room Number: 669/409-D  Evaluation Date: 6/7/2021  Type of Evaluation: Initial   Physician Order: PT Eval and Treat    Presenting Problem:  Thrombocytopenia  Reason for Therapy: Mobility Dysfunction and Discharge Medical History:   Diagnosis Date   • Back problem    • Calculus of kidney    • Cataract 2017    surgery   • Coronary atherosclerosis    • [facility]     14 yrs   • Esophageal reflux    • High blood pressure    • High cholesterol    • History of blood COGNITION  · Orientation Level:  oriented to place and oriented to person    RANGE OF MOTION AND STRENGTH ASSESSMENT  Upper extremity ROM and strength are within functional limits     Lower extremity ROM is within functional limits except for the follo 6/21/21  Patient Goal Patient's self-stated goal is: To stand.    Goal #1 Patient is able to demonstrate supine - sit EOB @ level: maximum assistance     Goal #1   Current Status    Goal #2 Patient is able to demonstrate transfers EOB to Wheelchair at [employer]

## 2021-06-07 NOTE — CONSULTS
[facility]    Cardiology Consultation    Liv Valerio Patient Status:  Inpatient    1945 MRN [id number]   Location [address] [facility] Attending Tesfaye Waddell MD   Hosp Day # 6 PCP Vashti Mann MD     2021  Reason for Cons 35%.    History:  Past Medical History:   Diagnosis Date   • Back problem    • Calculus of kidney    • Cataract 2017    surgery   • Coronary atherosclerosis    • Diabetes ([facility]     14 yrs   • Esophageal reflux    • High blood pressure    • High cholesterol HIVES    Comment:Other reaction(s): Hives/Urticaria  Tramadol                NAUSEA AND VOMITING, HIVES    Comment:Other reaction(s): emesis  Aspirin                 NAUSEA AND VOMITING, RASH    Comment:Can tolerate low dose but not regular strength d/t mg, 2 mg, Oral, Q4H PRN  •  lactulose (CHRONULAC) 10 GM/15ML solution 20 g, 20 g, Oral, Daily PRN  •  Loperamide HCl (IMODIUM) cap 2 mg, 2 mg, Oral, QID PRN  •  Pantoprazole Sodium (PROTONIX) EC tab 40 mg, 40 mg, Oral, QAM AC  •  ondansetron (ZOFRAN-ODT) d S3.  Lungs: Clear without wheezes, rales, rhonchi or dullness. Normal excursions and effort. Abdomen: Soft, non-tender. Extremities: Without clubbing, cyanosis. Generalized edema. Peripheral pulses are 2+.   Neurologic: Alert and oriented, normal affe

## 2021-06-07 NOTE — PLAN OF CARE
Pt vss overnight, on room air. Bumex BID. Plan for dialysis this morning. SW on consult to set up outpt dialysis.      Problem: Patient Centered Care  Goal: Patient preferences are identified and integrated in the patient's plan of care  Description: Steven Mckeon INTERVENTIONS:  - Continuous cardiac monitoring, monitor vital signs, obtain 12 lead EKG if indicated  - Evaluate effectiveness of antiarrhythmic and heart rate control medications as ordered  - Initiate emergency measures for life threatening arrhythmias

## 2021-06-08 PROCEDURE — 99232 SBSQ HOSP IP/OBS MODERATE 35: CPT | Performed by: NURSE PRACTITIONER

## 2021-06-08 NOTE — PROGRESS NOTES
[facility]    Cardiology Progress Note    Morris Specking Patient Status:  Inpatient    1945 MRN [id number]   Location [facility]5W Attending Tyler Coreas MD   [facility] Day # 7 PCP Olga Oates MD     2021    Assessment: (108.5 kg)  06/02/21 0540 : 237 lb 8 oz (107.7 kg)  06/01/21 1004 : 170 lb (77.1 kg)  03/09/21 1425 : 211 lb (95.7 kg)  03/09/21 0427 : 211 lb 4.8 oz (95.8 kg)  03/08/21 1236 : 173 lb (78.5 kg)  02/04/21 1842 : 176 lb (79.8 kg)      Tele: SR 80s, long AL pressure was moderately to       severely increased, estimated to be 60mm Hg. 10. Pericardium, extracardiac: There was a left pleural effusion. Compared to the previous study these findings represent indicate   worsening.      Labs:         Recent Labs 100 mL, 100 mL, Intravenous, PRN  sevelamer (RENVELA) tab 800 mg, 1,600 mg, Oral, TID CC  calciTRIOL (ROCALTROL) cap 0.25 mcg, 0.25 mcg, Oral, Daily  Albumin Human (ALBUMINAR) 25 % solution 12.5 g, 12.5 g, Intravenous, Once  heparin sodium 1000 UNIT/ML inj

## 2021-06-08 NOTE — PLAN OF CARE
Pt aox4, bp remained stable overnight, given scheduled midodrine. Permacath intact, PICC line dressing changed. Pt turned as requested. No HD today, start iv bumex. Pending placement.   Problem: Patient Centered Care  Goal: Patient preferences are identifie Progressing  Goal: Absence of cardiac arrhythmias or at baseline  Description: INTERVENTIONS:  - Continuous cardiac monitoring, monitor vital signs, obtain 12 lead EKG if indicated  - Evaluate effectiveness of antiarrhythmic and heart rate control medicati

## 2021-06-08 NOTE — PLAN OF CARE
Problem: Patient Centered Care  Goal: Patient preferences are identified and integrated in the patient's plan of care  Description: Interventions:  - What would you like us to know as we care for you?  I am wheelchair bound  - Provide timely, complete, an effectiveness of antiarrhythmic and heart rate control medications as ordered  - Initiate emergency measures for life threatening arrhythmias  - Monitor electrolytes and administer replacement therapy as ordered  Outcome: Progressing     A+Ox4, room air, n

## 2021-06-08 NOTE — PROGRESS NOTES
[facility]    Progress Note    Fatuma Mariomaude Patient Status:  Inpatient    1945 MRN [id number]   Location [facility]5W Attending Kasandra Kang MD   Hosp Day # 7 PCP Jones Espinal MD     CC: SOB edema   Subjective:   Flora Daily   • atorvastatin  40 mg Oral Nightly   • docusate sodium  100 mg Oral BID   • folic acid  1 mg Oral Daily   • Pantoprazole Sodium  40 mg Oral QAM AC   • Senna  8.6 mg Oral BID   • Insulin Aspart Pen  1-7 Units Subcutaneous TID CC       Current PRN In shows EF 10-15%.    - On bumex IV - changed to orals  - Holding dobutamine  due to tachycardia  - Patient continues to have low blood pressures, started on midodrine with improvement  - Monitor heart rate response  - Further dialysis per nephrology.   - qi

## 2021-06-08 NOTE — PROGRESS NOTES
[facility]    Progress Note      Subjective:     S/p HD yesterday with 2 liters UF     State breathing is better but still significant edema     Nauseous - s/p zofran and feels better    No dizziness or cp     Review of Systems:     Const Intravenous, Once per day on Tue Thu Sat  Midodrine HCl (PROAMATINE) tab 5 mg, 5 mg, Oral, TID  Albumin Human (ALBUMINAR) 25 % solution 100 mL, 100 mL, Intravenous, PRN  sevelamer (RENVELA) tab 800 mg, 1,600 mg, Oral, TID CC  calciTRIOL (ROCALTROL) cap 0.2 (DEX-4) chewable tab 8 tablet, 8 tablet, Oral, Q15 Min PRN  Insulin Aspart Pen (NOVOLOG) 100 UNIT/ML flexpen 1-7 Units, 1-7 Units, Subcutaneous, TID CC         Results:     Recent Labs   Lab 06/04/21  0552 06/04/21  0552 06/05/21  0528 06/07/21  0522 06/08 past for vancomycin related RUEL   - RUEL secondary to perfusion related injury in light of cardiorenal syndrome  - was on bumex and dobutamine gtt with poor response and cont. worsening of renal function - off of both gtt   - s/p tuneled HD catheter placemen

## 2021-06-08 NOTE — CM/SW NOTE
Updates and HD flowsheets sent to The [address] [place] [address] for review. 1035am  Insurance denied [facility] placement  Peer to peer :attending physician call [phone].  Deadline 6/9/21 @ 12:00pm     1150am  Outpatient HD referrals pending in the case that pa

## 2021-06-08 NOTE — PROGRESS NOTES
[facility]    Progress Note    Morgan Stalling Patient Status:  Inpatient    1945 MRN [id number]   Location [address] [facility] Attending Jarrell Peabody, MD   Hosp Day # 6 PCP Lew Lazaro MD       Subjective:   Morgan Stalling is full ROM all extremities good strength  no deformities  Extremities: 2+ edema  Neurological:  Grossly normal    Results:     Laboratory Data:  Lab Results   Component Value Date    WBC 5.6 06/07/2021    HGB 9.0 (L) 06/07/2021    HCT 27.4 (L) 06/07/2021

## 2021-06-09 ENCOUNTER — APPOINTMENT (OUTPATIENT)
Dept: CARDIOLOGY | Age: 76
End: 2021-06-09

## 2021-06-09 NOTE — PLAN OF CARE
Pt aox4, repositioned as tolerated. BP stable overnight on midodrine. Scheduled for HD this am. Cardiology cleared. Pending insurance authorization.  Possible outpatient HD, plan for ICD placement as out patient  Problem: Patient Centered Care  Goal: Neel weights  Outcome: Progressing  Goal: Absence of cardiac arrhythmias or at baseline  Description: INTERVENTIONS:  - Continuous cardiac monitoring, monitor vital signs, obtain 12 lead EKG if indicated  - Evaluate effectiveness of antiarrhythmic and heart rat

## 2021-06-09 NOTE — CM/SW NOTE
NIRALI called and spoke with daughter, Bernardo Savage to notify that insurance denied SNF and peer to peer is pending from MD. Daughter understood. If insurance ultimately denies placement, patient will discharge back to daughter, [facility] home David Kohli.  In Y

## 2021-06-09 NOTE — DISCHARGE PLANNING
I called and gave report to nurse C[facility] at [facility]. Patient's physical and history were relayed to nursing staff and included past medical history, admitting diagnosis of thrombocytopenia.  Patient will be picked up via Ambulance/Me

## 2021-06-09 NOTE — DISCHARGE SUMMARY
[facility]    Discharge Summary    Whit Morris Patient Status:  Inpatient    1945 MRN [id number]   Location [facility]5W Attending Juana Farah MD   Hosp Day # 8 PCP Daysi Parkinson MD     Date of Admission: 2021 hemorrhage, unspecified gastrointestinal hemorrhage type     Severe anemia     Duodenal arteriovenous malformation     GIB (gastrointestinal bleeding)     Constipation     Coffee ground emesis     History of arteriovenous malformation (AVM)     Nausea and does not require medications at this point, chronic kidney disease with progression to end-stage recently.   She had required brief dialysis in the past.  She had history of paroxysmal atrial fibrillation and taken off anticoagulation secondary to recur by mouth 3 (three) times daily with meals. Quantity: 60 tablet  Refills: 0        CHANGE how you take these medications      Instructions Prescription details   Metoprolol Succinate ER 25 MG Tb24  Commonly known as:  Toprol XL  What changed:   · how much HYDROmorphone HCl 2 MG Tabs  Commonly known as: DILAUDID      Take 1 tablet (2 mg total) by mouth every 4 (four) hours as needed for Pain.    Quantity: 15 tablet  Refills: 0     Insulin Aspart Pen 100 UNIT/ML Sopn  Commonly known as: NOVOLOG      Inject 1 Vitamin D3 (Cholecalciferol) 10 MCG (400 UNIT) Tabs           ASK your doctor about these medications      Instructions Prescription details   Insulin NPH (Human) (Isophane) 100 UNIT/ML Susp      Inject 13 Units into the skin daily with breakfast.   Refi

## 2021-06-09 NOTE — OCCUPATIONAL THERAPY NOTE
Pt not seen for OT at this time secondary to pt stating just received lunch tray. Pt was not seen in AM secondary to dialysis. Will try to see pt later in date as schedule permits.

## 2021-06-09 NOTE — PROGRESS NOTES
[facility]    Progress Note    Samuel Lanein Patient Status:  Inpatient    1945 MRN [id number]   Location [facility]5W Attending Kathleen Olmstead MD   Hosp Day # 8 PCP Theodore Camacho MD     CC: SOB edema   Subjective:   Flora Albumin Human  12.5 g Intravenous Once   • Heparin Sodium Lock Flush  1.5 mL Intravenous Once   • aspirin  81 mg Oral Daily   • atorvastatin  40 mg Oral Nightly   • docusate sodium  100 mg Oral BID   • folic acid  1 mg Oral Daily   • Pantoprazole Sodium  4 worsening kidney disease  - Cardiology following  - 2D ECHO reviewed shows EF 10-15%.    - On bumex IV - changed to orals  - Holding dobutamine  due to tachycardia  - Patient had low blood pressures, started on midodrine with improvement  - Monitor heart ra

## 2021-06-10 NOTE — TELEPHONE ENCOUNTER
Could we please call her nursing home and DC her Bumex she was sent home mistakingly from the hospital on it thank you

## 2021-06-10 NOTE — TELEPHONE ENCOUNTER
Patient was discharged to The [address] [place] [address]. Phone# [phone]. [facility] Yvette Reyes message.

## 2021-06-10 NOTE — PROGRESS NOTES
[facility]    Progress Note    Lissa Bridges Patient Status:  Inpatient    1945 MRN [id number]   Location [facility] [facility] Attending No att. providers found   Hosp Day # 8 PCP Audree Mohs, MD       Subjective:   Lissa Bridges normal  Skin/Hair: no unusual rashes present, no abnormal bruising noted  Back/Spine: no abnormalities noted  Musculoskeletal: full ROM all extremities good strength  no deformities  Extremities: no edema, cyanosis  Neurological:  Grossly normal    Results

## 2021-06-11 NOTE — PAYOR COMM NOTE
--------------  DISCHARGE REVIEW    Payor: [id number] #:  [id number]  Authorization Number: [id number]    Admit date: 6/1/21  Admit time:   1:30 PM  Discharge Date: 6/9/2021  5:16 PM     Admitting Physician: Catarina Stein MD valve regurgitation     Pulmonary hypertension (HCC)     Hiatal hernia with GERD and esophagitis     Failed total knee arthroplasty, sequela     Type 2 diabetes mellitus with hyperglycemia (HCC)     Coronary artery disease due to calcified coronary lesion of 5, , creatinine 8.13, bicarb 20, potassium 4.6. Urinalysis showed small sediment in the bladder with rare bacteria. No symptoms of urinary tract infection. CBC was unremarkable. Chest x-ray showed fluid overload with cardiomegaly.   The patien    Anemia of CKD III-IV  - H/H stable  - monitor      T2DM  -Improved control  - Hba1c 7.0       H/o bleeding AVM   - no blood thinners  - H/H stable.      Thrombocytopenia  - monitor.   - stop heparin   - no evidence of bleeding        Discharge Conditi 0     docusate sodium 100 MG Caps  Commonly known as: COLACE      Take 1 capsule (100 mg total) by mouth 2 (two) times daily. Quantity: 60 capsule  Refills: 2     epoetin lashae 33450 UNIT/ML Soln      Inject 10,000 Units into the skin once a week.  monday kit  Refills: 0     OneTouch Verio Strp      1 strip by Finger stick route 2 (two) times a day. Use as directed.    Quantity: 200 strip  Refills: 2     Pantoprazole Sodium 40 MG Tbec  Commonly known as: PROTONIX      Take 1 tablet (40 mg total) by mouth lenny

## 2021-07-06 NOTE — TELEPHONE ENCOUNTER
Juuj Pennington called stating that pt has been receiving dialysis and is very swollen, pt is at rehab center, pt is not getting the therapy that she is suppose to, pt has pain on her right heel and tylenol is not helping  Daughter wants to know what to do or give

## 2021-07-08 NOTE — TELEPHONE ENCOUNTER
Spoke to daughter. Patient currently at The [address] [place] [address]. Discussed with her to follow-up with the doctor following her at the facility for an exam because she would need to be seen for evaluation.   Given that she is at a facility, and our office do

## 2021-08-26 PROBLEM — K92.2 GI BLEED: Status: ACTIVE | Noted: 2021-01-01

## 2021-08-26 PROBLEM — N18.6 ESRD (END STAGE RENAL DISEASE) ON DIALYSIS (HCC): Status: ACTIVE | Noted: 2021-01-01

## 2021-08-26 PROBLEM — Z99.2 ESRD (END STAGE RENAL DISEASE) ON DIALYSIS (HCC): Status: ACTIVE | Noted: 2021-01-01

## 2021-08-26 PROBLEM — D64.9 ANEMIA, UNSPECIFIED TYPE: Status: ACTIVE | Noted: 2021-01-01

## 2021-08-26 NOTE — ED INITIAL ASSESSMENT (HPI)
PATIENT PRESENTS WITH HEMOGLOBIN OF 6.8 FROM [facility]. PATIENT NOTES \"LITTLE SHORTNESS BREATH\". DENIES CHEST PAIN. DENIES RED/DARK STOOLS. DENIES VOMITING. DIALYSIS LAST TODAY,.  Tuesday/THURSDAY/SATURDAY

## 2021-08-27 NOTE — ED QUICK NOTES
Orders for admission, patient is aware of plan and ready to go upstairs. Any questions, please call ED RN Tony Vu  at extension [phone].    Type of COVID test sent:Rapid - not detected  COVID Suspicion level: Low    Titratable drug(s) infusin unit PRBC   Ra

## 2021-08-27 NOTE — H&P (VIEW-ONLY)
[facility]   Gastroenterology Consultation Note    Mal Amie  Patient Status:    Inpatient  Date of Admission:         8/26/2021, Hospital day #1  Attending:   Lambert Bradford MD  PCP:     Alex Pinto MD    Reason for Consultation right thigh   • OTHER      removal kidney stone   • OTHER SURGICAL HISTORY      Cardiac stent placed 06/22/20   • PRESSURE ULCER BOOT Right 03/2019   • TMJ ARTHROSCOPY DEBRIDEMENT Right 07/2019    heel of the fooot   • TOTAL KNEE REPLACEMENT Right 10/29/20 tablet, 1 tablet, Oral, Daily  •  sevelamer (RENVELA) tab 800 mg, 1,600 mg, Oral, TID CC  •  Pantoprazole Sodium (PROTONIX) 40 mg in Sodium Chloride (PF) 0.9 % 10 mL IV push, 40 mg, Intravenous, Q12H  •  glucose (DEX4) oral liquid 15 g, 15 g, Oral, Q15 Min Take 1 tablet (40 mg total) by mouth nightly., Disp: 30 tablet, Rfl: 2, 8/25/2021 at Unknown time  aspirin 81 MG Oral Tab EC, Take 1 tablet (81 mg total) by mouth daily. , Disp: 30 tablet, Rfl: 11, 8/26/2021 at Unknown time  Ferrous Sulfate 325 (65 Fe) MG O at Unknown time  Senna 8.6 MG Oral Tab, Take 1 tablet (8.6 mg total) by mouth 2 (two) times daily. , Disp:  , Rfl: 0, Unknown at Unknown time  docusate sodium 100 MG Oral Cap, Take 1 capsule (100 mg total) by mouth 2 (two) times daily. , Disp: 60 capsule, Rf visible rash  NEURO: appropriate and interactive    Laboratory Data:  Lab Results   Component Value Date    WBC 10.1 08/27/2021    HGB 7.8 08/27/2021    HCT 25.7 08/27/2021    .0 08/27/2021    CREATSERUM 2.25 08/27/2021    BUN 32 08/27/2021    NA 13 prolonged hospitalization, surgical intervention, or even death. I also specifically mentioned the miss rate of EGD of 5-10% in the best of all circumstances.  The patient has agreed to sign an informed consent and elected to proceed with upper endoscopy wi

## 2021-08-27 NOTE — CONSULTS
[facility] ID CONSULT NOTE    Bellemoraima Tubbs Patient Status:  Inpatient    1945 MRN [id number]   Location [facility] 2W/SW Attending Yoli Pete MD   Hosp Day # 1 PCP Roxana Dumont MD       Reason for [facility] CYSTOSCOPY,INSERT URETERAL STENT     • HYSTERECTOMY  1981   • INJ TENDON/LIGAMENT/CYST Right 01/2019   • OTHER  2003    debridement r/t spider bite - brown reclusive, right thigh   • OTHER      removal kidney stone   • OTHER SURGICAL HISTORY      Cardiac s Q15 Min PRN **OR** dextrose 50 % injection 50 mL, 50 mL, Intravenous, Q15 Min PRN **OR** glucose (DEX4) oral liquid 30 g, 30 g, Oral, Q15 Min PRN **OR** glucose-vitamin C (DEX-4) chewable tab 8 tablet, 8 tablet, Oral, Q15 Min PRN  •  Insulin Aspart Pen (NO RBC 2.74*   HGB 7.8*   HCT 25.7*   MCV 93.8   MCH 28.5   MCHC 30.4*   RDW 22.3*   NEPRELIM 7.90*   WBC 10.1   .0     Recent Labs   Lab 08/26/21  1852 08/27/21  0442   * 139*   BUN 30* 32*   CREATSERUM 2.04* 2.25*   GFRAA 27* 24*   GFRNAA 23 Hold abx  -  Podiatry eval for possible debridement, cx  -  Will start abx after cultures available  -  Follow fever curve, wbc  -  Reviewed labs, micro, imaging reports, available old records  -  Discussed with patient, RN, primary    Thank you for allowi

## 2021-08-27 NOTE — OPERATIVE REPORT
ESOPHAGOGASTRODUODENOSCOPY (EGD) REPORT    Diane Franco    KELSEA 1945 Age 67year old   PCP Kristina Lei MD Endoscopist Joann Gomez MD     Date of procedure: 21    Procedure: EGD w/biopsies    Pre-operative diagnosis: melena, acute blood loss ane findings. Random biopsies of the stomach (body, antrum, cardia, and incisura) were taken with cold forceps for histology. 3. Duodenum: There was a 15 mm clean based ulcer in the duodenal bulb. Otherwise normal appearing 2nd portion of the duodenum.  Jass

## 2021-08-27 NOTE — H&P
[facility]    PATIENT'S NAME: Rc Pena   ATTENDING PHYSICIAN: Andrea Patel MD   PATIENT ACCOUNT#:   [de-identified]    LOCATION:  [place] [address]  MEDICAL RECORD #:   [id number]       YOB: 1945  ADMISSION DATE:       08/26/ endoscopically. PAST SURGICAL HISTORY:  Right total knee arthroplasty, prosthesis infection requiring antibiotic spacer and then explantation and knee fusion, history of cystoscopy and lithotripsy, hysterectomy, and cataract procedure.     MEDICATIONS: disease. 4.   Severe ischemic cardiomyopathy. 5.   Severe musculoskeletal deconditioning. 6.   Necrotic right heel ulcer and possible underlying osteomyelitis. 7.   Stage 4 sacral decubitus ulcer. Patient will be admitted to telemetry floor.   Monito

## 2021-08-27 NOTE — OCCUPATIONAL THERAPY NOTE
Occupational therapy orders received via functional mobility screen. Will D/C orders as pt has been bed bound/yomi lift sine living at home with daughter. She has been at [facility] a while\".  Per discussion with patient and EMR patient is at

## 2021-08-27 NOTE — CM/SW NOTE
Notified in nursing rounds that patient is from [address] [place] [address] and does not wish to return.   Met with patient and her son, Luann Samuels at the bedside and they requested referral to [facility] which they report has onsite HD    [facility]

## 2021-08-27 NOTE — ANESTHESIA PREPROCEDURE EVALUATION
Anesthesia PreOp Note    HPI:     Liv Valerio is a 67year old female who presents for preoperative consultation requested by:  Alex Lawson MD    Date of Surgery: 8/26/2021 - 8/27/2021    Procedure(s):  ESOPHAGOGASTRODUODENOSCOPY (EGD)  Indication: 06/18/2020      Pulmonary hypertension ([facility] [address]         Date Noted: 06/18/2020      Hiatal hernia with GERD and esophagitis         Date Noted: 06/18/2020      Failed total knee arthroplasty, sequela         Date Noted: 06/18/2020      Type 2 diabetes mellitu transfusion 06/2020    @ [facility]   • History of GI bleed 08/2019   • History of stomach ulcers    • Osteoarthritis    • Renal disorder     watching renal counts   • Visual impairment     readers       Past Surgical History:   Procedure Laterality Date   • SEAN Rfl: 0, 8/26/2021 at Unknown time  atorvastatin 40 MG Oral Tab, Take 1 tablet (40 mg total) by mouth nightly., Disp: 30 tablet, Rfl: 2, 8/25/2021 at Unknown time  aspirin 81 MG Oral Tab EC, Take 1 tablet (81 mg total) by mouth daily. , Disp: 30 tablet, Rfl: Solution, Take 20 g by mouth daily as needed. , Disp: , Rfl: , Unknown at Unknown time  Senna 8.6 MG Oral Tab, Take 1 tablet (8.6 mg total) by mouth 2 (two) times daily. , Disp:  , Rfl: 0, Unknown at Unknown time  docusate sodium 100 MG Oral Cap, Take 1 caps Clement Ha MD   Or  dextrose 50 % injection 50 mL, 50 mL, Intravenous, Q15 Min PRN, Martine Price MD   Or  glucose (DEX4) oral liquid 30 g, 30 g, Oral, Q15 Min PRN, Martine Price MD   Or  glucose-vitamin C (DEX-4) chewable tab 8 tablet, 8 tablet, Oral, Q Years: 8.00        Pack years: 4        Types: Cigarettes        Quit date: 10/11/1980        Years since quittin.9      Smokeless tobacco: Never Used    Vaping Use      Vaping Use: Never used    Substance and Sexual Activity      Alcohol use:  No 08/27/2021    CA 8.3 (L) 08/27/2021          Vital Signs: Body mass index is 31.21 kg/m². height is 1.575 m (5' 2\") and weight is 77.4 kg (170 lb 10.2 oz). Her temporal temperature is 96.1 °F (35.6 °C) (abnormal).  Her blood pressure is 114/59 and her p  consistent with restrictive physiology, indicative of decreased       left ventricular diastolic compliance and/or increased left       atrial pressure. 2.  Ventricular septum: Septal motion showed abnormal function and       dyssynergy.  These changes a

## 2021-08-27 NOTE — INTERVAL H&P NOTE
Pre-op Diagnosis: melena    The above referenced H&P was reviewed by Eun Vasquez MD on 8/27/2021, the patient was examined and no significant changes have occurred in the patient's condition since the H&P was performed.   I discussed with the patient and

## 2021-08-27 NOTE — ED PROVIDER NOTES
Patient Seen in: [facility] AND [facility] Emergency Department      History   Patient presents with:  Abnormal Labs    Stated Complaint: LIFELINE    HPI/Subjective:   HPI    67-year-old female with history of hypertension, hypercholesterolemia, diabetes, corona Right 03/2019   • TMJ ARTHROSCOPY DEBRIDEMENT Right 07/2019    heel of the fooot   • TOTAL KNEE REPLACEMENT Right 10/29/2018                Social History    Tobacco Use      Smoking status: Former Smoker        Packs/day: 0.50        Years: 8.00        Pa Non- 23 (*)     GFR, -American 27 (*)     All other components within normal limits   RBC MORPHOLOGY SCAN - Abnormal; Notable for the following components:    RBC Morphology See morphology below (*)     All other components within no gastrointestinal bleeding with anemia. Will transfuse PRBCs. Discussed with Dr. Kala Stewart, hospitalist.  Also discussed with Dr. Azeb Tyson, gastroenterology. Also discussed with Dr. Kashif Arguello, nephrology. Also discussed with Dr. Poonam Rios, cardiology.

## 2021-08-27 NOTE — CONSULTS
[facility] [place] [facility]   Gastroenterology Consultation Note    Taina Joshi  Patient Status:    Inpatient  Date of Admission:         8/26/2021, Hospital day #1  Attending:   Ankita Morrison MD  PCP:     Sushma Martinez MD    Reason for Consultation right thigh   • OTHER      removal kidney stone   • OTHER SURGICAL HISTORY      Cardiac stent placed 06/22/20   • PRESSURE ULCER BOOT Right 03/2019   • TMJ ARTHROSCOPY DEBRIDEMENT Right 07/2019    heel of the fooot   • TOTAL KNEE REPLACEMENT Right 10/29/20 tablet, 1 tablet, Oral, Daily  •  sevelamer (RENVELA) tab 800 mg, 1,600 mg, Oral, TID CC  •  Pantoprazole Sodium (PROTONIX) 40 mg in Sodium Chloride (PF) 0.9 % 10 mL IV push, 40 mg, Intravenous, Q12H  •  glucose (DEX4) oral liquid 15 g, 15 g, Oral, Q15 Min Take 1 tablet (40 mg total) by mouth nightly., Disp: 30 tablet, Rfl: 2, 8/25/2021 at Unknown time  aspirin 81 MG Oral Tab EC, Take 1 tablet (81 mg total) by mouth daily. , Disp: 30 tablet, Rfl: 11, 8/26/2021 at Unknown time  Ferrous Sulfate 325 (65 Fe) MG O at Unknown time  Senna 8.6 MG Oral Tab, Take 1 tablet (8.6 mg total) by mouth 2 (two) times daily. , Disp:  , Rfl: 0, Unknown at Unknown time  docusate sodium 100 MG Oral Cap, Take 1 capsule (100 mg total) by mouth 2 (two) times daily. , Disp: 60 capsule, Rf visible rash  NEURO: appropriate and interactive    Laboratory Data:  Lab Results   Component Value Date    WBC 10.1 08/27/2021    HGB 7.8 08/27/2021    HCT 25.7 08/27/2021    .0 08/27/2021    CREATSERUM 2.25 08/27/2021    BUN 32 08/27/2021    NA 13 prolonged hospitalization, surgical intervention, or even death. I also specifically mentioned the miss rate of EGD of 5-10% in the best of all circumstances.  The patient has agreed to sign an informed consent and elected to proceed with upper endoscopy wi

## 2021-08-27 NOTE — CONSULTS
[facility]  Cardiology Consultation    Maria Isabel Gardner Patient Status:  Inpatient    1945 MRN [id number]   Location [facility] 2W/SW Attending Steve Kinsey MD   Hosp Day # 1 PCP Layne Lopez MD     Reason for Consultation:  [facility] PRESSURE ULCER BOOT Right 03/2019   • TMJ ARTHROSCOPY DEBRIDEMENT Right 07/2019    heel of the fooot   • TOTAL KNEE REPLACEMENT Right 10/29/2018     Family History   Problem Relation Age of Onset   • Cancer Father         prostate   • Diabetes Mother    • (TYLENOL) tab 650 mg, 650 mg, Oral, Q6H PRN  •  ondansetron (ZOFRAN) injection 4 mg, 4 mg, Intravenous, Q6H PRN  •  metoclopramide (REGLAN) injection 5 mg, 5 mg, Intravenous, Q8H PRN    Review of Systems:  A comprehensive review of systems was negative if anticoagulation  Severe coronary artery disease  Severe mitral regurgitation      Recommendations:  Although patient has complicated cardiac history, currently she denies any cardiac symptoms and denies any active signs and symptoms suggestive of heart carlin

## 2021-08-27 NOTE — DIETARY NOTE
ADULT NUTRITION INITIAL ASSESSMENT    Pt is at moderate nutrition risk. Pt meets moderate malnutrition criteria.       CRITERIA FOR MALNUTRITION DIAGNOSIS:  Criteria for non-severe malnutrition diagnosis: chronic illness related to wt loss 20% in 1 year, e non-cardiac electrolyte replacement protocol ordered  • gabapentin  100 mg Oral TID   • collagenase   Topical Q shift   • atorvastatin  40 mg Oral Nightly   • calcitriol  0.25 mcg Oral QOD   • folic acid  1 mg Oral Daily   • Fluticasone Propionate  1 spray Encounters:  08/27/21 : 76.7 kg (169 lb)  06/09/21 : 110 kg (242 lb 8 oz)  03/09/21 : 95.7 kg (211 lb)  02/04/21 : 79.8 kg (176 lb)  01/25/21 : 84.5 kg (186 lb 4.8 oz)  12/05/20 : 75.4 kg (166 lb 3.6 oz)  11/21/20 : 84.5 kg (186 lb 4.8 oz)  10/21/20 : 98 k Monitor weight  - Nutrition Goals:      PO and supplement greater than 75% of needs, labs WNL, euglycemia, support wound healing, monitor fluid status, halt true wt loss and improved GI status    DIETITIAN FOLLOW UP: RD to follow and monitor nutrition stat

## 2021-08-27 NOTE — PHYSICAL THERAPY NOTE
PT Orders for eval and treat received via functional mobility screen. Collaborated with RN and OT and spoke with pt.  Pt reports she has been at the [facility] a while\" and has not been getting out of bed at all, nursing staff uses a yomi lif

## 2021-08-27 NOTE — ANESTHESIA POSTPROCEDURE EVALUATION
Patient: Lissa Bridges    Procedure Summary     Date: 08/27/21 Room / Location: [address] [place] [address] ENDOSCOPY 04 / [address] [place] [address] ENDOSCOPY    Anesthesia Start: 1330 Anesthesia Stop: 1291    Procedure: ESOPHAGOGASTRODUODENOSCOPY (EGD) (N/A ) Diagnosis:       Gastrointestinal hemor

## 2021-08-27 NOTE — VASCULAR ACCESS
Vascular Access Consult Note  8/27/2021     Reviewed H&P and current condition. Past Medical History:  2017: Cataract      Comment:  surgery  08/2019: History of GI bleed  06/2020:  History of blood transfusion      Comment:  @ [facility]  No date: Back problem Once         Current Vascular Access:PIC  PIV Location: R AC  Gauge: 20  Qty:1    Vascular access consult completed. Recommendation:  Discussed with patient's RN Juan F Marquez. Patient has adequate vascular access at this time.  Please contact us if patient's v

## 2021-08-27 NOTE — PROGRESS NOTES
[facility]    Progress Note    Beatrice Sims Patient Status:  Inpatient    1945 MRN [id number]   Location [facility] 2W/SW Attending Jose Lyons MD   Hosp Day # 1 PCP Karen Boss MD       Subjective:   Beatrice Sims 4.7 06/08/2021    TROP 0.171 (HH) 03/10/2021    CK 38 08/27/2020    B12 1,768 (H) 06/02/2021       XR HEEL (CALCANEUS) (MIN 2 VIEWS), RIGHT (CPT=73650)    Result Date: 8/26/2021  CONCLUSION:   Acute osteomyelitis of the inferior calcaneus with overlying art plan and work up.       Jose Ly MD  **Certification      PHYSICIAN Certification of Need for Inpatient Hospitalization - Initial Certification    Patient will require inpatient services that will reasonably be expected to span two midnight's based o

## 2021-08-27 NOTE — PROGRESS NOTES
08/27/21 0837   Wound 08/26/21 Pressure Injury Heel Right   Date First Assessed/Time First Assessed: 08/26/21 2232   Present on Hospital Admission: Yes  Primary Wound Type: Pressure Injury  Location: Heel  Wound Location Orientation: Right  Wound Descri Serosanguineous   Treatments Cleansed; Topical (Barrier/Moisturizer/Ointment)   Dressing Aquacel Foam   Dressing Changed New   Wound Depth (cm) 0.1 cm   Non-staged Wound Description Partial thickness   Christy-wound Assessment Clean;Dry; Intact;Fragile   State performed: yes     Edema Assessment     Left Right   Edema No No   Compression Device in Use No No     Measurements  Lower extremity measurements taken?  no  Vascular Assessment  Lower extremity vascular assessment done? yes    Left Right   Pulses aloe vesta applied and foam dressing placed for protection. The sacrum is an unstageable pressure ulcer with tan, adherent slough in the wound base which would benefit from application of Santyl for enzymatic debridement. Pillow placed under the left hip.

## 2021-08-27 NOTE — PAYOR COMM NOTE
--------------  ADMISSION REVIEW     Payor: [address] [place], [place] [address] #:  [id number]  Authorization Number: [id number]       ED Provider Notes        History   Patient presents with:  Abnormal Labs    Stated Complaint: LIFELINE    HPI/Subjectiv stone   • OTHER SURGICAL HISTORY      Cardiac stent placed 06/22/20   • PRESSURE ULCER BOOT Right 03/2019   • TMJ ARTHROSCOPY DEBRIDEMENT Right 07/2019    heel of the fooot   • TOTAL KNEE REPLACEMENT Right 10/29/2018       Review of Systems    Positive for limits   CBC W/ DIFFERENTIAL - Abnormal; Notable for the following components:    RBC 2.51 (*)     HGB 7.1 (*)     HCT 21.9 (*)     RDW-SD 76.5 (*)     RDW 24.9 (*)     Monocyte Absolute 1.03 (*)     All other components within normal limits   RAPID SARS-C hospital for further management. REVIEW OF SYSTEMS:  Patient does not know if she had dark bowel movements, but she has been feeling progressively fatigued and tired. No fever or chills. No abdominal pain. Other 12-point review of systems negative. anticoagulation  Severe coronary artery disease  Severe mitral regurgitation        Recommendations:  Although patient has complicated cardiac history, currently she denies any cardiac symptoms and denies any active signs and symptoms suggestive of heart f and adjust agents as needed.              8/27 GI    Assessment & Plan   Nam Ordoñez is a a(n) 67year old female w/ a history of ESRD on HD, severe ischemic cardiomyopathy EF 15%, severe MR and diffuse CAD on aspirin 81mg daily, DM, HTN, h/o duodenal A Date Action Dose Route User    8/27/2021 0358 Given 650 mg Oral Agustin Calderon RN      gabapentin (NEURONTIN) cap 100 mg     Date Action Dose Route User    8/27/2021 0127 Given 100 mg Oral Agustin Calderon RN      morphINE sulfate (PF) 2 MG/ML inject

## 2021-08-28 PROBLEM — L97.414: Status: ACTIVE | Noted: 2019-05-16

## 2021-08-28 NOTE — PROGRESS NOTES
[facility]    Progress Note    Audio and video telecommunication was done     Subjective:     Patient lying comfortably - awake and alert.  Denies any cp or sob  Receiving HD and tolerating well so far     Review of Systems:     Fransisca Oral, Nightly  calcitriol (ROCALTROL) cap 0.25 mcg, 0.25 mcg, Oral, QOD  folic acid (FOLVITE) tab 1 mg, 1 mg, Oral, Daily  Fluticasone Propionate (FLONASE) 50 MCG/ACT nasal spray 1 spray, 1 spray, Each Nare, Daily  metoprolol succinate (Toprol XL) 24 hr ta ALB  --   --  2.2*    134* 135*   K 4.4 4.0 4.5    104 104   CO2 25.0 24.0 23.0     PTT   Date Value Ref Range Status   02/19/2020 28.1 23.2 - 35.3 seconds Final     INR   Date Value Ref Range Status   12/04/2020 1.20 0.90 - 1.20 Final     Co non healing ulcer   - ID input noted - off abx   - CS pending   - podiatry input noted - recommend amputation     3.  Anemia:  - s/p EGD yesterday with small gastric erosions and clean based ulcer at GE junction and duodenal bulb  - on PPI   - s/p PRBC dunham

## 2021-08-28 NOTE — CONSULTS
[facility]OSP - [facility]    Report of Consultation    Neena Morley Patient Status:  Inpatient    1945 MRN [id number]   Location [facility] 2W/ Attending Mita Madrid MD   Hosp Day # 2 PCP Nino Light MD     Date of Admission:   03/2019   • TMJ ARTHROSCOPY DEBRIDEMENT Right 07/2019    heel of the fooot   • TOTAL KNEE REPLACEMENT Right 10/29/2018     Family History   Problem Relation Age of Onset   • Cancer Father         prostate   • Diabetes Mother    • Anemia Mother    • Diabete midodrine (PROAMATINE) tab 5 mg, 5 mg, Oral, TID  •  multivitamin (ADULT) tab 1 tablet, 1 tablet, Oral, Daily  •  sevelamer (RENVELA) tab 800 mg, 1,600 mg, Oral, TID CC  •  Pantoprazole Sodium (PROTONIX) 40 mg in Sodium Chloride (PF) 0.9 % 10 mL IV push, 4 8/26/2021  CONCLUSION:   Acute osteomyelitis of the inferior calcaneus with overlying subcutaneous emphysema/air-filled soft tissue ulcer.     Dictated by (CST): Azael Garrison MD on 8/26/2021 at 8:55 PM     Finalized by (CST): Azael Garrison MD on 8/26/2021 bleeding)     Constipation     Coffee ground emesis     History of arteriovenous malformation (AVM)     Nausea     Thrombocytopenia (HCC)     Acute kidney injury ([facility] [address]     Stage 3b chronic kidney disease (HCC)     Hyperphosphatemia     ESRD (end stage javi

## 2021-08-28 NOTE — CM/SW NOTE
NIRALI received MDO for POLST. NIRALI placed POLST form in chart. MD to discuss w/family, fill out middle section of POLST form, and sign prior to SW/CTL/RN witnessing POLST form. NIRALI/CLEMENTE to remain available for support and/or discharge planning.      Jose Bethea

## 2021-08-28 NOTE — PROGRESS NOTES
Cardiology Progress Note    Brigida Abo Patient Status:  Inpatient    1945 MRN [id number]   Location [facility] 2W/SW Attending Antonio Almaraz MD   Hosp Day # 2 PCP Frances Ovalles MD       Subjective: Feeling better, denies any new s Recent Labs   Lab 08/26/21  1852 08/27/21  0442 08/28/21 0442    134* 135*   K 4.4 4.0 4.5    104 104   CO2 25.0 24.0 23.0   BUN 30* 32* 40*   CREATSERUM 2.04* 2.25* 2.80*   CA 8.6 8.3* 8.6   MG  --   --  1.8   * 139* 245*       N Once  Albumin Human (ALBUMINAR) 25 % solution 100 mL, 100 mL, Intravenous, PRN  glucose (DEX4) oral liquid 15 g, 15 g, Oral, Q15 Min PRN   Or  glucose-vitamin C (DEX-4) chewable tab 4 tablet, 4 tablet, Oral, Q15 Min PRN   Or  dextrose 50 % injection 50 mL,

## 2021-08-28 NOTE — PROGRESS NOTES
[facility]  GI SERVICE PROGRESS NOTE    Rhondamaximo PerryOnel Patient Status:  Inpatient    1945 MRN [id number]   Location [facility] 2W/SW Attending Nimco Hinds MD   Hosp Day # 2 PCP Zoraida Haas MD       Subjective:     Bong Hatch input(s): URINE, CULTI, BLDSMR in the last 168 hours.       XR HEEL (CALCANEUS) (MIN 2 VIEWS), RIGHT (CPT=73650)    Result Date: 8/26/2021  CONCLUSION:   Acute osteomyelitis of the inferior calcaneus with overlying subcutaneous emphysema/air-filled soft tis involved physicians. Digital transcription software was utilized to produce this note. The note was proofread for content only. Typographical errors may remain.

## 2021-08-28 NOTE — PLAN OF CARE
A&Ox4, bed bound, on room air, multiple BM overnight, positive for cdiff. Plan for dialysis today. When medically cleared will be discharged to the [facility]. Pt updated on plan of care.     Problem: Patient Centered Care  Goal: Patient preferences a arrhythmias or at baseline  Description: INTERVENTIONS:  - Continuous cardiac monitoring, monitor vital signs, obtain 12 lead EKG if indicated  - Evaluate effectiveness of antiarrhythmic and heart rate control medications as ordered  - Initiate emergency m symptoms of electrolyte imbalances  - Administer electrolyte replacement as ordered  - Monitor response to electrolyte replacements, including rhythm and repeat lab results as appropriate  - Fluid restriction as ordered  - Instruct patient on fluid and nut enemas and rectal medication administration  - Ensure safe mobilization of patient  - Hold pressure on venipuncture sites to achieve adequate hemostasis  - Assess for signs and symptoms of internal bleeding  - Monitor lab trends  - Patient is to report abn resources  Description: INTERVENTIONS:  - Identify barriers to discharge w/pt and caregiver  - Include patient/family/discharge partner in discharge planning  - Arrange for needed discharge resources and transportation as appropriate  - Identify discharge

## 2021-08-28 NOTE — CONSULTS
[facility]    PATIENT'S NAME: Moody Martinez   ATTENDING PHYSICIAN: Juliet Gibbs MD   CONSULTING PHYSICIAN: Cody Johnson.  Darirus Cee MD   PATIENT ACCOUNT#:   [id number]    LOCATION:  [address] [place] [address] #:   [id number]       DATE OF BIRTH: SOCIAL HISTORY:  No smoking, no drinking. She is a , has a son. FAMILY HISTORY:  There is diabetes and kidney disease in her parents' family. Parents are not alive. PHYSICAL EXAMINATION:    GENERAL:  She looks comfortable.     VITAL SIGNS:

## 2021-08-28 NOTE — PROGRESS NOTES
[facility]    Progress Note    Morris Specking Patient Status:  Inpatient    1945 MRN [id number]   Location [facility] 2W/SW Attending Vivien Mercado MD   Hosp Day # 2 PCP Olga Oates MD       Subjective:   Morris Specking 135 (L) 08/28/2021    K 4.5 08/28/2021     08/28/2021    CO2 23.0 08/28/2021     (H) 08/28/2021    CA 8.6 08/28/2021    ALB 2.2 (L) 08/28/2021    ALKPHO 114 06/01/2021    BILT 0.5 06/01/2021    TP 6.2 (L) 06/01/2021    AST 11 (L) 06/01/2021 amputation    chronic HFrEF  Ischemic CMP  Severe MR/TR  Likely complicated by worsening kidney disease  - Cardiology following  - 2D ECHO shows EF 10-15%.    - restart home bumex pending egd  - resume home midodrine  - Further dialysis per nephrology.   -

## 2021-08-28 NOTE — PLAN OF CARE
Patient alert and oriented, complaining of chronic pain in her R foot. Dressing changes as charted. Pt with multiple Bms. Call light in reach and safety education provided.      Problem: Patient Centered Care  Goal: Patient preferences are identified and in effectiveness of antiarrhythmic and heart rate control medications as ordered  - Initiate emergency measures for life threatening arrhythmias  - Monitor electrolytes and administer replacement therapy as ordered  Outcome: Progressing     Problem: GASTROINT lab results as appropriate  - Fluid restriction as ordered  - Instruct patient on fluid and nutrition restrictions as appropriate  Outcome: Progressing  Goal: Hemodynamic stability and optimal renal function maintained  Description: INTERVENTIONS:  - Monit for signs and symptoms of internal bleeding  - Monitor lab trends  - Patient is to report abnormal signs of bleeding to staff  - Avoid use of toothpicks and dental floss  - Use electric shaver for shaving  - Use soft bristle tooth brush  - Limit straining needed discharge resources and transportation as appropriate  - Identify discharge learning needs (meds, wound care, etc)  - Arrange for interpreters to assist at discharge as needed  - Consider post-discharge preferences of patient/family/discharge partne

## 2021-08-28 NOTE — PROGRESS NOTES
Goals of care conversation  Discussed with Ms. Deidre Razo and her son. We discussed heart failure, end-stage renal disease and now hospitalization with GI bleed and anemia. We also discussed right heel ulcer. And underlying osteomyelitis.   We discussed lik

## 2021-08-29 NOTE — PROGRESS NOTES
Remy Gutierrez 98  GI SERVICE PROGRESS NOTE    Henrene Click Patient Status:  Inpatient    1945 MRN [id number]   Location [facility] 2W/SW Attending Mirella Perez MD   Hosp Day # 3 PCP Essence Rose MD       Subjective:      At l 21* 16* 20*   CA 8.3* 8.6 8.1*   ALB  --  2.2*  --    * 135* 138   K 4.0 4.5 4.4    104 105   CO2 24.0 23.0 29.0       No results for input(s): GABRIELLA TEIXEIRA in the last 168 hours.     Recent Labs   Lab 08/28/21  0442 08/29/21  0314   MG 1.8 1.5*   P Nahomy Thibodeaux MD          Over 25 minutes spent today reviewing today's and recent data; greater than 50% of that time was spent counseling the patient and coordination of care with RN and other involved physicians.     Digital tra

## 2021-08-29 NOTE — CONSULTS
[facility]    Report of Consultation    Hernán Saldana Patient Status:  Inpatient    1945 MRN [id number]   Location [facility] 5SW/SE Attending Herminio Prince MD   Hosp Day # 3 PCP Brittaney Martinez MD     Date of Admission:  8 Surgeon: Thaddeus Betancourt MD;  Location: [facility] ENDOSCOPY   • COLONOSCOPY     • CYSTOSCOPY,INSERT URETERAL STENT     • HYSTERECTOMY  1981   • INJ TENDON/LIGAMENT/CYST Right 01/2019   • OTHER  2003    debridement r/t spider bite - brown reclusive, right thigh 1,600 mg, Oral, TID CC  Pantoprazole Sodium (PROTONIX) 40 mg in Sodium Chloride (PF) 0.9 % 10 mL IV push, 40 mg, Intravenous, Q12H  heparin sodium 1000 UNIT/ML injection 1,500 Units, 1.5 mL, Intravenous, Once  Albumin Human (ALBUMINAR) 25 % solution 100 mL atorvastatin 40 MG Oral Tab, Take 1 tablet (40 mg total) by mouth nightly. aspirin 81 MG Oral Tab EC, Take 1 tablet (81 mg total) by mouth daily. Ferrous Sulfate 325 (65 Fe) MG Oral Tab, Take 1 tablet (325 mg total) by mouth 3 (three) times daily.   fol and fentanyl  Metronidazole           HIVES, NAUSEA AND VOMITING  Penicillins             HIVES    Comment:Other reaction(s): Hives/Urticaria  Spironolactone          HIVES  Tramadol                NAUSEA AND VOMITING, HIVES    Comment:Other reaction(s): e 08/29/2021    BUN 31 (H) 08/29/2021     08/29/2021    K 4.4 08/29/2021     08/29/2021    CO2 29.0 08/29/2021     (H) 08/29/2021    CA 8.1 (L) 08/29/2021    ALB 2.2 (L) 08/28/2021    ALKPHO 114 06/01/2021    TP 6.2 (L) 06/01/2021    AST 1 time I would recommend continued local wound care and antibiotic suppression. If this is ineffective we could attempt further surgical intervention, but the patient understands that with any of the above surgeries she is at high risk of mortality.   Please

## 2021-08-29 NOTE — PLAN OF CARE
No acute changes during shift. Wound care provided. Hemoglobin monitored. Safety measures are in place. Plan of care updated with patient and daughter in law.         Problem: Patient Centered Care  Goal: Patient preferences are identified and integrated in arrhythmias or at baseline  Description: INTERVENTIONS:  - Continuous cardiac monitoring, monitor vital signs, obtain 12 lead EKG if indicated  - Evaluate effectiveness of antiarrhythmic and heart rate control medications as ordered  - Initiate emergency m symptoms of electrolyte imbalances  - Administer electrolyte replacement as ordered  - Monitor response to electrolyte replacements, including rhythm and repeat lab results as appropriate  - Fluid restriction as ordered  - Instruct patient on fluid and nut enemas and rectal medication administration  - Ensure safe mobilization of patient  - Hold pressure on venipuncture sites to achieve adequate hemostasis  - Assess for signs and symptoms of internal bleeding  - Monitor lab trends  - Patient is to report abn resources  Description: INTERVENTIONS:  - Identify barriers to discharge w/pt and caregiver  - Include patient/family/discharge partner in discharge planning  - Arrange for needed discharge resources and transportation as appropriate  - Identify discharge

## 2021-08-29 NOTE — PROGRESS NOTES
[facility] HOSP - [facility]     Cardiology Progress Note        Scarlet Heading Patient Status:  Inpatient    1945 MRN [id number]   Location [facility] 5SW/SE Attending Jennifer Patricio MD   Hosp Day # 3 PCP MD Shama Mcclellan 100 mg Oral TID   • collagenase   Topical Q shift   • atorvastatin  40 mg Oral Nightly   • folic acid  1 mg Oral Daily   • Fluticasone Propionate  1 spray Each Nare Daily   • metoprolol succinate  25 mg Oral QPM   • midodrine  5 mg Oral TID   • multivitami

## 2021-08-29 NOTE — PROGRESS NOTES
[facility]    Progress Note    Liv Valerio Patient Status:  Inpatient    1945 MRN [id number]   Location [facility] 2W/SW Attending Ambreen Alcala MD   Hosp Day # 3 PCP Vashti Mann MD       Subjective:   Liv Valerio 08/29/2021     08/29/2021    K 4.4 08/29/2021     08/29/2021    CO2 29.0 08/29/2021     (H) 08/29/2021    CA 8.1 (L) 08/29/2021    ALB 2.2 (L) 08/28/2021    ALKPHO 114 06/01/2021    BILT 0.5 06/01/2021    TP 6.2 (L) 06/01/2021    AST 11 Inpatient Hospitalization - Initial Certification    Patient will require inpatient services that will reasonably be expected to span two midnight's based on the clinical documentation in H+P.    Based on patients current state of illness, I anticipate that

## 2021-08-29 NOTE — PLAN OF CARE
Problem: Patient Centered Care  Goal: Patient preferences are identified and integrated in the patient's plan of care  Description: Interventions:  - What would you like us to know as we care for you?  Dialysis 3 times a week  - Provide timely, complete, Assess quality of pulses, skin color and temperature  - Assess for signs of decreased coronary artery perfusion - ex.  Angina  - Evaluate fluid balance, assess for edema, trend weights  Outcome: Progressing  Goal: Absence of cardiac arrhythmias or at baseli consult as needed  - Instruct patient on self management of diabetes  Outcome: Progressing  Goal: Electrolytes maintained within normal limits  Description: INTERVENTIONS:  - Monitor labs and rhythm and assess patient for signs and symptoms of electrolyte Administer supportive blood products/factors as ordered and appropriate  Outcome: Progressing  Goal: Free from bleeding injury  Description: (Example usage: patient with low platelets)  INTERVENTIONS:  - Avoid intramuscular injections, enemas and rectal me devices as appropriate  - Consider OT/PT consult to assist with strengthening/mobility  - Encourage toileting schedule  Outcome: Progressing     Problem: DISCHARGE PLANNING  Goal: Discharge to home or other facility with appropriate resources  Description:

## 2021-08-29 NOTE — PROGRESS NOTES
[place] [facility]    Progress Note    Visit conducted via Audio and video telecommunication     Subjective:     Patient lying comfortably - awake and alert. Denies any cp or sob    S/p HD yesterday - tolerated well.      Willing to go thru amputa Topical, Q shift  atorvastatin (LIPITOR) tab 40 mg, 40 mg, Oral, Nightly  folic acid (FOLVITE) tab 1 mg, 1 mg, Oral, Daily  Fluticasone Propionate (FLONASE) 50 MCG/ACT nasal spray 1 spray, 1 spray, Each Nare, Daily  metoprolol succinate (Toprol XL) 24 hr t 8.1*   ALB  --  2.2*  --    * 135* 138   K 4.0 4.5 4.4    104 105   CO2 24.0 23.0 29.0     PTT   Date Value Ref Range Status   02/19/2020 28.1 23.2 - 35.3 seconds Final     INR   Date Value Ref Range Status   12/04/2020 1.20 0.90 - 1.20 Final

## 2021-08-30 NOTE — PROGRESS NOTES
Remy Gutierrez 98     Gastroenterology Progress Note    Erinlg Espinalzaira Patient Status:  Inpatient    1945 MRN [id number]   Location [facility] 5SW/SE Attending Deborah Koo MD   Hosp Day # 4 PCP Radene Fountain, MD Joice Boxer 7 mm GE junction ulcer, gastric erosions.     Of note, she was on daily pantoprazole in her nursing facility when she developed these ulcers on the aspirin therapy.     GI called about resumed melena/blood stools 8/29, no further today.  CBC is pending from lower extremities.     Dictated by (CST): Ha Tim MD on 8/30/2021 at 8:57 AM     Finalized by (CST): Ha Tim MD on 8/30/2021 at 8:59 AM

## 2021-08-30 NOTE — CM/SW NOTE
NIRALI following for discharge planning. Per chart review, plan is the [facility] pending acceptance and insurance authorization. PT/OT needed for [employer] authorization. RN notified.      NIRALI reviewed Aidin referral and referral is \"under re

## 2021-08-30 NOTE — SIGNIFICANT EVENT
RRT    *See RRT Documentation Record*    Reason the RRT was called: Unresponsive, low BP, low oxygen saturation  Assessment of patient leading up to RRT: Unresponsiveness, BP 78/42, SPO2 88%  Interventions/Testing: CT Head, CXR, EKG, Metoprolol IV 5mg, Lab

## 2021-08-30 NOTE — PROGRESS NOTES
[facility]    Progress Note    Andressa Fernandez Patient Status:  Inpatient    1945 MRN [id number]   Location [facility] 5SW/SE Attending Sabi Obrien MD   Hosp Day # 4 PCP Pan Lopez MD        Subjective:   Munira Coleman offer her a very distal below-knee amputation that explained this wound may not heal or may not be enough coverage for the bone. However this higher amputation is not an option given her knee effusions and associated nusrat.   The only other option would be a

## 2021-08-30 NOTE — PROGRESS NOTES
[facility]    Progress Note    Diane Farnco Patient Status:  Inpatient    1945 MRN [id number]   Location [facility] 2W/SW Attending Aldo Portillo MD   [facility] Day # 4 PCP Kristina Lei MD       Subjective:   Diane Franco 08/29/2021     08/29/2021    K 4.4 08/29/2021     08/29/2021    CO2 29.0 08/29/2021     (H) 08/29/2021    CA 8.1 (L) 08/29/2021    ALB 2.2 (L) 08/28/2021    ALKPHO 114 06/01/2021    BILT 0.5 06/01/2021    TP 6.2 (L) 06/01/2021    AST 11 nephrology.   - daily weights, I/O's.      ESRD  - Dialysis per nephrology    H/o Atrial fibrillation   - not on anticoag due to h/o GI bleed  - tele.       T2DM  - SSI to cover hyperglycemia  - Hypoglycemia protocol  - Will monitor and adjust agents as ne

## 2021-08-30 NOTE — PROGRESS NOTES
[facility] - [facility]     Cardiology Progress Note    Subjective:  No chest pain or shortness of breath. Sleepy, on RA.  Contact precautions for CDIFF    Objective:  BP 95/45 (BP Location: Right arm)   Pulse 70   Temp 98.2 °F (36.8 °C) (Oral)   Resp 18 SERGEI  8/30/2021  7:12 AM

## 2021-08-30 NOTE — SIGNIFICANT EVENT
RRT NOTE      S: RRT called by RN as patient was unresponsive. Never lost pulse and was maintaining oxygen saturations. She would respond to sternal rub and open her eyes and then closed her eyes again immediately.   Blood pressure initially showed she wa

## 2021-08-30 NOTE — PLAN OF CARE
Problem: Patient Centered Care  Goal: Patient preferences are identified and integrated in the patient's plan of care  Description: Interventions:  - What would you like us to know as we care for you?  Dialysis 3 times a week  - Provide timely, complete, trend weights  Outcome: Progressing  Goal: Absence of cardiac arrhythmias or at baseline  Description: INTERVENTIONS:  - Continuous cardiac monitoring, monitor vital signs, obtain 12 lead EKG if indicated  - Evaluate effectiveness of antiarrhythmic and hea Monitor labs and rhythm and assess patient for signs and symptoms of electrolyte imbalances  - Administer electrolyte replacement as ordered  - Monitor response to electrolyte replacements, including rhythm and repeat lab results as appropriate  - Fluid re platelets)  INTERVENTIONS:  - Avoid intramuscular injections, enemas and rectal medication administration  - Ensure safe mobilization of patient  - Hold pressure on venipuncture sites to achieve adequate hemostasis  - Assess for signs and symptoms of inter Discharge to home or other facility with appropriate resources  Description: INTERVENTIONS:  - Identify barriers to discharge w/pt and caregiver  - Include patient/family/discharge partner in discharge planning  - Arrange for needed discharge resources and

## 2021-08-30 NOTE — PROGRESS NOTES
INFECTIOUS DISEASE PROGRESS NOTE  [place] [facility] - [facility] ID PROGRESS NOTE    Makayla Dash Patient Status:  Inpatient    1945 MRN [id number]   Location [facility] 5SW/SE Attending Sangita Cummings MD   Hosp Day # 4 PCP Lety Nathan Neuropathy     Osteopenia     Pain in joint     Rotator cuff syndrome     Temporary cerebral vascular dysfunction     PAF (paroxysmal atrial fibrillation) (HCC)     Polyp of colon     Nonrheumatic mitral valve regurgitation     Pulmonary hypertension ([address] chest pain. On admission patient afebrile on room air and normal WBC. Rapid COVID-19 PCR negative. Noted to have a right heel ulcer with x-ray showing acute osteomyelitis of the inferior calcaneus with overlying subcutaneous emphysema.   Given dose of IV

## 2021-08-31 NOTE — PLAN OF CARE
[facility] notified and patient is not a candidate for tissue or organ donation. Spoke with coordinator Machelle West [phone]. Patient also not a  case.

## 2021-08-31 NOTE — PROGRESS NOTES
[facility] Dosing Service  Antibiotic Dosing    Bonnerget Bañuelos is a 67year old for whom pharmacy is dosing Meropenem for treatment of  sepsis of unknown origin.     Allergies: is allergic to hydrocodone, metronidazole, penicillins, spironolactone, trama

## 2021-08-31 NOTE — PLAN OF CARE
Problem: Patient Centered Care  Goal: Patient preferences are identified and integrated in the patient's plan of care  Description: Interventions:  - What would you like us to know as we care for you?  From [facility]  - Provide timely, complete, a nonpharmacologic comfort measures as appropriate  - Advance diet as tolerated, if ordered  - Obtain nutritional consult as needed  - Evaluate fluid balance  Outcome: Progressing  Goal: Maintains or returns to baseline bowel function  Description: INTERVENT urine output, blood pressure (other measures as available)  - Encourage oral intake as appropriate  - Instruct patient on fluid and nutrition restrictions as appropriate  Outcome: Progressing     Problem: SKIN/TISSUE INTEGRITY - ADULT  Goal: Incision(s), w Administer analgesics based on type and severity of pain and evaluate response  - Implement non-pharmacological measures as appropriate and evaluate response  - Consider cultural and social influences on pain and pain management  - Manage/alleviate anxiety

## 2021-08-31 NOTE — PLAN OF CARE
RRT    *See RRT Documentation Record*    Reason the RRT was called: unresponsive , low blood pressure, low Oxygen saturation    Assessment of patient leading up to RRT: as above   Interventions/Testing: none   Patient Outcome/Disposition: patient  a

## 2021-08-31 NOTE — DISCHARGE SUMMARY
[place] [facility] - [facility]    Discharge Summary    Taina Joshi Patient Status:  Inpatient    1945 MRN [id number]   Location [facility] 5SW/SE Attending No att. providers found   Hosp Day # 5 PCP Sushma Martinez MD     Date of Admission: 8 stage IV (HCC)     HFrEF (heart failure with reduced ejection fraction) ([address]     Secondary hyperparathyroidism ([address]     Gastrointestinal hemorrhage     Gastrointestinal hemorrhage, unspecified gastrointestinal hemorrhage type     Severe anemia     Duoden Heel Ulcer  Rt. Heel Osteomyelitis  Sepsis  -irina ID eval  -irina podiatry eval  -d/w podiatry and patient  -podiatry recommends amputation  -now agreeable  -d/w ortho not a candidate for amputation with knee fusion and nusrat.  Would be a poor candidate for high

## 2021-08-31 NOTE — PROGRESS NOTES
[facility]    Progress Note    Julissa Lomeli Patient Status:  Inpatient    1945 MRN [id number]   Location [facility] 5SW/SE Attending Mirella Perez MD   Hosp Day # 4 PCP Essence Rose MD       Subjective:   Julissa Lomeli normal  Skin/Hair: no unusual rashes present, no abnormal bruising noted  Back/Spine: no abnormalities noted  Musculoskeletal: full ROM all extremities good strength  no deformities  Extremities: r heel bandaged  Neurological:  Grossly normal    Results: 8/30/2021  CONCLUSION:  1. No evidence of deep venous thrombosis in the bilateral lower extremities. 2. Moderate subcutaneous edema in the bilateral lower extremities.     Dictated by (CST): Dominga Menezes MD on 8/30/2021 at 8:57 AM     Finalized by (CST):

## 2021-09-01 ENCOUNTER — TELEPHONE (OUTPATIENT)
Dept: INTERNAL MEDICINE UNIT | Facility: HOSPITAL | Age: 76
End: 2021-09-01

## 2021-09-01 ENCOUNTER — TELEPHONE (OUTPATIENT)
Dept: FAMILY MEDICINE CLINIC | Facility: CLINIC | Age: 76
End: 2021-09-01

## 2021-09-01 NOTE — PAYOR COMM NOTE
Discharge Notification    Patient Name: Eli Cantrell  Payor: [address] [place] [address] #: [id number]  Authorization Number: [id number]  Admit Date/Time: 8/26/2021 6:38 PM  Discharge Date/Time: 8/31/2021 3:00 AM

## 2021-09-01 NOTE — TELEPHONE ENCOUNTER
Death certificate completed and signed by Hospitalist MD Dr. Brittnee Heart. Submitted via fax to [phone]. Confirmation fax received.

## 2021-09-01 NOTE — TELEPHONE ENCOUNTER
Meenakshi from [address] [place] [address]  called asking if  will be signing the death certificate  To please call her back at [phone]

## 2021-10-04 ENCOUNTER — APPOINTMENT (OUTPATIENT)
Dept: HEMATOLOGY/ONCOLOGY | Facility: HOSPITAL | Age: 76
End: 2021-10-04
Attending: INTERNAL MEDICINE
Payer: MEDICARE

## 2021-10-25 NOTE — TELEPHONE ENCOUNTER
Cc: Endoscopy Visit    HPI: 75 y.o. female here for screening c scope, being due, with EGD added due to borderline anemia.      Past Medical History:   Diagnosis Date   • Aneurysm (HCC)    • Diabetes mellitus (HCC)    • High cholesterol    • Hypertension        Past Surgical History:   Procedure Laterality Date   • BREAST BIOPSY     • CEREBRAL ANEURYSM REPAIR     • COLONOSCOPY N/A 8/8/2016    Procedure: COLONOSCOPY to cecum;  Surgeon: Ariane Teixeira MD;  Location: [facility] ENDOSCOPY;  Service:    • DILATATION AND CURETTAGE     • TONSILLECTOMY         has No Known Allergies.       Medication List      ASK your doctor about these medications    atorvastatin 10 MG tablet  Commonly known as: LIPITOR     CALCIUM 600 PO     Cod Liver Oil capsule     enalapril 5 MG tablet  Commonly known as: VASOTEC     FLUOCINOLONE ACETONIDE EX     glipizide 10 MG 24 hr tablet  Commonly known as: GLUCOTROL XL     metFORMIN 500 MG tablet  Commonly known as: GLUCOPHAGE     TIMOPTIC-XE OP     triamterene-hydrochlorothiazide 37.5-25 MG per tablet  Commonly known as: MAXZIDE-25            Family History   Problem Relation Age of Onset   • Cancer Mother    • Breast cancer Mother    • Heart failure Mother    • Cancer Brother    • Diabetes Brother    • Hypertension Brother    • Heart attack Father    • Diabetes Brother    • Hypertension Brother        Social History     Socioeconomic History   • Marital status: Single   Tobacco Use   • Smoking status: Never Smoker   • Smokeless tobacco: Never Used   Vaping Use   • Vaping Use: Never used   Substance and Sexual Activity   • Alcohol use: Never   • Drug use: Never   • Sexual activity: Defer       Vitals:    10/25/21 0916   BP: 143/82   Pulse: 82   Resp: 16   SpO2: 100%       There is no height or weight on file to calculate BMI.    Physical Exam    General: No acute distress  Lungs: No labored breathing, Pulse oximetry on room air is 100%.  Heart/EKG: RRR  Abdomen: no complaints of pain  Mental:   Hello ,    As you know, Cary Woo had an extensive complicated rehabilitation stay at [facility].   During her rehabilitation, she had multiple hospitalization for GI bleed, coronary artery disease, her issues related to prosthetic joints Awake, alert, and oriented    Imp:     · Screening  · anemia     Plan:  · C scope and EGD    Ariane Teixeira MD  10:42 EDT

## 2022-02-14 NOTE — PROGRESS NOTES
[place] [facility] - [facility]  Progress Note     Julissa Click  : 1945    Status: Inpatient  Day #: 3    Attending: Helena Green MD  PCP: Essence Rose MD      Assessment and Plan     Intractable nausea and vomiting with coffee-ground emesis  Hx of blee Spoke with pt - states that when he bent down to pick something up his back pain returned. Requesting to schedule an appt for evaluation first and if appropriate an injection after.   67  Resp:  [17-19] 17  BP: (120-153)/(68-83) 136/74  General:  Alert, no distress  HEENT:  Normocephalic, atraumatic  Cardiac:  Regular rate, regular rhythm  Pulmonary:  Clear to auscultation bilaterally, respirations unlabored  Gastrointestinal:  Soft, no Glucose-Vitamin C **OR** dextrose **OR** glucose **OR** Glucose-Vitamin C, diphenhydrAMINE HCl, Fluticasone Propionate, HYDROmorphone HCl **OR** HYDROmorphone HCl **OR** HYDROmorphone HCl      Spent >35 minutes, >50% of the time counseling coordinating car

## 2022-03-01 NOTE — PLAN OF CARE
Problem: Diabetes/Glucose Control  Goal: Glucose maintained within prescribed range  Description  INTERVENTIONS:  - Monitor Blood Glucose as ordered  - Assess for signs and symptoms of hyperglycemia and hypoglycemia  - Administer ordered medications to m monitor pain and request assistance  - Assess pain using appropriate pain scale  - Administer analgesics based on type and severity of pain and evaluate response  - Implement non-pharmacological measures as appropriate and evaluate response  - Consider cul discharge planning if the patient needs post-hospital services based on physician/LIP order or complex needs related to functional status, cognitive ability or social support system  Outcome: Adequate for Discharge     Problem: SKIN/TISSUE INTEGRITY - ADUL <-- Click to add NO pertinent Family History

## 2022-06-29 NOTE — PRE-SEDATION ASSESSMENT
[facility]  IR Pre-Procedure Sedation Assessment    History of snoring or sleep or apnea?    No    History of previous problems with anesthesia or sedation  No    Physical Findings:  Neck: nl ROM  CV: RRR  PULM: normal respiratory rate/effor no

## 2022-10-25 NOTE — PHYSICAL THERAPY NOTE
PHYSICAL THERAPY TREATMENT NOTE - INPATIENT     Room Number: 962/621-J       Presenting Problem: ischemic cardiac myopathy post cath     Problem List  Active Problems:    Ischemic cardiomyopathy    Chronic kidney disease (CKD), stage IV (severe) ([facility])    H TOLERANCE                         O2 WALK                  AM-PAC '6-Clicks' INPATIENT SHORT FORM - BASIC MOBILITY  How much difficulty does the patient currently have. ..  -   Turning over in bed (including adjusting bedclothes, sheets and blankets)?: WINSTON Michaud [Bilateral] : knee bilaterally [5___] : hamstring 5[unfilled]/5 instructions provided to patient in preparation for discharge.    Goal #5   Current Status Therex as noted above performed 11/20/2020    Goal #6     Goal #6  Current Status       [Positive] : positive Catalina [de-identified] : Neurologic: normal coordination, normal DTR UE/LE , normal sensation, normal mood and affect, orientated and able to communicate.\par \par Skin: normal skin, no rash, no ulcers and no lesions.\par \par Lymphatic: no obvious lymphadenopathy in areas examined.\par \par Constitutional: well developed and well nourished.\par \par Cardiovascular: peripheral vascular exam is grossly normal.\par \par Pulmonary: no respiratory distress, lungs clear to auscultation bilaterally.\par \par Abdomen: normal bowel sounds, non-tender, no HSM and no mass.\par  [] : non-antalgic

## (undated) DEVICE — HEWSON SUTURE RETRIEVER: Brand: HEWSON SUTURE RETRIEVER

## (undated) DEVICE — NDLCTR: FOAM/MAG 30CT 64/CS: Brand: MEDICAL ACTION INDUSTRIES

## (undated) DEVICE — 2.3MM X 19.0MM TAPERED ROUTER

## (undated) DEVICE — DURACLIP 11MM 235CM

## (undated) DEVICE — COTTON UNDERCAST PADDING,REGULAR FINISH: Brand: WEBRIL

## (undated) DEVICE — STANDARD HYPODERMIC NEEDLE,POLYPROPYLENE HUB: Brand: MONOJECT

## (undated) DEVICE — GAMMEX® PI HYBRID SIZE 7.5, STERILE POWDER-FREE SURGICAL GLOVE, POLYISOPRENE AND NEOPRENE BLEND: Brand: GAMMEX

## (undated) DEVICE — STERILE POLYISOPRENE POWDER-FREE SURGICAL GLOVES: Brand: PROTEXIS

## (undated) DEVICE — SPINOCAN® 18 GA. X 3-1/2 IN. (90 MM) SPINAL NEEDLE: Brand: SPINOCAN®

## (undated) DEVICE — ESMARK: Brand: DEROYAL

## (undated) DEVICE — DRAPE SHEET LG

## (undated) DEVICE — Device: Brand: JELCO

## (undated) DEVICE — ZIMMER® STERILE DISPOSABLE TOURNIQUET CUFF WITH PLC, DUAL PORT, SINGLE BLADDER, 30 IN. (76 CM)

## (undated) DEVICE — Device

## (undated) DEVICE — FORCEP RADIAL JAW 4

## (undated) DEVICE — ZIMMER® STERILE DISPOSABLE TOURNIQUET CUFF WITH PLC, DUAL PORT, DUAL BLADDER, 18 IN. (46 CM)

## (undated) DEVICE — 35 ML SYRINGE REGULAR TIP: Brand: MONOJECT

## (undated) DEVICE — CAST TAPE SYNTH 5

## (undated) DEVICE — SEPRAMESH IP, 8" X 12" (20.3CM X 30.5CM), RECTANGLE
Type: IMPLANTABLE DEVICE | Site: KNEE | Status: NON-FUNCTIONAL
Brand: SEPRAMESH

## (undated) DEVICE — GAUZE SPONGES,12 PLY: Brand: CURITY

## (undated) DEVICE — SYRINGE MNJCT 10ML LF STRL REG

## (undated) DEVICE — NEEDLE CONTRAST INTERJECT 25G

## (undated) DEVICE — CULTURE TUBE ANAEROBIC

## (undated) DEVICE — LINE MNTR ADLT SET O2 INTMD

## (undated) DEVICE — Device: Brand: CUSTOM PROCEDURE KIT

## (undated) DEVICE — ZIMMER® STERILE DISPOSABLE TOURNIQUET CUFF WITH PLC, DUAL PORT, SINGLE BLADDER, 34 IN. (86 CM)

## (undated) DEVICE — CONMED SCOPE SAVER BITE BLOCK, 20X27 MM: Brand: SCOPE SAVER

## (undated) DEVICE — BOWL CEMENT MIX QUICK-VAC

## (undated) DEVICE — TOTAL KNEE: Brand: MEDLINE INDUSTRIES, INC.

## (undated) DEVICE — Device: Brand: DEFENDO AIR/WATER/SUCTION AND BIOPSY VALVE

## (undated) DEVICE — SOL  .9 1000ML BTL

## (undated) DEVICE — COVER SGL STRL LGHT HNDL BLU

## (undated) DEVICE — ANTIBACTERIAL VIOLET BRAIDED (POLYGLACTIN 910), SYNTHETIC ABSORBABLE SUTURE: Brand: COATED VICRYL

## (undated) DEVICE — PROXIMATE SKIN STAPLERS (35 WIDE) CONTAINS 35 STAINLESS STEEL STAPLES (FIXED HEAD): Brand: PROXIMATE

## (undated) DEVICE — 60 ML SYRINGE LUER-LOCK TIP: Brand: MONOJECT

## (undated) DEVICE — CHLORAPREP 26ML APPLICATOR

## (undated) DEVICE — HOOD: Brand: FLYTE

## (undated) DEVICE — SUCTION CANISTER, 3000CC,SAFELINER: Brand: DEROYAL

## (undated) DEVICE — SPONGE LAP 18X18 XRAY STRL

## (undated) DEVICE — MEDI-VAC NON-CONDUCTIVE SUCTION TUBING 6MM X 1.8M (6FT.) L: Brand: CARDINAL HEALTH

## (undated) DEVICE — PSI-TEC TUBING: Brand: PSI-TEC TUBING

## (undated) DEVICE — 3M™ IOBAN™ 2 ANTIMICROBIAL INCISE DRAPE 6650EZ: Brand: IOBAN™ 2

## (undated) DEVICE — SOL  .9 3000ML

## (undated) DEVICE — Device: Brand: STABLECUT®

## (undated) DEVICE — DRILL BIT SYNT 2.5X110 310.25

## (undated) DEVICE — 60 ML SYRINGE,TOOMEY TYPE: Brand: MONOJECT

## (undated) DEVICE — TOWEL OR BLU 16X26 STRL

## (undated) DEVICE — GUIDE PIN HEADED TH 3.2X50MM

## (undated) DEVICE — SUTURE PDS II 3-0 Z683G

## (undated) DEVICE — 6 ML SYRINGE LUER-LOCK TIP: Brand: MONOJECT

## (undated) DEVICE — WRAP COOLING KNEE W/ICE PILLOW

## (undated) DEVICE — CATH GOLD PROBE HEMOGLIDE 7FR

## (undated) DEVICE — DRILL BIT SYNT 2.7X125 315.28

## (undated) DEVICE — 20 ML SYRINGE LUER-LOCK TIP: Brand: MONOJECT

## (undated) DEVICE — T5 HOOD WITH PEEL AWAY FACE SHIELD

## (undated) DEVICE — GAMMEX® PI HYBRID SIZE 6.5, STERILE POWDER-FREE SURGICAL GLOVE, POLYISOPRENE AND NEOPRENE BLEND: Brand: GAMMEX

## (undated) DEVICE — TRAY SKIN PREP PVP-1

## (undated) DEVICE — STERILE TETRA-FLEX CF, ELASTIC BANDAGEE, 3" X 5.5YD: Brand: TETRA-FLEX™CF

## (undated) DEVICE — DRESSING AQUACEL AG SP 3.5X10

## (undated) DEVICE — CONTAINER SPEC STR 4OZ GRY LID

## (undated) DEVICE — KENDALL SCD EXPRESS SLEEVES, KNEE LENGTH, MEDIUM: Brand: KENDALL SCD

## (undated) DEVICE — NEEDLE HPO 18GA 1.5IN ECLPS

## (undated) DEVICE — CAST PADDING SYNTH 4

## (undated) DEVICE — INSTRUMENT 875-088 14MM DSTRCT PIN STRL: Brand: MEDTRONIC REUSABLE INSTRUMENT

## (undated) DEVICE — DERMABOND LIQUID ADHESIVE

## (undated) DEVICE — DRESSING CRTY CNFRM 3IN

## (undated) DEVICE — POLAR CARE CUBE COOLING UNIT

## (undated) DEVICE — SUTURE MONOCRYL 3-0 Y936H

## (undated) DEVICE — SNARE ENDOSCOPIC 10MM ROUND

## (undated) DEVICE — GAMMEX® PI HYBRID SIZE 8, STERILE POWDER-FREE SURGICAL GLOVE, POLYISOPRENE AND NEOPRENE BLEND: Brand: GAMMEX

## (undated) DEVICE — BATTERY

## (undated) DEVICE — DRILL BIT SYNT 2.0X125 310.21

## (undated) DEVICE — CASSETTE DRAPE: Brand: UNBRANDED

## (undated) DEVICE — LOWER EXTREMITY: Brand: MEDLINE INDUSTRIES, INC.

## (undated) DEVICE — STERILE LATEX POWDER-FREE SURGICAL GLOVESWITH NITRILE COATING: Brand: PROTEXIS

## (undated) DEVICE — SUTURE FIBERWIRE 5 AR-7211

## (undated) DEVICE — DRAPE SRG 70X60IN SPLT U IMPRV

## (undated) DEVICE — NON-ADHERENT STRIPS,OIL EMULSION: Brand: CURITY

## (undated) DEVICE — SUTURE ETHIBOND 5-0 MB46G

## (undated) DEVICE — 1010 S-DRAPE TOWEL DRAPE 10/BX: Brand: STERI-DRAPE™

## (undated) DEVICE — CULTURE COLLECT/TRANSPORT SYS

## (undated) DEVICE — 3.1MM X 19.1MM STRAIGHT ROUTER

## (undated) DEVICE — COVER TABLE BACK PADDED HVY DU

## (undated) DEVICE — DRESSING TRNS 4X3IN SIL PU 1

## (undated) DEVICE — SPLINT PRECUT SYNTH 5X30

## (undated) DEVICE — PAD THRP 16IN WRPON MU LNG STM

## (undated) DEVICE — 12 ML SYRINGE LUER-LOCK TIP: Brand: MONOJECT

## (undated) DEVICE — 3 ML SYRINGE LUER-LOCK TIP: Brand: MONOJECT

## (undated) DEVICE — SOL  .9 1000ML BAG

## (undated) DEVICE — GAUZE PACKING IODOFORM 1/4X5

## (undated) DEVICE — GUIDE PIN HEADED TH 3.2X33MM

## (undated) DEVICE — ANTIBACTERIAL UNDYED BRAIDED (POLYGLACTIN 910), SYNTHETIC ABSORBABLE SUTURE: Brand: COATED VICRYL

## (undated) DEVICE — MASK PROC W/VISOR ANTIGLARE

## (undated) DEVICE — INTENDED FOR TISSUE SEPARATION, AND OTHER PROCEDURES THAT REQUIRE A SHARP SURGICAL BLADE TO PUNCTURE OR CUT.: Brand: BARD-PARKER ® STAINLESS STEEL BLADES

## (undated) DEVICE — SUTURE PDS II 1 CT-1

## (undated) DEVICE — IMPERVIOUS STOCKINETTE: Brand: DEROYAL

## (undated) DEVICE — SUTURE ETHILON 2-0 FS

## (undated) DEVICE — DUAL CUT SAGITTAL BLADE

## (undated) DEVICE — ENCORE® LATEX ACCLAIM SIZE 8, STERILE LATEX POWDER-FREE SURGICAL GLOVE: Brand: ENCORE

## (undated) DEVICE — BONE WAX W31G

## (undated) DEVICE — SUPER SPONGES,MEDIUM: Brand: KERLIX

## (undated) DEVICE — DRESSING AQUACEL AG 3.5 X 10

## (undated) DEVICE — VERSAJET II HYDROSURGERY SYSTEM HANDSET, 45DEG 8MM EXACT: Brand: VERSAJET

## (undated) DEVICE — SYRINGE MNJCT 35ML LF STRL LL

## (undated) DEVICE — DRESSING AQUACEL AG 3.5 X 6

## (undated) DEVICE — BIPOLAR SEALER 23-112-1 AQM 6.0: Brand: AQUAMANTYS™

## (undated) DEVICE — ENCORE® PERRY STYLE 42 PF SIZE 7, STERILE LATEX POWDER-FREE SURGICAL GLOVE: Brand: ENCORE

## (undated) DEVICE — NEEDLE SPINAL 18X3-1/2 PINK.

## (undated) NOTE — IP AVS SNAPSHOT
[facility]            (For Outpatient Use Only) Initial Admit Date: 6/1/2021   Inpt/Obs Admit Date: Inpt: 6/1/21 / Obs: N/A   Discharge Date:    Jazzy Perez:  [de-identified]   MRN: [de-identified]   CSN: [id number]   CEID: [id number]        [facility] Name:  Olinda Albarado :    Subscriber ID:  Pt Rel to Subscriber:    Hospital Account Financial Class: Medicare Advantage    2021

## (undated) NOTE — LETTER
[address], [place]  Authorization for Invasive Procedures  1.  I hereby authorize  Dr. Wilver Beltran , my physician and whomever may be designated as the doctor's assistant, to perform the following operation and/or procedure:  Cardiac 4. Should the need arise during my operation or immediate post-operative period; I also consent to the administration of blood and/or blood products.  Further, I understand that despite careful testing and screening of blood and blood products, I may still 9. Patients having a sterilization procedure: I understand that if the procedure is successful the results will be permanent and it will therefore be impossible for me to inseminate, conceive or bear children.  I also understand that the procedure is intend

## (undated) NOTE — LETTER
Hospital Discharge Documentation  Patient was discharged to  [facility] [phone].     From: [address] Hospitalist's Office  Phone: [phone]    Patient discharged time/date: 11/21/2020  1:43 PM  Patient discharge disposition:  SNF Muscle weakness     Neuropathy     Osteopenia     Pain in joint     Rotator cuff syndrome     Temporary cerebral vascular dysfunction     PAF (paroxysmal atrial fibrillation) (HCC)     Polyp of colon     Nonrheumatic mitral valve regurgitation     Pulmo HISTORY OF RECENT ILLNESS:  The patient is a 58-year-old -American female with generalized osteoarthritis, advanced kidney disease, and coronary artery disease.   She had a right knee prosthesis infection that required antibiotic spacer implantation - awaiting final consult with Dr Kevin Paige from [facility] today but likely won't have surgery until after thanksgiving. If this is the case, pt will need to DC to [place] and come back when surgery scheduled.  Call out to daughter to discuss.      4.      Take 1 capsule (0.25 mcg total) by mouth every other day. Quantity:    Refills: 0     hydrALAzine HCl 50 MG Tabs  Commonly known as: APRESOLINE      Take 1 tablet (50 mg total) by mouth every 8 (eight) hours.    Quantity:    Refills: 0     Insulin Aspart BD Insulin Syringe U/F 31G X 5/16\" 0.5 ML Misc  Generic drug: Insulin Syringe-Needle U-100      USE 1 SYRINGE ONCE DAILY AS DIRECTED   Refills: 5     BD Pen Needle Shantelle U/F 32G X 4 MM Misc  Generic drug: Insulin Pen Needle      USE 1 PEN NEEDLE 4 TIMES DA Vitamin D (Cholecalciferol) 10 MCG (400 UNIT) Tabs      Take 400 Units by mouth daily.    Refills: 0        STOP taking these medications    carvedilol 25 MG Tabs  Commonly known as: COREG           ASK your doctor about these medications      Instructions

## (undated) NOTE — LETTER
06/30/21        [address]      Dear Judith Hernandez records indicate that you have outstanding lab work and or testing that was ordered for you and has not yet been completed: CT Chest    To provide you with

## (undated) NOTE — LETTER
[facility], [place]  Authorization for Invasive Procedures  1. I hereby authorize  Dr. Richard Sabillon , my physician and whomever may be designated as the doctor's assistant, to perform the following operation and/or procedure:   Holly Dee following are some, but not all, of the potential risks that can occur: fever and allergic reactions, hemolytic reactions, transmission of disease such as hepatitis, AIDS, cytomegalovirus (CMV), and flluid overload.  In the event that I wish to have autolog administration of sedation/analgesia as may be necessary or desirable in the judgment of my physician.      Signature of Patient:  ________________________________________________ Date: _________Time: _________    Responsible person in case of minor or unco

## (undated) NOTE — Clinical Note
Gaurav Leon and Dr. Florentin Kenyon - will plan for colonoscopy on [place]. She had an EGD in 2019 showing gastritis/esophagitis, on treatment now with PPI. She is high risk for the c-scope given her cardiac hx. I will obtain clearance from her cardiologist Dr. Arya Doan.  Dennis Molina

## (undated) NOTE — LETTER
[address] [facility]     I agree to have a Peripherally Inserted Central Catheter (PICC) placed in my arm.    1. The PICC insertion procedure, care, maintenance, risks, benefits, and complications have been explained to me b the PICC, including risks, benefits, and side effects related to the alternatives and risks related to not receiving this procedure.     8.  I have expressed any questions about this procedure to my physician or the PICC Proceduralist and he/she has answere

## (undated) NOTE — LETTER
Hospital Discharge Documentation  Patient was discharged to [place] [address]     From: [address] Hospitalist's Office  Phone: [phone]    Patient discharged time/date: 6/9/2021  5:16 PM  Patient discharge disposition:  SNF       Dis regurgitation     Pulmonary hypertension (HCC)     Hiatal hernia with GERD and esophagitis     Failed total knee arthroplasty, sequela     Type 2 diabetes mellitus with hyperglycemia (HCC)     Coronary artery disease due to calcified coronary lesion     In of 5, , creatinine 8.13, bicarb 20, potassium 4.6. Urinalysis showed small sediment in the bladder with rare bacteria. No symptoms of urinary tract infection. CBC was unremarkable. Chest x-ray showed fluid overload with cardiomegaly.   The patien    Anemia of CKD III-IV  - H/H stable  - monitor      T2DM  -Improved control  - Hba1c 7.0       H/o bleeding AVM   - no blood thinners  - H/H stable.      Thrombocytopenia  - monitor.   - stop heparin   - no evidence of bleeding        Discharge Conditi Refills: 0     docusate sodium 100 MG Caps  Commonly known as: COLACE      Take 1 capsule (100 mg total) by mouth 2 (two) times daily.    Quantity: 60 capsule  Refills: 2     epoetin lashae 20736 UNIT/ML Soln      Inject 10,000 Units into the skin once a week Quantity: 1 kit  Refills: 0     OneTouch Verio Strp      1 strip by Finger stick route 2 (two) times a day. Use as directed.    Quantity: 200 strip  Refills: 2     Pantoprazole Sodium 40 MG Tbec  Commonly known as: PROTONIX      Take 1 tablet (40 mg total)

## (undated) NOTE — LETTER
[address]  [facility] [address], [place], [place]  [address]  INFORMED CONSENT FOR TRANSFUSION OF BLOOD OR BLOOD PRODUCTS  My physician has informed me of the nature, purpose, benefits and risks of transfusion for blood and blood components that ______________________________________________  (Signature of Patient)                                                            (Responsible party in case of Minor,

## (undated) NOTE — LETTER
[address] [place] [address]  Authorization for Invasive Procedures  1.  I hereby authorize  ____________________ , my physician and whomever may be designated as the doctor's assistant, to perform the following operation and/or procedu 4. Should the need arise during my operation or immediate post-operative period; I also consent to the administration of blood and/or blood products.  Further, I understand that despite careful testing and screening of blood and blood products, I may still 9. Patients having a sterilization procedure: I understand that if the procedure is successful the results will be permanent and it will therefore be impossible for me to inseminate, conceive or bear children.  I also understand that the procedure is intend

## (undated) NOTE — LETTER
[address], [place]  Authorization for Invasive Procedures  1.  I hereby authorize Dr. Chepe Moody , my physician and whomever may be designated as the doctor's assistant, to perform the following operation and/or procedure:  esophagog fever and allergic reactions, hemolytic reactions, transmission of disease such as hepatitis, AIDS, cytomegalovirus (CMV), and flluid overload.  In the event that I wish to have autologous transfusions of my own blood, or a directed donor transfusion, I rashi Signature of Patient:  ________________________________________________ Date: _________Time: _________    Responsible person in case of minor or unconscious: _____________________________Relationship: ____________     Witness Signature: ___________________

## (undated) NOTE — IP AVS SNAPSHOT
Patient Demographics     Address  [address] Phone  [phone] [facility])  [phone] (Mobile) *Preferred* E-mail Address  [email or web address]      Emergency Contact(s)     Name Relation Home Work [address], elevating please make sure the surgical leg is higher than the heart. A good way to do this is to lay completely flat and put the leg on an arm rest of your couch with a few pillows under the ankle. Please make sure the leg is straight when elevating.    ? meloxicam, naproxen etc.), please continue to take this while on your blood thinner. Your pharmacist may instruct you differently. You will be on this for at least 6 weeks. ?  Continue your stool softener (colace) as long as your are taking narcotic pain m 3-5 days before you will run out of medication. Many large chain pharmacies will accept an electronic prescription. ?  Please understand that the requests will be taken care of as soon as possible but if it is the weekend they may not be refilled until [facility] 2. Polyethylene glycol (Glycolax or Miralax)17 Gm Daily. Mix well in glass of water. 3. Hold both docusate sodium (Colace) and polyethylene glycol (Miralax) if greater than three stools per day.   4. If no stool after two days at home take senna (Senakot) Next dose due: With dinner       Take 25 mg by mouth 2 (two) times daily with meals. Clopidogrel Bisulfate 75 MG Tabs  Commonly known as:  PLAVIX  Start taking on:  August 29, 2020  Next dose due:   Tomorrow am      Take 1 tablet (75 mg total) by Take 1 tablet (4 mg total) by mouth every 8 (eight) hours as needed. SERGEI Garcia         Pantoprazole Sodium 40 MG Tbec  Commonly known as:  PROTONIX  Next dose due:  TOMORROW MORNING      Take 1 tablet (40 mg total) by mouth every morning Order ID Medication Name Action Time Action Reason Comments    [id number] Clopidogrel Bisulfate (PLAVIX) tab 75 mg 08/28/20 1141 Given      [id number] HYDROmorphone HCl (DILAUDID) 1 MG/ML injection 0.4 mg (Or Linked Group #1) 08/27/20 2102 Given      [id number] Ordering provider:  Efra Costello MD  08/27/20 2300 Resulting lab:  [facility])    Specimen Information    Type Source Collected On   Blood — 08/28/20 0603          Components    Component Value Reference Range Flag L Hugo [address] [facility]) [facility] ([facility]) Lakeisha Herron. Violeta Cruz M.D. [facility]. 78  [facility] [address] [facility] [id number] 03/19/20 1442 - Present            Microbiology Results (All)     Procedure Component Value Units Date/Time    Rapid SARS Rapid SARS-CoV-2 by PCR STAT [[id number]]  (Normal) Collected:  08/03/20 0602    Order Status:  Completed Lab Status:  Final result Updated:  08/03/20 3581    Specimen:  Other from Nares      Rapid SARS-CoV-2 by PCR Not Detected         H&P - H&P Note Past Medical History:   Diagnosis Date   • Back problem    • Calculus of kidney    • Cataract 2017    surgery   • Coronary atherosclerosis    • [facility]     [facility] yrs   • Esophageal reflux    • Essential hypertension    • High blood pressure    • High c Financial resource strain: Not on file      Food insecurity:        Worry: Not on file        Inability: Not on file      Transportation needs:        Medical: Not on file        Non-medical: Not on file    Tobacco Use      Smoking status: Former Smoke GASTROINTESTINAL:  Denies abdominal pain, nausea, vomiting, constipation, diarrhea, or blood in stool.   MUSCULOSKELETAL: right knee chronic infection post remote TKA arthritic pain and weakness as noted above  NEUROLOGICAL:  Denies headache, seizures, dizz  07/16/2020    K 3.8 07/16/2020     07/16/2020    CO2 27.0 07/16/2020    GLU 87 07/16/2020    CA 9.1 07/16/2020    ALB 2.7 (L) 07/15/2020    ALKPHO 144 (H) 07/15/2020    BILT 0.4 07/15/2020    TP 7.2 07/15/2020    AST 16 07/15/2020    ALT 34 0 Failed total right knee replacement (HCC)      Pulmonary hypertension (HCC)  severe      Coronary artery disease due to calcified coronary lesion  Recent ANGELO to LAD 6/22/20      Failed total right knee replacement [facility])  Now for resection and antibiotic [facility]    PATIENT'S NAME: Hola Gabriella   ATTENDING PHYSICIAN: Everette Hdz. Yumi Ordonez MD   CONSULTING PHYSICIAN: Matthias Butcher.  Nancy Song MD   PATIENT ACCOUNT#:   [id number]    LOCATION:  [address] [place] [address] #:   [id number]       DATE OF BIRTH: put in her right knee. Infectious Disease is on the case. They recommended continue antibiotics, vancomycin, will need about 6 weeks postoperative course, but we need to follow the levels.      The patient has been seen by Cardiology during the admission am but is mixed up by the date and some of her answers are very slow. LABORATORY DATA:  No urinalysis is done. Vancomycin level high at 35. Blood tests:  Sodium 136, potassium 5.3, chloride 107, bicarb 24, BUN 48, creatinine 3.41.   Liver functions no No exam resulted this encounter. 2D Echocardiogram Results (HF patients only)    No exam resulted this encounter. Cath Angiogram Results (HF Patients only)    No exam resulted this encounter.           Physical Therapy Notes (last 72 hours) (Notes forward when standing. Patient able to stand for about 7 seconds with max A x 2. Patient assisted back to bedside chair with max A x 2 and able to reposition once BLE elevated.  Patient does not have the physical skills to return to prior living environment -   Need to walk in hospital room?: Total   -   Climbing 3-5 steps with a railing?: Total     AM-PAC Score:  Raw Score: 12   Approx Degree of Impairment: 68.66%   Standardized Score (AM-PAC Scale): 35.33   CMS Modifier (G-Code): CL    FUNCTIONAL ABILITY ST No notes of this type exist for this encounter.      Future Appointments        Provider Shen Marin    10/1/2020 11:00 AM [facility] Hematology Oncology [facility]      Multidisciplinary Problems     Active Goals        Problem: Pa  mL/min    Duration of Treatment 3 Hours    Dry weight (kg) or fluid removal (L) 1    Access Site Central Line    Close line with Heparin?  Yes        08/21/20 1407    08/21/20 1300  Hemodialysis inpatient  Tomorrow     Question Answer Comment   K 2 Site Assessment  Clean;Dry; Intact   Clean;Dry; Intact   Clean;Dry; Intact   Clean;Dry; Intact   Clean;Dry; Intact    Reason for Continuing Central Line  Hemodialysis   Hemodialysis   Hemodialysis   Hemodialysis   Hemodialysis    AV Fistula  —   —   —   —   — Status  —   —   —      CHG Impregnated Disc  —   —   —      If No, Reason Why?  —   —   —      Dressing Status  —   —   —      Site Condition  —   —   —      Dressing  —   —   —      Dressing Change Due  —   —   —      Drainage Description  —   —   —

## (undated) NOTE — IP AVS SNAPSHOT
Patient Demographics     Address  [address] [facility] [address] [place] [address] Phone  [phone] [facility])  [phone] (Work)  [phone] [facility]) E-mail Address  [email or web address]      Emergency Contact(s)     Name Relation Home Work Mobile the leg is straight when elevating. ? If you are short of breath or have calf pain please call us or go to the ER before elevating the leg. 3. How should I take care of my incision and dressing? ?  The Aquacel Ag dressing must be removed after 7 days ? Continue your stool softener (colace) as long as your are taking narcotic pain medication. 7. What are the signs of infection I should look for?  ? It is common for the knee and hip to be red and warm after surgery  ?  Our suspicion for infection rises possible but if it is the weekend they may not be refilled until Monday. Please allow 24- 48 hours on refill requests. 12. How do I get a handicap placard?   ? We are happy to provide you with ONE handicap placard at the time of your surgery which will b 4. If no stool after two days at home take senna (Senakot) 1-2 tablets at bedtime. Repeat nightly untill stool occurs. Continue program as long as continuing opioids. Dressing change instructions:   The Aquacel dressing is to be removed on post-op day You experience unusual pain or odor        Follow-up Information     Nicole Rivero MD In 2 weeks.     Specialty:  SURGERY, ORTHOPEDIC  Contact information:  [address]                  Your medication li Give 6 units for blood glucose 301-330 mg/dL  Give 7 units for blood glucose 331-360 mg/dl  Call physician if blood glucose is greater than 360 mg/dL      Inject 1-7 Units into the skin 3 (three) times daily with meals. SERGEI Garcia [id number] HYDROmorphone HCl (DILAUDID) tab 2-4 mg 11/21/18 1841 Given      [id number] HYDROmorphone HCl (DILAUDID) tab 2-4 mg 11/21/18 2341 Given      [id number] HYDROmorphone HCl (DILAUDID) tab 2-4 mg 11/22/18 0545 Given      [id number] HYDROmorphone HCl (D SpO2  95 % Filed at 11/22/2018 1710      Patient's Most Recent Weight       Most Recent Value   Patient Weight  98 kg (216 lb)         Lab Results Last 24 Hours      HEMOGLOBIN [[id number]] (Abnormal)  Resulted: 11/22/18 1334, Result status: Final result BUN/CREA Ratio 11.6 10.0 - 20.0 — [facility]   Anion Gap 5 0 - 18 mmol/L — [facility]   Calculated Osmolality 273 275 - 295 mOsm/kg  [facility]   GFR, Non-African American 26 >=60  [facility]   GFR, African-American 30 >=60  [facility]   Comment:  1945 MRN [id number]   Location [facility] PRE OP RECOVERY Attending Zulema Arechiga MD   Hosp Day #[CB.1] 0[CB. 2] PCP[CB. 1] No primary care provider on file.[CB.2]     Date:  2018  Date of Admission:  2018    History provided by:francisco no reaction   • Osteoarthritis    • Renal disorder     watching renal counts   • Visual impairment     readers     Past Surgical History:   Procedure Laterality Date   • CATARACT  2017   • CYSTOSCOPY,INSERT URETERAL STENT     • HYSTERECTOMY  1981   • KNEE Enalapril Maleate 10 MG Oral Tab Take 10 mg by mouth 2 (two) times daily. carvedilol 25 MG Oral Tab Take 50 mg by mouth daily. carvedilol 25 MG Oral Tab Take 25 mg by mouth nightly. bumetanide 1 MG Oral Tab Take 1 mg by mouth 2 (two) times daily.    I Component Value Date    WBC 7.8 11/19/2018    HGB 8.7 (L) 11/19/2018    HCT 26.3 (L) 11/19/2018     11/19/2018    CREATSERUM 1.29 11/19/2018    BUN 12 11/19/2018     11/19/2018    K 3.5 11/19/2018     11/19/2018    CO2 29 11/19/2018    G PM  Version 1 of 1    Author:  Magdy Yee PTA Service:  Rehab Author Type:  Physical Therapy Assistant    Filed:  11/22/2018  2:41 PM Date of Service:  11/22/2018 10:00 AM Status:  Signed    :  Magdy Yee PTA (Physical Therapy Assista PAIN ASSESSMENT[RM.1]   Ratin  Location: R LE  Management Techniques: Activity promotion; Body mechanics; Relaxation;Repositioning[RM. 2]    BALANCE[RM.1] Goal #2 Patient is able to demonstrate transfers Sit to/from Stand at assistance level: mod A      Goal #2  Current Status Mod A   Goal #3 SPT to the recliner with mod A RW as support    Goal #3   Current Status  SPT with the RW min A    Goal #4 amb 5 step elevated for pm session. Patient stood with Max a x 2 with RW x 2 for 1 min each with Mod A for balance. Pateint want to stay in chair and BLE was elevated. Patient cooperative with PT and is willing to work hard.  AROM on the L LE and AAROM on the R hip -   Moving to and from a bed to a chair (including a wheelchair)?: A Lot   -   Need to walk in hospital room?: Total   -   Climbing 3-5 steps with a railing?: Total[DK. 2]     AM-PAC Score:[DK.1]  Raw Score: 11   PT Approx Degree of Impairment Score: 72.57% Author:  Arie Barber PT Service:  Rehab Author Type:  Physical Therapist    Filed:  11/20/2018  4:57 PM Date of Service:  11/20/2018  3:03 PM Status:  Addendum    :  Arie Barber PT (Physical Therapist)    Related Notes:  Origin her to wrote a note of it in the chart as she did on 1518 hrs     Spoke to my supervisor Xander Subramanian OT regarding this as well reported the discrepancy of the order to nursing supervisor : Nas Doyle as well as nurse Alistair Shin. After today. Comment : going to daughters home with 13 steps from the outside.  no steps inside      Exercises AM Session PM Session   Ankle Pumps 10 reps B LE 10 reps B LE   Quad Sets 10 reps L LE 10 reps L LE   Glut Sets 10eps L LE 10 reps L LE   Hip Abd/Add 10 reps L Related Notes:  Addendum by Juan Luis Mckoy PT (Physical Therapist) filed at 11/20/2018  4:57 PM       PHYSICAL THERAPY KNEE TREATMENT NOTE - INPATIENT     Room Number: 618/247-Y             Presenting Problem: R patellar tendon rupture s/p reconst Julienne as well as nurse Tony Nelson. After today. Communicated with pt to do TDWB on the R LE from now on    DISCHARGE RECOMMENDATIONS  PT Discharge Recommendations: Sub-acute rehabilitation    PLAN  PT Treatment Plan: Endurance; Patient education; Samia Estimable Hip Abd/Add 10 reps L LE 10 reps L LE   Heel slides 10 reps L LE 10 reps L LE        SLR 10 reps L LE 10 reps L LE                           Knee ROM :0        Patient End of Session: Up in chair;Needs met;Call light within reach;RN aware of session/findin Presenting Problem: R patellar tendon rupture s/p reconstruction   Reason for Therapy: Mobility Dysfunction and Discharge Planning    ASSESSMENT     Patient is a 75year old female admitted 11/19/2018 for patellar tendon rupture, underwent a PATELLA TENDON Primary repair of MCL    She was evaluated by Dr. Fay Gee last Friday with a patella tendon rupture.  She had a right knee arthroplasty by Dr. Fay Gee on 10/29/18 with anemia following the surgery     She was admitted from [facility] for kidney stones with WEIGHT BEARING RESTRICTION  Weight Bearing Restriction: R lower extremity        R Lower Extremity: Weight Bearing as Tolerated       PAIN ASSESSMENT  Rating: 3  Location: R knee       COGNITION  · Overall Cognitive Status:  WFL - within functional limits Functional activity tolerated  Gait training  Strengthening  Transfer training    Patient End of Session: Up in chair;Needs met;Call light within reach;RN aware of session/findings; All patient questions and concerns addressed    CURRENT GOALS  Goals to be Reason for Therapy: Mobility Dysfunction and Discharge Planning    ASSESSMENT     Patient is a 75year old female admitted 11/19/2018 for patellar tendon rupture, underwent a PATELLA TENDON REPAIR along the Medial Meniscal injury.  Pt had a recent R TKA on She was evaluated by Dr. Fay Gee last Friday with a patella tendon rupture.  She had a right knee arthroplasty by Dr. Fay Gee on 10/29/18 with anemia following the surgery     She was [facility] from [facility] for kidney stones with nausea and vomiting and h Weight Bearing Restriction: R lower extremity        R Lower Extremity: Weight Bearing as Tolerated       PAIN ASSESSMENT  Rating: 3  Location: R knee       COGNITION  · Overall Cognitive Status:  WFL - within functional limits    RANGE OF MOTION AND STREN Strengthening  Transfer training    Patient End of Session: Up in chair;Needs met;Call light within reach;RN aware of session/findings; All patient questions and concerns addressed    CURRENT GOALS  Goals to be met by: 11/25/18  Patient Goal Patient's self-

## (undated) NOTE — IP AVS SNAPSHOT
[facility]            (For Outpatient Use Only) Initial Admit Date: 11/19/2018   Inpt/Obs Admit Date: Inpt: 11/19/18 / Obs: N/A   Discharge Date:    Luis Miguel Captain:  [de-identified]   MRN: [de-identified]   CSN: [id number]        ENCOUNTER  [facility] Financial Class: Medicare    November 22, 2018

## (undated) NOTE — LETTER
[facility]  Administration of Anesthesia  1. Daisey Buerger, or _________________________________ acting on her behalf, (Patient) (Dependent/Representative) request to receive anesthesia for my pending procedure/operation/treatment.   A 6. OBSTETRIC PATIENTS: Specific risks/consequences of spinal/epidural anesthesia may include itching, low blood pressure, difficulty urinating, slowing of the baby's heart rate and headache.  Rare risks include infections, high spinal block, spinal bleeding ___________________________________________________           _____________________________________________________  Date/Time                                                                                               Responsible person in case of minor

## (undated) NOTE — IP AVS SNAPSHOT
[facility]            (For Outpatient Use Only) Initial Admit Date: 8/26/2021   Inpt/Obs Admit Date: Inpt: 8/26/21 / Obs: N/A   Discharge Date: 8/31/2021   Hospital Acct:  [de-identified]   MRN: [de-identified]   CSN: [id number]   CEID: [id number] TERTIARY INSURANCE   Payor:  Plan:    Group Number:  Insurance Type:    Subscriber Name:  Subscriber :    Subscriber ID:  Pt Rel to Subscriber:    Hospital Account Financial Class: Medicare Advantage    2021

## (undated) NOTE — LETTER
[address] [place] [address]      Authorization for Surgical Operation and Procedure     Date:___________                                                                                                         Time:_______ 4.   Should the need arise during my operation or immediate post-operative period, I also consent to the administration of blood and/or blood products.   Further, I understand that despite careful testing and screening of blood or blood products by syed 8.   I recognize that in the event my procedure results in extended X-Ray/fluoroscopy time, I may develop a skin reaction. 9.  If I have a Do Not Attempt Resuscitation (DNAR) order in place, that status will be suspended while in the operating room, proc STATEMENT OF PHYSICIAN My signature below affirms that prior to the time of the procedure; I have explained to the patient and/or his/her legal representative, the risks and benefits involved in the proposed treatment and any reasonable alternative to the

## (undated) NOTE — IP AVS SNAPSHOT
[facility]            (For Outpatient Use Only) Initial Admit Date: 12/4/2020   Inpt/Obs Admit Date: Inpt: 12/5/20 / Obs: N/A   Discharge Date:    Jay Campos:  [de-identified]   MRN: [de-identified]   CSN: [id number]   CEID: [id number]        YYI Group Number:  Insurance Type:    Subscriber Name:  Subscriber :    Subscriber ID:  Pt Rel to Subscriber:    Hospital Account Financial Class: Medicare    2020

## (undated) NOTE — IP AVS SNAPSHOT
Patient Demographics     Address  [address] [facility] Phone  [phone] [facility])  [phone] (Mobile) *Preferred* E-mail Address  [email or web address]      Emergency Contact(s)     Name Relation Home Work [address], Commonly known as:  LIPITOR  Next dose due: Tonight 6/26 at 9pm      Take 1 tablet (40 mg total) by mouth nightly.    Tawnya Dawn MD         ReliOn Insulin Syringe 31G X 5/16\" 0.3 ML Misc  Generic drug:  Insulin Syringe-Needle U-100      USE 1 4 TIMES DA Omeprazole 40 MG Cpdr  Next dose due: Tomorrow 6/27 at 9am      TAKE 1 CAPSULE BY MOUTH ONCE DAILY          PEG 3350 17 g Pack  Commonly known as:  MIRALAX      Take 17 g by mouth daily as needed. SERGEI Garcia         Prasugrel HCl 10 MG Ta [id number] Prasugrel HCl (EFFIENT) tab TABS 10 mg 06/26/20 1030 Given      [id number] acetaminophen (TYLENOL) tab 650 mg 06/25/20 2152 Given      [id number] acetaminophen (TYLENOL) tab 650 mg 06/26/20 1028 Given      [id number] aspirin EC tab 81 mg 06/26/20 1 Resp  16 Filed at 06/26/2020 1642   Temp  98.2 °F (36.8 °C) Filed at 06/26/2020 1642   SpO2  99 % Filed at 06/26/2020 1642      Patient's Most Recent Weight       Most Recent Value   Patient Weight  78.8 kg (173 lb 11.2 oz)         Lab Results Last 24 Hour CBC WITH DIFFERENTIAL WITH PLATELET [[id number]]  Resulted: 06/26/20 0513, Result status: Final result   Ordering provider:  Tal Gipson MD  06/25/20 2300 Resulting lab:  [facility] ([facility])   Narrative:   The following orders inferior aspect of the knee incision. She had been scheduled for a revision 10 days ago but was postponed due to hg < 8 and history of cardiomyopathy. She was to get cardiac clearance but the knee drainage worsened and her knee has become dislocated.  She h • EXTREMITY LOWER IRRIGATION & DEBRIDEMENT Right 6/25/2019    Performed by Liliane Gonzalez DPM at [address] TENDON/LIGAMENT/CYST Right 01/2019   • KNEE TOTAL REPLACEMENT Right 10/29/2018    Performed by Tadeo Ortiz, Intimate partner violence:        Fear of current or ex partner: Not on file        Emotionally abused: Not on file        Physically abused: Not on file        Forced sexual activity: Not on file    Other Topics      Concerns:        Not on file    So icterus, conjunctivae clear bilaterally, no eye discharge Nose: patent, no nasal discharge Throat:  No tonsillar erythema or exudate. Mouth:  No oral lesions or ulcerations, good dentition. NECK: Supple, no CLAD, no JVD, no carotid bruit, no thyromegaly. ECG Report  Interpretation  -------------------------- Sinus Rhythm -First degree A-V block Poor R wave progression consider old anterior wall MI; may be normal variant or related to lead placement  - Nonspecific T-abnormality.  ABNORMAL When compared with Serum Creatinine >2 mg/dl: No  Flora has an RCRI score that is high risk and is at >11% risk for major cardiac event in the perioperative period.     Although Flora is high risk for surgery, clinically the risk is acceptable as she has greater risk if no in [facility] ID CONSULT NOTE    Thea Mclean Patient Status:  Inpatient    1945 MRN [id number]   Location [facility] 4W/SW/SE Attending Jakob Brand MD   Hosp Day # 2 PCP No primary care provider on file. • CYSTOSCOPY,INSERT URETERAL STENT     • ESOPHAGOGASTRODUODENOSCOPY (EGD) N/A 6/12/2020    Performed by Leilani Nuñez MD at [address] [facility] Right 6/25/2019    Performed by Lonnie Holstein, DPM at [place] [address] •  acetaminophen (TYLENOL) tab 650 mg, 650 mg, Oral, 4 times per day  •  HYDROmorphone HCl (DILAUDID) tab 2 mg, 2 mg, Oral, Q4H PRN  •  acetaminophen (TYLENOL) tab 650 mg, 650 mg, Oral, 4 times per day  •  amLODIPine Besylate (NORVASC) tab 5 mg, 5 mg, Oral CONSTITUTIONAL:  No weight loss, weakness or fatigue. HEENT:  Eyes:  No visual loss, blurred vision, double vision or yellow sclerae. Ears, Nose, Throat:  No hearing loss, sneezing, congestion, runny nose or sore throat. SKIN:  No rash or itching.   CARDI K 3.8 3.8 3.8    110 109   CO2 26.0 26.0 27.0   ALKPHO 112  --  95   AST 5*  --  5*   ALT 8*  --  7*   BILT 0.3  --  0.4   TP 7.2  --  6.6       Microbiology: Reviewed in EMR    Radiology: Reviewed    ASSESSMENT:    Antibiotics:[JM.1] off abx[JM.2] Will follow and advise[RF.1]    Electronically signed by Reji Ricardo DO on 6/20/2020  1:50 PM   Attribution Key    JM. 1 - Hattie Trammell PA-C on 6/20/2020  8:06 AM  MELISSA. 2 - Murray Conde PA-C on 6/20/2020 10:11 AM  RF. 1 - Reji Ricardo DO on Cath Angiogram Results (HF Patients only)    No exam resulted this encounter.           Physical Therapy Notes (last 72 hours) (Notes from 6/23/2020  5:27 PM through 6/26/2020  5:27 PM)      Physical Therapy Note signed by Marlee Kirby PT at 6/25/2020  8: been calculated based on documentation in the [facility] '6 clicks' Inpatient Basic Mobility Short Form. Research supports that patients with this level of impairment may benefit from[MJ.1] inpatient rehab. She may benefit from rehab prior to surgery[MJ.2]. [MJ PHYSICAL THERAPY MEDICAL/SOCIAL HISTORY     History related to current admission:[MJ.1] Ongoing knee pain and then quad tendon rupture and knee instability May 2020 necessitating a revision[MJ.2]     Problem List  Principal Problem:    Failed total knee Performed by Jaxon Amaro MD at [address] [place] [address] OR   • PRESSURE ULCER BOOT Right 03/2019   • TMJ ARTHROSCOPY DEBRIDEMENT Right 07/2019    heel of the fooot   • TOTAL KNEE REPLACEMENT Right 10/29/2018       HOME SITUATION  Type of Home: House   Home Layout: One knee ext due to quad rupture.  Encouraged her to continue strengthening all LE joints as she is not walking so will need to compensate by doing exercises to prepare for next surgery[MJ.2]    BALANCE  Static Sitting: Good  Dynamic Sitting: Fair -  Static Sta with RW and right KI[MJ.2]    Exercise/Education Provided:[MJ.1]  Bed mobility  Functional activity tolerated  Posture  Strengthening  Transfer training  education regarding preparation/strengthening for surgery[MJ.2]    Patient End of Session: Up in chair 7/6/2020 2:30 PM Agustin Abbott MD [facility] AND [facility] Hematology Oncology EMO      Multidisciplinary Problems     Active Goals     Not on file          Resolved Goals        Problem: Patient/Family Goals    Goal Priority Disciplines Outcome Interventions

## (undated) NOTE — LETTER
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 [JH.2]  General:  Alert, no distress  HEENT:  Normocephalic, atraumatic  Neck:  Supple, symmetrical  Cardiac:  Regular rate, regular rhythm  Pulmonary:  Clear to auscultation bilaterally, respirat monday   Refills: 0     Ferrous Sulfate 325 (65 Fe) MG Tabs      Take 1 tablet (325 mg total) by mouth 3 (three) times daily. Quantity: 90 tablet  Refills: 1     Fluticasone Propionate 50 MCG/ACT Susp  Commonly known as: FLONASE      daily as needed.    R 8.6 MG Tabs  Commonly known as: SENOKOT      Take 1 tablet (8.6 mg total) by mouth 2 (two) times daily. Quantity:    Refills: 0     triamcinolone acetonide 0.1 % Lotn  Commonly known as: KENALOG      Apply topically 3 (three) times daily.    Refills: 0

## (undated) NOTE — LETTER
Hospital Discharge Documentation  Patient was discharged to [facility] ([phone]).      From: [address] Hospitalist's Office  Phone: [phone]    Patient discharged time/date: 12/9/2020  6:28 PM  Patient discharge disposition:  SNF       Disc Congestive heart failure (HCC)     History of urinary stone     Hyperlipidemia     Iron deficiency anemia     Morbid obesity (HCC)     Muscle weakness     Neuropathy     Osteopenia     Pain in joint     Rotator cuff syndrome     Temporary cerebral vascu History of Present Illness:[VS.1] This is a very pleasant 76-year-old woman who was admitted to our institution in November of this year.   She has a history of osteoarthritis and had a right knee prosthesis infection that required antibiotic spacer placeme pressure improved to above 555 systolic. It continued to waffle a bit and dropped into the 80s again after her arrival in the PCU. She had been ordered 2 units of packed red blood cells.   However, because of the continued borderline blood pressure and da Vitals: Blood pressure 98/62, pulse 77, temperature 98 °F (36.7 °C), resp. rate 16, height 5' 2\" (1.575 m), weight 166 lb 3.6 oz (75.4 kg), SpO2 100 %. Follow up:    Follow-up Information     Co, Roberto Russ MD.    Specialty: Internal Medicine  Contact i Quantity: 15 tablet  Refills: 1        CONTINUE taking these medications      Instructions Prescription details   Acetaminophen 500 MG Caps      Take 1-2 capsules (500-1,000 mg total) by mouth every 6 (six) hours as needed for Pain.    Quantity: 60 capsule Place 1 patch onto the skin daily. Quantity: 30 patch  Refills: 0     Naloxone HCl 4 MG/0.1ML Liqd      4 mg by Nasal route as needed. If patient remains unresponsive, repeat dose in other nostril 2-5 minutes after first dose.    Quantity: 1 kit  Refills · bumetanide 1 MG Tabs  · HYDROmorphone HCl 2 MG Tabs             Discharge Instructions       -WB status: TDWB, no knee flexion, knee immobilizer at all times  -TDWB, no knee flexion, knee immobilizer at all times    CBC on 15th results to Dr. Isabella Felty

## (undated) NOTE — IP AVS SNAPSHOT
[facility]            (For Outpatient Use Only) Initial Admit Date: 3/8/2021   Inpt/Obs Admit Date: Inpt: 3/8/21 / Obs: N/A   Discharge Date:    Dora Tomlinson:  [de-identified]   MRN: [de-identified]   CSN: [id number]   CEID: [id number]        [facility] Group Number:  Insurance Type:    Subscriber Name:  Subscriber :    Subscriber ID:  Pt Rel to Subscriber:    Hospital Account Financial Class: Medicare    2021

## (undated) NOTE — ED AVS SNAPSHOT
Zara Toney   MRN: [id number]    Department:  [facility] Emergency Department   Date of Visit:  12/5/2019           Disclosure     Insurance plans vary and the physician(s) referred by the ER may not be covered by your plan.  Please contact within the next three months to obtain basic health screening including reassessment of your blood pressure.     IF THERE IS ANY CHANGE OR WORSENING OF YOUR CONDITION, CALL YOUR PRIMARY CARE PHYSICIAN AT ONCE OR RETURN IMMEDIATELY TO THE EMERGENCY DEPARTMEN

## (undated) NOTE — LETTER
[address] [place] [address]      Authorization for Surgical Operation and Procedure     Date:___________                                                                                                         Time:_______ 4.   Should the need arise during my operation or immediate post-operative period, I also consent to the administration of blood and/or blood products.   Further, I understand that despite careful testing and screening of blood or blood products by syed 8.   I recognize that in the event my procedure results in extended X-Ray/fluoroscopy time, I may develop a skin reaction. 9.  If I have a Do Not Attempt Resuscitation (DNAR) order in place, that status will be suspended while in the operating room, proc STATEMENT OF PHYSICIAN My signature below affirms that prior to the time of the procedure; I have explained to the patient and/or his/her legal representative, the risks and benefits involved in the proposed treatment and any reasonable alternative to the

## (undated) NOTE — IP AVS SNAPSHOT
Patient Demographics     Address  [address] [facility] [address] Phone  [phone] [facility])  [phone] (Mobile) *Preferred* E-mail Address  [email or web address]      Emergency Contact(s)     Name Relation Home Work Mobile    Lizz Valderrama BUMEX  Next dose due: tomorrow      Take 2 mg by mouth 2 (two) times daily. calciTRIOL 0.25 MCG Caps  Commonly known as: ROCALTROL  Next dose due: tomorrow      Take 1 capsule (0.25 mcg total) by mouth every other day.    Landon Landa,  Vitamin Tabs  Next dose due: Tomorrow morning      Take 1 tablet by mouth daily. Naloxone HCl 4 MG/0.1ML Liqd      4 mg by Nasal route as needed. If patient remains unresponsive, repeat dose in other nostril 2-5 minutes after first dose.    Ev Robertson [id number] Midodrine HCl (PROAMATINE) tab 5 mg 06/09/21 0547 Given      [id number] Midodrine HCl (PROAMATINE) tab 5 mg 06/09/21 1031 Given      [id number] Pantoprazole Sodium (PROTONIX) EC tab 40 mg 06/09/21 0547 Given      [id number] Senna (SENOKOT) tab 8.6 m [facility])   Potassium 4.6 3.5 - 5.1 mmol/L — [facility])   Chloride 105 98 - 112 mmol/L — [facility] ([facility])   CO2 22.0 21.0 - 32.0 mmol/L — [facility] ([facility])   Anion Gap 8 0 - 18 mmol/L — [facility] ([facility])   BUN 72 7 - 18 mg/dL [facility] H&P signed by Martine Price MD at 6/2/2021  4:08 PM   Version 1 of 1    Author: Martine Price MD Service: Hospitalist Author Type: Physician    Filed: 6/2/2021  4:08 PM Status: Signed    : Martine Price MD (Physician)       [facility] off anticoagulation secondary to recurrent gastrointestinal bleed. Upper endoscopy showed arteriovenous malformation and bleeding vessel treated endoscopically.     PAST SURGICAL HISTORY:  Right total knee arthroplasty, prosthesis infection requiring antib Motor and sensory intact. Cranial nerves II through XII are intact. ASSESSMENT:    1.   End-stage renal disease. Possibly the patient will need to restart dialysis.   2.   Underlying ischemic cardiomyopathy and coronary artery disease with relatively l -Patient would likely benefit from biventricular device implant. She does qualify for ICD. PLAN  Plan to proceed with BiV ICD outpatient after she is established with HD and is more medically optimized.     SERGEI Bryson  S  6/7/2021[LW.2] ANGIOPLASTY     • COLONOSCOPY N/A 6/12/2020    Procedure: COLONOSCOPY;  Surgeon: Thaddeus Betancourt MD;  Location: [facility] ENDOSCOPY   • COLONOSCOPY     • CYSTOSCOPY,INSERT URETERAL STENT     • HYSTERECTOMY  1981   • INJ TENDON/LIGAMENT/CYST Right 01/2019   • OT epoetin lashae-epbx (RETACRIT) 3000 UNIT/ML injection SOLN 3,000 Units, 3,000 Units, Intravenous, Once per day on Tue Thu Sat  •  sevelamer (RENVELA) tab 800 mg, 1,600 mg, Oral, TID CC  •  calciTRIOL (ROCALTROL) cap 0.25 mcg, 0.25 mcg, Oral, Daily  •  Albumi Min PRN **OR** Glucose-Vitamin C (DEX-4) chewable tab 8 tablet, 8 tablet, Oral, Q15 Min PRN  •  Insulin Aspart Pen (NOVOLOG) 100 UNIT/ML flexpen 1-7 Units, 1-7 Units, Subcutaneous, TID CC    Review of Systems:  GENERAL HEALTH:[LW.1] General fatigue, weakne 06/07/2021    CA 9.3 06/07/2021    PGLU 128 06/07/2021[LW.1]                 Electronically signed by Mona Gastelum MD on 6/7/2021  5:27 PM   Attribution Bhardwaj    LW. 1 - KEIRY Murillo on 6/7/2021 12:01 PM  LW.2 - KEIRY Murillo on 6/7/2021 Nonrheumatic mitral valve regurgitation     Pulmonary hypertension (HCC)     Hiatal hernia with GERD and esophagitis     Failed total knee arthroplasty, sequela     Type 2 diabetes mellitus with hyperglycemia (HCC)     Coronary artery disease due to calcif chemistry which showed GFR of 5, , creatinine 8.13, bicarb 20, potassium 4.6. Urinalysis showed small sediment in the bladder with rare bacteria. No symptoms of urinary tract infection. CBC was unremarkable.   Chest x-ray showed fluid overload wit bleed  - Improved rate  - tele.      Anemia of CKD III-IV  - H/H stable  - monitor      T2DM  -Improved control  - Hba1c 7.0       H/o bleeding AVM   - no blood thinners  - H/H stable.      Thrombocytopenia  - monitor.   - stop heparin   - no evidence of b needed for Itching or Sleep. Refills: 0     docusate sodium 100 MG Caps  Commonly known as: COLACE      Take 1 capsule (100 mg total) by mouth 2 (two) times daily.    Quantity: 60 capsule  Refills: 2     epoetin lashae 58337 UNIT/ML Soln      Inject 10,000 day. Use as directed. Quantity: 1 kit  Refills: 0     OneTouch Verio Strp      1 strip by Finger stick route 2 (two) times a day. Use as directed.    Quantity: 200 strip  Refills: 2     Pantoprazole Sodium 40 MG Tbec  Commonly known as: PROTONIX      Take through 6/9/2021  4:11 PM)      Physical Therapy Note signed by Per Castellano PT at 6/7/2021 10:13 AM  Version 1 of 1    Author: Per Castellano PT Service: Rehab Author Type: Physical Therapist    Filed: 6/7/2021 10:13 AM Date of Service: 6/7/2021  9: education;Strengthening;Balance training;Transfer training  Rehab Potential : Fair  Frequency (Obs): 3x/week       PHYSICAL THERAPY MEDICAL/SOCIAL HISTORY       Problem List  Principal Problem:     Thrombocytopenia ([facility] [address]  Active Problems:    Acute kidney in perform stand pivot transfer from bed to wheel nan. SUBJECTIVE  Patient willing to work with PT, but is fatigued.      PHYSICAL THERAPY EXAMINATION     OBJECTIVE  Precautions: Bed/chair alarm  Fall Risk: High fall risk    WEIGHT BEARING RESTRICTION unable to perform    Exercise/Education Provided:  Bed mobility  Functional activity tolerated  Lower therapeutic exercise:   Ankle pumps  LLE: knee flexion and extension AAROM    Patient End of Session: In bed;Needs met;Call light within reach;RN aware of signed by Rex Silveira at 6/9/2021  2:10 PM  Version 1 of 2    Author: Rex Silveira Service: — Author Type: Occupational Therapy Assistant    Filed: 6/9/2021  2:10 PM Date of Service: 6/9/2021  2:09 PM Status: Signed    : Rex Antoine Treatment 3 Hours    Dry weight (kg) or fluid removal (L) 2.0 liters    Access Site Central Line    Close line with Heparin?  Yes        06/08/21 1823    06/07/21 0700  Hemodialysis inpatient  Once     Question Answer Comment   K 3 mEq    Ca++ 2.5 mEq    Bi Continuing [facility] Financial  Hemodialysis   Hemodialysis   Poor venous access   Hemodialysis   —    AV Fistula  —   —   —   —   —    Status  —   —   Clamped   Clamped   —    CHG Impregnated Disc  —   —   —   Yes   —    If No, Reason Why?  —   —   —   —   — —   —   —    If No, Reason Why?  —   —   —   —   —    Dressing Status  Dressing Reinforced; Intact; Old drainage   Dressing Reinforced   Dressing Reinforced   Dressing Reinforced   Other (Comment)    Site Condition  Bleeding   Bleeding   Bleeding   Bleeding

## (undated) NOTE — LETTER
[facility]  Administration of Anesthesia  1. Tamia Neumann, or _________________________________ acting on her behalf, (Patient) (Dependent/Representative) request to receive anesthesia for my pending procedure/operation/treatment.   A bleeding, seizure, cardiac arrest and death. 7. AWARENESS: I understand that it is possible (but unlikely) to have explicit memory of events from the operating room while under general anesthesia.   8. ELECTROCONVULSIVE THERAPY PATIENTS: This consent serve below affirms that prior to the time of the procedure, I have explained to the patient and/or his/her guardian, the risks and benefits of undergoing anesthesia, as well as any reasonable alternatives.     ___________________________________________________

## (undated) NOTE — LETTER
Mariel May [address]  [facility] [address], [place], [place]  [address]  INFORMED CONSENT FOR TRANSFUSION OF BLOOD OR BLOOD PRODUCTS  My physician has informed me of the nature, purpose, benefits and risks of transfusion for blood and blood components that __________________________________________          ______________________________________________  (Signature of Patient)                                                            (Responsible party in case of Minor,

## (undated) NOTE — IP AVS SNAPSHOT
[facility]            (For Outpatient Use Only) Initial Admit Date: 11/10/2020   Inpt/Obs Admit Date: Inpt: 11/10/20 / Obs: N/A   Discharge Date:    Wilmer Webb:  [de-identified]   MRN: [de-identified]   CSN: [id number]   CEID: [id number]        E Group Number:  Insurance Type:    Subscriber Name:  Subscriber :    Subscriber ID:  Pt Rel to Subscriber:    Hospital Account Financial Class: Medicare    2020

## (undated) NOTE — LETTER
[address], [place]  Authorization for Invasive Procedures  1.  I hereby authorize Dr. Shanti Flores , my physician and whomever may be designated as the doctor's assistant, to perform the following operation and/or procedure:  Chest 4. Should the need arise during my operation or immediate post-operative period; I also consent to the administration of blood and/or blood products.  Further, I understand that despite careful testing and screening of blood and blood products, I may still 9. Patients having a sterilization procedure: I understand that if the procedure is successful the results will be permanent and it will therefore be impossible for me to inseminate, conceive or bear children.  I also understand that the procedure is intend

## (undated) NOTE — IP AVS SNAPSHOT
Patient Demographics     Address  [address] [facility] Phone  [phone] [facility])  [phone] (Mobile) *Preferred* E-mail Address  [email or web address]      Emergency Contact(s)     Name Relation Home Work [address], Instructions Authorizing Provider Morning Afternoon Evening As Needed   Acetaminophen 500 MG Caps      Take 1-2 capsules (500-1,000 mg total) by mouth every 6 (six) hours as needed for Pain.    Mu Veloz PA-C         aspirin 81 MG Tbec  Next dose due: HYDROmorphone HCl 2 MG Tabs  Commonly known as: DILAUDID      Take 1-2 tablets (2-4 mg total) by mouth every 4 (four) hours as needed for Pain.    Amira Fonseca DO         Imodium A-D 2 MG Caps  Generic drug: Loperamide HCl      Take 2 mg by mouth Take 1 g by mouth 4 (four) times daily before meals and nightly. traZODone HCl 50 MG Tabs  Commonly known as: DESYREL      Take 1 tablet (50 mg total) by mouth nightly as needed for Sleep. SERGEI Garcia         vancomycin HCl 125 MG [id number] hydrALAzine HCl (APRESOLINE) tab 50 mg 11/20/20 1328 Given      [id number] hydrALAzine HCl (APRESOLINE) tab 50 mg 11/20/20 2029 Given      [id number] hydrALAzine HCl (APRESOLINE) tab 50 mg 11/21/20 0611 Given      [id number] isosorbide dinitrate (I Component Value Reference Range Flag Lab   Glucose 107 70 - 99 mg/dL [facility])   Sodium 136 136 - 145 mmol/L — [facility])   Potassium 3.8 3.5 - 5.1 mmol/L — [facility])   Chloride 93 98 - 112 mmol/L [facility] ([facility])   CO2 37. 0 Order Status: Completed Lab Status: Final result Updated: 11/13/20 1950    Specimen: Other from Nares      Rapid SARS-CoV-2 by PCR Not Detected    Urine Culture, Routine Once [[id number]]  (Abnormal)  (Susceptibility) Collected: 11/10/20 2030    Order Stat HISTORY OF RECENT ILLNESS:  The patient is a 48-year-old -American female with generalized osteoarthritis, advanced kidney disease, and coronary artery disease.   She had a right knee prosthesis infection that required antibiotic spacer implantation ALLERGIES:  Listed hydrocodone, metoclopramide, penicillin, tramadol, aspirin, codeine; mainly side effects. FAMILY HISTORY:  Mother with diabetes and hypertension. Father had prostate cancer. SOCIAL HISTORY:  Ex-tobacco user.  No current tobacco, al The patient will be admitted to telemetry floor. Hold diuretics. Gentle hydration. Precardiac angiogram tomorrow. Obtain Nephrology and Cardiology consult. Monitor Accu-Cheks. Further recommendations to follow.      Dictated By David Palacio MD  d: 1 • Visual impairment     readers       Past Surgical History  Past Surgical History:   Procedure Laterality Date   • CATARACT  2017   • CATH BARE METAL STENT (BMS)     • CATH PERCUTANEOUS  TRANSLUMINAL CORONARY ANGIOPLASTY     • COLONOSCOPY N/A 6/12/2020 •  acetaminophen (TYLENOL EXTRA STRENGTH) tab 1,000 mg, 1,000 mg, Oral, Once    •  famoTIDine (PEPCID) tab 20 mg, 20 mg, Oral, Once    •  Metoclopramide HCl (REGLAN) tab 10 mg, 10 mg, Oral, Once    •  metoprolol Tartrate (LOPRESSOR) tab 25 mg, 25 mg, Oral, •  acetaminophen (TYLENOL EXTRA STRENGTH) tab 1,000 mg, 1,000 mg, Oral, Q8H PRN    •  HYDROmorphone HCl (DILAUDID) tab 4 mg, 4 mg, Oral, Q4H PRN        Allergies    Hydrocodone             HIVES, NAUSEA AND VOMITING    Comment:Tolerates morphine, hydromorp 08/27/20 : 202 lb 14.4 oz (92 kg)  07/15/20 : 168 lb 12.8 oz (76.6 kg)      General appearance: alert, appears stated age and cooperative  Head: Normocephalic, atraumatic  Eyes: conjunctivae/corneas clear  Neck:  no JVD  Pulmonary:  clear to auscultation a Thank you for allowing me to participate in the care of your patient. Oly Damico MD  11/10/2020[RS.1]      Electronically signed by Yudi Lopez MD on 11/10/2020  6:30 PM   Attribution Bhardwaj    RS. 1 - Amari Garcia MD on 11/10/2020  6:02 PM Morbid obesity (HCC)     Muscle weakness     Neuropathy     Osteopenia     Pain in joint     Rotator cuff syndrome     Temporary cerebral vascular dysfunction     PAF (paroxysmal atrial fibrillation) (HCC)     Polyp of colon     Nonrheumatic mitral valv - awaiting final consult with Dr Iwona Tompkins from [facility] today but likely won't have surgery until after thanksgiving. If this is the case, pt will need to DC to [facility] and come back when surgery scheduled.  Call out to daughter to discuss.      4.      Take 1 capsule (0.25 mcg total) by mouth every other day. Quantity:    Refills: 0     hydrALAzine HCl 50 MG Tabs  Commonly known as: APRESOLINE      Take 1 tablet (50 mg total) by mouth every 8 (eight) hours.    Quantity:    Refills: 0     Insulin Aspart BD Insulin Syringe U/F 31G X 5/16\" 0.5 ML Misc  Generic drug: Insulin Syringe-Needle U-100      USE 1 SYRINGE ONCE DAILY AS DIRECTED   Refills: 5     BD Pen Needle Shantelle U/F 32G X 4 MM Misc  Generic drug: Insulin Pen Needle      USE 1 PEN NEEDLE 4 TIMES DA Vitamin D (Cholecalciferol) 10 MCG (400 UNIT) Tabs      Take 400 Units by mouth daily.    Refills: 0        STOP taking these medications    carvedilol 25 MG Tabs  Commonly known as: COREG           ASK your doctor about these medications      Instructions Author: Mami Oscar PT Service: — Author Type: Physical Therapist    Filed: 11/20/2020  4:01 PM Date of Service: 11/20/2020  3:58 PM Status: Signed    :  Mami Oscar PT (Physical Therapist)       PHYSICAL THERAPY TREATMENT NOTE - IN BALANCE                                                                                                                     Static Sitting: Not tested  Dynamic Sitting: Not tested           Static Standing: Not tested  Dynamic Standing: Not tested    ACTIV Current Status  lift transfers with patient[RW.1] 11/20/2020[RW.2]    Goal #3 Patient is able to transfer to bedside chair with min A x 1    Goal #3   Current Status  lift transfers with patient[RW.1] 11/20/2020[RW.3]    Goal #4     Goal #4   Current Statu Pt is alert orientated x 4 . Pt reports pain at 7/10 right knee. Pt observed with KI brace locked in ext in place with poor positioning and loose.     Pt education initiated with pt including brace education for proper fit , need to wear brace at all francisco j Pt with generalized weakness throughout , right LE is immobilized in KI brace locked in extension--NO right  KNEE ROM per chart  , right LE with restricted WB status of TDWB and left LE is weak with left knee pain. Pt with increae B LE swelling noted . Weight Bearing Restriction: R lower extremity        R Lower Extremity: Touch Down Weight Bearing       PAIN ASSESSMENT   Ratin  Location: right knee  chronic in nature    RN notified   pt agreed to participation at current pain level    Goal #1 Patient is able to demonstrate supine - sit EOB @ level: minimum assistance      Goal #1   Current Status  NT    Goal #2 Patient is able to demonstrate transfers Sit to/from Stand at assistance level: minimum assistance with walker - rolling      G Pt seen up in bed, PPE worn by staff, gloves, mask and goggles, pt wearing mask. Pt declined to sit at EOB, stating she was unable.  Pt with KI on RLE< discussed with pt possible transfer bed to chair with chair placed on L, stronger side, pt stating she is -   Taking care of personal grooming such as brushing teeth?: A Little  -   Eating meals?: A Little    AM-PAC Score:  Score: 14  Approx Degree of Impairment: 59.67%  Standardized Score (AM-PAC Scale): 33.39  CMS Modifier (G-Code): CK    FUNCTIONAL TRANSFER Description: Patient's Long Term Goal: go home    Interventions:  - cath, med admin, follow MD orders  - See additional Care Plan goals for specific interventions   Patient/Family Short Term Goal   (Resolved)     Interdisciplinary Completed    Description:

## (undated) NOTE — IP AVS SNAPSHOT
[facility]            (For Outpatient Use Only) Initial Admit Date: 8/1/2020   Inpt/Obs Admit Date: Inpt: 8/1/20 / Obs: N/A   Discharge Date:    [place]:  [de-identified]   MRN: [de-identified]   CSN: [id number]   CEID: [id number]        [facility] Subscriber Name:  Nba Avelar :    Subscriber ID:  Pt Rel to Subscriber:    Hospital Account Financial Class: Medicare    2020

## (undated) NOTE — IP AVS SNAPSHOT
[facility]            (For Outpatient Use Only) Initial Admit Date: 1/25/2021   Inpt/Obs Admit Date: Inpt: 1/25/21 / Obs: N/A   Discharge Date:    Jessie Burnham:  [de-identified]   MRN: [de-identified]   CSN: [id number]   CEID: [id number]        [facility] Subscriber Name:  Saima Echevarria :    Subscriber ID:  Pt Rel to Subscriber:    Hospital Account Financial Class: Medicare    2021

## (undated) NOTE — LETTER
[facility]  Administration of Anesthesia  1. Clyde Cantrell, or _________________________________ acting on her behalf, (Patient) (Dependent/Representative) request to receive anesthesia for my pending procedure/operation/treatment.   A bleeding, seizure, cardiac arrest and death. 7. AWARENESS: I understand that it is possible (but unlikely) to have explicit memory of events from the operating room while under general anesthesia.   8. ELECTROCONVULSIVE THERAPY PATIENTS: This consent serve below affirms that prior to the time of the procedure, I have explained to the patient and/or his/her guardian, the risks and benefits of undergoing anesthesia, as well as any reasonable alternatives.     ___________________________________________________

## (undated) NOTE — LETTER
[address], [place]  Authorization for Invasive Procedures  1.  I hereby authorize Dr. Alex Jackson , my physician and whomever may be designated as the doctor's assistant, to perform the following operation and/or procedure:  [place]ophag[place] 4. Should the need arise during my operation or immediate post-operative period; I also consent to the administration of blood and/or blood products.  Further, I understand that despite careful testing and screening of blood and blood products, I may still 9. Patients having a sterilization procedure: I understand that if the procedure is successful the results will be permanent and it will therefore be impossible for me to inseminate, conceive or bear children.  I also understand that the procedure is intend

## (undated) NOTE — LETTER
[facility]  Administration of Anesthesia  1. Nadia Mari, or _________________________________ acting on her behalf, (Patient) (Dependent/Representative) request to receive anesthesia for my pending procedure/operation/treatment.   A 6. OBSTETRIC PATIENTS: Specific risks/consequences of spinal/epidural anesthesia may include itching, low blood pressure, difficulty urinating, slowing of the baby's heart rate and headache.  Rare risks include infections, high spinal block, spinal bleeding ___________________________________________________           _____________________________________________________  Date/Time                                                                                               Responsible person in case of minor

## (undated) NOTE — IP AVS SNAPSHOT
Patient Demographics     Address  [address] Phone  [phone] [facility])  [phone] (Mobile) *Preferred* E-mail Address  [email or web address]      Emergency Contact(s)     Name Relation Home Work [address], Specialty: GASTROENTEROLOGY  Contact information:  Thomas Conner   [phone]                  Your medication list      TAKE these medications       Instructions Authorizing Provider Morning Afternoon Evening As Needed   A Commonly known as: FLONASE  Next dose due: WHEN NEEDED      daily as needed. folic acid 1 MG Tabs  Commonly known as: Tennis Hinckley  Next dose due: TOMORROW MORNING      Take 1 tablet (1 mg total) by mouth daily.    Tara Hutchinson MD         [facility] Good Sergio Take 1 g by mouth 4 (four) times daily before meals and nightly. traZODone HCl 50 MG Tabs  Commonly known as: DESYREL  Next dose due: TONIGHT AT BEDTIME IF NEEDED      Take 1 tablet (50 mg total) by mouth nightly as needed for Sleep.    Telly Cooper [id number] Potassium Chloride ER (K-DUR M20) CR tab 40 mEq 12/09/20 0939 Given      [id number] aspirin EC tab 81 mg 12/08/20 1825 Given      [id number] aspirin EC tab 81 mg 12/09/20 0939 Given      [id number] diphenhydrAMINE (BENADRYL) cap/tab 25 mg 12/09/20 Chloride 104 98 - 112 mmol/L — [facility] ([facility])   CO2 35.0 21.0 - 32.0 mmol/L [facility] ([facility])   Anion Gap 4 0 - 18 mmol/L — [facility] ([facility])    7 - 18 mg/dL [facility])   Creatinine 2.56 0.55 - 1.02 mg/dL [facility] ([facility])   BUN Not Specified    Amikacin <=2  Sensitive    Cefazolin >=64  Resistant    Cefepime >=64  Resistant    Ceftazidime >=64  Resistant    Ceftriaxone >=64  Resistant    Ciprofloxacin >=4  Resistant    Gentamicin >=16  Resistant    Levofloxacin >=8  Resistant •  metolazone 2.5 MG Oral Tab, Take 1 tablet (2.5 mg total) by mouth 3 (three) times a week., Disp:  , Rfl: 0    •  calciTRIOL 0.25 MCG Oral Cap, Take 1 capsule (0.25 mcg total) by mouth every other day., Disp:  , Rfl: 0    •  hydrALAzine HCl 50 MG Oral Ta •  Vitamin D, Cholecalciferol, (VITAMIN D3) 10 MCG (400 UNIT) Oral Tab, Take 400 Units by mouth daily. , Disp: , Rfl:     •  Insulin NPH, Human,, Isophane, 100 UNIT/ML Subcutaneous Suspension, Inject 12 Units into the skin daily with breakfast., Disp: , Rfl •  dextrose 50 % injection 50 mL, 50 mL, Intravenous, Q15 Min PRN    Or    •  glucose (DEX4) oral liquid 30 g, 30 g, Oral, Q15 Min PRN    Or    •  Glucose-Vitamin C (DEX-4) chewable tab 8 tablet, 8 tablet, Oral, Q15 Min PRN    •  Atropine Sulfate 0.1 MG/ML • Visual impairment     readers     Past Surgical History:   Procedure Laterality Date   • CATARACT  2017   • CATH BARE METAL STENT (BMS)     • CATH PERCUTANEOUS  TRANSLUMINAL CORONARY ANGIOPLASTY     • COLONOSCOPY N/A 6/12/2020    Performed by Eve [facility] NECK & BACK [x ] [x ]    HEART [x ] [ x]    LUNGS [x ] [ x]    ABDOMEN [x ] [x ]    UROGENITAL [ ] [ ]    EXTREMITIES [x ] [x ]    OTHER        [ x ] I have discussed the risks and benefits and alternatives with the patient/family.   They understand and agr She was scheduled to have orthopedic surgery to remove spacers in her knee. It was decided because of this to intervene on her circumflex in the future not now. She actually was scheduled to come off of her Plavix on December 2 for surgery.   She then had • KNEE TOTAL REVISION Right 8/3/2020    Performed by Carlos Arora MD at [facility] MAIN OR   • OTHER  2003    debridement r/t spider bite - brown reclusive, right thigh   • OTHER      removal kidney stone   • OTHER SURGICAL HISTORY      Cardiac stent placed 06/ •  nitroGLYCERIN (NITROSTAT) SL tab 0.4 mg, 0.4 mg, Sublingual, Q5 Min PRN    •  ondansetron HCl (ZOFRAN) injection 4 mg, 4 mg, Intravenous, Q6H PRN    •  Metoclopramide HCl (REGLAN) injection 5 mg, 5 mg, Intravenous, Q8H PRN    •  Pantoprazole Sodium (PRO •  Ferrous Sulfate 325 (65 Fe) MG Oral Tab, Take 1 tablet (325 mg total) by mouth 3 (three) times daily. •  folic acid 1 MG Oral Tab, Take 1 tablet (1 mg total) by mouth daily.     •  Vitamin D, Cholecalciferol, (VITAMIN D3) 10 MCG (400 UNIT) Oral Tab, T Tramadol                NAUSEA AND VOMITING, HIVES    Comment:Other reaction(s): emesis  Aspirin                 NAUSEA AND VOMITING, RASH    Comment:Can tolerate low dose but not regular strength d/t             GI problemsStomach ulcers  Codeine Cardiac: Regular rate and rhythm. S1, S2 normal. No murmur, pericardial rub, S3, or extra cardiac sounds. Lungs: Clear without wheezes, rales, rhonchi or dullness. Normal excursions and effort. Abdomen: Soft, non-tender.  No organosplenomegally, mass or HGB 7.9*   < > 8.1*   < > 8.2* 7.9* 8.0*   HCT 23.3*   < > 24.2*   < > 24.6* 24.1* 24.5*   MCV 86.9  --  87.7   < > 90.1 89.6 92.5   MCH 29.5  --  29.3   < > 30.0 29.4 30.2   MCHC 33.9  --  33.5   < > 33.3 32.8 32.7   RDW 15.3*  --  15.7*   < > 16.6* 16.8* Imaging Results (HF patients)    Chest X-Ray Results (HF patients only)    No exam resulted this encounter. 2D Echocardiogram Results (HF patients only)    No exam resulted this encounter.       Cath Angiogram Results (HF Patients only)    No exam resu Bed Mobility: Pt transferred supine to sit with maxAx2. Pt knowledgeable of some components of sequencing, required mod verbal cues for positioning. Pt sat EOB for 5 minutes with CGA and bilateral UE support.  Pt deferred transfer to recliner due to fatigue \"This is a very pleasant 65-year-old woman who was admitted to our institution in November of this year. She has a history of osteoarthritis and had a right knee prosthesis infection that required antibiotic spacer placement in August 2020.   She was admi systolic. It continued to waffle a bit and dropped into the 80s again after her arrival in the PCU. She had been ordered 2 units of packed red blood cells.   However, because of the continued borderline blood pressure and dark black bowel movement which w CKD (chronic kidney disease), stage IV (HCC)    Gastrointestinal hemorrhage    Severe anemia      Past Medical History  Past Medical History:   Diagnosis Date   • Back problem    • Calculus of kidney    • Cataract 2017    surgery   • Coronary atheroscler Prior Level of Young: Dependent, unable to get details due to fatigue. SUBJECTIVE  \"I can try. \"     PHYSICAL THERAPY EXAMINATION     OBJECTIVE  Precautions: Limb alert - right;Cardiac;Bed/chair alarm  Fall Risk: High fall risk    WEIGHT BEARIN Strengthening  Lower therapeutic exercise: Ankle pumps  Heel slides  Transfer training    Patient End of Session: In bed;Needs met;Call light within reach;RN aware of session/findings; All patient questions and concerns addressed; Alarm set    CURRENT GOALS No notes of this type exist for this encounter. Video Swallow Study Notes    No notes of this type exist for this encounter. SLP Notes    No notes of this type exist for this encounter.      Future Appointments        Provider Shen Donohue

## (undated) NOTE — LETTER
[address], [place]  Authorization for Invasive Procedures  1.  I hereby authorize Dr. Ulices Holly , my physician and whomever may be designated as the doctor's assistant, to perform the following operation and/or procedure: Central potential risks that can occur: fever and allergic reactions, hemolytic reactions, transmission of disease such as hepatitis, AIDS, cytomegalovirus (CMV), and flluid overload.  In the event that I wish to have autologous transfusions of my own blood, or a d judgment of my physician.      Signature of Patient:  ________________________________________________ Date: _________Time: _________    Responsible person in case of minor or unconscious: _____________________________Relationship: ____________     Witness

## (undated) NOTE — LETTER
[facility], [place]  Authorization for Invasive Procedures  1.  I hereby authorize Dr. Teetee Schneider , my physician and whomever may be designated as the doctor's assistant, to perform the following operation and/or procedure:  CARDIAC C 5. I consent to the photographing of the operations or procedures to be performed for the purposes of advancing medicine, science, and/or education, provided my identity is not revealed.  If the procedure has been videotaped, the physician/surgeon will obta __________ Time: ___________    Statement of Physician  My signature below affirms that prior to the time of the procedure, I have explained to the patient and/or her legal representative, the risks and benefits involved in the proposed treatment and any r

## (undated) NOTE — IP AVS SNAPSHOT
[facility]            (For Outpatient Use Only) Initial Admit Date: 6/18/2020   Inpt/Obs Admit Date: Inpt: 6/18/20 / Obs: N/A   Discharge Date:    Dora Tomlinson:  [de-identified]   MRN: [de-identified]   CSN: [id number]   CEID: [id number]        [facility] Subscriber ID:  Pt Rel to Subscriber:    Hospital Account Financial Class: Medicare    June 26, 2020

## (undated) NOTE — IP AVS SNAPSHOT
Patient Demographics     Address  [address] Phone  [phone] [facility])  [phone] (Mobile) *Preferred* E-mail Address  [email or web address]      Emergency Contact(s)     Name Relation Home Work Mobile    wilma MD         bumetanide 1 MG Tabs  Commonly known as: BUMEX  Next dose due: Tomorrow 9 am      Take 1 tablet (1 mg total) by mouth every other day. Missy Coleman MD         calciTRIOL 0.25 MCG Caps  Commonly known as: ROCALTROL  Next dose due:  Tomorrow 9 lactulose 10 GM/15ML Soln  Commonly known as: CHRONULAC  Next dose due: Anytime as Needed      Take 20 g by mouth daily as needed. lidocaine-menthol 4-1 % Ptch  Next dose due: Tomorrow 9 am      Place 1 patch onto the skin daily.    [place] location directed by your doctor or nurse    Bring a paper prescription for each of these medications  HYDROmorphone HCl 2 MG Tabs           [phone] - MAR ACTION REPORT  (last 24 hrs)    ** SITE UNKNOWN **     Order ID Medication Name Action Time Action ([facility])    Specimen Information    Type Source Collected On   Blood — 03/11/21 0536          Components    Component Value Reference Range Flag Lab   Glucose 143 70 - 99 mg/dL H [facility])   Sodium 141 136 - 145 mmol/L — [facility]   Troponin 0.171 <0.045 ng/mL [facility])   Comment:         This test may exhibit interference when a sample is collected from a person who is consuming high dose of biotin (a.k.a., vitamin B7, vitamin H, coenzyme R) supplements resulting in Lauren Anderson M.D. [facility]  [facility][note 3][place] [id number] 03/19/20 1442 - Present            Microbiology Results (All)     Procedure Component Value Units Date/Time    Clostridium difficile(toxigenic)PCR Once [[id number]]  (Normal) Collected: 03/10/21 HISTORY:  Coronary artery disease, had LAD drug-eluting stent in June 2020. After that, was found to have low ejection fraction. Repeat angiogram showed diffuse coronary artery disease. Elected to be treated conservatively.   She had history of hypertens person. Moderate distress. VITAL SIGNS:  Temperature 98.8, pulse 83, respiratory rate 24, blood pressure 147/76, pulse ox 100% on room air. HEENT:  Atraumatic. Oropharynx clear, dry mucous membranes. Normal hard and soft palate.   Eyes:  Anicteric scle Cardiology Consultation  [facility]    Alexandra Haq Patient Status:  Inpatient    1945 MRN [id number]   Location [facility] 5SW/SE Attending Alex Jaime MD   Hosp Day # 1 PCP Olga Oates MD     Date of Adm N/A 6/12/2020    Performed by Chelsy Cerda MD at [address] [place] [address]     • ESOPHAGOGASTRODUODENOSCOPY (EGD) N/A 12/5/2020    Performed by Mando Rodney MD at [facility]   • ESOPHAGOGASTRODUODENOSCOPY (EGD) N/A 6/12 Pantoprazole Sodium (PROTONIX) 40 mg in Sodium Chloride (PF) 0.9 % 10 mL IV push, 40 mg, Intravenous, Daily  0.9% NaCl infusion, , Intravenous, Continuous  acetaminophen (TYLENOL) tab 650 mg, 650 mg, Oral, Q6H PRN  ondansetron HCl (ZOFRAN) injection 4 mg, MG/0.4ML Subcutaneous Solution, Inject 0.4 mL (40 mg total) into the skin daily. Ondansetron HCl (ZOFRAN) 4 mg tablet, Take 1 tablet (4 mg total) by mouth every 8 (eight) hours as needed for Nausea.   acetaminophen 500 MG Oral Tab, Take 1-2 tablets (500-1, needed. If patient remains unresponsive, repeat dose in other nostril 2-5 minutes after first dose. Nutritional Supplements (GLUCERNA SHAKE OR), Take 1 each by mouth 3 (three) times daily as needed.   ondansetron 4 MG Oral Tablet Dispersible, Take 1 tablet 03/09/21 0659 03/09/21 0700 - 03/10/21 0659       Intake    P.O.  --  395  --    P.O. -- 395 --    I.V.  --  225  675    Volume (mL) (0.9% NaCl infusion) -- 225 675    Total Intake -- 620 675       Output    Urine  --  150  --    Urine -- 150 --    Inconti 12/20/2019    LIP 12 (L) 09/17/2020    ESRML 10 10/21/2020    CRP <0.29 10/21/2020    MG 2.5 12/08/2020    PHOS 3.1 12/08/2020    CK 38 08/27/2020    B12 1,087 (H) 06/18/2020         Recent Labs   Lab 03/08/21  1255 03/09/21  0520   * 135*   BUN 46* 11:44 AM Date of Service: 3/11/2021 11:44 AM Status: Signed    : Dario De León MD (Physician)       [facility]  Discharge Summary[JH.1]     Flora Jones[JH.2]  : 1945    Status: Inpatient  Day #:[JH.1] 3[JH.2]    Attending: Jocelyn Vogt January and she was discharged on aspirin per ortho.  Off lovenox for now.  -antiemetics prn     CAD with ischemic CM EFT 15%  LAD drug-eluting stent in June 2020  -back on asa 81mg  -cardiology on consult  -cont metoprolol  -bumex po     Paroxysmal a-fib Discharge Medications      CHANGE how you take these medications      Instructions Prescription details   ondansetron 4 MG Tbdp  Commonly known as: ZOFRAN-ODT  What changed: when to take this      Take 1 tablet (4 mg total) by mouth every 4 (four) hours as daily as needed. Refills: 0     HYDROmorphone HCl 2 MG Tabs  Commonly known as: DILAUDID      Take 1 tablet (2 mg total) by mouth every 4 (four) hours as needed for Pain.    Quantity: 15 tablet  Refills: 0     Insulin Aspart Pen 100 UNIT/ML Sopn  Commonly medications    Clopidogrel Bisulfate 75 MG Tabs  Commonly known as: PLAVIX        Enoxaparin Sodium 40 MG/0.4ML Soln  Commonly known as: LOVENOX        Ondansetron HCl 4 mg tablet  Commonly known as: Vanessa Patel              Where to Get Your Medications      P encouragement d/t nausea. Pt transferred from rehab where she was recovering after R knee fusion on 1/26/21. Pt was WBAT RLE in knee immobilizer at previous d/c. Pt no longer wearing brace.  R[AO.1]eports she was at rehab starting to stand and walk 10 feet re-attempt later today. Plan to attempt eval tomorrow. [SF.1]      Attribution Bhardwaj    SF.1 - Delgado Solano OT on 3/10/2021 11:07 AM               Occupational Therapy Note signed by Delgado Solano OT at 3/9/2021  2:10 PM  Version 1 of 1    Author:

## (undated) NOTE — LETTER
[address]  [address]  [facility] [address]  [phone]                06/21/20      Julianne [address]        Dear Taqueria Villatoro,    I reviewed the pathology rep

## (undated) NOTE — LETTER
[address], [place]  Authorization for Invasive Procedures  1.  I hereby authorize Dr. Robert Thibodeaux , my physician and whomever may be designated as the doctor's assistant, to perform the following operation and/or procedure:  Central adrian following are some, but not all, of the potential risks that can occur: fever and allergic reactions, hemolytic reactions, transmission of disease such as hepatitis, AIDS, cytomegalovirus (CMV), and flluid overload.  In the event that I wish to have autolog administration of sedation/analgesia as may be necessary or desirable in the judgment of my physician.      Signature of Patient:  ________________________________________________ Date: _________Time: _________    Responsible person in case of minor or unco

## (undated) NOTE — LETTER
[facility]  Administration of Anesthesia  1. Ortiz Martinez, or _________________________________ acting on her behalf, (Patient) (Dependent/Representative) request to receive anesthesia for my pending procedure/operation/treatment.   A 6. OBSTETRIC PATIENTS: Specific risks/consequences of spinal/epidural anesthesia may include itching, low blood pressure, difficulty urinating, slowing of the baby's heart rate and headache.  Rare risks include infections, high spinal block, spinal bleeding ___________________________________________________           _____________________________________________________  Date/Time                                                                                               Responsible person in case of minor

## (undated) NOTE — LETTER
Hospital Discharge Documentation  Patient  during her most recent hospitalization at [facility].     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 Also noted to have a necrotic right heel ulcer. X-ray of the right heel is still pending.   Blood transfusion was ordered, IV Protonix, and she will be admitted to the hospital for further management.  [facility]

## (undated) NOTE — LETTER
[address] [place] [address]      Authorization for Surgical Operation and Procedure     Date:___________                                                                                                         Time:_______ following are some, but not all, of the potential risks that can occur: fever and allergic reactions, hemolytic reactions, transmission of diseases such as Hepatitis, AIDS and Cytomegalovirus (CMV) and fluid overload.   In the event that I wish to have an a The surgeon or my attending physician will determine when the applicable recovery period ends for purposes of reinstating the DNAR order.   10. Patients having a sterilization procedure: I understand that if the procedure is successful the results will be p with my patient.     _______________________________________________________________ _____________________________  [facility] Candy  Physician)                                                                                         (Date)

## (undated) NOTE — LETTER
[address], [place]  Authorization for Invasive Procedures  1.  I hereby authorize Dr. Roberth Becerra , my physician and whomever may be designated as the doctor's assistant, to perform the following operation and/or procedure:  [address] [facility] 4. Should the need arise during my operation or immediate post-operative period; I also consent to the administration of blood and/or blood products.  Further, I understand that despite careful testing and screening of blood and blood products, I may still 9. Patients having a sterilization procedure: I understand that if the procedure is successful the results will be permanent and it will therefore be impossible for me to inseminate, conceive or bear children.  I also understand that the procedure is intend